# Patient Record
Sex: FEMALE | Race: WHITE | HISPANIC OR LATINO
[De-identification: names, ages, dates, MRNs, and addresses within clinical notes are randomized per-mention and may not be internally consistent; named-entity substitution may affect disease eponyms.]

---

## 2017-04-13 ENCOUNTER — APPOINTMENT (OUTPATIENT)
Dept: CARDIOLOGY | Facility: CLINIC | Age: 82
End: 2017-04-13

## 2017-04-13 VITALS — WEIGHT: 120 LBS | SYSTOLIC BLOOD PRESSURE: 110 MMHG | DIASTOLIC BLOOD PRESSURE: 60 MMHG | BODY MASS INDEX: 21.95 KG/M2

## 2017-09-25 ENCOUNTER — APPOINTMENT (OUTPATIENT)
Dept: CARDIOLOGY | Facility: CLINIC | Age: 82
End: 2017-09-25

## 2017-09-25 VITALS
BODY MASS INDEX: 20.8 KG/M2 | HEIGHT: 62 IN | WEIGHT: 113 LBS | HEART RATE: 82 BPM | DIASTOLIC BLOOD PRESSURE: 66 MMHG | SYSTOLIC BLOOD PRESSURE: 126 MMHG

## 2017-09-25 DIAGNOSIS — E11.9 TYPE 2 DIABETES MELLITUS W/OUT COMPLICATIONS: ICD-10-CM

## 2017-09-25 DIAGNOSIS — I10 ESSENTIAL (PRIMARY) HYPERTENSION: ICD-10-CM

## 2017-09-25 DIAGNOSIS — E03.9 HYPOTHYROIDISM, UNSPECIFIED: ICD-10-CM

## 2017-09-25 DIAGNOSIS — Z98.61 ATHEROSCLEROTIC HEART DISEASE OF NATIVE CORONARY ARTERY W/OUT ANGINA PECTORIS: ICD-10-CM

## 2017-09-25 DIAGNOSIS — I25.10 ATHEROSCLEROTIC HEART DISEASE OF NATIVE CORONARY ARTERY W/OUT ANGINA PECTORIS: ICD-10-CM

## 2017-10-13 ENCOUNTER — APPOINTMENT (OUTPATIENT)
Dept: CARDIOLOGY | Facility: CLINIC | Age: 82
End: 2017-10-13

## 2017-10-13 VITALS
DIASTOLIC BLOOD PRESSURE: 77 MMHG | WEIGHT: 113 LBS | HEIGHT: 62 IN | BODY MASS INDEX: 20.8 KG/M2 | HEART RATE: 62 BPM | SYSTOLIC BLOOD PRESSURE: 113 MMHG | OXYGEN SATURATION: 99 %

## 2017-12-14 ENCOUNTER — INPATIENT (INPATIENT)
Facility: HOSPITAL | Age: 82
LOS: 5 days | Discharge: HOME HEALTH | End: 2017-12-20
Attending: INTERNAL MEDICINE | Admitting: GENERAL PRACTICE

## 2017-12-14 DIAGNOSIS — I25.10 ATHEROSCLEROTIC HEART DISEASE OF NATIVE CORONARY ARTERY WITHOUT ANGINA PECTORIS: ICD-10-CM

## 2017-12-14 DIAGNOSIS — I50.1 LEFT VENTRICULAR FAILURE, UNSPECIFIED: ICD-10-CM

## 2017-12-14 DIAGNOSIS — M06.9 RHEUMATOID ARTHRITIS, UNSPECIFIED: ICD-10-CM

## 2017-12-14 DIAGNOSIS — R73.9 HYPERGLYCEMIA, UNSPECIFIED: ICD-10-CM

## 2017-12-14 DIAGNOSIS — E03.9 HYPOTHYROIDISM, UNSPECIFIED: ICD-10-CM

## 2017-12-14 DIAGNOSIS — I10 ESSENTIAL (PRIMARY) HYPERTENSION: ICD-10-CM

## 2017-12-14 DIAGNOSIS — E11.9 TYPE 2 DIABETES MELLITUS WITHOUT COMPLICATIONS: ICD-10-CM

## 2017-12-14 DIAGNOSIS — E78.5 HYPERLIPIDEMIA, UNSPECIFIED: ICD-10-CM

## 2017-12-22 DIAGNOSIS — Z79.84 LONG TERM (CURRENT) USE OF ORAL HYPOGLYCEMIC DRUGS: ICD-10-CM

## 2017-12-22 DIAGNOSIS — I42.8 OTHER CARDIOMYOPATHIES: ICD-10-CM

## 2017-12-22 DIAGNOSIS — Z79.4 LONG TERM (CURRENT) USE OF INSULIN: ICD-10-CM

## 2017-12-22 DIAGNOSIS — E87.1 HYPO-OSMOLALITY AND HYPONATREMIA: ICD-10-CM

## 2017-12-22 DIAGNOSIS — M06.9 RHEUMATOID ARTHRITIS, UNSPECIFIED: ICD-10-CM

## 2017-12-22 DIAGNOSIS — N17.9 ACUTE KIDNEY FAILURE, UNSPECIFIED: ICD-10-CM

## 2017-12-22 DIAGNOSIS — Z95.810 PRESENCE OF AUTOMATIC (IMPLANTABLE) CARDIAC DEFIBRILLATOR: ICD-10-CM

## 2017-12-22 DIAGNOSIS — E87.6 HYPOKALEMIA: ICD-10-CM

## 2017-12-22 DIAGNOSIS — I11.0 HYPERTENSIVE HEART DISEASE WITH HEART FAILURE: ICD-10-CM

## 2017-12-22 DIAGNOSIS — E03.9 HYPOTHYROIDISM, UNSPECIFIED: ICD-10-CM

## 2017-12-22 DIAGNOSIS — E83.39 OTHER DISORDERS OF PHOSPHORUS METABOLISM: ICD-10-CM

## 2017-12-22 DIAGNOSIS — I21.9 ACUTE MYOCARDIAL INFARCTION, UNSPECIFIED: ICD-10-CM

## 2017-12-22 DIAGNOSIS — I50.23 ACUTE ON CHRONIC SYSTOLIC (CONGESTIVE) HEART FAILURE: ICD-10-CM

## 2017-12-22 DIAGNOSIS — Z87.891 PERSONAL HISTORY OF NICOTINE DEPENDENCE: ICD-10-CM

## 2017-12-22 DIAGNOSIS — E86.0 DEHYDRATION: ICD-10-CM

## 2017-12-22 DIAGNOSIS — E11.10 TYPE 2 DIABETES MELLITUS WITH KETOACIDOSIS WITHOUT COMA: ICD-10-CM

## 2017-12-22 DIAGNOSIS — I25.10 ATHEROSCLEROTIC HEART DISEASE OF NATIVE CORONARY ARTERY WITHOUT ANGINA PECTORIS: ICD-10-CM

## 2017-12-22 DIAGNOSIS — J06.9 ACUTE UPPER RESPIRATORY INFECTION, UNSPECIFIED: ICD-10-CM

## 2017-12-22 DIAGNOSIS — E78.5 HYPERLIPIDEMIA, UNSPECIFIED: ICD-10-CM

## 2018-01-07 ENCOUNTER — INPATIENT (INPATIENT)
Facility: HOSPITAL | Age: 83
LOS: 4 days | Discharge: HOME | End: 2018-01-12
Attending: INTERNAL MEDICINE | Admitting: GENERAL PRACTICE

## 2018-01-07 DIAGNOSIS — M06.9 RHEUMATOID ARTHRITIS, UNSPECIFIED: ICD-10-CM

## 2018-01-07 DIAGNOSIS — E03.9 HYPOTHYROIDISM, UNSPECIFIED: ICD-10-CM

## 2018-01-07 DIAGNOSIS — R73.9 HYPERGLYCEMIA, UNSPECIFIED: ICD-10-CM

## 2018-01-07 DIAGNOSIS — I25.10 ATHEROSCLEROTIC HEART DISEASE OF NATIVE CORONARY ARTERY WITHOUT ANGINA PECTORIS: ICD-10-CM

## 2018-01-07 DIAGNOSIS — E11.9 TYPE 2 DIABETES MELLITUS WITHOUT COMPLICATIONS: ICD-10-CM

## 2018-01-07 DIAGNOSIS — E78.5 HYPERLIPIDEMIA, UNSPECIFIED: ICD-10-CM

## 2018-01-07 DIAGNOSIS — I10 ESSENTIAL (PRIMARY) HYPERTENSION: ICD-10-CM

## 2018-01-07 DIAGNOSIS — I50.1 LEFT VENTRICULAR FAILURE, UNSPECIFIED: ICD-10-CM

## 2018-01-18 ENCOUNTER — OUTPATIENT (OUTPATIENT)
Dept: OUTPATIENT SERVICES | Facility: HOSPITAL | Age: 83
LOS: 1 days | Discharge: HOME | End: 2018-01-18

## 2018-01-18 ENCOUNTER — APPOINTMENT (OUTPATIENT)
Dept: ENDOCRINOLOGY | Facility: CLINIC | Age: 83
End: 2018-01-18

## 2018-01-18 VITALS
TEMPERATURE: 97.2 F | HEIGHT: 62 IN | BODY MASS INDEX: 20.43 KG/M2 | HEART RATE: 88 BPM | WEIGHT: 111 LBS | SYSTOLIC BLOOD PRESSURE: 102 MMHG | DIASTOLIC BLOOD PRESSURE: 68 MMHG

## 2018-01-18 DIAGNOSIS — I25.10 ATHEROSCLEROTIC HEART DISEASE OF NATIVE CORONARY ARTERY WITHOUT ANGINA PECTORIS: ICD-10-CM

## 2018-01-18 DIAGNOSIS — I10 ESSENTIAL (PRIMARY) HYPERTENSION: ICD-10-CM

## 2018-01-18 DIAGNOSIS — E78.5 HYPERLIPIDEMIA, UNSPECIFIED: ICD-10-CM

## 2018-01-18 DIAGNOSIS — I50.1 LEFT VENTRICULAR FAILURE, UNSPECIFIED: ICD-10-CM

## 2018-01-18 DIAGNOSIS — E11.9 TYPE 2 DIABETES MELLITUS WITHOUT COMPLICATIONS: ICD-10-CM

## 2018-01-18 DIAGNOSIS — R73.9 HYPERGLYCEMIA, UNSPECIFIED: ICD-10-CM

## 2018-01-18 DIAGNOSIS — M06.9 RHEUMATOID ARTHRITIS, UNSPECIFIED: ICD-10-CM

## 2018-01-18 DIAGNOSIS — E03.9 HYPOTHYROIDISM, UNSPECIFIED: ICD-10-CM

## 2018-01-18 RX ORDER — ESCITALOPRAM OXALATE 10 MG/1
10 TABLET ORAL
Qty: 30 | Refills: 3 | Status: ACTIVE | COMMUNITY
Start: 2018-01-18

## 2018-01-19 LAB — GLUCOSE SERPL-MCNC: 175 MG/DL

## 2018-01-22 DIAGNOSIS — E03.9 HYPOTHYROIDISM, UNSPECIFIED: ICD-10-CM

## 2018-01-22 DIAGNOSIS — Z91.19 PATIENT'S NONCOMPLIANCE WITH OTHER MEDICAL TREATMENT AND REGIMEN: ICD-10-CM

## 2018-01-22 DIAGNOSIS — E11.59 TYPE 2 DIABETES MELLITUS WITH OTHER CIRCULATORY COMPLICATIONS: ICD-10-CM

## 2018-01-22 DIAGNOSIS — I13.0 HYPERTENSIVE HEART AND CHRONIC KIDNEY DISEASE WITH HEART FAILURE AND STAGE 1 THROUGH STAGE 4 CHRONIC KIDNEY DISEASE, OR UNSPECIFIED CHRONIC KIDNEY DISEASE: ICD-10-CM

## 2018-01-22 DIAGNOSIS — Z79.82 LONG TERM (CURRENT) USE OF ASPIRIN: ICD-10-CM

## 2018-01-22 DIAGNOSIS — Z90.710 ACQUIRED ABSENCE OF BOTH CERVIX AND UTERUS: ICD-10-CM

## 2018-01-22 DIAGNOSIS — I50.22 CHRONIC SYSTOLIC (CONGESTIVE) HEART FAILURE: ICD-10-CM

## 2018-01-22 DIAGNOSIS — R41.0 DISORIENTATION, UNSPECIFIED: ICD-10-CM

## 2018-01-22 DIAGNOSIS — Z79.4 LONG TERM (CURRENT) USE OF INSULIN: ICD-10-CM

## 2018-01-22 DIAGNOSIS — M06.9 RHEUMATOID ARTHRITIS, UNSPECIFIED: ICD-10-CM

## 2018-01-22 DIAGNOSIS — B96.20 UNSPECIFIED ESCHERICHIA COLI [E. COLI] AS THE CAUSE OF DISEASES CLASSIFIED ELSEWHERE: ICD-10-CM

## 2018-01-22 DIAGNOSIS — E11.65 TYPE 2 DIABETES MELLITUS WITH HYPERGLYCEMIA: ICD-10-CM

## 2018-01-22 DIAGNOSIS — E78.5 HYPERLIPIDEMIA, UNSPECIFIED: ICD-10-CM

## 2018-01-22 DIAGNOSIS — F03.90 UNSPECIFIED DEMENTIA WITHOUT BEHAVIORAL DISTURBANCE: ICD-10-CM

## 2018-01-22 DIAGNOSIS — Z87.891 PERSONAL HISTORY OF NICOTINE DEPENDENCE: ICD-10-CM

## 2018-01-22 DIAGNOSIS — H40.9 UNSPECIFIED GLAUCOMA: ICD-10-CM

## 2018-01-22 DIAGNOSIS — F32.9 MAJOR DEPRESSIVE DISORDER, SINGLE EPISODE, UNSPECIFIED: ICD-10-CM

## 2018-01-22 DIAGNOSIS — E11.22 TYPE 2 DIABETES MELLITUS WITH DIABETIC CHRONIC KIDNEY DISEASE: ICD-10-CM

## 2018-01-22 DIAGNOSIS — Z95.810 PRESENCE OF AUTOMATIC (IMPLANTABLE) CARDIAC DEFIBRILLATOR: ICD-10-CM

## 2018-01-22 DIAGNOSIS — E87.1 HYPO-OSMOLALITY AND HYPONATREMIA: ICD-10-CM

## 2018-01-22 DIAGNOSIS — N39.0 URINARY TRACT INFECTION, SITE NOT SPECIFIED: ICD-10-CM

## 2018-01-22 DIAGNOSIS — N18.4 CHRONIC KIDNEY DISEASE, STAGE 4 (SEVERE): ICD-10-CM

## 2018-01-22 DIAGNOSIS — Z95.0 PRESENCE OF CARDIAC PACEMAKER: ICD-10-CM

## 2018-01-22 LAB — C PEPTIDE SERPL-MCNC: 1.04 NG/ML

## 2018-01-22 RX ORDER — INSULIN LISPRO 100 [IU]/ML
100 INJECTION, SOLUTION INTRAVENOUS; SUBCUTANEOUS
Refills: 0 | Status: DISCONTINUED | COMMUNITY
Start: 2018-01-18 | End: 2018-01-22

## 2018-01-23 RX ORDER — PIOGLITAZONE HYDROCHLORIDE 30 MG/1
30 TABLET ORAL
Qty: 30 | Refills: 5 | Status: DISCONTINUED | COMMUNITY
Start: 2018-01-22 | End: 2018-01-23

## 2018-02-07 RX ORDER — INSULIN GLARGINE 100 [IU]/ML
100 INJECTION, SOLUTION SUBCUTANEOUS DAILY
Refills: 0 | Status: DISCONTINUED | COMMUNITY
Start: 2018-01-18 | End: 2018-02-07

## 2018-02-07 RX ORDER — GLIMEPIRIDE 2 MG/1
2 TABLET ORAL DAILY
Qty: 90 | Refills: 2 | Status: ACTIVE | COMMUNITY
Start: 1900-01-01 | End: 1900-01-01

## 2018-02-07 RX ORDER — BLOOD SUGAR DIAGNOSTIC
STRIP MISCELLANEOUS 3 TIMES DAILY
Qty: 300 | Refills: 0 | Status: ACTIVE | COMMUNITY
Start: 2018-02-07 | End: 1900-01-01

## 2018-02-07 RX ORDER — INSULIN GLARGINE 300 U/ML
300 INJECTION, SOLUTION SUBCUTANEOUS
Qty: 1 | Refills: 4 | Status: ACTIVE | COMMUNITY
Start: 2018-02-07 | End: 1900-01-01

## 2018-02-14 RX ORDER — BLOOD SUGAR DIAGNOSTIC
STRIP MISCELLANEOUS 3 TIMES DAILY
Qty: 1 | Refills: 5 | Status: ACTIVE | COMMUNITY
Start: 2018-02-14 | End: 1900-01-01

## 2018-02-14 RX ORDER — LANCETS 33 GAUGE
EACH MISCELLANEOUS
Qty: 100 | Refills: 5 | Status: ACTIVE | COMMUNITY
Start: 2018-02-14 | End: 1900-01-01

## 2018-04-13 ENCOUNTER — APPOINTMENT (OUTPATIENT)
Dept: CARDIOLOGY | Facility: CLINIC | Age: 83
End: 2018-04-13

## 2018-04-13 VITALS
HEART RATE: 57 BPM | WEIGHT: 112 LBS | DIASTOLIC BLOOD PRESSURE: 77 MMHG | SYSTOLIC BLOOD PRESSURE: 122 MMHG | HEIGHT: 62 IN | BODY MASS INDEX: 20.61 KG/M2 | OXYGEN SATURATION: 96 %

## 2018-04-30 ENCOUNTER — INPATIENT (INPATIENT)
Facility: HOSPITAL | Age: 83
LOS: 16 days | Discharge: NURSING, REHAB OR LONG-TERM CARE | End: 2018-05-17
Attending: INTERNAL MEDICINE | Admitting: INTERNAL MEDICINE
Payer: MEDICARE

## 2018-04-30 VITALS
RESPIRATION RATE: 18 BRPM | DIASTOLIC BLOOD PRESSURE: 83 MMHG | SYSTOLIC BLOOD PRESSURE: 156 MMHG | HEART RATE: 78 BPM | OXYGEN SATURATION: 96 % | TEMPERATURE: 96 F

## 2018-04-30 LAB
ACETONE SERPL-MCNC: (no result)
ALBUMIN SERPL ELPH-MCNC: 3 G/DL — LOW (ref 3.5–5.2)
ALP SERPL-CCNC: 294 U/L — HIGH (ref 30–115)
ALT FLD-CCNC: 7 U/L — SIGNIFICANT CHANGE UP (ref 0–41)
ANION GAP SERPL CALC-SCNC: 24 MMOL/L — HIGH (ref 7–14)
APPEARANCE UR: (no result)
AST SERPL-CCNC: 10 U/L — SIGNIFICANT CHANGE UP (ref 0–41)
BACTERIA # UR AUTO: (no result) /HPF
BASE EXCESS BLDV CALC-SCNC: -13 MMOL/L — LOW (ref -2–2)
BASE EXCESS BLDV CALC-SCNC: -13.3 MMOL/L — LOW (ref -2–2)
BILIRUB SERPL-MCNC: 0.3 MG/DL — SIGNIFICANT CHANGE UP (ref 0.2–1.2)
BILIRUB UR-MCNC: NEGATIVE — SIGNIFICANT CHANGE UP
BUN SERPL-MCNC: 125 MG/DL — CRITICAL HIGH (ref 10–20)
CA-I SERPL-SCNC: 1.09 MMOL/L — LOW (ref 1.12–1.3)
CA-I SERPL-SCNC: 1.13 MMOL/L — SIGNIFICANT CHANGE UP (ref 1.12–1.3)
CALCIUM SERPL-MCNC: 8.1 MG/DL — LOW (ref 8.5–10.1)
CHLORIDE SERPL-SCNC: 90 MMOL/L — LOW (ref 98–110)
CO2 SERPL-SCNC: 11 MMOL/L — LOW (ref 17–32)
COLOR SPEC: YELLOW — SIGNIFICANT CHANGE UP
CREAT SERPL-MCNC: 5.8 MG/DL — CRITICAL HIGH (ref 0.7–1.5)
DIFF PNL FLD: (no result)
GAS PNL BLDV: 122 MMOL/L — LOW (ref 136–145)
GAS PNL BLDV: 124 MMOL/L — LOW (ref 136–145)
GAS PNL BLDV: SIGNIFICANT CHANGE UP
GAS PNL BLDV: SIGNIFICANT CHANGE UP
GLUCOSE SERPL-MCNC: 637 MG/DL — CRITICAL HIGH (ref 70–99)
GLUCOSE UR QL: >=1000 MG/DL
HCO3 BLDV-SCNC: 12 MMOL/L — LOW (ref 22–29)
HCO3 BLDV-SCNC: 13 MMOL/L — LOW (ref 22–29)
HCT VFR BLD CALC: 26.1 % — LOW (ref 37–47)
HCT VFR BLDA CALC: 39.9 % — SIGNIFICANT CHANGE UP (ref 34–44)
HCT VFR BLDA CALC: 46.8 % — HIGH (ref 34–44)
HGB BLD CALC-MCNC: 13 G/DL — LOW (ref 14–18)
HGB BLD CALC-MCNC: 15.3 G/DL — SIGNIFICANT CHANGE UP (ref 14–18)
HGB BLD-MCNC: 8.8 G/DL — LOW (ref 12–16)
KETONES UR-MCNC: (no result)
LACTATE BLDV-MCNC: 1.2 MMOL/L — SIGNIFICANT CHANGE UP (ref 0.5–1.6)
LACTATE BLDV-MCNC: 1.3 MMOL/L — SIGNIFICANT CHANGE UP (ref 0.5–1.6)
LEUKOCYTE ESTERASE UR-ACNC: (no result)
LIDOCAIN IGE QN: 51 U/L — SIGNIFICANT CHANGE UP (ref 7–60)
MCHC RBC-ENTMCNC: 25 PG — LOW (ref 27–31)
MCHC RBC-ENTMCNC: 33.7 G/DL — SIGNIFICANT CHANGE UP (ref 32–37)
MCV RBC AUTO: 74.1 FL — LOW (ref 81–99)
NITRITE UR-MCNC: NEGATIVE — SIGNIFICANT CHANGE UP
NRBC # BLD: 0 /100 WBCS — SIGNIFICANT CHANGE UP (ref 0–0)
PCO2 BLDV: 28 MMHG — LOW (ref 41–51)
PCO2 BLDV: 32 MMHG — LOW (ref 41–51)
PH BLDV: 7.22 — LOW (ref 7.26–7.43)
PH BLDV: 7.26 — SIGNIFICANT CHANGE UP (ref 7.26–7.43)
PH UR: 6 — SIGNIFICANT CHANGE UP (ref 5–8)
PLATELET # BLD AUTO: 381 K/UL — SIGNIFICANT CHANGE UP (ref 130–400)
PO2 BLDV: 35 MMHG — SIGNIFICANT CHANGE UP (ref 20–40)
PO2 BLDV: 40 MMHG — SIGNIFICANT CHANGE UP (ref 20–40)
POTASSIUM BLDV-SCNC: 5.6 MMOL/L — SIGNIFICANT CHANGE UP (ref 3.3–5.6)
POTASSIUM BLDV-SCNC: 6.4 MMOL/L — HIGH (ref 3.3–5.6)
POTASSIUM SERPL-MCNC: 7.3 MMOL/L — CRITICAL HIGH (ref 3.5–5)
POTASSIUM SERPL-SCNC: 7.3 MMOL/L — CRITICAL HIGH (ref 3.5–5)
PROT SERPL-MCNC: 5.4 G/DL — LOW (ref 6–8)
PROT UR-MCNC: (no result) MG/DL
RBC # BLD: 3.52 M/UL — LOW (ref 4.2–5.4)
RBC # FLD: 17 % — HIGH (ref 11.5–14.5)
RBC CASTS # UR COMP ASSIST: (no result) /HPF
SAO2 % BLDV: 58 % — SIGNIFICANT CHANGE UP
SAO2 % BLDV: 68 % — SIGNIFICANT CHANGE UP
SODIUM SERPL-SCNC: 125 MMOL/L — LOW (ref 135–146)
SP GR SPEC: 1.01 — SIGNIFICANT CHANGE UP (ref 1.01–1.03)
UROBILINOGEN FLD QL: 0.2 MG/DL — SIGNIFICANT CHANGE UP (ref 0.2–0.2)
WBC # BLD: 26.54 K/UL — HIGH (ref 4.8–10.8)
WBC # FLD AUTO: 26.54 K/UL — HIGH (ref 4.8–10.8)
WBC UR QL: (no result) /HPF

## 2018-04-30 RX ORDER — CEFEPIME 1 G/1
1000 INJECTION, POWDER, FOR SOLUTION INTRAMUSCULAR; INTRAVENOUS EVERY 12 HOURS
Qty: 0 | Refills: 0 | Status: DISCONTINUED | OUTPATIENT
Start: 2018-05-01 | End: 2018-05-01

## 2018-04-30 RX ORDER — CEFEPIME 1 G/1
1000 INJECTION, POWDER, FOR SOLUTION INTRAMUSCULAR; INTRAVENOUS ONCE
Qty: 0 | Refills: 0 | Status: COMPLETED | OUTPATIENT
Start: 2018-04-30 | End: 2018-04-30

## 2018-04-30 RX ORDER — CALCIUM GLUCONATE 100 MG/ML
1 VIAL (ML) INTRAVENOUS ONCE
Qty: 0 | Refills: 0 | Status: COMPLETED | OUTPATIENT
Start: 2018-04-30 | End: 2018-04-30

## 2018-04-30 RX ORDER — IPRATROPIUM/ALBUTEROL SULFATE 18-103MCG
3 AEROSOL WITH ADAPTER (GRAM) INHALATION
Qty: 0 | Refills: 0 | Status: COMPLETED | OUTPATIENT
Start: 2018-04-30 | End: 2018-04-30

## 2018-04-30 RX ORDER — SODIUM CHLORIDE 9 MG/ML
1000 INJECTION INTRAMUSCULAR; INTRAVENOUS; SUBCUTANEOUS ONCE
Qty: 0 | Refills: 0 | Status: COMPLETED | OUTPATIENT
Start: 2018-04-30 | End: 2018-04-30

## 2018-04-30 RX ORDER — GABAPENTIN 400 MG/1
100 CAPSULE ORAL ONCE
Qty: 0 | Refills: 0 | Status: COMPLETED | OUTPATIENT
Start: 2018-04-30 | End: 2018-04-30

## 2018-04-30 RX ORDER — CEFEPIME 1 G/1
INJECTION, POWDER, FOR SOLUTION INTRAMUSCULAR; INTRAVENOUS
Qty: 0 | Refills: 0 | Status: DISCONTINUED | OUTPATIENT
Start: 2018-04-30 | End: 2018-05-01

## 2018-04-30 RX ORDER — INSULIN HUMAN 100 [IU]/ML
5 INJECTION, SOLUTION SUBCUTANEOUS
Qty: 100 | Refills: 0 | Status: DISCONTINUED | OUTPATIENT
Start: 2018-04-30 | End: 2018-05-02

## 2018-04-30 RX ADMIN — CEFEPIME 100 MILLIGRAM(S): 1 INJECTION, POWDER, FOR SOLUTION INTRAMUSCULAR; INTRAVENOUS at 23:00

## 2018-04-30 RX ADMIN — SODIUM CHLORIDE 1000 MILLILITER(S): 9 INJECTION INTRAMUSCULAR; INTRAVENOUS; SUBCUTANEOUS at 16:23

## 2018-04-30 RX ADMIN — GABAPENTIN 100 MILLIGRAM(S): 400 CAPSULE ORAL at 17:26

## 2018-04-30 RX ADMIN — Medication 200 GRAM(S): at 20:34

## 2018-04-30 RX ADMIN — INSULIN HUMAN 5 UNIT(S)/HR: 100 INJECTION, SOLUTION SUBCUTANEOUS at 19:55

## 2018-04-30 RX ADMIN — Medication 3 MILLILITER(S): at 20:34

## 2018-04-30 RX ADMIN — Medication 3 MILLILITER(S): at 19:56

## 2018-04-30 RX ADMIN — Medication 3 MILLILITER(S): at 21:05

## 2018-04-30 NOTE — ED PROVIDER NOTE - MEDICAL DECISION MAKING DETAILS
82 yo F with h/o DM, HTN, HL, RA, hypothyroidism, CHF (EF 55% in 12/17)< s/p PPM, s/p AICD, hemorrhoids, p/w weakness and diarrhea. As per family, pt has been getting increasingly weak and tired at home. Pt initially noted with elevated glucose, hyperkalemia. Pt treated with insuline drip and hyperkalemia protocol. Repeat fingerstick shows slight improvement of glucose. VBG shows improvement of potassium. Pt given abx for CT finding of gangrenous cysitits. Urology aware and will f/u. Spoke with ICU fellow, (?) who approved pt to ICU under .

## 2018-04-30 NOTE — ED PROVIDER NOTE - NS ED ROS FT
Cardiac:  No chest pain, SOB   Respiratory:  No cough or respiratory distress. No hemoptysis. No history of asthma or RAD.  GI:  + diarrhea No nausea, vomiting, abdominal pain.  :  No dysuria, frequency or burning.  MS:  No myalgia, muscle weakness, joint pain or back pain.  Neuro:  No headache or weakness.  No LOC.  Skin:  No skin rash.   Endocrine: +diabetes  Except as documented in the HPI,  all other systems are negative.

## 2018-04-30 NOTE — ED PROVIDER NOTE - ATTENDING CONTRIBUTION TO CARE
82 yo F with h/o DM, HTN, HL, RA, hypothyroidism, CHF (EF 55% in 12/17)< s/p PPM, s/p AICD, hemorrhoids, p/w weakness and diarrhea. As per family, pt has been getting increasingly weak and tired at home. She developed profuse diarrhea x 2 days, having multiple episodes of watery, dark stool. Pt has not been able to get out of bed and has not been eating so not taking her meds. Pt states sx started after eating out. Pt states that she had similar sx in the past when she was admitted to the ICU for DKA. Pt denies any fever/chills, ha, chest pain, sob, constipation, dysuria. a/p: pt tachy, pt appears in mild distress, weak, +dry mucus membranes, g, ncat, perrla, norm cardiac exam, lungs cta b/l, no w/r/r, abd is soft and nt, no pedal edema, will check labs, vbg, ekg, cxr, ct a/p, ivf and reassess

## 2018-04-30 NOTE — ED ADULT NURSE NOTE - OBJECTIVE STATEMENT
Pt c/o weakness, b/l lower extremity pain and diarrhea x 2 days accompanied by hyperglycemia. as per daughter she checked her mothers fingerstick at it was "too high to read". Pt denies any chest pain, abd pain, recent travel, or recent course of antibiotic

## 2018-04-30 NOTE — ED PROVIDER NOTE - CARE PLAN
Principal Discharge DX:	DKA (diabetic ketoacidoses)  Secondary Diagnosis:	Cystitis  Secondary Diagnosis:	Hyperkalemia

## 2018-04-30 NOTE — ED PROVIDER NOTE - PROGRESS NOTE DETAILS
PH 7.2 FS undetecable on machine, probable DKA, 2n dliter of NS ordered and 2nd 18 gauge line placed, pending ct of abd and pelvis, movin gpt in room now pt found to have gangrenous cystitis on non-con CT, subspeciality PA Zenaida aware and will see pt, will order cefepime Repeat fingerstick shows slight improvement of glucose. VBG shows improvement of potassium. Pt given abx for CT finding of gangrenous cysitits. Urology aware and will f/u. Spoke with ICU fellow, (?) who approved pt to ICU under . Discussed case with ICU Resident Dr. Zelaya

## 2018-04-30 NOTE — ED PROVIDER NOTE - CRITICAL CARE PROVIDED
interpretation of diagnostic studies/consult w/ pt's family directly relating to pts condition/direct patient care (not related to procedure)/additional history taking/consultation with other physicians

## 2018-04-30 NOTE — ED PROVIDER NOTE - PHYSICAL EXAMINATION
VITAL SIGNS: I have reviewed nursing notes and confirm.  CONSTITUTIONAL: cachectic, chronically ill-appearing  SKIN: skin exam is warm and dry, no acute rash.    HEAD: Normocephalic; atraumatic.  EYES: conjunctiva and sclera clear.  ENT: No nasal discharge; airway clear..  CARD: S1, S2 normal; no murmurs, gallops, or rubs. Regular rate and rhythm.   RESP: No wheezes, rales or rhonchi.  ABD: Normal bowel sounds; soft; non-distended; non-tender  EXT: Normal ROM.  No clubbing, cyanosis or edema.   NEURO: Alert, oriented, grossly unremarkable

## 2018-05-01 DIAGNOSIS — Z95.810 PRESENCE OF AUTOMATIC (IMPLANTABLE) CARDIAC DEFIBRILLATOR: Chronic | ICD-10-CM

## 2018-05-01 LAB
AMYLASE P1 CFR SERPL: 41 U/L — SIGNIFICANT CHANGE UP (ref 25–115)
ANION GAP SERPL CALC-SCNC: 18 MMOL/L — HIGH (ref 7–14)
ANION GAP SERPL CALC-SCNC: 19 MMOL/L — HIGH (ref 7–14)
ANION GAP SERPL CALC-SCNC: 24 MMOL/L — HIGH (ref 7–14)
APPEARANCE UR: (no result)
BASOPHILS # BLD AUTO: 0.02 K/UL — SIGNIFICANT CHANGE UP (ref 0–0.2)
BASOPHILS NFR BLD AUTO: 0.1 % — SIGNIFICANT CHANGE UP (ref 0–1)
BILIRUB UR-MCNC: NEGATIVE — SIGNIFICANT CHANGE UP
BUN SERPL-MCNC: 100 MG/DL — CRITICAL HIGH (ref 10–20)
BUN SERPL-MCNC: 120 MG/DL — CRITICAL HIGH (ref 10–20)
BUN SERPL-MCNC: 122 MG/DL — CRITICAL HIGH (ref 10–20)
BUN SERPL-MCNC: 125 MG/DL — CRITICAL HIGH (ref 10–20)
BUN SERPL-MCNC: 127 MG/DL — CRITICAL HIGH (ref 10–20)
C DIFF BY PCR RESULT: NEGATIVE — SIGNIFICANT CHANGE UP
C DIFF TOX GENS STL QL NAA+PROBE: SIGNIFICANT CHANGE UP
CALCIUM SERPL-MCNC: 6.1 MG/DL — LOW (ref 8.5–10.1)
CALCIUM SERPL-MCNC: 7.4 MG/DL — LOW (ref 8.5–10.1)
CALCIUM SERPL-MCNC: 7.5 MG/DL — LOW (ref 8.5–10.1)
CALCIUM SERPL-MCNC: 7.9 MG/DL — LOW (ref 8.5–10.1)
CALCIUM SERPL-MCNC: 8 MG/DL — LOW (ref 8.5–10.1)
CHLORIDE SERPL-SCNC: 100 MMOL/L — SIGNIFICANT CHANGE UP (ref 98–110)
CHLORIDE SERPL-SCNC: 100 MMOL/L — SIGNIFICANT CHANGE UP (ref 98–110)
CHLORIDE SERPL-SCNC: 108 MMOL/L — SIGNIFICANT CHANGE UP (ref 98–110)
CHLORIDE SERPL-SCNC: 96 MMOL/L — LOW (ref 98–110)
CHLORIDE SERPL-SCNC: 99 MMOL/L — SIGNIFICANT CHANGE UP (ref 98–110)
CK SERPL-CCNC: 12 U/L — SIGNIFICANT CHANGE UP (ref 0–225)
CO2 SERPL-SCNC: 10 MMOL/L — LOW (ref 17–32)
CO2 SERPL-SCNC: 12 MMOL/L — LOW (ref 17–32)
CO2 SERPL-SCNC: 13 MMOL/L — LOW (ref 17–32)
CO2 SERPL-SCNC: 14 MMOL/L — LOW (ref 17–32)
CO2 SERPL-SCNC: 15 MMOL/L — LOW (ref 17–32)
COLOR SPEC: YELLOW — SIGNIFICANT CHANGE UP
CREAT ?TM UR-MCNC: 56 MG/DL — SIGNIFICANT CHANGE UP
CREAT SERPL-MCNC: 4.3 MG/DL — CRITICAL HIGH (ref 0.7–1.5)
CREAT SERPL-MCNC: 5.9 MG/DL — CRITICAL HIGH (ref 0.7–1.5)
CREAT SERPL-MCNC: 5.9 MG/DL — CRITICAL HIGH (ref 0.7–1.5)
CREAT SERPL-MCNC: 6.1 MG/DL — CRITICAL HIGH (ref 0.7–1.5)
CREAT SERPL-MCNC: 6.1 MG/DL — CRITICAL HIGH (ref 0.7–1.5)
DIFF PNL FLD: (no result)
EOSINOPHIL # BLD AUTO: 0.11 K/UL — SIGNIFICANT CHANGE UP (ref 0–0.7)
EOSINOPHIL NFR BLD AUTO: 0.6 % — SIGNIFICANT CHANGE UP (ref 0–8)
GLUCOSE SERPL-MCNC: 116 MG/DL — HIGH (ref 70–99)
GLUCOSE SERPL-MCNC: 147 MG/DL — HIGH (ref 70–99)
GLUCOSE SERPL-MCNC: 227 MG/DL — HIGH (ref 70–99)
GLUCOSE SERPL-MCNC: 326 MG/DL — HIGH (ref 70–99)
GLUCOSE SERPL-MCNC: SIGNIFICANT CHANGE UP MG/DL (ref 70–99)
GLUCOSE UR QL: 250 MG/DL
HCT VFR BLD CALC: 23.5 % — LOW (ref 37–47)
HGB BLD-MCNC: 7.9 G/DL — LOW (ref 12–16)
IMM GRANULOCYTES NFR BLD AUTO: 0.9 % — HIGH (ref 0.1–0.3)
KETONES UR-MCNC: NEGATIVE — SIGNIFICANT CHANGE UP
LEUKOCYTE ESTERASE UR-ACNC: (no result)
LIDOCAIN IGE QN: 16 U/L — SIGNIFICANT CHANGE UP (ref 7–60)
LYMPHOCYTES # BLD AUTO: 0.76 K/UL — LOW (ref 1.2–3.4)
LYMPHOCYTES # BLD AUTO: 3.8 % — LOW (ref 20.5–51.1)
MAGNESIUM SERPL-MCNC: 2.2 MG/DL — SIGNIFICANT CHANGE UP (ref 1.8–2.4)
MCHC RBC-ENTMCNC: 24.5 PG — LOW (ref 27–31)
MCHC RBC-ENTMCNC: 33.6 G/DL — SIGNIFICANT CHANGE UP (ref 32–37)
MCV RBC AUTO: 73 FL — LOW (ref 81–99)
MONOCYTES # BLD AUTO: 0.66 K/UL — HIGH (ref 0.1–0.6)
MONOCYTES NFR BLD AUTO: 3.3 % — SIGNIFICANT CHANGE UP (ref 1.7–9.3)
NEUTROPHILS # BLD AUTO: 18.19 K/UL — HIGH (ref 1.4–6.5)
NEUTROPHILS NFR BLD AUTO: 91.3 % — HIGH (ref 42.2–75.2)
NITRITE UR-MCNC: NEGATIVE — SIGNIFICANT CHANGE UP
OSMOLALITY UR: 367 MOS/KG — SIGNIFICANT CHANGE UP (ref 50–1400)
PH UR: 5.5 — SIGNIFICANT CHANGE UP (ref 5–8)
PHOSPHATE SERPL-MCNC: 5.4 MG/DL — HIGH (ref 2.1–4.9)
PLATELET # BLD AUTO: 358 K/UL — SIGNIFICANT CHANGE UP (ref 130–400)
POTASSIUM SERPL-MCNC: 4.5 MMOL/L — SIGNIFICANT CHANGE UP (ref 3.5–5)
POTASSIUM SERPL-MCNC: 5.5 MMOL/L — HIGH (ref 3.5–5)
POTASSIUM SERPL-MCNC: 5.9 MMOL/L — HIGH (ref 3.5–5)
POTASSIUM SERPL-MCNC: 5.9 MMOL/L — HIGH (ref 3.5–5)
POTASSIUM SERPL-MCNC: 6.1 MMOL/L — CRITICAL HIGH (ref 3.5–5)
POTASSIUM SERPL-SCNC: 4.5 MMOL/L — SIGNIFICANT CHANGE UP (ref 3.5–5)
POTASSIUM SERPL-SCNC: 5.5 MMOL/L — HIGH (ref 3.5–5)
POTASSIUM SERPL-SCNC: 5.9 MMOL/L — HIGH (ref 3.5–5)
POTASSIUM SERPL-SCNC: 5.9 MMOL/L — HIGH (ref 3.5–5)
POTASSIUM SERPL-SCNC: 6.1 MMOL/L — CRITICAL HIGH (ref 3.5–5)
PROT UR-MCNC: 100 MG/DL
RBC # BLD: 3.22 M/UL — LOW (ref 4.2–5.4)
RBC # FLD: 16.6 % — HIGH (ref 11.5–14.5)
SODIUM SERPL-SCNC: 130 MMOL/L — LOW (ref 135–146)
SODIUM SERPL-SCNC: 133 MMOL/L — LOW (ref 135–146)
SODIUM SERPL-SCNC: 133 MMOL/L — LOW (ref 135–146)
SODIUM SERPL-SCNC: 134 MMOL/L — LOW (ref 135–146)
SODIUM SERPL-SCNC: 136 MMOL/L — SIGNIFICANT CHANGE UP (ref 135–146)
SODIUM UR-SCNC: 71 MMOL/L — SIGNIFICANT CHANGE UP
SP GR SPEC: 1.01 — SIGNIFICANT CHANGE UP (ref 1.01–1.03)
TROPONIN T SERPL-MCNC: <0.01 NG/ML — SIGNIFICANT CHANGE UP
UROBILINOGEN FLD QL: 0.2 MG/DL — SIGNIFICANT CHANGE UP (ref 0.2–0.2)
WBC # BLD: 19.91 K/UL — HIGH (ref 4.8–10.8)
WBC # FLD AUTO: 19.91 K/UL — HIGH (ref 4.8–10.8)

## 2018-05-01 RX ORDER — INSULIN HUMAN 100 [IU]/ML
6 INJECTION, SOLUTION SUBCUTANEOUS ONCE
Qty: 0 | Refills: 0 | Status: COMPLETED | OUTPATIENT
Start: 2018-05-01 | End: 2018-05-01

## 2018-05-01 RX ORDER — LEVOTHYROXINE SODIUM 125 MCG
100 TABLET ORAL DAILY
Qty: 0 | Refills: 0 | Status: DISCONTINUED | OUTPATIENT
Start: 2018-05-01 | End: 2018-05-17

## 2018-05-01 RX ORDER — DEXTROSE 50 % IN WATER 50 %
25 SYRINGE (ML) INTRAVENOUS ONCE
Qty: 0 | Refills: 0 | Status: COMPLETED | OUTPATIENT
Start: 2018-05-01 | End: 2018-05-01

## 2018-05-01 RX ORDER — SODIUM CHLORIDE 9 MG/ML
1000 INJECTION, SOLUTION INTRAVENOUS
Qty: 0 | Refills: 0 | Status: DISCONTINUED | OUTPATIENT
Start: 2018-05-01 | End: 2018-05-01

## 2018-05-01 RX ORDER — SODIUM CHLORIDE 9 MG/ML
500 INJECTION INTRAMUSCULAR; INTRAVENOUS; SUBCUTANEOUS ONCE
Qty: 0 | Refills: 0 | Status: DISCONTINUED | OUTPATIENT
Start: 2018-05-01 | End: 2018-05-01

## 2018-05-01 RX ORDER — CEFEPIME 1 G/1
1000 INJECTION, POWDER, FOR SOLUTION INTRAMUSCULAR; INTRAVENOUS DAILY
Qty: 0 | Refills: 0 | Status: DISCONTINUED | OUTPATIENT
Start: 2018-05-01 | End: 2018-05-01

## 2018-05-01 RX ORDER — SODIUM CHLORIDE 9 MG/ML
500 INJECTION, SOLUTION INTRAVENOUS ONCE
Qty: 0 | Refills: 0 | Status: COMPLETED | OUTPATIENT
Start: 2018-05-01 | End: 2018-05-01

## 2018-05-01 RX ORDER — SODIUM CHLORIDE 9 MG/ML
1000 INJECTION INTRAMUSCULAR; INTRAVENOUS; SUBCUTANEOUS
Qty: 0 | Refills: 0 | Status: DISCONTINUED | OUTPATIENT
Start: 2018-05-01 | End: 2018-05-01

## 2018-05-01 RX ORDER — ATORVASTATIN CALCIUM 80 MG/1
10 TABLET, FILM COATED ORAL AT BEDTIME
Qty: 0 | Refills: 0 | Status: DISCONTINUED | OUTPATIENT
Start: 2018-05-01 | End: 2018-05-17

## 2018-05-01 RX ORDER — METOPROLOL TARTRATE 50 MG
50 TABLET ORAL DAILY
Qty: 0 | Refills: 0 | Status: DISCONTINUED | OUTPATIENT
Start: 2018-05-01 | End: 2018-05-17

## 2018-05-01 RX ORDER — ESCITALOPRAM OXALATE 10 MG/1
10 TABLET, FILM COATED ORAL DAILY
Qty: 0 | Refills: 0 | Status: DISCONTINUED | OUTPATIENT
Start: 2018-05-01 | End: 2018-05-17

## 2018-05-01 RX ORDER — HEPARIN SODIUM 5000 [USP'U]/ML
5000 INJECTION INTRAVENOUS; SUBCUTANEOUS EVERY 12 HOURS
Qty: 0 | Refills: 0 | Status: DISCONTINUED | OUTPATIENT
Start: 2018-05-01 | End: 2018-05-17

## 2018-05-01 RX ORDER — MEROPENEM 1 G/30ML
500 INJECTION INTRAVENOUS EVERY 24 HOURS
Qty: 0 | Refills: 0 | Status: DISCONTINUED | OUTPATIENT
Start: 2018-05-01 | End: 2018-05-07

## 2018-05-01 RX ORDER — SODIUM POLYSTYRENE SULFONATE 4.1 MEQ/G
15 POWDER, FOR SUSPENSION ORAL ONCE
Qty: 0 | Refills: 0 | Status: COMPLETED | OUTPATIENT
Start: 2018-05-01 | End: 2018-05-01

## 2018-05-01 RX ORDER — SODIUM CHLORIDE 9 MG/ML
1000 INJECTION, SOLUTION INTRAVENOUS
Qty: 0 | Refills: 0 | Status: DISCONTINUED | OUTPATIENT
Start: 2018-05-01 | End: 2018-05-02

## 2018-05-01 RX ADMIN — SODIUM POLYSTYRENE SULFONATE 15 GRAM(S): 4.1 POWDER, FOR SUSPENSION ORAL at 07:47

## 2018-05-01 RX ADMIN — SODIUM CHLORIDE 666.67 MILLILITER(S): 9 INJECTION, SOLUTION INTRAVENOUS at 11:18

## 2018-05-01 RX ADMIN — HEPARIN SODIUM 5000 UNIT(S): 5000 INJECTION INTRAVENOUS; SUBCUTANEOUS at 17:11

## 2018-05-01 RX ADMIN — ESCITALOPRAM OXALATE 10 MILLIGRAM(S): 10 TABLET, FILM COATED ORAL at 14:44

## 2018-05-01 RX ADMIN — MEROPENEM 100 MILLIGRAM(S): 1 INJECTION INTRAVENOUS at 11:17

## 2018-05-01 RX ADMIN — Medication 25 MILLILITER(S): at 11:18

## 2018-05-01 RX ADMIN — INSULIN HUMAN 5 UNIT(S)/HR: 100 INJECTION, SOLUTION SUBCUTANEOUS at 00:47

## 2018-05-01 RX ADMIN — SODIUM CHLORIDE 100 MILLILITER(S): 9 INJECTION, SOLUTION INTRAVENOUS at 19:42

## 2018-05-01 RX ADMIN — ATORVASTATIN CALCIUM 10 MILLIGRAM(S): 80 TABLET, FILM COATED ORAL at 21:59

## 2018-05-01 RX ADMIN — Medication 100 MICROGRAM(S): at 07:48

## 2018-05-01 NOTE — H&P ADULT - NSHPPHYSICALEXAM_GEN_ALL_CORE
GENERAL: in mild distress, appears dry. pt is drowsy but able to wake up and answers questions appropriately  HEAD:  Atraumatic, Normocephalic  EYES: EOMI, PERRLA, conjunctiva and sclera clear  NECK: Supple, No JVD  CHEST/LUNG: Clear to auscultation bilaterally; No wheeze  HEART: Regular rate and rhythm; No murmurs, rubs, or gallops  ABDOMEN: Soft, Nontender, Nondistended; Bowel sounds present  EXTREMITIES: No clubbing, cyanosis, or edema  NEUROLOGY: non-focal

## 2018-05-01 NOTE — H&P ADULT - NSHPLABSRESULTS_GEN_ALL_CORE
T(C): 36.4 (18 @ 02:30), Max: 36.4 (18 @ 00:17)  T(F): 97.6 (18 @ 02:30), Max: 97.6 (18 @ 00:17)  HR: 64 (18 @ 03:30) (64 - 82)  BP: 99/47 (18 @ 03:30) (97/47 - 156/83)  RR: 15 (18 @ 03:30) (14 - 18)  SpO2: 97% (18 @ 03:30) (95% - 99%)  Labs:                         8.8<L>  26.54<H>   )-----------(   381      ( 2018 16:59 )              26.1<L>    Neutro%  x       Lympho%  x       Mono%    x       Bands    x            125<L>  |  90<L>  |  125<HH>  ----------------------------<  637<HH>  7.3<HH>   |  11<L>  |  5.8<HH>    Ca    8.1<L>      2018 16:59    TPro  5.4<L>  /  Alb  3.0<L>  /  TBili  0.3  /  DBili  x   /  AST  10  /  ALT  7   /  AlkPhos  294<H>      eGFR if Non African American: 6 mL/min/1.73M2 (18 @ 16:59)  eGFR if : 7 mL/min/1.73M2 (18 @ 16:59)      Urinalysis Basic - ( 2018 21:29 )    Color: Yellow / Appearance: Cloudy / S.015 / pH: x  Gluc: x / Ketone: Trace  / Bili: Negative / Urobili: 0.2 mg/dL   Blood: x / Protein: Trace mg/dL / Nitrite: Negative   Leuk Esterase: Small / RBC: 2-5 /HPF / WBC 6-10 /HPF   Sq Epi: x / Non Sq Epi: x / Bacteria: Many /HPF    Venous Blood Gas:   @ 21:51  7.26/28/40/12/68  VBG Lactate: 1.3  Venous Blood Gas:   @ 16:40  7.22/32/35//58  VBG Lactate: 1.2

## 2018-05-01 NOTE — SWALLOW BEDSIDE ASSESSMENT ADULT - COMMENTS
+toleration w/o any overt s/s penetration/aspiration Mild oral dysphagia w/o any overt s/s of penetration/aspiration

## 2018-05-01 NOTE — CONSULT NOTE ADULT - SUBJECTIVE AND OBJECTIVE BOX
Patient is a 83y old  Female who presents with a chief complaint of DKA (01 May 2018 04:15)      Ovenight events:    PAST MEDICAL & SURGICAL HISTORY:  Neuropathy  Diabetes  Hypothyroidism  HTN (hypertension)  CHF (congestive heart failure)  AICD (automatic cardioverter/defibrillator) present        FAMILY HISTORY:  No pertinent family history in first degree relatives        Allergies    Plavix (Other (Moderate))    Intolerances        atorvastatin 10 milliGRAM(s) Oral at bedtime  dextrose 5% + sodium chloride 0.9%. 1000 milliLiter(s) IV Continuous <Continuous>  dextrose 50% Injectable 25 milliLiter(s) IV Push once  escitalopram 10 milliGRAM(s) Oral daily  insulin Infusion 5 Unit(s)/Hr IV Continuous <Continuous>  lactated ringers Bolus 500 milliLiter(s) IV Bolus once  levothyroxine 100 MICROGram(s) Oral daily  meropenem  IVPB 500 milliGRAM(s) IV Intermittent every 24 hours  metoprolol succinate ER 50 milliGRAM(s) Oral daily  sodium chloride 0.9% Bolus 500 milliLiter(s) IV Bolus once  sodium chloride 0.9% Bolus 500 milliLiter(s) IV Bolus once  : Home Meds:      PHYSICAL EXAM    ICU Vital Signs Last 24 Hrs  T(C): 36.7 (01 May 2018 08:00), Max: 36.7 (01 May 2018 08:00)  T(F): 98 (01 May 2018 08:00), Max: 98 (01 May 2018 08:00)  HR: 74 (01 May 2018 08:00) (58 - 82)  BP: 118/54 (01 May 2018 08:00) (78/46 - 156/83)  BP(mean): 75 (01 May 2018 08:00) (58 - 80)  ABP: --  ABP(mean): --  RR: 15 (01 May 2018 08:00) (14 - 18)  SpO2: 92% (01 May 2018 08:00) (92% - 99%)      General:  HEENT:               Lymph Nodes: No cervical LN   Lungs: Bilateral BS  Cardiovascular: Regular  Abdomen: Soft, Positive BS  Extremities: No clubbing  Skin: Warm  Neurological: Non focal       18 @ 07:01  -  18 @ 07:00  --------------------------------------------------------  IN:    dextrose 5% + sodium chloride 0.9%.: 75 mL    insulin Infusion: 33 mL    IV PiggyBack: 20 mL    sodium chloride 0.9%: 150 mL    Sodium Chloride 0.9% IV Bolus: 1000 mL  Total IN: 1278 mL    OUT:    Voided: 730 mL  Total OUT: 730 mL    Total NET: 548 mL          LABS:                          7.9    19.91 )-----------( 358      ( 01 May 2018 04:17 )             23.5                                               05-01    133<L>  |  96<L>  |  127<HH>  ----------------------------<  227<H>  5.9<H>   |  13<L>  |  6.1<HH>    Ca    7.9<L>      01 May 2018 04:17    TPro  5.4<L>  /  Alb  3.0<L>  /  TBili  0.3  /  DBili  x   /  AST  10  /  ALT  7   /  AlkPhos  294<H>                                               Urinalysis Basic - ( 01 May 2018 08:59 )    Color: Yellow / Appearance: Turbid / S.015 / pH: x  Gluc: x / Ketone: Negative  / Bili: Negative / Urobili: 0.2 mg/dL   Blood: x / Protein: 100 mg/dL / Nitrite: Negative   Leuk Esterase: Large / RBC: >50 /HPF / WBC >50 /HPF   Sq Epi: x / Non Sq Epi: x / Bacteria: Many /HPF                                                  LIVER FUNCTIONS - ( 2018 16:59 )  Alb: 3.0 g/dL / Pro: 5.4 g/dL / ALK PHOS: 294 U/L / ALT: 7 U/L / AST: 10 U/L / GGT: x                                                                                                                                       X-Rays:             < from: CT Abdomen and Pelvis No Cont (18 @ 20:21) >  PELVIC ORGANS: There is intramural gas within the bladder wall. There is   also intraluminal bladder bowel gas.    < end of copied text >                                                                                 ECHO:    MEDICATIONS  (STANDING):  atorvastatin 10 milliGRAM(s) Oral at bedtime  dextrose 5% + sodium chloride 0.9%. 1000 milliLiter(s) (75 mL/Hr) IV Continuous <Continuous>  dextrose 50% Injectable 25 milliLiter(s) IV Push once  escitalopram 10 milliGRAM(s) Oral daily  insulin Infusion 5 Unit(s)/Hr (5 mL/Hr) IV Continuous <Continuous>  lactated ringers Bolus 500 milliLiter(s) IV Bolus once  levothyroxine 100 MICROGram(s) Oral daily  meropenem  IVPB 500 milliGRAM(s) IV Intermittent every 24 hours  metoprolol succinate ER 50 milliGRAM(s) Oral daily  sodium chloride 0.9% Bolus 500 milliLiter(s) IV Bolus once  sodium chloride 0.9% Bolus 500 milliLiter(s) IV Bolus once    MEDICATIONS  (PRN): 84 yo F with h/o DM, HTN, HL, RA, hypothyroidism, CHF (EF 55% in )< s/p PPM, s/p AICD, hemorrhoids, p/w weakness and diarrhea. As per family, pt has been getting increasingly weak and tired at home. Pt initially noted with elevated glucose, hyperkalemia. Pt treated with insuline drip and hyperkalemia protocol. Repeat fingerstick shows slight improvement of glucose. VBG shows improvement of potassium. Pt given abx for CT finding of gangrenous cysitits. Urology c/s recommended medical management for the current time.       Ovenight events:    PAST MEDICAL & SURGICAL HISTORY:  Neuropathy  Diabetes  Hypothyroidism  HTN (hypertension)  CHF (congestive heart failure)  AICD (automatic cardioverter/defibrillator) present        FAMILY HISTORY:  No pertinent family history in first degree relatives        Allergies    Plavix (Other (Moderate))    Intolerances        atorvastatin 10 milliGRAM(s) Oral at bedtime  dextrose 5% + sodium chloride 0.9%. 1000 milliLiter(s) IV Continuous <Continuous>  dextrose 50% Injectable 25 milliLiter(s) IV Push once  escitalopram 10 milliGRAM(s) Oral daily  insulin Infusion 5 Unit(s)/Hr IV Continuous <Continuous>  lactated ringers Bolus 500 milliLiter(s) IV Bolus once  levothyroxine 100 MICROGram(s) Oral daily  meropenem  IVPB 500 milliGRAM(s) IV Intermittent every 24 hours  metoprolol succinate ER 50 milliGRAM(s) Oral daily  sodium chloride 0.9% Bolus 500 milliLiter(s) IV Bolus once  sodium chloride 0.9% Bolus 500 milliLiter(s) IV Bolus once  : Home Meds:      PHYSICAL EXAM    ICU Vital Signs Last 24 Hrs  T(C): 36.7 (01 May 2018 08:00), Max: 36.7 (01 May 2018 08:00)  T(F): 98 (01 May 2018 08:00), Max: 98 (01 May 2018 08:00)  HR: 74 (01 May 2018 08:00) (58 - 82)  BP: 118/54 (01 May 2018 08:00) (78/46 - 156/83)  BP(mean): 75 (01 May 2018 08:00) (58 - 80)  ABP: --  ABP(mean): --  RR: 15 (01 May 2018 08:00) (14 - 18)  SpO2: 92% (01 May 2018 08:00) (92% - 99%)      General:  HEENT:               Lymph Nodes: No cervical LN   Lungs: Bilateral BS  Cardiovascular: Regular  Abdomen: Soft, Positive BS  Extremities: No clubbing  Skin: Warm  Neurological: Non focal       18 @ 07:01  -  - @ 07:00  --------------------------------------------------------  IN:    dextrose 5% + sodium chloride 0.9%.: 75 mL    insulin Infusion: 33 mL    IV PiggyBack: 20 mL    sodium chloride 0.9%: 150 mL    Sodium Chloride 0.9% IV Bolus: 1000 mL  Total IN: 1278 mL    OUT:    Voided: 730 mL  Total OUT: 730 mL    Total NET: 548 mL          LABS:                          7.9    19.91 )-----------( 358      ( 01 May 2018 04:17 )             23.5                                               05-01    133<L>  |  96<L>  |  127<HH>  ----------------------------<  227<H>  5.9<H>   |  13<L>  |  6.1<HH>    Ca    7.9<L>      01 May 2018 04:17    TPro  5.4<L>  /  Alb  3.0<L>  /  TBili  0.3  /  DBili  x   /  AST  10  /  ALT  7   /  AlkPhos  294<H>                                               Urinalysis Basic - ( 01 May 2018 08:59 )    Color: Yellow / Appearance: Turbid / S.015 / pH: x  Gluc: x / Ketone: Negative  / Bili: Negative / Urobili: 0.2 mg/dL   Blood: x / Protein: 100 mg/dL / Nitrite: Negative   Leuk Esterase: Large / RBC: >50 /HPF / WBC >50 /HPF   Sq Epi: x / Non Sq Epi: x / Bacteria: Many /HPF                                                  LIVER FUNCTIONS - ( 2018 16:59 )  Alb: 3.0 g/dL / Pro: 5.4 g/dL / ALK PHOS: 294 U/L / ALT: 7 U/L / AST: 10 U/L / GGT: x                                                                                                                                       X-Rays:             < from: CT Abdomen and Pelvis No Cont (18 @ 20:21) >  PELVIC ORGANS: There is intramural gas within the bladder wall. There is   also intraluminal bladder bowel gas.    < end of copied text >                                                                                 ECHO:    MEDICATIONS  (STANDING):  atorvastatin 10 milliGRAM(s) Oral at bedtime  dextrose 5% + sodium chloride 0.9%. 1000 milliLiter(s) (75 mL/Hr) IV Continuous <Continuous>  dextrose 50% Injectable 25 milliLiter(s) IV Push once  escitalopram 10 milliGRAM(s) Oral daily  insulin Infusion 5 Unit(s)/Hr (5 mL/Hr) IV Continuous <Continuous>  lactated ringers Bolus 500 milliLiter(s) IV Bolus once  levothyroxine 100 MICROGram(s) Oral daily  meropenem  IVPB 500 milliGRAM(s) IV Intermittent every 24 hours  metoprolol succinate ER 50 milliGRAM(s) Oral daily  sodium chloride 0.9% Bolus 500 milliLiter(s) IV Bolus once  sodium chloride 0.9% Bolus 500 milliLiter(s) IV Bolus once    MEDICATIONS  (PRN): 84 yo F with h/o DM, HTN, HL, RA, hypothyroidism, CHF (EF 55% in )< s/p PPM, s/p AICD, hemorrhoids, p/w weakness and diarrhea. As per family, pt has been getting increasingly weak and tired at home. Pt initially noted with elevated glucose, hyperkalemia. Pt treated with insuline drip and hyperkalemia protocol. Repeat fingerstick shows slight improvement of glucose. VBG shows improvement of potassium. Pt given abx for CT finding of gangrenous cysitits. Urology c/s recommended medical management for the current time.         PAST MEDICAL & SURGICAL HISTORY:  Neuropathy  Diabetes  Hypothyroidism  HTN (hypertension)  CHF (congestive heart failure)  AICD (automatic cardioverter/defibrillator) present        FAMILY HISTORY:  No pertinent family history in first degree relatives        Allergies    Plavix (Other (Moderate))    Intolerances        atorvastatin 10 milliGRAM(s) Oral at bedtime  dextrose 5% + sodium chloride 0.9%. 1000 milliLiter(s) IV Continuous <Continuous>  dextrose 50% Injectable 25 milliLiter(s) IV Push once  escitalopram 10 milliGRAM(s) Oral daily  insulin Infusion 5 Unit(s)/Hr IV Continuous <Continuous>  lactated ringers Bolus 500 milliLiter(s) IV Bolus once  levothyroxine 100 MICROGram(s) Oral daily  meropenem  IVPB 500 milliGRAM(s) IV Intermittent every 24 hours  metoprolol succinate ER 50 milliGRAM(s) Oral daily  sodium chloride 0.9% Bolus 500 milliLiter(s) IV Bolus once  sodium chloride 0.9% Bolus 500 milliLiter(s) IV Bolus once  : Home Meds:      PHYSICAL EXAM    ICU Vital Signs Last 24 Hrs  T(C): 36.7 (01 May 2018 08:00), Max: 36.7 (01 May 2018 08:00)  T(F): 98 (01 May 2018 08:00), Max: 98 (01 May 2018 08:00)  HR: 74 (01 May 2018 08:00) (58 - 82)  BP: 118/54 (01 May 2018 08:00) (78/46 - 156/83)  BP(mean): 75 (01 May 2018 08:00) (58 - 80)  ABP: --  ABP(mean): --  RR: 15 (01 May 2018 08:00) (14 - 18)  SpO2: 92% (01 May 2018 08:00) (92% - 99%)      General:  HEENT:               Lymph Nodes: No cervical LN   Lungs: Bilateral BS  Cardiovascular: Regular  Abdomen: Soft, Positive BS non tender not distended  Extremities: No clubbing  Skin: Warm  Neurological: Non focal, AAO1      18 @ 07:01  -  18 @ 07:00  --------------------------------------------------------  IN:    dextrose 5% + sodium chloride 0.9%.: 75 mL    insulin Infusion: 33 mL    IV PiggyBack: 20 mL    sodium chloride 0.9%: 150 mL    Sodium Chloride 0.9% IV Bolus: 1000 mL  Total IN: 1278 mL    OUT:    Voided: 730 mL  Total OUT: 730 mL    Total NET: 548 mL          LABS:                          7.9    19.91 )-----------( 358      ( 01 May 2018 04:17 )             23.5                                               05-01    133<L>  |  96<L>  |  127<HH>  ----------------------------<  227<H>  5.9<H>   |  13<L>  |  6.1<HH>    Ca    7.9<L>      01 May 2018 04:17    TPro  5.4<L>  /  Alb  3.0<L>  /  TBili  0.3  /  DBili  x   /  AST  10  /  ALT  7   /  AlkPhos  294<H>                                               Urinalysis Basic - ( 01 May 2018 08:59 )    Color: Yellow / Appearance: Turbid / S.015 / pH: x  Gluc: x / Ketone: Negative  / Bili: Negative / Urobili: 0.2 mg/dL   Blood: x / Protein: 100 mg/dL / Nitrite: Negative   Leuk Esterase: Large / RBC: >50 /HPF / WBC >50 /HPF   Sq Epi: x / Non Sq Epi: x / Bacteria: Many /HPF                                                  LIVER FUNCTIONS - ( 2018 16:59 )  Alb: 3.0 g/dL / Pro: 5.4 g/dL / ALK PHOS: 294 U/L / ALT: 7 U/L / AST: 10 U/L / GGT: x                                                                                                                                       X-Rays:             < from: CT Abdomen and Pelvis No Cont (18 @ 20:21) >  PELVIC ORGANS: There is intramural gas within the bladder wall. There is   also intraluminal bladder bowel gas.    < end of copied text >                                                                                 ECHO:    MEDICATIONS  (STANDING):  atorvastatin 10 milliGRAM(s) Oral at bedtime  dextrose 5% + sodium chloride 0.9%. 1000 milliLiter(s) (75 mL/Hr) IV Continuous <Continuous>  dextrose 50% Injectable 25 milliLiter(s) IV Push once  escitalopram 10 milliGRAM(s) Oral daily  insulin Infusion 5 Unit(s)/Hr (5 mL/Hr) IV Continuous <Continuous>  lactated ringers Bolus 500 milliLiter(s) IV Bolus once  levothyroxine 100 MICROGram(s) Oral daily  meropenem  IVPB 500 milliGRAM(s) IV Intermittent every 24 hours  metoprolol succinate ER 50 milliGRAM(s) Oral daily  sodium chloride 0.9% Bolus 500 milliLiter(s) IV Bolus once  sodium chloride 0.9% Bolus 500 milliLiter(s) IV Bolus once    MEDICATIONS  (PRN):

## 2018-05-01 NOTE — CONSULT NOTE ADULT - ASSESSMENT
a/p as above no acute urological intervention surgically    repeat potassium level,     broad spectrum iv antibiotics    strict aggressive glucose control    urine culture sensitivity; blood culture    id consult     nephrology consult    discussed with dr esquivel at [facility],     attn to see and sign a/p as above no acute urological intervention surgically    davis to remain at all times    repeat potassium level,     broad spectrum iv antibiotics    strict aggressive glucose control    urine culture sensitivity; blood culture    id consult     nephrology consult    discussed with dr esquivel at [facility],     attn to see and sign

## 2018-05-01 NOTE — CONSULT NOTE ADULT - ASSESSMENT
82 yo F with h/o DM, HTN, HL, RA, hypothyroidism, CHF (EF 55% in 12/17)< s/p PPM, s/p AICD, hemorrhoids, p/w weakness and diarrhea. FOund to have DREW , acidosis and hyperkalemia    ·	DREW rule out prerenal etiology due to diarrhea/ ATN (hypotension and emphysematous cystitis)/ DKA   ·	Non oliguric  ·	no hydro on CT   ·	would continue IVF and switch to d5w and 3 amps bicarbonate at 100 cc per hour / DC LR   ·	follow repeat K and BMP and IP   ·	on insulin for now   ·	check ABG and urine lytes   ·	Avoid hypotension and nephrotoxins  ·	No need for RRT yet     ·	Sepsis / cystitis/ Diarrhea   ·	on Merrem dose is OK   ·	check C diff

## 2018-05-01 NOTE — CONSULT NOTE ADULT - ASSESSMENT
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

## 2018-05-01 NOTE — PROGRESS NOTE ADULT - SUBJECTIVE AND OBJECTIVE BOX
Patient is a 83y old  Female who presents with a chief complaint of DKA (01 May 2018 04:15)      Overnight events:   No acute event overnight.         PAST MEDICAL & SURGICAL HISTORY:  Neuropathy  Diabetes  Hypothyroidism  HTN (hypertension)  CHF (congestive heart failure)  AICD (automatic cardioverter/defibrillator) present      Allergies  Plavix (Other (Moderate))          MEDICATIONS  (STANDING):  atorvastatin 10 milliGRAM(s) Oral at bedtime  dextrose 5% + sodium chloride 0.9%. 1000 milliLiter(s) (75 mL/Hr) IV Continuous <Continuous>  dextrose 50% Injectable 25 milliLiter(s) IV Push once  escitalopram 10 milliGRAM(s) Oral daily  insulin Infusion 5 Unit(s)/Hr (5 mL/Hr) IV Continuous <Continuous>  lactated ringers Bolus 500 milliLiter(s) IV Bolus once  levothyroxine 100 MICROGram(s) Oral daily  meropenem  IVPB 500 milliGRAM(s) IV Intermittent every 24 hours  metoprolol succinate ER 50 milliGRAM(s) Oral daily  sodium chloride 0.9% Bolus 500 milliLiter(s) IV Bolus once  sodium chloride 0.9% Bolus 500 milliLiter(s) IV Bolus once          Vital Signs Last 24 Hrs  T(C): 36.7 (01 May 2018 08:00), Max: 36.7 (01 May 2018 08:00)  T(F): 98 (01 May 2018 08:00), Max: 98 (01 May 2018 08:00)  HR: 74 (01 May 2018 08:00) (58 - 82)  BP: 118/54 (01 May 2018 08:00) (78/46 - 156/83)  BP(mean): 75 (01 May 2018 08:00) (58 - 80)  RR: 15 (01 May 2018 08:00) (14 - 18)  SpO2: 92% (01 May 2018 08:00) (92% - 99%)  CAPILLARY BLOOD GLUCOSE  57 (01 May 2018 08:00)  55 (01 May 2018 07:00)  108 (01 May 2018 06:00)  227 (01 May 2018 05:00)  279 (01 May 2018 04:00)  316 (01 May 2018 03:00)  380 (01 May 2018 02:15)  437 (01 May 2018 00:56)  498 (2018 23:30)  554 (2018 22:36)  542 (2018 21:24)        I&O's Summary    2018 07:01  -  01 May 2018 07:00  --------------------------------------------------------  IN: 1278 mL / OUT: 730 mL / NET: 548 mL        Physical Exam:    -     General : Not in acute distress.    -      Cardiac: S1 & S2. No murmur/gallop/rubs.     -      Pulm: clear to auscultate b/l lung field.     -      GI: positive BS. No tenderness/rigidity/rebound.     -      Musculoskeletal: no pitting edema.     -      Neuro: A & O3.         Labs:                        7.9    19.91 )-----------( 358      ( 01 May 2018 04:17 )             23.5                           05-01    133<L>  |  96<L>  |  127<HH>  ----------------------------<  227<H>  5.9<H>   |  13<L>  |  6.1<HH>    Ca    7.9<L>      01 May 2018 04:17    TPro  5.4<L>  /  Alb  3.0<L>  /  TBili  0.3  /  DBili  x   /  AST  10  /  ALT  7   /  AlkPhos  294<H>  04-30    LIVER FUNCTIONS - ( 2018 16:59 )  Alb: 3.0 g/dL / Pro: 5.4 g/dL / ALK PHOS: 294 U/L / ALT: 7 U/L / AST: 10 U/L / GGT: x                                                         Urinalysis Basic - ( 01 May 2018 08:59 )    Color: Yellow / Appearance: Turbid / S.015 / pH: x  Gluc: x / Ketone: Negative  / Bili: Negative / Urobili: 0.2 mg/dL   Blood: x / Protein: 100 mg/dL / Nitrite: Negative   Leuk Esterase: Large / RBC: >50 /HPF / WBC >50 /HPF   Sq Epi: x / Non Sq Epi: x / Bacteria: Many /HPF              Imaging:    CTabdo/pelvic: Findings compatible with emphysematous cystitis.    CXR: Pulmonary vascular congestion. Mild cardiomegaly.        ECG: Patient is a 83y old  Female who presents with a chief complaint of DKA (01 May 2018 04:15)      Overnight events:   No acute event overnight.         PAST MEDICAL & SURGICAL HISTORY:  Neuropathy  Diabetes  Hypothyroidism  HTN (hypertension)  CHF (congestive heart failure)  AICD (automatic cardioverter/defibrillator) present      Allergies  Plavix (Other (Moderate))          MEDICATIONS  (STANDING):  atorvastatin 10 milliGRAM(s) Oral at bedtime  dextrose 5% 1000 milliLiter(s) (100 mL/Hr) IV Continuous <Continuous>  escitalopram 10 milliGRAM(s) Oral daily  insulin Infusion 5 Unit(s)/Hr (5 mL/Hr) IV Continuous <Continuous>  levothyroxine 100 MICROGram(s) Oral daily  meropenem  IVPB 500 milliGRAM(s) IV Intermittent every 24 hours  metoprolol succinate ER 50 milliGRAM(s) Oral daily          Vital Signs Last 24 Hrs  T(C): 36.7 (01 May 2018 08:00), Max: 36.7 (01 May 2018 08:00)  T(F): 98 (01 May 2018 08:00), Max: 98 (01 May 2018 08:00)  HR: 74 (01 May 2018 08:00) (58 - 82)  BP: 118/54 (01 May 2018 08:00) (78/46 - 156/83)  BP(mean): 75 (01 May 2018 08:00) (58 - 80)  RR: 15 (01 May 2018 08:00) (14 - 18)  SpO2: 92% (01 May 2018 08:00) (92% - 99%)  CAPILLARY BLOOD GLUCOSE  57 (01 May 2018 08:00)  55 (01 May 2018 07:00)  108 (01 May 2018 06:00)  227 (01 May 2018 05:00)  279 (01 May 2018 04:00)  316 (01 May 2018 03:00)  380 (01 May 2018 02:15)  437 (01 May 2018 00:56)  498 (2018 23:30)  554 (2018 22:36)  542 (2018 21:24)        I&O's Summary    2018 07:01  -  01 May 2018 07:00  --------------------------------------------------------  IN: 1278 mL / OUT: 730 mL / NET: 548 mL        Physical Exam:    -     General : Not in acute distress.    -      Cardiac: S1 & S2. No murmur/gallop/rubs.     -      Pulm: clear to auscultate b/l lung field.     -      GI: positive BS. No tenderness/rigidity/rebound.     -      Musculoskeletal: no pitting edema.     -      Neuro: A&O x 3. Non focal.         Labs:                        7.9    19.91 )-----------( 358      ( 01 May 2018 04:17 )             23.5                           05-01    133<L>  |  96<L>  |  127<HH>  ----------------------------<  227<H>  5.9<H>   |  13<L>  |  6.1<HH>    Ca    7.9<L>      01 May 2018 04:17    TPro  5.4<L>  /  Alb  3.0<L>  /  TBili  0.3  /  DBili  x   /  AST  10  /  ALT  7   /  AlkPhos  294<H>  04-30    LIVER FUNCTIONS - ( 2018 16:59 )  Alb: 3.0 g/dL / Pro: 5.4 g/dL / ALK PHOS: 294 U/L / ALT: 7 U/L / AST: 10 U/L / GGT: x                                                         Urinalysis Basic - ( 01 May 2018 08:59 )    Color: Yellow / Appearance: Turbid / S.015 / pH: x  Gluc: x / Ketone: Negative  / Bili: Negative / Urobili: 0.2 mg/dL   Blood: x / Protein: 100 mg/dL / Nitrite: Negative   Leuk Esterase: Large / RBC: >50 /HPF / WBC >50 /HPF   Sq Epi: x / Non Sq Epi: x / Bacteria: Many /HPF              Imaging:    CTabdo/pelvic: Findings compatible with emphysematous cystitis.    CXR: Pulmonary vascular congestion. Mild cardiomegaly.        ECG:

## 2018-05-01 NOTE — H&P ADULT - ASSESSMENT
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    # DKA likely 2/2 dehydration 2/2 watery diarrhea.  - start insulin drip  - IV NS at 100cc/hr (lower rate 2/2 DREW)  - monitor FS  - monitor K  - monitor anion gap, bicarb   - BMP q2-3 hours  - switch to d5w+ 1/2 NS when FS less than 200  - clear liquid diet as tolerated    # DREW (last Cr on Jan 2018 was 0.97) likely 2/2 pre-renal 2/2 DKA and r/o post renal  - renal c/s  - urine   - US bladder and kidney  - urine studies    # Emphysematous bladder  - per Urology: no acute  intervention for now and emphysematous bladder likely 2/2 DM  - davis to remain at all times   - c/w cefepime   - urine culture and blood cx follow up  - f/u ID    # DM  - c/w insulin  and Hba1c    # CHF s/p AICD- stable    # DVT ppx: on heparin subq  - 84 yo F with PMHx as listed admitted for DKA and 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    # DKA likely 2/2 dehydration 2/2 watery diarrhea.  - r/o C Diff and send for pcr  - start insulin drip  - IV NS at 100cc/hr (lower rate 2/2 DREW)  - monitor FS  - monitor K  - monitor anion gap, bicarb   - BMP q2-3 hours  - switch to d5w+ 1/2 NS when FS less than 200  - clear liquid diet as tolerated    # DREW (last Cr on Jan 2018 was 0.97) likely 2/2 pre-renal 2/2 DKA and r/o post renal  - renal c/s  - urine   - US bladder and kidney  - urine studies  - hold entresto, spironolactone, lasix for now    # Emphysematous bladder  - per Urology: no acute  intervention for now and emphysematous bladder likely 2/2 DM  - davis to remain at all times   - c/w cefepime   - urine culture and blood cx follow up  - f/u ID    # DM  - c/w insulin  and Hba1c    # CHF s/p AICD- stable  - hold spironolactone, entresto and lasix for now for DREW    # DVT ppx: on heparin subq  - 82 yo F with PMHx as listed admitted for DKA and 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    # DKA likely 2/2 dehydration 2/2 watery diarrhea.  - r/o C Diff and send for pcr  - start insulin drip  - IV NS at 100cc/hr (lower rate 2/2 DREW)  - monitor FS  - monitor K  - monitor anion gap, bicarb   - BMP q2-3 hours  - switch to d5w+ 1/2 NS when FS less than 200     # DREW and hyperkalemia(last Cr on Jan 2018 was 0.97) likely 2/2 pre-renal 2/2 DKA and r/o post renal  - renal c/s  - urine   - US bladder and kidney  - urine studies  - hold entresto, spironolactone, lasix for now  -15 mg Kayexelate    # Emphysematous bladder  - per Urology: no acute  intervention for now and emphysematous bladder likely 2/2 DM  - davis to remain at all times   - c/w cefepime   - urine culture and blood cx follow up  - f/u ID    # DM  - c/w insulin  and Hba1c    # CHF s/p AICD- stable  - hold spironolactone, entresto and lasix for now for DREW    # DVT ppx: on heparin subq  -

## 2018-05-01 NOTE — CONSULT NOTE ADULT - SUBJECTIVE AND OBJECTIVE BOX
Patient is a 83y old  Female who presents with a chief complaint of weakness    HPI:    82 yo F with h/o DM, HTN, HL, RA, hypothyroidism, CHF (EF 55% in )< s/p PPM, s/p AICD, hemorrhoids, p/w weakness and diarrhea. As per family, pt has been getting increasingly weak and tired at home. She developed profuse diarrhea x 2 days, having multiple episodes of watery, dark stool. Pt has not been able to get out of bed and has not been eating so not taking her meds. Pt states sx started after eating out. Pt states that she had similar sx in the past when she was admitted to the ICU for DKA. Pt denies any fever/chills, ha, chest pain, sob, constipation, dysuria. a/p: pt tachy, pt appears in mild distress, weak, +dry mucus membranes, g, ncat, perrla, norm cardiac exam, lungs cta b/l, no w/r/r, abd is soft and nt, no pedal edema, will check labs, vbg, ekg, cxr, ct a/p, ivf and reassess.    family states her blood sugar is now under control with a reading of 600 plus.  ct abd show air within the wall of the bladder, likely 2/2 to uncontrolled diabetes       PAST MEDICAL & SURGICAL HISTORY:  Neuropathy  Diabetes  Hypothyroidism  HTN (hypertension)  CHF (congestive heart failure)      Home Medications:  aspirin 81 mg oral tablet: 1 tab(s) orally once a day (2018 16:36)  Entresto 97 mg-103 mg oral tablet: 1 tab(s) orally 2 times a day (2018 16:36)  escitalopram 10 mg oral tablet: 1 tab(s) orally once a day (2018 16:36)  glimepiride 2 mg oral tablet: 1 tab(s) orally once a day (2018 16:36)  Lasix 20 mg oral tablet: 1 tab(s) orally once a day (2018 16:36)  levothyroxine 100 mcg (0.1 mg) oral tablet: 1 tab(s) orally once a day (2018 16:36)  metoprolol succinate 50 mg oral tablet, extended release: 1 tab(s) orally once a day (2018 16:36)  pravastatin 10 mg oral tablet: 1 tab(s) orally once a day (2018 16:36)  spironolactone 25 mg oral tablet: 1 tab(s) orally 2 times a day (2018 16:36)  Tresiba FlexTouch 100 units/mL subcutaneous solution:  (2018 16:36)      Allergies    Plavix (Other (Moderate))    Vital Signs Last 24 Hrs  T(C): 36.4 (01 May 2018 00:17), Max: 36.4 (01 May 2018 00:17)  T(F): 97.6 (01 May 2018 00:17), Max: 97.6 (01 May 2018 00:17)  HR: 76 (01 May 2018 00:17) (76 - 82)  BP: 111/65 (01 May 2018 00:17) (111/65 - 156/83)    RR: 18 (01 May 2018 00:17) (16 - 18)  SpO2: 99% (01 May 2018 00:17) (96% - 99%)      PHYSICAL EXAM:      Constitutional:    Eyes:    ENMT:    Neck:    Breasts:    Back:    Respiratory:    Cardiovascular:    Gastrointestinal:    Genitourinary:    Rectal:    Extremities:    Vascular:    Neurological:    Skin:    Lymph Nodes:    Musculoskeletal:    Psychiatric:                          8.8    26.54 )-----------( 381      ( 2018 16:59 )             26.1         0430    125<L>  |  90<L>  |  125<HH>  ----------------------------<  637<HH>  7.3<HH>   |  11<L>  |  5.8<HH>    Ca    8.1<L>      2018 16:59    TPro  5.4<L>  /  Alb  3.0<L>  /  TBili  0.3  /  DBili  x   /  AST  10  /  ALT  7   /  AlkPhos  294<H>  30          Urinalysis Basic - ( 2018 21:29 )    Color: Yellow / Appearance: Cloudy / S.015 / pH: x  Gluc: x / Ketone: Trace  / Bili: Negative / Urobili: 0.2 mg/dL   Blood: x / Protein: Trace mg/dL / Nitrite: Negative   Leuk Esterase: Small / RBC: 2-5 /HPF / WBC 6-10 /HPF   Sq Epi: x / Non Sq Epi: x / Bacteria: Many /HPF        MEDICATIONS  (STANDING):  cefepime   IVPB 1000 milliGRAM(s) IV Intermittent every 12 hours  cefepime   IVPB      insulin Infusion 5 Unit(s)/Hr (5 mL/Hr) IV Continuous <Continuous>    MEDICATIONS  (PRN): Patient is a 83y old  Female who presents with a chief complaint of weakness    HPI:    82 yo F with h/o DM, HTN, HL, RA, hypothyroidism, CHF (EF 55% in )< s/p PPM, s/p AICD, hemorrhoids, p/w weakness and diarrhea. As per family, pt has been getting increasingly weak and tired at home. She developed profuse diarrhea x 2 days, having multiple episodes of watery, dark stool. Pt has not been able to get out of bed and has not been eating so not taking her meds. Pt states sx started after eating out. Pt states that she had similar sx in the past when she was admitted to the ICU for DKA. Pt denies any fever/chills, ha, chest pain, sob, constipation, dysuria. a/p: pt tachy, pt appears in mild distress, weak, +dry mucus membranes, g, ncat, perrla, norm cardiac exam, lungs cta b/l, no w/r/r, abd is soft and nt, no pedal edema, will check labs, vbg, ekg, cxr, ct a/p, ivf and reassess.    family states her blood sugar is now under control with a reading of 600 plus.  ct abd show air within the wall of the bladder, likely 2/2 to uncontrolled diabetes       PAST MEDICAL & SURGICAL HISTORY:  Neuropathy  Diabetes  Hypothyroidism  HTN (hypertension)  CHF (congestive heart failure)      Home Medications:  aspirin 81 mg oral tablet: 1 tab(s) orally once a day (2018 16:36)  Entresto 97 mg-103 mg oral tablet: 1 tab(s) orally 2 times a day (2018 16:36)  escitalopram 10 mg oral tablet: 1 tab(s) orally once a day (2018 16:36)  glimepiride 2 mg oral tablet: 1 tab(s) orally once a day (2018 16:36)  Lasix 20 mg oral tablet: 1 tab(s) orally once a day (2018 16:36)  levothyroxine 100 mcg (0.1 mg) oral tablet: 1 tab(s) orally once a day (2018 16:36)  metoprolol succinate 50 mg oral tablet, extended release: 1 tab(s) orally once a day (2018 16:36)  pravastatin 10 mg oral tablet: 1 tab(s) orally once a day (2018 16:36)  spironolactone 25 mg oral tablet: 1 tab(s) orally 2 times a day (2018 16:36)  Tresiba FlexTouch 100 units/mL subcutaneous solution:  (2018 16:36)      Allergies    Plavix (Other (Moderate))    Vital Signs Last 24 Hrs  T(C): 36.4 (01 May 2018 00:17), Max: 36.4 (01 May 2018 00:17)  T(F): 97.6 (01 May 2018 00:17), Max: 97.6 (01 May 2018 00:17)  HR: 76 (01 May 2018 00:17) (76 - 82)  BP: 111/65 (01 May 2018 00:17) (111/65 - 156/83)    RR: 18 (01 May 2018 00:17) (16 - 18)  SpO2: 99% (01 May 2018 00:17) (96% - 99%)      PHYSICAL EXAM:      Constitutional:    Eyes:    ENMT:    Neck:    Breasts:    Back:    Respiratory:    Cardiovascular:    Gastrointestinal:    Genitourinary:    Rectal:    Extremities:    Vascular:    Neurological:    Skin:    Lymph Nodes:    Musculoskeletal:    Psychiatric:                          8.8    26.54 )-----------( 381      ( 2018 16:59 )             26.1         0430    125<L>  |  90<L>  |  125<HH>  ----------------------------<  637<HH>  7.3<HH>   |  11<L>  |  5.8<HH>    Ca    8.1<L>      2018 16:59    TPro  5.4<L>  /  Alb  3.0<L>  /  TBili  0.3  /  DBili  x   /  AST  10  /  ALT  7   /  AlkPhos  294<H>  30          Urinalysis Basic - ( 2018 21:29 )    Color: Yellow / Appearance: Cloudy / S.015 / pH: x  Gluc: x / Ketone: Trace  / Bili: Negative / Urobili: 0.2 mg/dL   Blood: x / Protein: Trace mg/dL / Nitrite: Negative   Leuk Esterase: Small / RBC: 2-5 /HPF / WBC 6-10 /HPF   Sq Epi: x / Non Sq Epi: x / Bacteria: Many /HPF        MEDICATIONS  (STANDING):  cefepime   IVPB 1000 milliGRAM(s) IV Intermittent every 12 hours  cefepime   IVPB        emphysematous cystitis.    There is intramural gas within the bladder wall. There is   also intraluminal bladder bowel gas.      < end of copied text >  insulin Infusion 5 Unit(s)/Hr (5 mL/Hr) IV Continuous <Continuous>    MEDICATIONS  (PRN): Patient is a 83y old  Female who presents with a chief complaint of weakness    HPI:    82 yo F with h/o DM, HTN, HL, RA, hypothyroidism, CHF (EF 55% in )< s/p PPM, s/p AICD, hemorrhoids, p/w weakness and diarrhea. As per family, pt has been getting increasingly weak and tired at home. She developed profuse diarrhea x 2 days, having multiple episodes of watery, dark stool. Pt has not been able to get out of bed and has not been eating so not taking her meds. Pt states sx started after eating out. Pt states that she had similar sx in the past when she was admitted to the ICU for DKA. Pt denies any fever/chills, ha, chest pain, sob, constipation, dysuria. a/p: pt tachy, pt appears in mild distress, weak, +dry mucus membranes, g, ncat, perrla, norm cardiac exam, lungs cta b/l, no w/r/r, abd is soft and nt, no pedal edema, will check labs, vbg, ekg, cxr, ct a/p, ivf and reassess.    family states her blood sugar is now under control with a reading of 600 plus.  ct abd show air within the wall of the bladder, likely 2/2 to uncontrolled diabetes       PAST MEDICAL & SURGICAL HISTORY:  Neuropathy  Diabetes  Hypothyroidism  HTN (hypertension)  CHF (congestive heart failure)      Home Medications:  aspirin 81 mg oral tablet: 1 tab(s) orally once a day (2018 16:36)  Entresto 97 mg-103 mg oral tablet: 1 tab(s) orally 2 times a day (2018 16:36)  escitalopram 10 mg oral tablet: 1 tab(s) orally once a day (2018 16:36)  glimepiride 2 mg oral tablet: 1 tab(s) orally once a day (2018 16:36)  Lasix 20 mg oral tablet: 1 tab(s) orally once a day (2018 16:36)  levothyroxine 100 mcg (0.1 mg) oral tablet: 1 tab(s) orally once a day (2018 16:36)  metoprolol succinate 50 mg oral tablet, extended release: 1 tab(s) orally once a day (2018 16:36)  pravastatin 10 mg oral tablet: 1 tab(s) orally once a day (2018 16:36)  spironolactone 25 mg oral tablet: 1 tab(s) orally 2 times a day (2018 16:36)  Tresiba FlexTouch 100 units/mL subcutaneous solution:  (2018 16:36)      Allergies    Plavix (Other (Moderate))    Vital Signs Last 24 Hrs  T(C): 36.4 (01 May 2018 00:17), Max: 36.4 (01 May 2018 00:17)  T(F): 97.6 (01 May 2018 00:17), Max: 97.6 (01 May 2018 00:17)  HR: 76 (01 May 2018 00:17) (76 - 82)  BP: 111/65 (01 May 2018 00:17) (111/65 - 156/83)    RR: 18 (01 May 2018 00:17) (16 - 18)  SpO2: 99% (01 May 2018 00:17) (96% - 99%)      PHYSICAL EXAM:      Constitutional: weak , dry, confused    Eyes: perrl, eomi    ENMT: no facial droop, sensory intact    Neck: from    Respiratory: cta b/l    Cardiovascular: s1, s2    Gastrointestinal: abd soft nondistended    Genitourinary: davis in place urine cloudy with white sediment    Rectal: declined    Extremities: moving all extremities to verbal commands sensory intact                          8.8    26.54 )-----------( 381      ( 2018 16:59 )             26.1         125<L>  |  90<L>  |  125<HH>  ----------------------------<  637<HH>  7.3<HH>   |  11<L>  |  5.8<HH>    Ca    8.1<L>      2018 16:59    TPro  5.4<L>  /  Alb  3.0<L>  /  TBili  0.3  /  DBili  x   /  AST  10  /  ALT  7   /  AlkPhos  294<H>  04-30          Urinalysis Basic - ( 2018 21:29 )    Color: Yellow / Appearance: Cloudy / S.015 / pH: x  Gluc: x / Ketone: Trace  / Bili: Negative / Urobili: 0.2 mg/dL   Blood: x / Protein: Trace mg/dL / Nitrite: Negative   Leuk Esterase: Small / RBC: 2-5 /HPF / WBC 6-10 /HPF   Sq Epi: x / Non Sq Epi: x / Bacteria: Many /HPF        MEDICATIONS  (STANDING):  cefepime   IVPB 1000 milliGRAM(s) IV Intermittent every 12 hours  cefepime   IVPB        emphysematous cystitis.    There is intramural gas within the bladder wall. There is   also intraluminal bladder bowel gas.        insulin Infusion 5 Unit(s)/Hr (5 mL/Hr) IV Continuous <Continuous>    MEDICATIONS  (PRN):

## 2018-05-01 NOTE — CONSULT NOTE ADULT - SUBJECTIVE AND OBJECTIVE BOX
NEPHROLOGY CONSULTATION NOTE    82 yo F with h/o DM, HTN, HL, RA, hypothyroidism, CHF (EF 55% in )< s/p PPM, s/p AICD, hemorrhoids, p/w weakness and diarrhea. Pt reports profuse diarrhea x 3 days, having multiple episodes of watery, dark stool with associated increasing weakness. Pt has not been able to get out of bed and has not been eating so not taking her meds. Pt states sx started after eating out. Pt denies any fever/chills, ha, chest pain, sob, constipation, dysuria.  Patient seen today lethargic and poorly responsive to questions and commands   Pt is found to have DKA in ED. VBG pH 7.2, anion gap 24, small acetone, glucose >600. Pt is hemodynamically stable in ED.        PAST MEDICAL & SURGICAL HISTORY:  Neuropathy  Diabetes  Hypothyroidism  HTN (hypertension)  CHF (congestive heart failure)  AICD (automatic cardioverter/defibrillator) present    Allergies:  Plavix (Other (Moderate))    Home Medications Reviewed  Hospital Medications:   MEDICATIONS  (STANDING):  atorvastatin 10 milliGRAM(s) Oral at bedtime  dextrose 5% + sodium chloride 0.9%. 1000 milliLiter(s) (75 mL/Hr) IV Continuous <Continuous>  dextrose 50% Injectable 25 milliLiter(s) IV Push once  escitalopram 10 milliGRAM(s) Oral daily  insulin Infusion 5 Unit(s)/Hr (5 mL/Hr) IV Continuous <Continuous>  lactated ringers Bolus 500 milliLiter(s) IV Bolus once  levothyroxine 100 MICROGram(s) Oral daily  meropenem  IVPB 500 milliGRAM(s) IV Intermittent every 24 hours  metoprolol succinate ER 50 milliGRAM(s) Oral daily  sodium chloride 0.9% Bolus 500 milliLiter(s) IV Bolus once  sodium chloride 0.9% Bolus 500 milliLiter(s) IV Bolus once      SOCIAL HISTORY:  Denies ETOH,Smoking,   FAMILY HISTORY:  No pertinent family history in first degree relatives        REVIEW OF SYSTEMS:  lethargic confused poorly responsive to commands  Rest as above     VITALS:  T(F): 98 (18 @ 08:00), Max: 98 (18 @ 08:00)  HR: 74 (18 @ 08:00)  BP: 118/54 (18 @ 08:00)  RR: 15 (18 @ 08:00)  SpO2: 92% (18 @ 08:00)    30 @ 07:01  -   @ 07:00  --------------------------------------------------------  IN: 1278 mL / OUT: 730 mL / NET: 548 mL      Height (cm): 162.56 ( @ 02:15)  Weight (kg): 50.9 ( @ 02:15)  BMI (kg/m2): 19.3 ( 02:15)  BSA (m2): 1.53 ( @ 02:15)      I&O's Detail    2018 07:01  -  01 May 2018 07:00  --------------------------------------------------------  IN:    dextrose 5% + sodium chloride 0.9%.: 75 mL    insulin Infusion: 33 mL    IV PiggyBack: 20 mL    sodium chloride 0.9%: 150 mL    Sodium Chloride 0.9% IV Bolus: 1000 mL  Total IN: 1278 mL    OUT:    Voided: 730 mL  Total OUT: 730 mL    Total NET: 548 mL            PHYSICAL EXAM:  Constitutional: lethargic   HEENT: anicteric sclera, oropharynx clear, MMM  Neck: No JVD  Respiratory: CTAB, no wheezes, rales or rhonchi  Cardiovascular: irregular S1S2  Gastrointestinal: BS+, soft, NT/ND  Extremities: No cyanosis or clubbing. No peripheral edema  Neurological: lethargic moves all extremities   : No CVA tenderness. No davis.   Skin: No rashes  Vascular Access:    LABS:      133<L>  |  96<L>  |  127<HH>  ----------------------------<  227<H>  5.9<H>   |  13<L>  |  6.1<HH>  Creatinine Trend: 6.1<--, 6.1<--, 5.8<--  Ca    7.9<L>      01 May 2018 04:17    TPro  5.4<L>  /  Alb  3.0<L>  /  TBili  0.3  /  DBili      /  AST  10  /  ALT  7   /  AlkPhos  294<H>                              7.9    19.91 )-----------( 358      ( 01 May 2018 04:17 )             23.5     Urine Studies:  Urinalysis Basic - ( 01 May 2018 08:59 )    Color: Yellow / Appearance: Turbid / S.015 / pH:   Gluc:  / Ketone: Negative  / Bili: Negative / Urobili: 0.2 mg/dL   Blood:  / Protein: 100 mg/dL / Nitrite: Negative   Leuk Esterase: Large / RBC: >50 /HPF / WBC >50 /HPF   Sq Epi:  / Non Sq Epi:  / Bacteria: Many /HPF      Sodium, Random Urine: 71.0 mmoL/L ( @ 08:58)  Creatinine, Random Urine: 56 mg/dL ( @ 08:58)  Osmolality, Random Urine: 367 mos/kg ( @ 08:57)            RADIOLOGY & ADDITIONAL STUDIES:  < from: CT Abdomen and Pelvis No Cont (18 @ 20:21) >  IMPRESSION:     Findings compatible with emphysematous cystitis     < end of copied text >      < from: CT Abdomen and Pelvis No Cont (18 @ 20:21) >  KIDNEYS: No radiopaque urinary stones. No hydronephrosis    < end of copied text >

## 2018-05-01 NOTE — CONSULT NOTE ADULT - SUBJECTIVE AND OBJECTIVE BOX
Patient is a 83y old  Female who presents with a chief complaint of DKA (01 May 2018 04:15)      HPI:  84 yo F with h/o DM, HTN, HL, RA, hypothyroidism, CHF (EF 55% in )< s/p PPM, s/p AICD, hemorrhoids, p/w weakness and diarrhea. Pt reports profuse diarrhea x 3 days, having multiple episodes of watery, dark stool with associated increasing weakness. Pt has not been able to get out of bed and has not been eating so not taking her meds. Pt states sx started after eating out. Pt denies any fever/chills, ha, chest pain, sob, constipation, dysuria.  Pt is found to have DKA in ED. VBG pH 7.2, anion gap 24, small acetone, glucose >600. Pt is hemodynamically stable in ED.  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 Pt denies having dysuria or pain in bladder.   - s/p IV NS 1000cc bolus (01 May 2018 04:15)      PAST MEDICAL & SURGICAL HISTORY:  Neuropathy  Diabetes  Hypothyroidism  HTN (hypertension)  CHF (congestive heart failure)  AICD (automatic cardioverter/defibrillator) present      SOCIAL HX:   Smoking      X smoker                    ETOH   no ETOH                         Other    FAMILY HISTORY:  No pertinent family history in first degree relatives      ROS:  See HPI     Allergies    Plavix (Other (Moderate))    Intolerances          PHYSICAL EXAM    ICU Vital Signs Last 24 Hrs  T(C): 36.4 (01 May 2018 02:30), Max: 36.4 (01 May 2018 00:17)  T(F): 97.6 (01 May 2018 02:30), Max: 97.6 (01 May 2018 00:17)  HR: 68 (01 May 2018 07:00) (58 - 82)  BP: 115/56 (01 May 2018 07:00) (78/46 - 156/83)  BP(mean): 77 (01 May 2018 07:00) (58 - 80)  ABP: --  ABP(mean): --  RR: 14 (01 May 2018 07:00) (14 - 18)  SpO2: 96% (01 May 2018 07:00) (95% - 99%)      General: In NAD . AOX3  HEENT:  DANIEL              Lymph Nodes: No cervical LN   Lungs: Bilateral BS  Cardiovascular: Regular  Abdomen: Soft, Positive BS  Extremities: No clubbing  Skin: Warm  Neurological: Non focal       18 @ 07:01  -  18 @ 07:00  --------------------------------------------------------  IN:    dextrose 5% + sodium chloride 0.9%.: 75 mL    insulin Infusion: 33 mL    IV PiggyBack: 20 mL    sodium chloride 0.9%: 150 mL    Sodium Chloride 0.9% IV Bolus: 1000 mL  Total IN: 1278 mL    OUT:    Voided: 730 mL  Total OUT: 730 mL    Total NET: 548 mL          LABS:                          7.9    19.91 )-----------( 358      ( 01 May 2018 04:17 )             23.5                                               05-01    133<L>  |  96<L>  |  127<HH>  ----------------------------<  227<H>  5.9<H>   |  13<L>  |  6.1<HH>    Ca    7.9<L>      01 May 2018 04:17    TPro  5.4<L>  /  Alb  3.0<L>  /  TBili  0.3  /  DBili  x   /  AST  10  /  ALT  7   /  AlkPhos  294<H>                                               Urinalysis Basic - ( 2018 21:29 )    Color: Yellow / Appearance: Cloudy / S.015 / pH: x  Gluc: x / Ketone: Trace  / Bili: Negative / Urobili: 0.2 mg/dL   Blood: x / Protein: Trace mg/dL / Nitrite: Negative   Leuk Esterase: Small / RBC: 2-5 /HPF / WBC 6-10 /HPF   Sq Epi: x / Non Sq Epi: x / Bacteria: Many /HPF                                                  LIVER FUNCTIONS - ( 2018 16:59 )  Alb: 3.0 g/dL / Pro: 5.4 g/dL / ALK PHOS: 294 U/L / ALT: 7 U/L / AST: 10 U/L / GGT: x                                                                                                                                       X-Rays                                                                                     ECHO    MEDICATIONS  (STANDING):  atorvastatin 10 milliGRAM(s) Oral at bedtime  cefepime   IVPB 1000 milliGRAM(s) IV Intermittent daily  dextrose 5% + sodium chloride 0.9%. 1000 milliLiter(s) (75 mL/Hr) IV Continuous <Continuous>  dextrose 50% Injectable 25 milliLiter(s) IV Push once  escitalopram 10 milliGRAM(s) Oral daily  insulin Infusion 5 Unit(s)/Hr (5 mL/Hr) IV Continuous <Continuous>  levothyroxine 100 MICROGram(s) Oral daily  metoprolol succinate ER 50 milliGRAM(s) Oral daily  sodium chloride 0.9% Bolus 500 milliLiter(s) IV Bolus once  sodium chloride 0.9% Bolus 500 milliLiter(s) IV Bolus once    MEDICATIONS  (PRN):

## 2018-05-01 NOTE — CONSULT NOTE ADULT - ASSESSMENT
IMPRESSION:    DKA  DREW  Emphysematous Cystitis / UTI     PLAN:    CNS: no Depressants    HEENT: Oral care    PULMONARY:  HOB @ 45 degrees    CARDIOVASCULAR: Continue IVF.      GI: GI prophylaxis.  Feeding     RENAL:  Follow up lytes.  Correct as needed.  Repeat Lytes stat.    INFECTIOUS DISEASE: Follow up cultures.  Meropenem for now.  FU Cultures     HEMATOLOGICAL:  DVT prophylaxis.    ENDOCRINE:  Follow up FS.  Insulin protocol if needed.    MUSCULOSKELETAL:    OOB to chair.

## 2018-05-01 NOTE — H&P ADULT - HISTORY OF PRESENT ILLNESS
82 yo F with h/o DM, HTN, HL, RA, hypothyroidism, CHF (EF 55% in 12/17)< s/p PPM, s/p AICD, hemorrhoids, p/w weakness and diarrhea. Pt reports profuse diarrhea x 3 days, having multiple episodes of watery, dark stool with associated increasing weakness. Pt has not been able to get out of bed and has not been eating so not taking her meds. Pt states sx started after eating out. Pt denies any fever/chills, ha, chest pain, sob, constipation, dysuria.  Pt is found to have DKA in ED. VBG pH 7.2, anion gap 24, small acetone, glucose >600. Pt is hemodynamically stable in ED.  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 Pt denies having dysuria or pain in bladder.   - s/p IV NS 1000cc bolus

## 2018-05-01 NOTE — PROGRESS NOTE ADULT - ASSESSMENT
82 yo F with hx of DM presents with 3d hx of diarrhea and weakness, found to have DKA, DREW and Emphysematous cystitis.       PLAN:    CNS: Not on depressants.     PULMONARY:  HOB @ 45 degrees    CARDIOVASCULAR:   CHF  - c/w metoprolol succinate    GI: GI prophylaxis  - c/w  current diet    RENAL:   DREW likely pre-renal   - c/w IVF   - monitor BMP     INFECTIOUS DISEASE:   Emphysematous cystitis  - UA positive WBC  - f/u UCx, BCx   - c/w Petey for now  - f/u ID      ENDOCRINE:    DKA  - monitor lytes  - c/w insulin protocol.  - Monitor FS    HEMATOLOGICAL:  DVT prophylaxis.      OOB to chair. 82 yo F with hx of DM presents with 3d hx of diarrhea and weakness, found to have DKA, DREW and Emphysematous cystitis.       PLAN:    CNS: Not on depressants.     PULMONARY:  HOB @ 45 degrees    CARDIOVASCULAR:   CHF  - c/w metoprolol succinate    GI: GI prophylaxis  - c/w  current diet    RENAL:   DREW likely pre-renal   - c/w IVF D5W NS with HCO3 @ 75cc/h as per renal  - monitor BMP   - f/u nephro    INFECTIOUS DISEASE:   Emphysematous cystitis  - UA positive WBC  - f/u UCx, BCx   - c/w Petey for now  - f/u ID      ENDOCRINE:    DKA  - monitor lytes (replete as needed)  - c/w insulin protocol.  - Monitor FS    HEMATOLOGICAL:  DVT prophylaxis.      OOB to chair. 84 yo F with hx of DM presents with 3d hx of diarrhea and weakness, found to have DKA, DREW and Emphysematous cystitis.       PLAN:    CNS: Not on depressants.     PULMONARY:  HOB @ 45 degrees    CARDIOVASCULAR:   CHF  - c/w metoprolol succinate    GI: GI prophylaxis  - c/w  current diet    RENAL:   DREW likely pre-renal   - c/w IVF D5w with HCO3 @ 100cc/h as per renal  - monitor BMP   - f/u nephro    INFECTIOUS DISEASE:   Emphysematous cystitis  - UA positive WBC  - f/u UCx, BCx   - c/w Petey for now  - f/u ID      ENDOCRINE:    DKA  - monitor lytes (replete as needed)  - c/w insulin protocol.  - Monitor FS    HEMATOLOGICAL:  DVT prophylaxis.      OOB to chair.

## 2018-05-02 LAB
ALLERGY+IMMUNOLOGY DIAG STUDY NOTE: SIGNIFICANT CHANGE UP
ANION GAP SERPL CALC-SCNC: 15 MMOL/L — HIGH (ref 7–14)
ANION GAP SERPL CALC-SCNC: 20 MMOL/L — HIGH (ref 7–14)
BASOPHILS # BLD AUTO: 0.02 K/UL — SIGNIFICANT CHANGE UP (ref 0–0.2)
BASOPHILS # BLD AUTO: 0.03 K/UL — SIGNIFICANT CHANGE UP (ref 0–0.2)
BASOPHILS NFR BLD AUTO: 0.1 % — SIGNIFICANT CHANGE UP (ref 0–1)
BASOPHILS NFR BLD AUTO: 0.2 % — SIGNIFICANT CHANGE UP (ref 0–1)
BLD GP AB SCN SERPL QL: (no result)
BUN SERPL-MCNC: 120 MG/DL — CRITICAL HIGH (ref 10–20)
BUN SERPL-MCNC: 120 MG/DL — CRITICAL HIGH (ref 10–20)
CALCIUM SERPL-MCNC: 6.9 MG/DL — LOW (ref 8.5–10.1)
CALCIUM SERPL-MCNC: 7 MG/DL — LOW (ref 8.5–10.1)
CHLORIDE SERPL-SCNC: 103 MMOL/L — SIGNIFICANT CHANGE UP (ref 98–110)
CHLORIDE SERPL-SCNC: 96 MMOL/L — LOW (ref 98–110)
CO2 SERPL-SCNC: 19 MMOL/L — SIGNIFICANT CHANGE UP (ref 17–32)
CO2 SERPL-SCNC: 19 MMOL/L — SIGNIFICANT CHANGE UP (ref 17–32)
CREAT SERPL-MCNC: 5.6 MG/DL — CRITICAL HIGH (ref 0.7–1.5)
CREAT SERPL-MCNC: 6 MG/DL — CRITICAL HIGH (ref 0.7–1.5)
EOSINOPHIL # BLD AUTO: 0.29 K/UL — SIGNIFICANT CHANGE UP (ref 0–0.7)
EOSINOPHIL # BLD AUTO: 0.31 K/UL — SIGNIFICANT CHANGE UP (ref 0–0.7)
EOSINOPHIL NFR BLD AUTO: 1.6 % — SIGNIFICANT CHANGE UP (ref 0–8)
EOSINOPHIL NFR BLD AUTO: 1.7 % — SIGNIFICANT CHANGE UP (ref 0–8)
GAS PNL BLDA: SIGNIFICANT CHANGE UP
GLUCOSE SERPL-MCNC: 146 MG/DL — HIGH (ref 70–99)
GLUCOSE SERPL-MCNC: 51 MG/DL — LOW (ref 70–99)
HCT VFR BLD CALC: 21.2 % — LOW (ref 37–47)
HCT VFR BLD CALC: 22.6 % — LOW (ref 37–47)
HGB BLD-MCNC: 7.3 G/DL — CRITICAL LOW (ref 12–16)
HGB BLD-MCNC: 7.7 G/DL — LOW (ref 12–16)
IMM GRANULOCYTES NFR BLD AUTO: 0.9 % — HIGH (ref 0.1–0.3)
IMM GRANULOCYTES NFR BLD AUTO: 1 % — HIGH (ref 0.1–0.3)
LYMPHOCYTES # BLD AUTO: 0.67 K/UL — LOW (ref 1.2–3.4)
LYMPHOCYTES # BLD AUTO: 0.84 K/UL — LOW (ref 1.2–3.4)
LYMPHOCYTES # BLD AUTO: 3.7 % — LOW (ref 20.5–51.1)
LYMPHOCYTES # BLD AUTO: 4.7 % — LOW (ref 20.5–51.1)
MCHC RBC-ENTMCNC: 24.4 PG — LOW (ref 27–31)
MCHC RBC-ENTMCNC: 24.7 PG — LOW (ref 27–31)
MCHC RBC-ENTMCNC: 34.1 G/DL — SIGNIFICANT CHANGE UP (ref 32–37)
MCHC RBC-ENTMCNC: 34.4 G/DL — SIGNIFICANT CHANGE UP (ref 32–37)
MCV RBC AUTO: 70.9 FL — LOW (ref 81–99)
MCV RBC AUTO: 72.4 FL — LOW (ref 81–99)
MONOCYTES # BLD AUTO: 0.87 K/UL — HIGH (ref 0.1–0.6)
MONOCYTES # BLD AUTO: 1.06 K/UL — HIGH (ref 0.1–0.6)
MONOCYTES NFR BLD AUTO: 4.8 % — SIGNIFICANT CHANGE UP (ref 1.7–9.3)
MONOCYTES NFR BLD AUTO: 5.9 % — SIGNIFICANT CHANGE UP (ref 1.7–9.3)
NEUTROPHILS # BLD AUTO: 15.55 K/UL — HIGH (ref 1.4–6.5)
NEUTROPHILS # BLD AUTO: 16.26 K/UL — HIGH (ref 1.4–6.5)
NEUTROPHILS NFR BLD AUTO: 86.6 % — HIGH (ref 42.2–75.2)
NEUTROPHILS NFR BLD AUTO: 88.8 % — HIGH (ref 42.2–75.2)
NRBC # BLD: 0 /100 WBCS — SIGNIFICANT CHANGE UP (ref 0–0)
NRBC # BLD: 0 /100 WBCS — SIGNIFICANT CHANGE UP (ref 0–0)
PLATELET # BLD AUTO: 310 K/UL — SIGNIFICANT CHANGE UP (ref 130–400)
PLATELET # BLD AUTO: 337 K/UL — SIGNIFICANT CHANGE UP (ref 130–400)
POTASSIUM SERPL-MCNC: 5.5 MMOL/L — HIGH (ref 3.5–5)
POTASSIUM SERPL-MCNC: 5.7 MMOL/L — HIGH (ref 3.5–5)
POTASSIUM SERPL-SCNC: 5.5 MMOL/L — HIGH (ref 3.5–5)
POTASSIUM SERPL-SCNC: 5.7 MMOL/L — HIGH (ref 3.5–5)
RBC # BLD: 2.99 M/UL — LOW (ref 4.2–5.4)
RBC # BLD: 3.12 M/UL — LOW (ref 4.2–5.4)
RBC # FLD: 16.7 % — HIGH (ref 11.5–14.5)
RBC # FLD: 17.2 % — HIGH (ref 11.5–14.5)
SODIUM SERPL-SCNC: 135 MMOL/L — SIGNIFICANT CHANGE UP (ref 135–146)
SODIUM SERPL-SCNC: 137 MMOL/L — SIGNIFICANT CHANGE UP (ref 135–146)
TYPE + AB SCN PNL BLD: SIGNIFICANT CHANGE UP
WBC # BLD: 17.95 K/UL — HIGH (ref 4.8–10.8)
WBC # BLD: 18.29 K/UL — HIGH (ref 4.8–10.8)
WBC # FLD AUTO: 17.95 K/UL — HIGH (ref 4.8–10.8)
WBC # FLD AUTO: 18.29 K/UL — HIGH (ref 4.8–10.8)

## 2018-05-02 PROCEDURE — 99231 SBSQ HOSP IP/OBS SF/LOW 25: CPT

## 2018-05-02 RX ORDER — DEXTROSE 50 % IN WATER 50 %
12.5 SYRINGE (ML) INTRAVENOUS ONCE
Qty: 0 | Refills: 0 | Status: DISCONTINUED | OUTPATIENT
Start: 2018-05-02 | End: 2018-05-17

## 2018-05-02 RX ORDER — INSULIN GLARGINE 100 [IU]/ML
5 INJECTION, SOLUTION SUBCUTANEOUS EVERY MORNING
Qty: 0 | Refills: 0 | Status: DISCONTINUED | OUTPATIENT
Start: 2018-05-02 | End: 2018-05-03

## 2018-05-02 RX ORDER — INSULIN LISPRO 100/ML
2 VIAL (ML) SUBCUTANEOUS
Qty: 0 | Refills: 0 | Status: DISCONTINUED | OUTPATIENT
Start: 2018-05-02 | End: 2018-05-03

## 2018-05-02 RX ORDER — BENZOCAINE AND MENTHOL 5; 1 G/100ML; G/100ML
1 LIQUID ORAL
Qty: 0 | Refills: 0 | Status: DISCONTINUED | OUTPATIENT
Start: 2018-05-02 | End: 2018-05-17

## 2018-05-02 RX ORDER — DEXTROSE 50 % IN WATER 50 %
1 SYRINGE (ML) INTRAVENOUS ONCE
Qty: 0 | Refills: 0 | Status: DISCONTINUED | OUTPATIENT
Start: 2018-05-02 | End: 2018-05-17

## 2018-05-02 RX ORDER — DEXTROSE 50 % IN WATER 50 %
25 SYRINGE (ML) INTRAVENOUS ONCE
Qty: 0 | Refills: 0 | Status: DISCONTINUED | OUTPATIENT
Start: 2018-05-02 | End: 2018-05-17

## 2018-05-02 RX ORDER — INSULIN LISPRO 100/ML
VIAL (ML) SUBCUTANEOUS
Qty: 0 | Refills: 0 | Status: DISCONTINUED | OUTPATIENT
Start: 2018-05-02 | End: 2018-05-03

## 2018-05-02 RX ORDER — SODIUM CHLORIDE 9 MG/ML
1000 INJECTION, SOLUTION INTRAVENOUS
Qty: 0 | Refills: 0 | Status: DISCONTINUED | OUTPATIENT
Start: 2018-05-02 | End: 2018-05-17

## 2018-05-02 RX ORDER — DEXTROSE 50 % IN WATER 50 %
25 SYRINGE (ML) INTRAVENOUS ONCE
Qty: 0 | Refills: 0 | Status: COMPLETED | OUTPATIENT
Start: 2018-05-02 | End: 2018-05-02

## 2018-05-02 RX ORDER — GLUCAGON INJECTION, SOLUTION 0.5 MG/.1ML
1 INJECTION, SOLUTION SUBCUTANEOUS ONCE
Qty: 0 | Refills: 0 | Status: DISCONTINUED | OUTPATIENT
Start: 2018-05-02 | End: 2018-05-17

## 2018-05-02 RX ORDER — ACETAMINOPHEN 500 MG
650 TABLET ORAL EVERY 6 HOURS
Qty: 0 | Refills: 0 | Status: DISCONTINUED | OUTPATIENT
Start: 2018-05-02 | End: 2018-05-17

## 2018-05-02 RX ORDER — DEXTROSE 50 % IN WATER 50 %
50 SYRINGE (ML) INTRAVENOUS ONCE
Qty: 0 | Refills: 0 | Status: COMPLETED | OUTPATIENT
Start: 2018-05-02 | End: 2018-05-02

## 2018-05-02 RX ORDER — SODIUM CHLORIDE 9 MG/ML
1000 INJECTION, SOLUTION INTRAVENOUS
Qty: 0 | Refills: 0 | Status: DISCONTINUED | OUTPATIENT
Start: 2018-05-02 | End: 2018-05-02

## 2018-05-02 RX ORDER — SODIUM CHLORIDE 9 MG/ML
1000 INJECTION, SOLUTION INTRAVENOUS
Qty: 0 | Refills: 0 | Status: DISCONTINUED | OUTPATIENT
Start: 2018-05-02 | End: 2018-05-03

## 2018-05-02 RX ADMIN — Medication 650 MILLIGRAM(S): at 19:39

## 2018-05-02 RX ADMIN — INSULIN GLARGINE 5 UNIT(S): 100 INJECTION, SOLUTION SUBCUTANEOUS at 12:18

## 2018-05-02 RX ADMIN — HEPARIN SODIUM 5000 UNIT(S): 5000 INJECTION INTRAVENOUS; SUBCUTANEOUS at 06:25

## 2018-05-02 RX ADMIN — ATORVASTATIN CALCIUM 10 MILLIGRAM(S): 80 TABLET, FILM COATED ORAL at 22:33

## 2018-05-02 RX ADMIN — Medication 650 MILLIGRAM(S): at 17:37

## 2018-05-02 RX ADMIN — MEROPENEM 100 MILLIGRAM(S): 1 INJECTION INTRAVENOUS at 11:25

## 2018-05-02 RX ADMIN — Medication 100 MICROGRAM(S): at 05:20

## 2018-05-02 RX ADMIN — BENZOCAINE AND MENTHOL 1 LOZENGE: 5; 1 LIQUID ORAL at 22:34

## 2018-05-02 RX ADMIN — HEPARIN SODIUM 5000 UNIT(S): 5000 INJECTION INTRAVENOUS; SUBCUTANEOUS at 17:40

## 2018-05-02 RX ADMIN — Medication 25 MILLILITER(S): at 00:00

## 2018-05-02 RX ADMIN — Medication 2 UNIT(S): at 17:39

## 2018-05-02 RX ADMIN — Medication 1: at 17:40

## 2018-05-02 RX ADMIN — ESCITALOPRAM OXALATE 10 MILLIGRAM(S): 10 TABLET, FILM COATED ORAL at 13:46

## 2018-05-02 RX ADMIN — Medication 50 MILLILITER(S): at 05:19

## 2018-05-02 NOTE — PROGRESS NOTE ADULT - ASSESSMENT
84 yo F with hx of DM presents with 3d hx of diarrhea and weakness, found to have DKA, DREW and Emphysematous cystitis.       PLAN:    CNS: Not on depressants.     PULMONARY:  HOB @ 45 degrees    CARDIOVASCULAR:   CHF  - c/w metoprolol succinate    GI: GI prophylaxis  - c/w  current diet    RENAL:   DREW likely pre-renal   - c/w IVF 1/2 NS @ 75cc  - monitor BMP   - f/u nephro    INFECTIOUS DISEASE:   Emphysematous cystitis  - UA positive WBC  - f/u UCx, BCx   - c/w Petey for now  - f/u ID      ENDOCRINE:    DKA  - monitor lytes (replete as needed)  - c/w insulin protocol.  - Monitor FS  - bridge to insulin SC today     HEMATOLOGICAL:  DVT prophylaxis.      OOB to chair. 82 yo F with hx of DM presents with 3d hx of diarrhea and weakness, found to have DKA, DREW and Emphysematous cystitis.       PLAN:    CNS: Not on depressants.     PULMONARY:  HOB @ 45 degrees    CARDIOVASCULAR:   CHF  - c/w metoprolol succinate    GI: GI prophylaxis  - c/w  current diet    RENAL:   DERW likely pre-renal   - c/w IVF 1/2 NS @ 75cc  - monitor BMP   - f/u nephro    INFECTIOUS DISEASE:   Emphysematous cystitis  - UA positive WBC  - f/u UCx, BCx   - c/w Petey for now  - f/u ID      ENDOCRINE:    DKA  - monitor lytes (replete as needed)  - c/w insulin protocol.  - Monitor FS  - bridge to insulin SC today     HEMATOLOGICAL:  DVT prophylaxis.      OOB to chair.

## 2018-05-02 NOTE — CONSULT NOTE ADULT - SUBJECTIVE AND OBJECTIVE BOX
Request for consultation:  Requested by:    Patient is a 83y old  Female who presents with a chief complaint of DKA (01 May 2018 04:15)    HPI:  84 yo F with h/o DM, HTN, HL, RA, hypothyroidism, CHF (EF 55% in 12/17)< s/p PPM, s/p AICD, hemorrhoids, p/w weakness and diarrhea. Pt reports profuse diarrhea x 3 days, having multiple episodes of watery, dark stool with associated increasing weakness. Pt has not been able to get out of bed and has not been eating so not taking her meds. Pt states sx started after eating out. Pt denies any fever/chills, ha, chest pain, sob, constipation, dysuria.  Pt is found to have DKA in ED. VBG pH 7.2, anion gap 24, small acetone, glucose >600. Pt is hemodynamically stable in ED.  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 Pt denies having dysuria or pain in bladder.   - s/p IV NS 1000cc bolus (01 May 2018 04:15).    D3 of ICU stay, AG is closed, patient experiencing overnight hypoglycemia while on the insulin gtt. DREW present of admission but improving. She is lethargic, denies abdominal pain, N/V, still having diarrhea but improving (2 loose BM overnight)      FAMILY HISTORY:  No pertinent family history in first degree relatives    PAST MEDICAL & SURGICAL HISTORY:  Neuropathy  Diabetes  Hypothyroidism  HTN (hypertension)  CHF (congestive heart failure)  AICD (automatic cardioverter/defibrillator) present      Birth History:  Developmental History:    Review of Systems:  All review of systems negative, except for those marked:  General:		[x] Abnormal:  Pulmonary:		[] Abnormal:  Cardiac:		[x] Abnormal: dyspnea on exertion  Gastrointestinal:	[x] Abnormal: see HPI  ENT:			[] Abnormal:  Renal/Urologic:		[x] Abnormal: see HPI  Musculoskeletal:	[] Abnormal:  Endocrine:		[x] Abnormal: Poorly controlled diabetes  Hematologic:		[] Abnormal:  Neurologic:		[] Abnormal:  Skin:			[] Abnormal:  Allergy/Immune:	[] Abnormal:  Psychiatric:		[] Abnormal:    Allergies    Plavix (Other (Moderate))    Intolerances      Home Medications:  aspirin 81 mg oral tablet: 1 tab(s) orally once a day (30 Apr 2018 16:36)  Entresto 97 mg-103 mg oral tablet: 1 tab(s) orally 2 times a day (30 Apr 2018 16:36)  escitalopram 10 mg oral tablet: 1 tab(s) orally once a day (30 Apr 2018 16:36)  glimepiride 2 mg oral tablet: 1 tab(s) orally once a day (30 Apr 2018 16:36)  Lasix 20 mg oral tablet: 1 tab(s) orally once a day (30 Apr 2018 16:36)  levothyroxine 100 mcg (0.1 mg) oral tablet: 1 tab(s) orally once a day (30 Apr 2018 16:36)  metoprolol succinate 50 mg oral tablet, extended release: 1 tab(s) orally once a day (30 Apr 2018 16:36)  pravastatin 10 mg oral tablet: 1 tab(s) orally once a day (30 Apr 2018 16:36)  spironolactone 25 mg oral tablet: 1 tab(s) orally 2 times a day (30 Apr 2018 16:36)  Tresiba FlexTouch 100 units/mL subcutaneous solution:  (30 Apr 2018 16:36)        MEDICATIONS  (STANDING):  atorvastatin 10 milliGRAM(s) Oral at bedtime  dextrose 5% + sodium chloride 0.45%. 1000 milliLiter(s) (75 mL/Hr) IV Continuous <Continuous>  dextrose 5%. 1000 milliLiter(s) (50 mL/Hr) IV Continuous <Continuous>  dextrose 50% Injectable 12.5 Gram(s) IV Push once  dextrose 50% Injectable 25 Gram(s) IV Push once  dextrose 50% Injectable 25 Gram(s) IV Push once  escitalopram 10 milliGRAM(s) Oral daily  heparin  Injectable 5000 Unit(s) SubCutaneous every 12 hours  insulin glargine Injectable (LANTUS) 5 Unit(s) SubCutaneous every morning  insulin lispro (HumaLOG) corrective regimen sliding scale   SubCutaneous three times a day before meals  insulin lispro Injectable (HumaLOG) 2 Unit(s) SubCutaneous before breakfast  insulin lispro Injectable (HumaLOG) 2 Unit(s) SubCutaneous before lunch  insulin lispro Injectable (HumaLOG) 2 Unit(s) SubCutaneous before dinner  levothyroxine 100 MICROGram(s) Oral daily  meropenem  IVPB 500 milliGRAM(s) IV Intermittent every 24 hours  metoprolol succinate ER 50 milliGRAM(s) Oral daily    MEDICATIONS  (PRN):  dextrose Gel 1 Dose(s) Oral once PRN Blood Glucose LESS THAN 70 milliGRAM(s)/deciliter  glucagon  Injectable 1 milliGRAM(s) IntraMuscular once PRN Glucose LESS THAN 70 milligrams/deciliter      Vital Signs Last 24 Hrs  T(C): 37.1 (02 May 2018 04:00), Max: 37.1 (02 May 2018 04:00)  T(F): 98.7 (02 May 2018 04:00), Max: 98.7 (02 May 2018 04:00)  HR: 80 (02 May 2018 10:00) (62 - 84)  BP: 70/44 (02 May 2018 10:00) (70/44 - 124/63)  BP(mean): 54 (02 May 2018 10:00) (53 - 92)  RR: 18 (02 May 2018 10:00) (13 - 36)  SpO2: 96% (02 May 2018 10:00) (92% - 99%)  Height (cm): 162.56 (05-01 @ 02:15)  Weight (kg): 50.9 (05-01 @ 02:15)  BMI (kg/m2): 19.3 (05-01 @ 02:15)    PHYSICAL EXAM  All physical exam findings normal, except those marked:  General:	Confused, lethargic but cooperative, NAD  .		[] Abnormal:  Neck		Normal: supple, no cervical adenopathy, no palpable thyroid  .		[] Abnormal:  Cardiovascular	Normal: regular rate, normal S1, S2, no murmurs  .		[] Abnormal:  Respiratory	Normal: no chest wall deformity, normal respiratory pattern, CTA B/L  .		[] Abnormal:  Abdominal	Normal: soft, ND, NT, bowel sounds present, no masses, no organomegaly  .		[] Abnormal:  		No CVA tenderness		  	            [] Abnormal:  Extremities	Normal: FROM x4  .		[] Abnormal:  Skin		Normal: intact and not indurated, no rash, no acanthosis nigricans  .		[] Abnormal:  Neurologic	Normal: grossly intact  .		[x] Abnormal: AAOx1    LABS  VBG - ( 30 Apr 2018 21:51 )  pH: 7.26  /  pCO2: 28    /  pO2: 40    / HCO3: 12    / Base Excess: -13.0 /  SvO2: 68    / Lactate: 1.3                            7.3    18.29 )-----------( 337      ( 02 May 2018 04:27 )             21.2     05-02    137  |  103  |  120<HH>  ----------------------------<  51<L>  5.5<H>   |  19  |  5.6<HH>    Ca    7.0<L>      02 May 2018 04:27  Phos  5.4     05-01  Mg     2.2     05-01    TPro  5.4<L>  /  Alb  3.0<L>  /  TBili  0.3  /  DBili  x   /  AST  10  /  ALT  7   /  AlkPhos  294<H>  04-30      Ketone - Urine: Negative (05-01 @ 08:59)  Ketone - Urine: Trace (04-30 @ 21:29)    CAPILLARY BLOOD GLUCOSE  152 (02 May 2018 10:00)  143 (02 May 2018 06:00)  63 (02 May 2018 05:00)  70 (02 May 2018 04:00)  94 (02 May 2018 02:00)  124 (02 May 2018 01:00)  57 (02 May 2018 00:00)  101 (01 May 2018 22:00)  161 (01 May 2018 20:00)  160 (01 May 2018 18:00)  143 (01 May 2018 16:00)  144 (01 May 2018 14:00)  131 (01 May 2018 12:30)

## 2018-05-02 NOTE — CONSULT NOTE ADULT - ASSESSMENT
Patient is a 83y old  Female who presents with a chief complaint of DKA (01 May 2018 04:15)    PAST MEDICAL & SURGICAL HISTORY:  Neuropathy  Diabetes  Hypothyroidism  HTN (hypertension)  CHF (congestive heart failure)  AICD (automatic cardioverter/defibrillator) present      # DKA:   AG closed and bicarb 19  Patient is eating.   Still on Insulin gtt: Switch to basal/bolus regimen (0.5u/kg per day)    # Emphesymatous cystitis:   On IV meropenem  f/u blood and urine cultures     # Delirium:   Toxic/metabolic, manage underlying sepsis and DREW as per primary team

## 2018-05-02 NOTE — PROGRESS NOTE ADULT - ASSESSMENT
IMPRESSION:    DKA resolved  DREW  Emphysematous Cystitis / UTI     PLAN:    CNS: no Depressants    HEENT: Oral care    PULMONARY:  HOB @ 45 degrees    CARDIOVASCULAR: Continue IVF.  Check ABG and adjust accordingly    GI: GI prophylaxis.  Feeding     RENAL:  Follow up lytes.  Correct as needed.  FU with renal     INFECTIOUS DISEASE: Follow up cultures.  Meropenem for now.  FU Cultures     HEMATOLOGICAL:  DVT prophylaxis.    ENDOCRINE:  Follow up FS.  SQ insulin and DC IV insulin     MUSCULOSKELETAL:    OOB to chair.      Transfer to floor     DC Karina

## 2018-05-02 NOTE — PROGRESS NOTE ADULT - SUBJECTIVE AND OBJECTIVE BOX
Patient is a 83y old  Female who presents with a chief complaint of DKA (01 May 2018 04:15)      Overnight events:   No acute event overnight. Patient is awake but sleepy.   Will convert IV insulin to SC insulin and transfer to floor today.         PAST MEDICAL & SURGICAL HISTORY:  Neuropathy  Diabetes  Hypothyroidism  HTN (hypertension)  CHF (congestive heart failure)  AICD (automatic cardioverter/defibrillator) present      Allergies  Plavix (Other (Moderate))          MEDICATIONS  (STANDING):  atorvastatin 10 milliGRAM(s) Oral at bedtime  dextrose 5% + sodium chloride 0.45%. 1000 milliLiter(s) (75 mL/Hr) IV Continuous <Continuous>  dextrose 5%. 1000 milliLiter(s) (50 mL/Hr) IV Continuous <Continuous>  dextrose 50% Injectable 12.5 Gram(s) IV Push once  dextrose 50% Injectable 25 Gram(s) IV Push once  escitalopram 10 milliGRAM(s) Oral daily  heparin  Injectable 5000 Unit(s) SubCutaneous every 12 hours  insulin glargine Injectable (LANTUS) 5 Unit(s) SubCutaneous every morning  insulin lispro (HumaLOG) corrective regimen sliding scale   SubCutaneous three times a day before meals  insulin lispro Injectable (HumaLOG) 2 Unit(s) SubCutaneous before breakfast  insulin lispro Injectable (HumaLOG) 2 Unit(s) SubCutaneous before lunch  insulin lispro Injectable (HumaLOG) 2 Unit(s) SubCutaneous before dinner  levothyroxine 100 MICROGram(s) Oral daily  meropenem  IVPB 500 milliGRAM(s) IV Intermittent every 24 hours  metoprolol succinate ER 50 milliGRAM(s) Oral daily    MEDICATIONS  (PRN):  dextrose Gel 1 Dose(s) Oral once PRN Blood Glucose LESS THAN 70 milliGRAM(s)/deciliter  glucagon  Injectable 1 milliGRAM(s) IntraMuscular once PRN Glucose LESS THAN 70 milligrams/deciliter          Vital Signs Last 24 Hrs  T(C): 36.7 (01 May 2018 08:00), Max: 36.7 (01 May 2018 08:00)  T(F): 98 (01 May 2018 08:00), Max: 98 (01 May 2018 08:00)  HR: 74 (01 May 2018 08:00) (58 - 82)  BP: 118/54 (01 May 2018 08:00) (78/46 - 156/83)  BP(mean): 75 (01 May 2018 08:00) (58 - 80)  RR: 15 (01 May 2018 08:00) (14 - 18)  SpO2: 92% (01 May 2018 08:00) (92% - 99%)  CAPILLARY BLOOD GLUCOSE  57 (01 May 2018 08:00)  55 (01 May 2018 07:00)  108 (01 May 2018 06:00)  227 (01 May 2018 05:00)  279 (01 May 2018 04:00)  316 (01 May 2018 03:00)  380 (01 May 2018 02:15)  437 (01 May 2018 00:56)  498 (2018 23:30)  554 (2018 22:36)  542 (2018 21:24)        I&O's Summary    2018 07:01  -  01 May 2018 07:00  --------------------------------------------------------  IN: 1278 mL / OUT: 730 mL / NET: 548 mL          Physical Exam:    -     General : Not in acute distress.    -      Cardiac: S1 & S2. No murmur/gallop/rubs.     -      Pulm: clear to auscultate b/l lung field.     -      GI: positive BS. No tenderness/rigidity/rebound.     -      Musculoskeletal: no pitting edema.     -      Neuro: A&O x 3. Non focal.           Labs:                        7.9    19.91 )-----------( 358      ( 01 May 2018 04:17 )             23.5                           05-01    133<L>  |  96<L>  |  127<HH>  ----------------------------<  227<H>  5.9<H>   |  13<L>  |  6.1<HH>    Ca    7.9<L>      01 May 2018 04:17    TPro  5.4<L>  /  Alb  3.0<L>  /  TBili  0.3  /  DBili  x   /  AST  10  /  ALT  7   /  AlkPhos  294<H>  0430    LIVER FUNCTIONS - ( 2018 16:59 )  Alb: 3.0 g/dL / Pro: 5.4 g/dL / ALK PHOS: 294 U/L / ALT: 7 U/L / AST: 10 U/L / GGT: x                                                         Urinalysis Basic - ( 01 May 2018 08:59 )    Color: Yellow / Appearance: Turbid / S.015 / pH: x  Gluc: x / Ketone: Negative  / Bili: Negative / Urobili: 0.2 mg/dL   Blood: x / Protein: 100 mg/dL / Nitrite: Negative   Leuk Esterase: Large / RBC: >50 /HPF / WBC >50 /HPF   Sq Epi: x / Non Sq Epi: x / Bacteria: Many /HPF              Imaging:    CTabdo/pelvic: Findings compatible with emphysematous cystitis.    CXR: Pulmonary vascular congestion. Mild cardiomegaly.        ECG: Patient is a 83y old  Female who presents with a chief complaint of DKA (01 May 2018 04:15)      Overnight events:   No acute event overnight. Patient is awake but sleepy.   Will convert IV insulin to SC insulin and transfer to floor today.         PAST MEDICAL & SURGICAL HISTORY:  Neuropathy  Diabetes  Hypothyroidism  HTN (hypertension)  CHF (congestive heart failure)  AICD (automatic cardioverter/defibrillator) present      Allergies  Plavix (Other (Moderate))          MEDICATIONS  (STANDING):  atorvastatin 10 milliGRAM(s) Oral at bedtime  dextrose 5% + sodium chloride 0.45%. 1000 milliLiter(s) (75 mL/Hr) IV Continuous <Continuous>  dextrose 5%. 1000 milliLiter(s) (50 mL/Hr) IV Continuous <Continuous>  dextrose 50% Injectable 12.5 Gram(s) IV Push once  dextrose 50% Injectable 25 Gram(s) IV Push once  escitalopram 10 milliGRAM(s) Oral daily  heparin  Injectable 5000 Unit(s) SubCutaneous every 12 hours  insulin glargine Injectable (LANTUS) 5 Unit(s) SubCutaneous every morning  insulin lispro (HumaLOG) corrective regimen sliding scale   SubCutaneous three times a day before meals  insulin lispro Injectable (HumaLOG) 2 Unit(s) SubCutaneous before breakfast  insulin lispro Injectable (HumaLOG) 2 Unit(s) SubCutaneous before lunch  insulin lispro Injectable (HumaLOG) 2 Unit(s) SubCutaneous before dinner  levothyroxine 100 MICROGram(s) Oral daily  meropenem  IVPB 500 milliGRAM(s) IV Intermittent every 24 hours  metoprolol succinate ER 50 milliGRAM(s) Oral daily    MEDICATIONS  (PRN):  dextrose Gel 1 Dose(s) Oral once PRN Blood Glucose LESS THAN 70 milliGRAM(s)/deciliter  glucagon  Injectable 1 milliGRAM(s) IntraMuscular once PRN Glucose LESS THAN 70 milligrams/deciliter        Vital Signs Last 24 Hrs  T(C): 37.1 (02 May 2018 04:00), Max: 37.1 (02 May 2018 04:00)  T(F): 98.7 (02 May 2018 04:00), Max: 98.7 (02 May 2018 04:00)  HR: 80 (02 May 2018 10:00) (62 - 84)  BP: 70/44 (02 May 2018 10:00) (70/44 - 124/63)  BP(mean): 54 (02 May 2018 10:00) (53 - 92)  RR: 18 (02 May 2018 10:00) (13 - 36)  SpO2: 96% (02 May 2018 10:00) (92% - 99%)    I&O's Summary    01 May 2018 07:01  -  02 May 2018 07:00  --------------------------------------------------------  IN: 2567 mL / OUT: 722 mL / NET: 1845 mL            Physical Exam:    -     General : Not in acute distress.    -      Cardiac: S1 & S2. No murmur/gallop/rubs.     -      Pulm: clear to auscultate b/l lung field.     -      GI: positive BS. No tenderness/rigidity/rebound.     -      Musculoskeletal: no pitting edema.     -      Neuro: A&O x 3. Non focal.           Labs:                                   7.3    18.29 )-----------( 337      ( 02 May 2018 04:27 )             21.2             05-02    137  |  103  |  120<HH>  ----------------------------<  51<L>  5.5<H>   |  19  |  5.6<HH>    Ca    7.0<L>      02 May 2018 04:27  Phos  5.4     05-01  Mg     2.2     05-01    TPro  5.4<L>  /  Alb  3.0<L>  /  TBili  0.3  /  DBili  x   /  AST  10  /  ALT  7   /  AlkPhos  294<H>  30    LIVER FUNCTIONS - ( 2018 16:59 )  Alb: 3.0 g/dL / Pro: 5.4 g/dL / ALK PHOS: 294 U/L / ALT: 7 U/L / AST: 10 U/L / GGT: x                   CARDIAC MARKERS ( 01 May 2018 12:53 )  x     / <0.01 ng/mL / 12 U/L / x     / x            Urinalysis Basic - ( 01 May 2018 08:59 )    Color: Yellow / Appearance: Turbid / S.015 / pH: x  Gluc: x / Ketone: Negative  / Bili: Negative / Urobili: 0.2 mg/dL   Blood: x / Protein: 100 mg/dL / Nitrite: Negative   Leuk Esterase: Large / RBC: >50 /HPF / WBC >50 /HPF   Sq Epi: x / Non Sq Epi: x / Bacteria: Many /HPF            Imaging:    CTabdo/pelvic: Findings compatible with emphysematous cystitis.    CXR: Pulmonary vascular congestion. Mild cardiomegaly.        ECG:

## 2018-05-02 NOTE — SWALLOW BEDSIDE ASSESSMENT ADULT - SWALLOW EVAL: DIAGNOSIS
+mild oral dysphagia for soft, +toleration of thin, puree and soft w/o overt s/s of penetration/aspiration

## 2018-05-02 NOTE — CHART NOTE - NSCHARTNOTEFT_GEN_A_CORE
HPI  84 yo F with h/o DM, HTN, HL, RA, hypothyroidism, CHF (EF 55% in 12/17)< s/p PPM, s/p AICD, hemorrhoids, p/w weakness and diarrhea. Pt reports profuse diarrhea x 3 days, having multiple episodes of watery, dark stool with associated increasing weakness. Pt has not been able to get out of bed and has not been eating so not taking her meds. Pt states sx started after eating out. Pt denies any fever/chills, ha, chest pain, sob, constipation, dysuria.  Pt is found to have DKA in ED. VBG pH 7.2, anion gap 24, small acetone, glucose >600. Pt is hemodynamically stable in ED.  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 Pt denies having dysuria or pain in bladder.   - s/p IV NS 1000cc bolus.        ICU event:    Patient was admitted to ICU for close monitoring for DKA. Patient was placed on insulin and NaHCO3 drip with frequent FS check.  Overnight patient had multiple hypoglycemic episodes while on insulin drip, requiring D5W.   Bridged insulin IV to SC as AG improved and pH is wnl.   Patient was never intubated, never require pressors or MLC.   Patient's diarrhea also improved and BP stable.   Will transfer to floor today.     Impression:   DKA  DREW  Emphysematous cystitis      PLAN:  - continue with davis as per Urology.   - f/u BMP   - f/u UCx sensitivity  - f/u Urology  - f/u ID  - f/u Endo

## 2018-05-02 NOTE — CONSULT NOTE ADULT - ATTENDING COMMENTS
as above, commence basal and bolus therapy, continue general supportive care.
Pt interviewed and examined.  Labs and ct reviewed.  Impression:  empysematous cystitis, likely neurogenic bladder    Recommendation:    Follow up cultures  broad spectrum iv antibiotics  maintain davis  ID consult  control of blood sugar  nephrology consult

## 2018-05-02 NOTE — PROGRESS NOTE ADULT - SUBJECTIVE AND OBJECTIVE BOX
Over Night Events:  Did well overnight.  No SOB .  No CP.  UO 35cc / hr         ROS:  See HPI    PHYSICAL EXAM    ICU Vital Signs Last 24 Hrs  T(C): 37.1 (02 May 2018 04:00), Max: 37.1 (02 May 2018 04:00)  T(F): 98.7 (02 May 2018 04:00), Max: 98.7 (02 May 2018 04:00)  HR: 70 (02 May 2018 07:34) (62 - 84)  BP: 72/42 (02 May 2018 07:34) (72/42 - 124/63)  BP(mean): 53 (02 May 2018 07:34) (53 - 92)  ABP: --  ABP(mean): --  RR: 19 (02 May 2018 07:34) (13 - 36)  SpO2: 92% (02 May 2018 07:34) (92% - 99%)      General: in NAD   HEENT: DANIEL             Lymph Nodes: No cervical LN   Lungs: Bilateral BS  Cardiovascular: Regular   Abdomen: Soft, Positive BS  Extremities: No clubbing   Skin: Warm  Neurological: Non focal       18 @ 07:01  -  18 @ 07:00  --------------------------------------------------------  IN:    dextrose 5%: 1600 mL    dextrose 5% + sodium chloride 0.9%: 375 mL    insulin Infusion: 42 mL    IV PiggyBack: 50 mL    Lactated Ringers IV Bolus: 500 mL  Total IN: 2567 mL    OUT:    Indwelling Catheter - Urethral: 722 mL  Total OUT: 722 mL    Total NET: 1845 mL          LABS:                            7.3    18.29 )-----------( 337      ( 02 May 2018 04:27 )             21.2                                               05-02    137  |  103  |  120<HH>  ----------------------------<  51<L>  5.5<H>   |  19  |  5.6<HH>    Ca    7.0<L>      02 May 2018 04:27  Phos  5.4     05-  Mg     2.2     05-01    TPro  5.4<L>  /  Alb  3.0<L>  /  TBili  0.3  /  DBili  x   /  AST  10  /  ALT  7   /  AlkPhos  294<H>                                               Urinalysis Basic - ( 01 May 2018 08:59 )    Color: Yellow / Appearance: Turbid / S.015 / pH: x  Gluc: x / Ketone: Negative  / Bili: Negative / Urobili: 0.2 mg/dL   Blood: x / Protein: 100 mg/dL / Nitrite: Negative   Leuk Esterase: Large / RBC: >50 /HPF / WBC >50 /HPF   Sq Epi: x / Non Sq Epi: x / Bacteria: Many /HPF        CARDIAC MARKERS ( 01 May 2018 12:53 )  x     / <0.01 ng/mL / 12 U/L / x     / x                                                LIVER FUNCTIONS - ( 2018 16:59 )  Alb: 3.0 g/dL / Pro: 5.4 g/dL / ALK PHOS: 294 U/L / ALT: 7 U/L / AST: 10 U/L / GGT: x                                                                                                                                       MEDICATIONS  (STANDING):  atorvastatin 10 milliGRAM(s) Oral at bedtime  dextrose 5% + sodium chloride 0.45%. 1000 milliLiter(s) (75 mL/Hr) IV Continuous <Continuous>  escitalopram 10 milliGRAM(s) Oral daily  heparin  Injectable 5000 Unit(s) SubCutaneous every 12 hours  insulin Infusion 5 Unit(s)/Hr (5 mL/Hr) IV Continuous <Continuous>  levothyroxine 100 MICROGram(s) Oral daily  meropenem  IVPB 500 milliGRAM(s) IV Intermittent every 24 hours  metoprolol succinate ER 50 milliGRAM(s) Oral daily    MEDICATIONS  (PRN):      Xrays:            NO infiltrate                                                                       ECHO

## 2018-05-02 NOTE — PROGRESS NOTE ADULT - ASSESSMENT
a/p as above    caution with insulin drip    transfused as needed - h/h 7.3/ 21    nephrology note - no need for no indication for RRT Cr trending down    possible pressur for hypotension    keep davis at all times - do not d/c davis as per dr esquivel    f/u blood urine culture    discussed with medicine resident     attn to see

## 2018-05-02 NOTE — PROGRESS NOTE ADULT - ASSESSMENT
82 yo F with h/o DM, HTN, HL, RA, hypothyroidism, CHF (EF 55% in 12/17)< s/p PPM, s/p AICD, hemorrhoids, p/w weakness and diarrhea. Found to have DREW , acidosis and hyperkalemia/cystitis/neurogenic bladder:  # creatinine trending down  # non oliguric  # change iv fluid to 1/2 ns with 75 meq hco3 at 75 cc/h  # start sodium bicarb q 8  #followed by ID/  # remains on marry  # BP on the low side, d/c metoprolol, might need to start pressors  # ph noted, start phoslo 1/1/1  # no indication for RRT  # will follow

## 2018-05-03 LAB
-  AMIKACIN: SIGNIFICANT CHANGE UP
-  AMOXICILLIN/CLAVULANIC ACID: SIGNIFICANT CHANGE UP
-  AMPICILLIN/SULBACTAM: SIGNIFICANT CHANGE UP
-  AMPICILLIN: SIGNIFICANT CHANGE UP
-  AZTREONAM: SIGNIFICANT CHANGE UP
-  CEFAZOLIN: SIGNIFICANT CHANGE UP
-  CEFEPIME: SIGNIFICANT CHANGE UP
-  CEFOXITIN: SIGNIFICANT CHANGE UP
-  CEFTRIAXONE: SIGNIFICANT CHANGE UP
-  CIPROFLOXACIN: SIGNIFICANT CHANGE UP
-  ERTAPENEM: SIGNIFICANT CHANGE UP
-  GENTAMICIN: SIGNIFICANT CHANGE UP
-  IMIPENEM: SIGNIFICANT CHANGE UP
-  LEVOFLOXACIN: SIGNIFICANT CHANGE UP
-  MEROPENEM: SIGNIFICANT CHANGE UP
-  NITROFURANTOIN: SIGNIFICANT CHANGE UP
-  PIPERACILLIN/TAZOBACTAM: SIGNIFICANT CHANGE UP
-  TIGECYCLINE: SIGNIFICANT CHANGE UP
-  TOBRAMYCIN: SIGNIFICANT CHANGE UP
-  TRIMETHOPRIM/SULFAMETHOXAZOLE: SIGNIFICANT CHANGE UP
ANION GAP SERPL CALC-SCNC: 17 MMOL/L — HIGH (ref 7–14)
ANION GAP SERPL CALC-SCNC: 22 MMOL/L — HIGH (ref 7–14)
APPEARANCE UR: (no result)
BACTERIA # UR AUTO: (no result) /HPF
BASOPHILS # BLD AUTO: 0.04 K/UL — SIGNIFICANT CHANGE UP (ref 0–0.2)
BASOPHILS NFR BLD AUTO: 0.3 % — SIGNIFICANT CHANGE UP (ref 0–1)
BILIRUB UR-MCNC: NEGATIVE — SIGNIFICANT CHANGE UP
BUN SERPL-MCNC: 117 MG/DL — CRITICAL HIGH (ref 10–20)
BUN SERPL-MCNC: 120 MG/DL — CRITICAL HIGH (ref 10–20)
CALCIUM SERPL-MCNC: 6.8 MG/DL — LOW (ref 8.5–10.1)
CALCIUM SERPL-MCNC: 6.9 MG/DL — LOW (ref 8.5–10.1)
CHLORIDE SERPL-SCNC: 93 MMOL/L — LOW (ref 98–110)
CHLORIDE SERPL-SCNC: 96 MMOL/L — LOW (ref 98–110)
CO2 SERPL-SCNC: 16 MMOL/L — LOW (ref 17–32)
CO2 SERPL-SCNC: 16 MMOL/L — LOW (ref 17–32)
COLOR SPEC: YELLOW — SIGNIFICANT CHANGE UP
CREAT SERPL-MCNC: 6 MG/DL — CRITICAL HIGH (ref 0.7–1.5)
CREAT SERPL-MCNC: 6 MG/DL — CRITICAL HIGH (ref 0.7–1.5)
CULTURE RESULTS: SIGNIFICANT CHANGE UP
DIFF PNL FLD: (no result)
EOSINOPHIL # BLD AUTO: 0.3 K/UL — SIGNIFICANT CHANGE UP (ref 0–0.7)
EOSINOPHIL NFR BLD AUTO: 2.2 % — SIGNIFICANT CHANGE UP (ref 0–8)
EPI CELLS # UR: (no result) /HPF
ESTIMATED AVERAGE GLUCOSE: 309 MG/DL — HIGH (ref 68–114)
GAS PNL BLDA: SIGNIFICANT CHANGE UP
GLUCOSE SERPL-MCNC: 189 MG/DL — HIGH (ref 70–99)
GLUCOSE SERPL-MCNC: 197 MG/DL — HIGH (ref 70–99)
GLUCOSE UR QL: 100 MG/DL
HBA1C BLD-MCNC: 12.4 % — HIGH (ref 4–5.6)
HCT VFR BLD CALC: 20.9 % — LOW (ref 37–47)
HGB BLD-MCNC: 7 G/DL — CRITICAL LOW (ref 12–16)
IMM GRANULOCYTES NFR BLD AUTO: 1.2 % — HIGH (ref 0.1–0.3)
IRON SATN MFR SERPL: 28 % — SIGNIFICANT CHANGE UP (ref 15–50)
IRON SATN MFR SERPL: 32 UG/DL — LOW (ref 35–150)
KETONES UR-MCNC: NEGATIVE — SIGNIFICANT CHANGE UP
LEUKOCYTE ESTERASE UR-ACNC: (no result)
LYMPHOCYTES # BLD AUTO: 0.82 K/UL — LOW (ref 1.2–3.4)
LYMPHOCYTES # BLD AUTO: 6 % — LOW (ref 20.5–51.1)
MCHC RBC-ENTMCNC: 24.5 PG — LOW (ref 27–31)
MCHC RBC-ENTMCNC: 33.5 G/DL — SIGNIFICANT CHANGE UP (ref 32–37)
MCV RBC AUTO: 73.1 FL — LOW (ref 81–99)
METHOD TYPE: SIGNIFICANT CHANGE UP
MONOCYTES # BLD AUTO: 0.64 K/UL — HIGH (ref 0.1–0.6)
MONOCYTES NFR BLD AUTO: 4.7 % — SIGNIFICANT CHANGE UP (ref 1.7–9.3)
NEUTROPHILS # BLD AUTO: 11.61 K/UL — HIGH (ref 1.4–6.5)
NEUTROPHILS NFR BLD AUTO: 85.6 % — HIGH (ref 42.2–75.2)
NITRITE UR-MCNC: NEGATIVE — SIGNIFICANT CHANGE UP
NRBC # BLD: 0 /100 WBCS — SIGNIFICANT CHANGE UP (ref 0–0)
ORGANISM # SPEC MICROSCOPIC CNT: SIGNIFICANT CHANGE UP
ORGANISM # SPEC MICROSCOPIC CNT: SIGNIFICANT CHANGE UP
PH UR: 6 — SIGNIFICANT CHANGE UP (ref 5–8)
PLATELET # BLD AUTO: 254 K/UL — SIGNIFICANT CHANGE UP (ref 130–400)
POTASSIUM SERPL-MCNC: 5.5 MMOL/L — HIGH (ref 3.5–5)
POTASSIUM SERPL-MCNC: 5.5 MMOL/L — HIGH (ref 3.5–5)
POTASSIUM SERPL-SCNC: 5.5 MMOL/L — HIGH (ref 3.5–5)
POTASSIUM SERPL-SCNC: 5.5 MMOL/L — HIGH (ref 3.5–5)
PROT UR-MCNC: 30 MG/DL
RBC # BLD: 2.86 M/UL — LOW (ref 4.2–5.4)
RBC # FLD: 17.2 % — HIGH (ref 11.5–14.5)
RBC CASTS # UR COMP ASSIST: (no result) /HPF
SODIUM SERPL-SCNC: 129 MMOL/L — LOW (ref 135–146)
SODIUM SERPL-SCNC: 131 MMOL/L — LOW (ref 135–146)
SP GR SPEC: 1.01 — SIGNIFICANT CHANGE UP (ref 1.01–1.03)
SPECIMEN SOURCE: SIGNIFICANT CHANGE UP
TIBC SERPL-MCNC: 114 UG/DL — LOW (ref 220–430)
UIBC SERPL-MCNC: 82 UG/DL — LOW (ref 110–370)
UROBILINOGEN FLD QL: 0.2 MG/DL — SIGNIFICANT CHANGE UP (ref 0.2–0.2)
WBC # BLD: 13.57 K/UL — HIGH (ref 4.8–10.8)
WBC # FLD AUTO: 13.57 K/UL — HIGH (ref 4.8–10.8)
WBC UR QL: >50 /HPF

## 2018-05-03 RX ORDER — INSULIN LISPRO 100/ML
4 VIAL (ML) SUBCUTANEOUS
Qty: 0 | Refills: 0 | Status: DISCONTINUED | OUTPATIENT
Start: 2018-05-03 | End: 2018-05-09

## 2018-05-03 RX ORDER — INSULIN LISPRO 100/ML
6 VIAL (ML) SUBCUTANEOUS ONCE
Qty: 0 | Refills: 0 | Status: COMPLETED | OUTPATIENT
Start: 2018-05-03 | End: 2018-05-03

## 2018-05-03 RX ORDER — INSULIN HUMAN 100 [IU]/ML
5 INJECTION, SOLUTION SUBCUTANEOUS ONCE
Qty: 0 | Refills: 0 | Status: COMPLETED | OUTPATIENT
Start: 2018-05-03 | End: 2018-05-03

## 2018-05-03 RX ORDER — INSULIN GLARGINE 100 [IU]/ML
5 INJECTION, SOLUTION SUBCUTANEOUS EVERY MORNING
Qty: 0 | Refills: 0 | Status: DISCONTINUED | OUTPATIENT
Start: 2018-05-03 | End: 2018-05-10

## 2018-05-03 RX ORDER — INSULIN LISPRO 100/ML
8 VIAL (ML) SUBCUTANEOUS ONCE
Qty: 0 | Refills: 0 | Status: DISCONTINUED | OUTPATIENT
Start: 2018-05-03 | End: 2018-05-03

## 2018-05-03 RX ORDER — LANOLIN ALCOHOL/MO/W.PET/CERES
5 CREAM (GRAM) TOPICAL AT BEDTIME
Qty: 0 | Refills: 0 | Status: DISCONTINUED | OUTPATIENT
Start: 2018-05-03 | End: 2018-05-17

## 2018-05-03 RX ORDER — SODIUM CHLORIDE 9 MG/ML
1000 INJECTION INTRAMUSCULAR; INTRAVENOUS; SUBCUTANEOUS ONCE
Qty: 0 | Refills: 0 | Status: COMPLETED | OUTPATIENT
Start: 2018-05-03 | End: 2018-05-03

## 2018-05-03 RX ORDER — SODIUM CHLORIDE 9 MG/ML
1000 INJECTION, SOLUTION INTRAVENOUS
Qty: 0 | Refills: 0 | Status: DISCONTINUED | OUTPATIENT
Start: 2018-05-03 | End: 2018-05-04

## 2018-05-03 RX ORDER — INSULIN LISPRO 100/ML
VIAL (ML) SUBCUTANEOUS
Qty: 0 | Refills: 0 | Status: DISCONTINUED | OUTPATIENT
Start: 2018-05-03 | End: 2018-05-10

## 2018-05-03 RX ORDER — INSULIN LISPRO 100/ML
VIAL (ML) SUBCUTANEOUS
Qty: 0 | Refills: 0 | Status: DISCONTINUED | OUTPATIENT
Start: 2018-05-03 | End: 2018-05-12

## 2018-05-03 RX ORDER — INSULIN GLARGINE 100 [IU]/ML
5 INJECTION, SOLUTION SUBCUTANEOUS AT BEDTIME
Qty: 0 | Refills: 0 | Status: DISCONTINUED | OUTPATIENT
Start: 2018-05-03 | End: 2018-05-12

## 2018-05-03 RX ORDER — OXYCODONE HYDROCHLORIDE 5 MG/1
5 TABLET ORAL EVERY 6 HOURS
Qty: 0 | Refills: 0 | Status: DISCONTINUED | OUTPATIENT
Start: 2018-05-03 | End: 2018-05-03

## 2018-05-03 RX ADMIN — INSULIN GLARGINE 5 UNIT(S): 100 INJECTION, SOLUTION SUBCUTANEOUS at 10:31

## 2018-05-03 RX ADMIN — SODIUM CHLORIDE 1000 MILLILITER(S): 9 INJECTION INTRAMUSCULAR; INTRAVENOUS; SUBCUTANEOUS at 12:28

## 2018-05-03 RX ADMIN — OXYCODONE HYDROCHLORIDE 5 MILLIGRAM(S): 5 TABLET ORAL at 10:57

## 2018-05-03 RX ADMIN — HEPARIN SODIUM 5000 UNIT(S): 5000 INJECTION INTRAVENOUS; SUBCUTANEOUS at 05:58

## 2018-05-03 RX ADMIN — MEROPENEM 100 MILLIGRAM(S): 1 INJECTION INTRAVENOUS at 11:01

## 2018-05-03 RX ADMIN — SODIUM CHLORIDE 125 MILLILITER(S): 9 INJECTION, SOLUTION INTRAVENOUS at 15:29

## 2018-05-03 RX ADMIN — INSULIN HUMAN 5 UNIT(S): 100 INJECTION, SOLUTION SUBCUTANEOUS at 12:25

## 2018-05-03 RX ADMIN — Medication 650 MILLIGRAM(S): at 06:24

## 2018-05-03 RX ADMIN — Medication 650 MILLIGRAM(S): at 23:24

## 2018-05-03 RX ADMIN — Medication 2 UNIT(S): at 08:56

## 2018-05-03 RX ADMIN — ESCITALOPRAM OXALATE 10 MILLIGRAM(S): 10 TABLET, FILM COATED ORAL at 12:08

## 2018-05-03 RX ADMIN — HEPARIN SODIUM 5000 UNIT(S): 5000 INJECTION INTRAVENOUS; SUBCUTANEOUS at 17:30

## 2018-05-03 RX ADMIN — INSULIN GLARGINE 3 UNIT(S): 100 INJECTION, SOLUTION SUBCUTANEOUS at 20:37

## 2018-05-03 RX ADMIN — Medication 50 MILLIGRAM(S): at 05:58

## 2018-05-03 RX ADMIN — ATORVASTATIN CALCIUM 10 MILLIGRAM(S): 80 TABLET, FILM COATED ORAL at 22:56

## 2018-05-03 RX ADMIN — Medication 5 MILLIGRAM(S): at 22:56

## 2018-05-03 RX ADMIN — SODIUM CHLORIDE 75 MILLILITER(S): 9 INJECTION, SOLUTION INTRAVENOUS at 04:04

## 2018-05-03 RX ADMIN — Medication 100 MICROGRAM(S): at 05:58

## 2018-05-03 RX ADMIN — OXYCODONE HYDROCHLORIDE 5 MILLIGRAM(S): 5 TABLET ORAL at 11:30

## 2018-05-03 RX ADMIN — Medication 650 MILLIGRAM(S): at 22:54

## 2018-05-03 RX ADMIN — Medication 650 MILLIGRAM(S): at 05:58

## 2018-05-03 RX ADMIN — Medication 3: at 08:56

## 2018-05-03 NOTE — PROGRESS NOTE ADULT - ASSESSMENT
#DKA  was downgraded from ICU  anion gap re-opened on the floor AM labs  continuing subcutaneous insulin, sugars have ranged from 250 to 100 through the day, monitoing   endocrine recommending increasing lantus to 20 per day, currently have 12 units daily 6 am 6 hs  f/u 1600 labs, if anion gap worsening or not improving    #DREW  nephro: change fluids to 1/2NS w/ bicarb, which was done this morning, and started sodium bicarb 650 PO q8, need for dialysis?  HD?    #Emphysematous cystitis / UTI  f/u urology - continue davis, awaiting follow up from attng    DNR/DNI #DKA  was downgraded from ICU  anion gap re-opened on the floor AM labs (not sure if the gap is being caused by the acute kidney injury or secondary to DM.   continuing subcutaneous insulin, sugars have ranged from 250 to 100 through the day, monitoing   endocrine recommending increasing lantus to 20 per day, currently have 12 units daily 6 am 6 hs  f/u 1600 labs, if anion gap worsening or not improving    #anion gap metabolic acidosis  now with respiratory compensation seen on ABG  nephro: change fluids to 1/2NS w/ bicarb, which was done this morning, and started sodium bicarb 650 PO q8, need for dialysis?  f/u nephro for hemodialysis    #Lethargic / altered  patient knows name, where she is, and the year but is increasingly lethargic  Patient going back into DKA vs uremic encephalopathy    #Emphysematous cystitis / UTI  f/u urology - continue davis, awaiting follow up from attng    DNR/DNI

## 2018-05-03 NOTE — PROGRESS NOTE ADULT - ASSESSMENT
82 yo F with h/o DM, HTN, HL, RA, hypothyroidism, CHF (EF 55% in 12/17)< s/p PPM, s/p AICD, hemorrhoids, p/w weakness and diarrhea. Found to have DREW , acidosis and hyperkalemia/cystitis/neurogenic bladder:  # creatinine trending down  # non oliguric  # change iv fluid to 1/2 ns with 75 meq hco3 at 75 cc/h  # start sodium bicarb q 8  #followed by ID/  # remains on marry  # BP on the low side, d/c metoprolol, might need to start pressors  # ph noted, start phoslo 1/1/1  # no indication for RRT  # will follow 82 yo F with h/o DM, HTN, HL, RA, hypothyroidism, CHF (EF 55% in 12/17)< s/p PPM, s/p AICD, hemorrhoids, p/w weakness and diarrhea. Found to have DREW , acidosis and hyperkalemia/cystitis/neurogenic bladder:    # creatinine stable at 6   # non oliguric  # change iv fluid to 1/2 ns with 75 meq hco3 at 75 cc/h please   # start sodium bicarb 650 mg po q 8  #followed by ID/  # remains on marry  # BP noted better today   # phosphorus noted  no binders  # no urgent indication for RRT  # will follow

## 2018-05-03 NOTE — PROGRESS NOTE ADULT - SUBJECTIVE AND OBJECTIVE BOX
Nephrology progress note    Patient is seen and examined, events over the last 24 h noted .    Allergies:  Plavix (Other (Moderate))    Hospital Medications:   MEDICATIONS  (STANDING):  atorvastatin 10 milliGRAM(s) Oral at bedtime  dextrose 5%. 1000 milliLiter(s) (50 mL/Hr) IV Continuous <Continuous>  dextrose 50% Injectable 12.5 Gram(s) IV Push once  dextrose 50% Injectable 25 Gram(s) IV Push once  dextrose 50% Injectable 25 Gram(s) IV Push once  escitalopram 10 milliGRAM(s) Oral daily  heparin  Injectable 5000 Unit(s) SubCutaneous every 12 hours  insulin glargine Injectable (LANTUS) 5 Unit(s) SubCutaneous every morning  insulin lispro (HumaLOG) corrective regimen sliding scale   SubCutaneous three times a day before meals  insulin lispro Injectable (HumaLOG) 2 Unit(s) SubCutaneous before breakfast  insulin lispro Injectable (HumaLOG) 2 Unit(s) SubCutaneous before lunch  insulin lispro Injectable (HumaLOG) 2 Unit(s) SubCutaneous before dinner  levothyroxine 100 MICROGram(s) Oral daily  melatonin 5 milliGRAM(s) Oral at bedtime  meropenem  IVPB 500 milliGRAM(s) IV Intermittent every 24 hours  metoprolol succinate ER 50 milliGRAM(s) Oral daily  oxyCODONE    IR 5 milliGRAM(s) Oral every 6 hours  sodium chloride 0.45%. 1000 milliLiter(s) (75 mL/Hr) IV Continuous <Continuous>        VITALS:  T(F): 97.2 (18 @ 05:41), Max: 97.6 (18 @ 17:30)  HR: 80 (18 @ 05:41)  BP: 143/64 (18 @ 05:41)  RR: 18 (18 @ 05:41)  SpO2: 95% (18 @ 19:30)  Wt(kg): --     @ 07:01  -   @ 07:00  --------------------------------------------------------  IN: 2567 mL / OUT: 722 mL / NET: 1845 mL     @ 07:01  -   @ 07:00  --------------------------------------------------------  IN: 905 mL / OUT: 530 mL / NET: 375 mL      Height (cm): 160.02 ( @ 17:30)  Weight (kg): 51.3 ( @ 17:30)  BMI (kg/m2): 20 ( @ 17:30)  BSA (m2): 1.52 ( @ 17:30)    PHYSICAL EXAM:  Constitutional: NAD  HEENT: anicteric sclera, oropharynx clear, MMM  Neck: No JVD  Respiratory: CTAB, no wheezes, rales or rhonchi  Cardiovascular: S1, S2, RRR  Gastrointestinal: BS+, soft, NT/ND  Extremities: No cyanosis or clubbing. No peripheral edema  :  No davis.   Skin: No rashes    LABS:      131<L>  |  93<L>  |  117<HH>  ----------------------------<  197<H>  5.5<H>   |  16<L>  |  6.0<HH>    Ca    6.8<L>      03 May 2018 05:50  Phos  5.4       Mg     2.2                                 7.0    13.57 )-----------( 254      ( 03 May 2018 05:50 )             20.9       Urine Studies:  Urinalysis Basic - ( 01 May 2018 08:59 )    Color: Yellow / Appearance: Turbid / S.015 / pH:   Gluc:  / Ketone: Negative  / Bili: Negative / Urobili: 0.2 mg/dL   Blood:  / Protein: 100 mg/dL / Nitrite: Negative   Leuk Esterase: Large / RBC: >50 /HPF / WBC >50 /HPF   Sq Epi:  / Non Sq Epi:  / Bacteria: Many /HPF      Sodium, Random Urine: 71.0 mmoL/L ( @ 08:58)  Creatinine, Random Urine: 56 mg/dL (05-01 @ 08:58)  Osmolality, Random Urine: 367 mos/kg ( @ 08:57)    RADIOLOGY & ADDITIONAL STUDIES: Nephrology progress note    Patient is seen and examined, events over the last 24 h noted .  No chest pain no SOB  more responsive   Allergies:  Plavix (Other (Moderate))    Hospital Medications:   MEDICATIONS  (STANDING):  atorvastatin 10 milliGRAM(s) Oral at bedtime  dextrose 5%. 1000 milliLiter(s) (50 mL/Hr) IV Continuous <Continuous>  escitalopram 10 milliGRAM(s) Oral daily  heparin  Injectable 5000 Unit(s) SubCutaneous every 12 hours  insulin glargine Injectable (LANTUS) 5 Unit(s) SubCutaneous every morning  insulin lispro (HumaLOG) corrective regimen sliding scale   SubCutaneous three times a day before meals  levothyroxine 100 MICROGram(s) Oral daily  melatonin 5 milliGRAM(s) Oral at bedtime  meropenem  IVPB 500 milliGRAM(s) IV Intermittent every 24 hours  metoprolol succinate ER 50 milliGRAM(s) Oral daily  oxyCODONE    IR 5 milliGRAM(s) Oral every 6 hours  sodium chloride 0.45%. 1000 milliLiter(s) (75 mL/Hr) IV Continuous <Continuous>        VITALS:  T(F): 97.2 (18 @ 05:41), Max: 97.6 (18 @ 17:30)  HR: 80 (18 @ 05:41)  BP: 143/64 (18 @ 05:41)  RR: 18 (18 @ 05:41)  SpO2: 95% (18 @ 19:30)  Wt(kg): --     @ :  -   @ 07:00  --------------------------------------------------------  IN: 2567 mL / OUT: 722 mL / NET: 1845 mL     @ 07:01  -   @ 07:00  --------------------------------------------------------  IN: 905 mL / OUT: 530 mL / NET: 375 mL      Height (cm): 160.02 ( @ 17:30)  Weight (kg): 51.3 ( @ 17:30)  BMI (kg/m2): 20 ( @ 17:30)  BSA (m2): 1.52 ( @ 17:30)    PHYSICAL EXAM:  Constitutional: NAD  HEENT: anicteric sclera, oropharynx clear, MMM  Neck: No JVD  Respiratory: CTAB, no wheezes, rales or rhonchi  Cardiovascular: S1, S2, RRR  Gastrointestinal: BS+, soft, NT/ND  Extremities: No cyanosis or clubbing. No peripheral edema  :  positive davis   Skin: No rashes    LABS:      131<L>  |  93<L>  |  117<HH>  ----------------------------<  197<H>  5.5<H>   |  16<L>  |  6.0<HH>  Creatinine Trend: 6.0<--, 6.0<--, 6.0<--, 5.6<--, 5.9<--, 5.9<--  Ca    6.8<L>      03 May 2018 05:50  Phos  5.4       Mg     2.2                                 7.0    13.57 )-----------( 254      ( 03 May 2018 05:50 )             20.9       Urine Studies:  Urinalysis Basic - ( 01 May 2018 08:59 )    Color: Yellow / Appearance: Turbid / S.015 / pH:   Gluc:  / Ketone: Negative  / Bili: Negative / Urobili: 0.2 mg/dL   Blood:  / Protein: 100 mg/dL / Nitrite: Negative   Leuk Esterase: Large / RBC: >50 /HPF / WBC >50 /HPF   Sq Epi:  / Non Sq Epi:  / Bacteria: Many /HPF      Sodium, Random Urine: 71.0 mmoL/L ( @ 08:58)  Creatinine, Random Urine: 56 mg/dL ( @ 08:58)  Osmolality, Random Urine: 367 mos/kg ( @ 08:57)    RADIOLOGY & ADDITIONAL STUDIES:

## 2018-05-03 NOTE — PROGRESS NOTE ADULT - ASSESSMENT
Mrs Lloyd is an elderly Female with above PMH s/p ICU Care for DKA on the floor Day #1.     Problem List  1. DKA (resolved)  2. DREW on CKD (stable per Nephro)  3. High Anion Gap Metabolic Acidosis  4. Anemia (Acute on Chronic - no acute blood loss / +Chronic Anemia due to CKD)  5. Bilateral Pleural Effusions - component of fluid overload from IVF / TTE?  6. Emphesematous Cystitis - Keep davis per  - f/u recs / f/u Ucxs  7. Metabolic Encephalopathy - RF, UTI, Acidosis  8. CODE STATUS - DNR / DNI - Will speak to family to sign forms    Plan: ABG, BMP, CBC, Transfuse 1PRBC, 1/2NSwith bicarb Infusion, f/u Ucxs, Blood cxs, UA, c/w Meropenem, f/u , Nephrology ? HD Plans? Access? Adjust Lantus 12Un sq AM/PM, Lispro 4Un with meals. Plan Rounding Team.

## 2018-05-03 NOTE — PROGRESS NOTE ADULT - SUBJECTIVE AND OBJECTIVE BOX
Evgeny Perry; Edson Reza; Sarha Marquez; User ADM; Maurice Menendez; Maurice Menendez; Iveth Gibbons; Justino Zelaya; Stephany Fox; Oleg Hernandez; Team [facility]; Team [facility]; Ordering Physician; Daniella Alvarado; Faisal Nowak; Gina Hendricks; Anders Abbott; Marilou Portillo; Jinny Childs; Jinny Childs; Amy Joe; Roland Fay; Maurice De Oliveira; Katie Borges  Patient is a 83y old  Female who presents with a chief complaint of DKA (01 May 2018 04:15)      Vital Signs Last 24 Hrs  T(C): 35.7 (03 May 2018 14:26), Max: 36.4 (02 May 2018 17:30)  T(F): 96.3 (03 May 2018 14:26), Max: 97.6 (02 May 2018 17:30)  HR: 65 (03 May 2018 14:26) (65 - 88)  BP: 139/65 (03 May 2018 14:26) (121/57 - 143/64)  BP(mean): 93 (02 May 2018 16:00) (93 - 93)  RR: 16 (03 May 2018 14:26) (16 - 24)  SpO2: 95% (02 May 2018 19:30) (94% - 96%)  CAPILLARY BLOOD GLUCOSE  100 (03 May 2018 15:05)  273 (03 May 2018 11:17)  268 (03 May 2018 05:41)  224 (02 May 2018 20:47)  160 (02 May 2018 17:00)          PAST MEDICAL & SURGICAL HISTORY:  Neuropathy  Diabetes  Hypothyroidism  HTN (hypertension)  CHF (congestive heart failure)  AICD (automatic cardioverter/defibrillator) present    MEDICATIONS  (STANDING):  atorvastatin 10 milliGRAM(s) Oral at bedtime  dextrose 5%. 1000 milliLiter(s) (50 mL/Hr) IV Continuous <Continuous>  dextrose 50% Injectable 12.5 Gram(s) IV Push once  dextrose 50% Injectable 25 Gram(s) IV Push once  dextrose 50% Injectable 25 Gram(s) IV Push once  escitalopram 10 milliGRAM(s) Oral daily  heparin  Injectable 5000 Unit(s) SubCutaneous every 12 hours  insulin glargine Injectable (LANTUS) 5 Unit(s) SubCutaneous every morning  insulin glargine Injectable (LANTUS) 5 Unit(s) SubCutaneous at bedtime  insulin lispro (HumaLOG) corrective regimen sliding scale   SubCutaneous three times a day before meals  insulin lispro (HumaLOG) corrective regimen sliding scale   SubCutaneous three times a day before meals  insulin lispro Injectable (HumaLOG) 4 Unit(s) SubCutaneous before breakfast  insulin lispro Injectable (HumaLOG) 4 Unit(s) SubCutaneous before lunch  insulin lispro Injectable (HumaLOG) 4 Unit(s) SubCutaneous before dinner  levothyroxine 100 MICROGram(s) Oral daily  melatonin 5 milliGRAM(s) Oral at bedtime  meropenem  IVPB 500 milliGRAM(s) IV Intermittent every 24 hours  metoprolol succinate ER 50 milliGRAM(s) Oral daily  sodium chloride 0.45% 1000 milliLiter(s) (125 mL/Hr) IV Continuous <Continuous>    MEDICATIONS  (PRN):  acetaminophen   Tablet. 650 milliGRAM(s) Oral every 6 hours PRN Mild Pain (1 - 3)  benzocaine 15 mG/menthol 3.6 mG Lozenge 1 Lozenge Oral every 3 hours PRN Sore Throat  dextrose Gel 1 Dose(s) Oral once PRN Blood Glucose LESS THAN 70 milliGRAM(s)/deciliter  glucagon  Injectable 1 milliGRAM(s) IntraMuscular once PRN Glucose LESS THAN 70 milligrams/deciliter      Physical exam  Gen: lethargic  heent: no lymphadenopathy, neck supple  Cardio: RRR, s1 s2 noted  pulm: lungs clear to auscultation b/l, no rales, crackles noted  abd: soft, non tender  Extremities: edematous b/l arms and legs non pitting edema  neuro: A&Ox3, lethargic     Labs:                        7.0    13.57 )-----------( 254      ( 03 May 2018 05:50 )             20.9             05-03    131<L>  |  93<L>  |  117<HH>  ----------------------------<  197<H>  5.5<H>   |  16<L>  |  6.0<HH>    Ca    6.8<L>      03 May 2018 05:50  Phos  5.4     05-01  Mg     2.2     05-01                    ABG - ( 03 May 2018 14:40 )  pH, Arterial: 7.34  pH, Blood: x     /  pCO2: 31    /  pO2: 68    / HCO3: 17    / Base Excess: -8.2  /  SaO2: 94                    Culture - Blood (collected 02 May 2018 04:27)  Source: .Blood None  Preliminary Report (03 May 2018 13:02):    No growth to date.    Culture - Blood (collected 01 May 2018 11:00)  Source: .Blood Blood  Preliminary Report (03 May 2018 01:03):    No growth to date.    Culture - Urine (collected 01 May 2018 08:59)  Source: .Urine Catheterized  Final Report (03 May 2018 09:56):    10,000 - 49,000 CFU/mL Escherichia coli  Organism: Escherichia coli (03 May 2018 09:56)  Organism: Escherichia coli (03 May 2018 09:56)      I&O's Summary    02 May 2018 07:01  -  03 May 2018 07:00  --------------------------------------------------------  IN: 905 mL / OUT: 530 mL / NET: 375 mL        Imaging:    ECG:

## 2018-05-03 NOTE — PROGRESS NOTE ADULT - SUBJECTIVE AND OBJECTIVE BOX
VI MOBLEY  83y  Female    Patient is a 83y old  Female who presents with a chief complaint of DKA (01 May 2018 04:15)    INTERVAL HPI/OVERNIGHT EVENTS: Lethargic today. No overnight events.     PAST MEDICAL & SURGICAL HISTORY:  Neuropathy  Diabetes  Hypothyroidism  HTN (hypertension)  CHF (congestive heart failure)  AICD (automatic cardioverter/defibrillator) present    VITALS:  T(F): 96.3 (05-03-18 @ 14:26), Max: 97.6 (05-02-18 @ 17:30)  HR: 65 (05-03-18 @ 14:26) (65 - 88)  BP: 139/65 (05-03-18 @ 14:26) (121/57 - 143/64)  RR: 16 (05-03-18 @ 14:26) (16 - 24)  SpO2: 95% (05-02-18 @ 19:30) (94% - 96%)    LABS:                        7.0    13.57 )-----------( 254      ( 03 May 2018 05:50 )             20.9     05-03    131<L>  |  93<L>  |  117<HH>  ----------------------------<  197<H>  5.5<H>   |  16<L>  |  6.0    Ca    6.8<L>      03 May 2018 05:50  Phos  5.4     05-01  Mg     2.2     05-01    MICROBIOLOGY: pending urine cxs and Blood cxs    RADIOLOGY & ADDITIONAL TESTS:  CXR: Bilateral Interstitial Opacities    PROBLEM LIST:  HEALTH ISSUES - PROBLEM Dx: DLPAULA VI  83y  Female    Patient is a 83y old  Female who presents with a chief complaint of DKA (01 May 2018 04:15)    INTERVAL HPI/OVERNIGHT EVENTS: Lethargic today. No overnight events.     PAST MEDICAL & SURGICAL HISTORY:  Neuropathy  Diabetes  Hypothyroidism  HTN (hypertension)  CHF (congestive heart failure)  AICD (automatic cardioverter/defibrillator) present    VITALS:  T(F): 96.3 (05-03-18 @ 14:26), Max: 97.6 (05-02-18 @ 17:30)  HR: 65 (05-03-18 @ 14:26) (65 - 88)  BP: 139/65 (05-03-18 @ 14:26) (121/57 - 143/64)  RR: 16 (05-03-18 @ 14:26) (16 - 24)  SpO2: 95% (05-02-18 @ 19:30) (94% - 96%)    LABS:                        7.0    13.57 )-----------( 254      ( 03 May 2018 05:50 )             20.9     05-03    131<L>  |  93<L>  |  117<HH>  ----------------------------<  197<H>  5.5<H>   |  16<L>  |  6.0    Ca    6.8<L>      03 May 2018 05:50  Phos  5.4     05-01  Mg     2.2     05-01    MEDICATIONS  (STANDING):  atorvastatin 10 milliGRAM(s) Oral at bedtime  dextrose 5%. 1000 milliLiter(s) (50 mL/Hr) IV Continuous <Continuous>  dextrose 50% Injectable 12.5 Gram(s) IV Push once  dextrose 50% Injectable 25 Gram(s) IV Push once  dextrose 50% Injectable 25 Gram(s) IV Push once  escitalopram 10 milliGRAM(s) Oral daily  heparin  Injectable 5000 Unit(s) SubCutaneous every 12 hours  insulin glargine Injectable (LANTUS) 5 Unit(s) SubCutaneous every morning  insulin glargine Injectable (LANTUS) 5 Unit(s) SubCutaneous at bedtime  insulin lispro (HumaLOG) corrective regimen sliding scale   SubCutaneous three times a day before meals  insulin lispro (HumaLOG) corrective regimen sliding scale   SubCutaneous three times a day before meals  insulin lispro Injectable (HumaLOG) 4 Unit(s) SubCutaneous before breakfast  insulin lispro Injectable (HumaLOG) 4 Unit(s) SubCutaneous before lunch  insulin lispro Injectable (HumaLOG) 4 Unit(s) SubCutaneous before dinner  levothyroxine 100 MICROGram(s) Oral daily  melatonin 5 milliGRAM(s) Oral at bedtime  meropenem  IVPB 500 milliGRAM(s) IV Intermittent every 24 hours  metoprolol succinate ER 50 milliGRAM(s) Oral daily  sodium chloride 0.45% 1000 milliLiter(s) (125 mL/Hr) IV Continuous <Continuous>    MEDICATIONS  (PRN):  acetaminophen   Tablet. 650 milliGRAM(s) Oral every 6 hours PRN Mild Pain (1 - 3)  benzocaine 15 mG/menthol 3.6 mG Lozenge 1 Lozenge Oral every 3 hours PRN Sore Throat  dextrose Gel 1 Dose(s) Oral once PRN Blood Glucose LESS THAN 70 milliGRAM(s)/deciliter  glucagon  Injectable 1 milliGRAM(s) IntraMuscular once PRN Glucose LESS THAN 70 milligrams/deciliter      MICROBIOLOGY: pending urine cxs and Blood cxs    RADIOLOGY & ADDITIONAL TESTS:  CXR: Bilateral Interstitial Opacities

## 2018-05-04 LAB
ANION GAP SERPL CALC-SCNC: 17 MMOL/L — HIGH (ref 7–14)
ANION GAP SERPL CALC-SCNC: 17 MMOL/L — HIGH (ref 7–14)
ANION GAP SERPL CALC-SCNC: 20 MMOL/L — HIGH (ref 7–14)
BUN SERPL-MCNC: 111 MG/DL — CRITICAL HIGH (ref 10–20)
BUN SERPL-MCNC: 116 MG/DL — CRITICAL HIGH (ref 10–20)
BUN SERPL-MCNC: 117 MG/DL — CRITICAL HIGH (ref 10–20)
CALCIUM SERPL-MCNC: 6.8 MG/DL — LOW (ref 8.5–10.1)
CALCIUM SERPL-MCNC: 6.9 MG/DL — LOW (ref 8.5–10.1)
CALCIUM SERPL-MCNC: 6.9 MG/DL — LOW (ref 8.5–10.1)
CHLORIDE SERPL-SCNC: 90 MMOL/L — LOW (ref 98–110)
CHLORIDE SERPL-SCNC: 95 MMOL/L — LOW (ref 98–110)
CHLORIDE SERPL-SCNC: 97 MMOL/L — LOW (ref 98–110)
CK MB CFR SERPL CALC: 1.8 NG/ML — SIGNIFICANT CHANGE UP (ref 0.6–6.3)
CK SERPL-CCNC: 25 U/L — SIGNIFICANT CHANGE UP (ref 0–225)
CO2 SERPL-SCNC: 18 MMOL/L — SIGNIFICANT CHANGE UP (ref 17–32)
CO2 SERPL-SCNC: 19 MMOL/L — SIGNIFICANT CHANGE UP (ref 17–32)
CO2 SERPL-SCNC: 21 MMOL/L — SIGNIFICANT CHANGE UP (ref 17–32)
CREAT SERPL-MCNC: 5.4 MG/DL — CRITICAL HIGH (ref 0.7–1.5)
CREAT SERPL-MCNC: 5.6 MG/DL — CRITICAL HIGH (ref 0.7–1.5)
CREAT SERPL-MCNC: 5.7 MG/DL — CRITICAL HIGH (ref 0.7–1.5)
FERRITIN SERPL-MCNC: 618 NG/ML — HIGH (ref 15–150)
FOLATE SERPL-MCNC: 4.8 NG/ML — SIGNIFICANT CHANGE UP
GLUCOSE SERPL-MCNC: 106 MG/DL — HIGH (ref 70–99)
GLUCOSE SERPL-MCNC: 165 MG/DL — HIGH (ref 70–99)
GLUCOSE SERPL-MCNC: 171 MG/DL — HIGH (ref 70–99)
HCT VFR BLD CALC: 22.7 % — LOW (ref 37–47)
HGB BLD-MCNC: 7.6 G/DL — LOW (ref 12–16)
MCHC RBC-ENTMCNC: 24.6 PG — LOW (ref 27–31)
MCHC RBC-ENTMCNC: 33.5 G/DL — SIGNIFICANT CHANGE UP (ref 32–37)
MCV RBC AUTO: 73.5 FL — LOW (ref 81–99)
NRBC # BLD: 0 /100 WBCS — SIGNIFICANT CHANGE UP (ref 0–0)
PLATELET # BLD AUTO: 257 K/UL — SIGNIFICANT CHANGE UP (ref 130–400)
POTASSIUM SERPL-MCNC: 5.2 MMOL/L — HIGH (ref 3.5–5)
POTASSIUM SERPL-MCNC: 5.6 MMOL/L — HIGH (ref 3.5–5)
POTASSIUM SERPL-MCNC: 6.2 MMOL/L — CRITICAL HIGH (ref 3.5–5)
POTASSIUM SERPL-SCNC: 5.2 MMOL/L — HIGH (ref 3.5–5)
POTASSIUM SERPL-SCNC: 5.6 MMOL/L — HIGH (ref 3.5–5)
POTASSIUM SERPL-SCNC: 6.2 MMOL/L — CRITICAL HIGH (ref 3.5–5)
RBC # BLD: 3.09 M/UL — LOW (ref 4.2–5.4)
RBC # FLD: 17.2 % — HIGH (ref 11.5–14.5)
SODIUM SERPL-SCNC: 131 MMOL/L — LOW (ref 135–146)
SODIUM SERPL-SCNC: 131 MMOL/L — LOW (ref 135–146)
SODIUM SERPL-SCNC: 132 MMOL/L — LOW (ref 135–146)
TROPONIN T SERPL-MCNC: <0.01 NG/ML — SIGNIFICANT CHANGE UP
VIT B12 SERPL-MCNC: 1866 PG/ML — HIGH (ref 232–1245)
WBC # BLD: 9.9 K/UL — SIGNIFICANT CHANGE UP (ref 4.8–10.8)
WBC # FLD AUTO: 9.9 K/UL — SIGNIFICANT CHANGE UP (ref 4.8–10.8)

## 2018-05-04 RX ORDER — SODIUM POLYSTYRENE SULFONATE 4.1 MEQ/G
30 POWDER, FOR SUSPENSION ORAL ONCE
Qty: 0 | Refills: 0 | Status: COMPLETED | OUTPATIENT
Start: 2018-05-04 | End: 2018-05-04

## 2018-05-04 RX ORDER — SODIUM BICARBONATE 1 MEQ/ML
650 SYRINGE (ML) INTRAVENOUS EVERY 8 HOURS
Qty: 0 | Refills: 0 | Status: DISCONTINUED | OUTPATIENT
Start: 2018-05-04 | End: 2018-05-08

## 2018-05-04 RX ORDER — DEXTROSE 50 % IN WATER 50 %
50 SYRINGE (ML) INTRAVENOUS ONCE
Qty: 0 | Refills: 0 | Status: COMPLETED | OUTPATIENT
Start: 2018-05-04 | End: 2018-05-04

## 2018-05-04 RX ORDER — INSULIN HUMAN 100 [IU]/ML
10 INJECTION, SOLUTION SUBCUTANEOUS ONCE
Qty: 0 | Refills: 0 | Status: COMPLETED | OUTPATIENT
Start: 2018-05-04 | End: 2018-05-04

## 2018-05-04 RX ORDER — INSULIN LISPRO 100/ML
2 VIAL (ML) SUBCUTANEOUS ONCE
Qty: 0 | Refills: 0 | Status: COMPLETED | OUTPATIENT
Start: 2018-05-04 | End: 2018-05-04

## 2018-05-04 RX ORDER — INSULIN GLARGINE 100 [IU]/ML
3 INJECTION, SOLUTION SUBCUTANEOUS ONCE
Qty: 0 | Refills: 0 | Status: COMPLETED | OUTPATIENT
Start: 2018-05-04 | End: 2018-05-03

## 2018-05-04 RX ADMIN — Medication 100 MICROGRAM(S): at 05:11

## 2018-05-04 RX ADMIN — Medication 1: at 08:38

## 2018-05-04 RX ADMIN — Medication 1: at 12:33

## 2018-05-04 RX ADMIN — Medication 650 MILLIGRAM(S): at 13:56

## 2018-05-04 RX ADMIN — SODIUM CHLORIDE 125 MILLILITER(S): 9 INJECTION, SOLUTION INTRAVENOUS at 00:07

## 2018-05-04 RX ADMIN — MEROPENEM 100 MILLIGRAM(S): 1 INJECTION INTRAVENOUS at 11:21

## 2018-05-04 RX ADMIN — Medication 4 UNIT(S): at 12:33

## 2018-05-04 RX ADMIN — INSULIN GLARGINE 5 UNIT(S): 100 INJECTION, SOLUTION SUBCUTANEOUS at 08:39

## 2018-05-04 RX ADMIN — Medication 4 UNIT(S): at 17:54

## 2018-05-04 RX ADMIN — Medication 650 MILLIGRAM(S): at 16:56

## 2018-05-04 RX ADMIN — Medication 4 UNIT(S): at 08:38

## 2018-05-04 RX ADMIN — Medication 2 UNIT(S): at 22:05

## 2018-05-04 RX ADMIN — ESCITALOPRAM OXALATE 10 MILLIGRAM(S): 10 TABLET, FILM COATED ORAL at 11:21

## 2018-05-04 RX ADMIN — Medication 650 MILLIGRAM(S): at 16:26

## 2018-05-04 RX ADMIN — ATORVASTATIN CALCIUM 10 MILLIGRAM(S): 80 TABLET, FILM COATED ORAL at 21:41

## 2018-05-04 RX ADMIN — HEPARIN SODIUM 5000 UNIT(S): 5000 INJECTION INTRAVENOUS; SUBCUTANEOUS at 05:10

## 2018-05-04 RX ADMIN — Medication 50 MILLIGRAM(S): at 05:11

## 2018-05-04 RX ADMIN — INSULIN HUMAN 10 UNIT(S): 100 INJECTION, SOLUTION SUBCUTANEOUS at 04:38

## 2018-05-04 RX ADMIN — Medication 50 MILLILITER(S): at 04:38

## 2018-05-04 RX ADMIN — Medication 5 MILLIGRAM(S): at 21:42

## 2018-05-04 RX ADMIN — HEPARIN SODIUM 5000 UNIT(S): 5000 INJECTION INTRAVENOUS; SUBCUTANEOUS at 17:55

## 2018-05-04 RX ADMIN — Medication 650 MILLIGRAM(S): at 21:42

## 2018-05-04 RX ADMIN — SODIUM POLYSTYRENE SULFONATE 30 GRAM(S): 4.1 POWDER, FOR SUSPENSION ORAL at 08:55

## 2018-05-04 NOTE — PROGRESS NOTE ADULT - ASSESSMENT
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

## 2018-05-04 NOTE — PROGRESS NOTE ADULT - ASSESSMENT
Patient is a 83y old  Female who presents with a chief complaint of DKA (01 May 2018 04:15)    PAST MEDICAL & SURGICAL HISTORY:  Neuropathy  Diabetes  Hypothyroidism  HTN (hypertension)  CHF (congestive heart failure)  AICD (automatic cardioverter/defibrillator) present    #DKA: Resolved  -Patient mental status is waxing and waning probably due to uremic encephalopathy: Decreased appetite and inconsistent meals.   -Increase Lantus to 12 units qHS with Lispro 3u qAC.    #Refractory hyperkalemia: DREW on CKD. HD? Patient is a 83y old  Female who presents with a chief complaint of DKA (01 May 2018 04:15)    PAST MEDICAL & SURGICAL HISTORY:  Neuropathy  Diabetes  Hypothyroidism  HTN (hypertension)  CHF (congestive heart failure)  AICD (automatic cardioverter/defibrillator) present    #DKA: Resolved  -Patient mental status is waxing and waning probably due to uremic encephalopathy: Decreased appetite and inconsistent meals.   -Continue current regimen    #Refractory hyperkalemia: DREW on CKD. HD?

## 2018-05-04 NOTE — PROGRESS NOTE ADULT - ASSESSMENT
Mrs Lloyd is an elderly Female with above PMH s/p ICU Care for DKA on the floor Day #1.     Problem List  1. DKA (resolved)  2. DREW on CKD (stable per Nephro)  3. High Anion Gap Metabolic Acidosis  4. Anemia (Acute on Chronic - no acute blood loss / +Chronic Anemia due to CKD)  5. Bilateral Pleural Effusions - component of fluid overload from IVF / TTE?  6. Emphesematous Cystitis - Keep davis per  - f/u recs / f/u Ucxs  7. Metabolic Encephalopathy - RF, UTI, Acidosis  8. CODE STATUS - DNR / DNI - Will speak to family to sign forms  9. HyperKalemia s/p Bicarb/Insulin    Plan:   Continue Meropenem  Continue Davis   Patient is AOx3 she consents to HD if needed   Follow Kidney Function / K / f/u BMP   recs - continue abx and davis  F/U U cxs / Blood cxs  Continue meds as above  Stop IVF  Will follow Mrs Lloyd is an elderly Female with above PMH s/p ICU Care for DKA on the floor Day #1.     Problem List  1. DKA (resolved)  2. DREW on CKD (stable per Nephro)  3. High Anion Gap Metabolic Acidosis  4. Anemia (Acute on Chronic - no acute blood loss / +Chronic Anemia due to CKD)  5. Bilateral Pleural Effusions - component of fluid overload from IVF / TTE?  6. Emphesematous Cystitis - Keep davis per  - f/u recs / f/u Ucxs  7. Metabolic Encephalopathy - RF, UTI, Acidosis  8. CODE STATUS - DNR / DNI - Will speak to family to sign forms  9. HyperKalemia s/p Bicarb/Insulin  10. Sepsis POA     Plan:   Continue Meropenem  Continue Davis   Patient is AOx3 she consents to HD if needed   Follow Kidney Function / K / f/u BMP   recs - continue abx and davis  F/U U cxs / Blood cxs  Continue meds as above  Stop IVF  Will follow Mrs Lloyd is an elderly Female with above PMH s/p ICU Care for DKA on the floor Day #1.     Problem List  1. DKA (resolved)  2. DREW on CKD (stable per Nephro) / Fluid Overload from RF  3. High Anion Gap Metabolic Acidosis  4. Anemia (Acute on Chronic - no acute blood loss / +Chronic Anemia due to CKD)  5. Bilateral Pleural Effusions - component of fluid overload from IVF / TTE?  6. Emphesematous Cystitis - Keep davis per  - f/u recs / f/u Ucxs  7. Metabolic Encephalopathy - RF, UTI, Acidosis  8. CODE STATUS - DNR / DNI - Will speak to family to sign forms  9. HyperKalemia s/p Bicarb/Insulin  10. Sepsis POA   11. Chronic Systolic CHF s/p AICD / with improvement with Current EF 55%    Plan:   ID Consult  Continue Meropenem  Continue Davis   Patient is AOx3 she consents to HD if needed   Follow Kidney Function / K / f/u BMP   recs - continue abx and davis  F/U U cxs / Blood cxs  Continue meds as above  Stop IVF  Will follow

## 2018-05-04 NOTE — PROGRESS NOTE ADULT - SUBJECTIVE AND OBJECTIVE BOX
Interval Events:    [] All review of systems performed and negative, unlisted commented here:    Allergies    Plavix (Other (Moderate))    Intolerances      Endocrine/Metabolic Medications:  atorvastatin 10 milliGRAM(s) Oral at bedtime  dextrose 50% Injectable 12.5 Gram(s) IV Push once  dextrose 50% Injectable 25 Gram(s) IV Push once  dextrose 50% Injectable 25 Gram(s) IV Push once  dextrose Gel 1 Dose(s) Oral once PRN  glucagon  Injectable 1 milliGRAM(s) IntraMuscular once PRN  insulin glargine Injectable (LANTUS) 5 Unit(s) SubCutaneous every morning  insulin glargine Injectable (LANTUS) 5 Unit(s) SubCutaneous at bedtime  insulin lispro (HumaLOG) corrective regimen sliding scale   SubCutaneous three times a day before meals  insulin lispro (HumaLOG) corrective regimen sliding scale   SubCutaneous three times a day before meals  insulin lispro Injectable (HumaLOG) 4 Unit(s) SubCutaneous before breakfast  insulin lispro Injectable (HumaLOG) 4 Unit(s) SubCutaneous before lunch  insulin lispro Injectable (HumaLOG) 4 Unit(s) SubCutaneous before dinner  levothyroxine 100 MICROGram(s) Oral daily      Vital Signs Last 24 Hrs  T(C): 37 (04 May 2018 05:47), Max: 37 (04 May 2018 05:47)  T(F): 98.6 (04 May 2018 05:47), Max: 98.6 (04 May 2018 05:47)  HR: 59 (04 May 2018 05:47) (59 - 75)  BP: 142/64 (04 May 2018 05:47) (121/62 - 142/64)  BP(mean): --  RR: 17 (04 May 2018 05:47) (16 - 20)  SpO2: --      PHYSICAL EXAM  All physical exam findings normal, except those marked:  General:	Lethargic, well-hydrated  .  Neck		Normal: supple, no cervical adenopathy, no palpable thyroid  .		[] Abnormal:  Cardiovascular	Normal: regular rate, normal S1, S2, no murmurs  .		[] Abnormal:  Respiratory	Normal: no chest wall deformity, normal respiratory pattern, CTA B/L  .		[] Abnormal: tachypnea  Abdominal	Normal: soft, ND, NT, bowel sounds present, no masses, no organomegaly  .		[] Abnormal:  		deferred  Extremities	  .		[] Abnormal: edematous bilateral upper and lower extremities  Skin		Normal: intact and not indurated, no rash, no acanthosis nigricans  .		[] Abnormal:  Neurologic	Normal: grossly intact, AAOx1  .		[] Abnormal:    LABS                        7.6    9.90  )-----------( 257      ( 04 May 2018 08:38 )             22.7                               131    |  90     |  117                 Calcium: 6.9   / iCa: x      (05-04 @ 08:38)    ----------------------------<  165       Magnesium: x                                5.6     |  21     |  5.6              Phosphorous: x          CAPILLARY BLOOD GLUCOSE  197 (04 May 2018 11:10)  153 (04 May 2018 07:12)  74 (03 May 2018 21:33)  79 (03 May 2018 16:26)  100 (03 May 2018 15:05) Interval Events:    [] All review of systems performed and negative, unlisted commented here: Reports feeling well, looked lethargic though.     Allergies    Plavix (Other (Moderate))    Intolerances      Endocrine/Metabolic Medications:  atorvastatin 10 milliGRAM(s) Oral at bedtime  dextrose 50% Injectable 12.5 Gram(s) IV Push once  dextrose 50% Injectable 25 Gram(s) IV Push once  dextrose 50% Injectable 25 Gram(s) IV Push once  dextrose Gel 1 Dose(s) Oral once PRN  glucagon  Injectable 1 milliGRAM(s) IntraMuscular once PRN  insulin glargine Injectable (LANTUS) 5 Unit(s) SubCutaneous every morning  insulin glargine Injectable (LANTUS) 5 Unit(s) SubCutaneous at bedtime  insulin lispro (HumaLOG) corrective regimen sliding scale   SubCutaneous three times a day before meals  insulin lispro (HumaLOG) corrective regimen sliding scale   SubCutaneous three times a day before meals  insulin lispro Injectable (HumaLOG) 4 Unit(s) SubCutaneous before breakfast  insulin lispro Injectable (HumaLOG) 4 Unit(s) SubCutaneous before lunch  insulin lispro Injectable (HumaLOG) 4 Unit(s) SubCutaneous before dinner  levothyroxine 100 MICROGram(s) Oral daily      Vital Signs Last 24 Hrs  T(C): 37 (04 May 2018 05:47), Max: 37 (04 May 2018 05:47)  T(F): 98.6 (04 May 2018 05:47), Max: 98.6 (04 May 2018 05:47)  HR: 59 (04 May 2018 05:47) (59 - 75)  BP: 142/64 (04 May 2018 05:47) (121/62 - 142/64)  BP(mean): --  RR: 17 (04 May 2018 05:47) (16 - 20)  SpO2: --      PHYSICAL EXAM  All physical exam findings normal, except those marked:  General:	Lethargic, well-hydrated  .  Neck		Normal: supple, no cervical adenopathy, no palpable thyroid  .		[] Abnormal:  Cardiovascular	Normal: regular rate, normal S1, S2, no murmurs  .		[] Abnormal:  Respiratory	Normal: no chest wall deformity, normal respiratory pattern, CTA B/L  .		[] Abnormal: tachypnea  Abdominal	Normal: soft, ND, NT, bowel sounds present, no masses, no organomegaly  .		[] Abnormal:  		deferred  Extremities	  .		[] Abnormal: edematous bilateral upper and lower extremities  Skin		Normal: intact and not indurated, no rash, no acanthosis nigricans  .		[] Abnormal:  Neurologic	Normal: grossly intact, AAOx1  .		[] Abnormal:    LABS                        7.6    9.90  )-----------( 257      ( 04 May 2018 08:38 )             22.7                               131    |  90     |  117                 Calcium: 6.9   / iCa: x      (05-04 @ 08:38)    ----------------------------<  165       Magnesium: x                                5.6     |  21     |  5.6              Phosphorous: x          CAPILLARY BLOOD GLUCOSE  197 (04 May 2018 11:10)  153 (04 May 2018 07:12)  74 (03 May 2018 21:33)  79 (03 May 2018 16:26)  100 (03 May 2018 15:05)

## 2018-05-04 NOTE — PROGRESS NOTE ADULT - SUBJECTIVE AND OBJECTIVE BOX
VI MOBLEY  83y  Female    Patient is a 83y old  Female who presents with a chief complaint of DKA (01 May 2018 04:15)    INTERVAL HPI/OVERNIGHT EVENTS: Lethargic today. No overnight events.     PAST MEDICAL & SURGICAL HISTORY:  Neuropathy  Diabetes  Hypothyroidism  HTN (hypertension)  CHF (congestive heart failure)  AICD (automatic cardioverter/defibrillator) present    Vital Signs Last 24 Hrs  T(C): 36.1 (04 May 2018 14:35), Max: 37 (04 May 2018 05:47)  T(F): 96.9 (04 May 2018 14:35), Max: 98.6 (04 May 2018 05:47)  HR: 67 (04 May 2018 14:35) (59 - 75)  BP: 142/68 (04 May 2018 14:35) (121/62 - 142/68)  RR: 16 (04 May 2018 14:35) (16 - 20)    LABS:                        7.6    9.90  )-----------( 257      ( 04 May 2018 08:38 )             22.7       05-04    131<L>  |  95<L>  |  111<HH>  ----------------------------<  171<H>  5.2<H>   |  19  |  5.4<HH>    Ca    6.8<L>      04 May 2018 12:12                          7.0    13.57 )-----------( 254      ( 03 May 2018 05:50 )             20.9     05-03    131<L>  |  93<L>  |  117<HH>  ----------------------------<  197<H>  5.5<H>   |  16<L>  |  6.0    Ca    6.8<L>      03 May 2018 05:50  Phos  5.4     05-01  Mg     2.2     05-01    MEDICATIONS  (STANDING):  atorvastatin 10 milliGRAM(s) Oral at bedtime  dextrose 5%. 1000 milliLiter(s) (50 mL/Hr) IV Continuous <Continuous>  dextrose 50% Injectable 12.5 Gram(s) IV Push once  dextrose 50% Injectable 25 Gram(s) IV Push once  dextrose 50% Injectable 25 Gram(s) IV Push once  escitalopram 10 milliGRAM(s) Oral daily  heparin  Injectable 5000 Unit(s) SubCutaneous every 12 hours  insulin glargine Injectable (LANTUS) 5 Unit(s) SubCutaneous every morning  insulin glargine Injectable (LANTUS) 5 Unit(s) SubCutaneous at bedtime  insulin lispro (HumaLOG) corrective regimen sliding scale   SubCutaneous three times a day before meals  insulin lispro (HumaLOG) corrective regimen sliding scale   SubCutaneous three times a day before meals  insulin lispro Injectable (HumaLOG) 4 Unit(s) SubCutaneous before breakfast  insulin lispro Injectable (HumaLOG) 4 Unit(s) SubCutaneous before lunch  insulin lispro Injectable (HumaLOG) 4 Unit(s) SubCutaneous before dinner  levothyroxine 100 MICROGram(s) Oral daily  melatonin 5 milliGRAM(s) Oral at bedtime  meropenem  IVPB 500 milliGRAM(s) IV Intermittent every 24 hours  metoprolol succinate ER 50 milliGRAM(s) Oral daily  sodium chloride 0.45% 1000 milliLiter(s) (125 mL/Hr) IV Continuous <Continuous>    MEDICATIONS  (PRN):  acetaminophen   Tablet. 650 milliGRAM(s) Oral every 6 hours PRN Mild Pain (1 - 3)  benzocaine 15 mG/menthol 3.6 mG Lozenge 1 Lozenge Oral every 3 hours PRN Sore Throat  dextrose Gel 1 Dose(s) Oral once PRN Blood Glucose LESS THAN 70 milliGRAM(s)/deciliter  glucagon  Injectable 1 milliGRAM(s) IntraMuscular once PRN Glucose LESS THAN 70 milligrams/deciliter      MICROBIOLOGY: pending urine cxs and Blood cxs    RADIOLOGY & ADDITIONAL TESTS:  CXR: Bilateral Interstitial Opacities

## 2018-05-04 NOTE — PROGRESS NOTE ADULT - SUBJECTIVE AND OBJECTIVE BOX
seen and examine  no distress  c/o CP       Standing Inpatient Medications  atorvastatin 10 milliGRAM(s) Oral at bedtime  dextrose 5%. 1000 milliLiter(s) IV Continuous <Continuous>  dextrose 50% Injectable 12.5 Gram(s) IV Push once  dextrose 50% Injectable 25 Gram(s) IV Push once  dextrose 50% Injectable 25 Gram(s) IV Push once  escitalopram 10 milliGRAM(s) Oral daily  heparin  Injectable 5000 Unit(s) SubCutaneous every 12 hours  insulin glargine Injectable (LANTUS) 5 Unit(s) SubCutaneous every morning  insulin glargine Injectable (LANTUS) 5 Unit(s) SubCutaneous at bedtime  insulin lispro (HumaLOG) corrective regimen sliding scale   SubCutaneous three times a day before meals  insulin lispro (HumaLOG) corrective regimen sliding scale   SubCutaneous three times a day before meals  insulin lispro Injectable (HumaLOG) 4 Unit(s) SubCutaneous before breakfast  insulin lispro Injectable (HumaLOG) 4 Unit(s) SubCutaneous before lunch  insulin lispro Injectable (HumaLOG) 4 Unit(s) SubCutaneous before dinner  levothyroxine 100 MICROGram(s) Oral daily  melatonin 5 milliGRAM(s) Oral at bedtime  meropenem  IVPB 500 milliGRAM(s) IV Intermittent every 24 hours  metoprolol succinate ER 50 milliGRAM(s) Oral daily  sodium bicarbonate 650 milliGRAM(s) Oral every 8 hours              VITALS/PHYSICAL EXAM  --------------------------------------------------------------------------------  T(C): 37 (05-04-18 @ 05:47), Max: 37 (05-04-18 @ 05:47)  HR: 59 (05-04-18 @ 05:47) (59 - 75)  BP: 142/64 (05-04-18 @ 05:47) (121/62 - 142/64)  RR: 17 (05-04-18 @ 05:47) (16 - 20)  SpO2: --  Wt(kg): --  Height (cm): 160.02 (05-02-18 @ 17:30)  Weight (kg): 51.3 (05-02-18 @ 17:30)  BMI (kg/m2): 20 (05-02-18 @ 17:30)  BSA (m2): 1.52 (05-02-18 @ 17:30)      05-03-18 @ 07:01  -  05-04-18 @ 07:00  --------------------------------------------------------  IN: 276 mL / OUT: 850 mL / NET: -574 mL      Physical Exam:  	Gen: NAD  	Pulm: CTA B/L  	CV:  S1S2; no rub  	Abd: soft, nontender/nondistended  	: davis   	  	      LABS/STUDIES  --------------------------------------------------------------------------------              7.6    9.90  >-----------<  257      [05-04-18 @ 08:38]              22.7     131  |  x   |  117  ----------------------------<  x       [05-04-18 @ 08:38]  5.6   |  x   |  x         Ca     6.9     [05-04-18 @ 02:08]            Creatinine Trend:  SCr 5.7 [05-04 @ 02:08]  SCr 6.0 [05-03 @ 05:50]  SCr 6.0 [05-03 @ 02:08]  SCr 6.0 [05-02 @ 16:37]  SCr 5.6 [05-02 @ 04:27]    Urinalysis - [05-03-18 @ 17:57]      Color Yellow / Appearance Cloudy / SG 1.010 / pH 6.0      Gluc 100 / Ketone Negative  / Bili Negative / Urobili 0.2       Blood Moderate / Protein 30 / Leuk Est Large / Nitrite Negative      RBC 5-10 / WBC >50 / Hyaline  / Gran  / Sq Epi  / Non Sq Epi Occasional / Bacteria Few    Urine Creatinine 56      [05-01-18 @ 08:58]  Urine Sodium 71.0      [05-01-18 @ 08:58]  Urine Osmolality 367      [05-01-18 @ 08:57]    Iron 32, TIBC 114, %sat 28      [05-03-18 @ 05:50]  Ferritin 618      [05-03-18 @ 05:50]  HbA1c 12.4      [05-03-18 @ 05:50]

## 2018-05-04 NOTE — PROGRESS NOTE ADULT - ASSESSMENT
82 yo F with h/o DM, HTN, HL, RA, hypothyroidism, CHF (EF 55% in 12/17)< s/p PPM, s/p AICD, hemorrhoids, p/w weakness and diarrhea. Found to have DREW , acidosis and hyperkalemia/cystitis/neurogenic bladder:    # creatinine stable at 6   # non oliguric  # off iv fluids   # on  sodium bicarb 650 mg po q 8  # remains on marry  # BP noted better today   # will discuss RRT with the family , and further plan of care   # overall prognosis poor

## 2018-05-05 LAB
ANION GAP SERPL CALC-SCNC: 20 MMOL/L — HIGH (ref 7–14)
BUN SERPL-MCNC: 110 MG/DL — CRITICAL HIGH (ref 10–20)
CALCIUM SERPL-MCNC: 7 MG/DL — LOW (ref 8.5–10.1)
CHLORIDE SERPL-SCNC: 94 MMOL/L — LOW (ref 98–110)
CO2 SERPL-SCNC: 18 MMOL/L — SIGNIFICANT CHANGE UP (ref 17–32)
CREAT SERPL-MCNC: 5.6 MG/DL — CRITICAL HIGH (ref 0.7–1.5)
GLUCOSE SERPL-MCNC: 135 MG/DL — HIGH (ref 70–99)
HCT VFR BLD CALC: 25 % — LOW (ref 37–47)
HGB BLD-MCNC: 8.2 G/DL — LOW (ref 12–16)
MCHC RBC-ENTMCNC: 24.5 PG — LOW (ref 27–31)
MCHC RBC-ENTMCNC: 32.8 G/DL — SIGNIFICANT CHANGE UP (ref 32–37)
MCV RBC AUTO: 74.6 FL — LOW (ref 81–99)
NRBC # BLD: 0 /100 WBCS — SIGNIFICANT CHANGE UP (ref 0–0)
PLATELET # BLD AUTO: 244 K/UL — SIGNIFICANT CHANGE UP (ref 130–400)
POTASSIUM SERPL-MCNC: 6 MMOL/L — CRITICAL HIGH (ref 3.5–5)
POTASSIUM SERPL-SCNC: 6 MMOL/L — CRITICAL HIGH (ref 3.5–5)
RBC # BLD: 3.35 M/UL — LOW (ref 4.2–5.4)
RBC # FLD: 17.3 % — HIGH (ref 11.5–14.5)
SODIUM SERPL-SCNC: 132 MMOL/L — LOW (ref 135–146)
WBC # BLD: 9.08 K/UL — SIGNIFICANT CHANGE UP (ref 4.8–10.8)
WBC # FLD AUTO: 9.08 K/UL — SIGNIFICANT CHANGE UP (ref 4.8–10.8)

## 2018-05-05 RX ORDER — LIDOCAINE 4 G/100G
1 CREAM TOPICAL DAILY
Qty: 0 | Refills: 0 | Status: DISCONTINUED | OUTPATIENT
Start: 2018-05-06 | End: 2018-05-17

## 2018-05-05 RX ORDER — LIDOCAINE 4 G/100G
1 CREAM TOPICAL ONCE
Qty: 0 | Refills: 0 | Status: COMPLETED | OUTPATIENT
Start: 2018-05-05 | End: 2018-05-05

## 2018-05-05 RX ORDER — LIDOCAINE 4 G/100G
CREAM TOPICAL
Qty: 0 | Refills: 0 | Status: DISCONTINUED | OUTPATIENT
Start: 2018-05-05 | End: 2018-05-17

## 2018-05-05 RX ADMIN — Medication 650 MILLIGRAM(S): at 13:47

## 2018-05-05 RX ADMIN — Medication 650 MILLIGRAM(S): at 00:45

## 2018-05-05 RX ADMIN — HEPARIN SODIUM 5000 UNIT(S): 5000 INJECTION INTRAVENOUS; SUBCUTANEOUS at 05:31

## 2018-05-05 RX ADMIN — Medication 2: at 12:16

## 2018-05-05 RX ADMIN — Medication 650 MILLIGRAM(S): at 21:18

## 2018-05-05 RX ADMIN — Medication 650 MILLIGRAM(S): at 16:15

## 2018-05-05 RX ADMIN — ESCITALOPRAM OXALATE 10 MILLIGRAM(S): 10 TABLET, FILM COATED ORAL at 11:48

## 2018-05-05 RX ADMIN — Medication 650 MILLIGRAM(S): at 22:18

## 2018-05-05 RX ADMIN — Medication 650 MILLIGRAM(S): at 05:32

## 2018-05-05 RX ADMIN — MEROPENEM 100 MILLIGRAM(S): 1 INJECTION INTRAVENOUS at 11:50

## 2018-05-05 RX ADMIN — Medication 650 MILLIGRAM(S): at 22:48

## 2018-05-05 RX ADMIN — ATORVASTATIN CALCIUM 10 MILLIGRAM(S): 80 TABLET, FILM COATED ORAL at 21:19

## 2018-05-05 RX ADMIN — Medication 100 MICROGRAM(S): at 05:32

## 2018-05-05 RX ADMIN — HEPARIN SODIUM 5000 UNIT(S): 5000 INJECTION INTRAVENOUS; SUBCUTANEOUS at 17:41

## 2018-05-05 RX ADMIN — INSULIN GLARGINE 5 UNIT(S): 100 INJECTION, SOLUTION SUBCUTANEOUS at 21:19

## 2018-05-05 RX ADMIN — Medication 4 UNIT(S): at 12:16

## 2018-05-05 RX ADMIN — Medication 5 MILLIGRAM(S): at 21:19

## 2018-05-05 RX ADMIN — Medication 50 MILLIGRAM(S): at 05:32

## 2018-05-05 RX ADMIN — Medication 650 MILLIGRAM(S): at 09:54

## 2018-05-05 NOTE — PROGRESS NOTE ADULT - SUBJECTIVE AND OBJECTIVE BOX
seen and examined  no distress  lying comfortable       Standing Inpatient Medications  atorvastatin 10 milliGRAM(s) Oral at bedtime  dextrose 5%. 1000 milliLiter(s) IV Continuous <Continuous>  dextrose 50% Injectable 12.5 Gram(s) IV Push once  dextrose 50% Injectable 25 Gram(s) IV Push once  dextrose 50% Injectable 25 Gram(s) IV Push once  escitalopram 10 milliGRAM(s) Oral daily  heparin  Injectable 5000 Unit(s) SubCutaneous every 12 hours  insulin glargine Injectable (LANTUS) 5 Unit(s) SubCutaneous every morning  insulin glargine Injectable (LANTUS) 5 Unit(s) SubCutaneous at bedtime  insulin lispro (HumaLOG) corrective regimen sliding scale   SubCutaneous three times a day before meals  insulin lispro (HumaLOG) corrective regimen sliding scale   SubCutaneous three times a day before meals  insulin lispro Injectable (HumaLOG) 4 Unit(s) SubCutaneous before breakfast  insulin lispro Injectable (HumaLOG) 4 Unit(s) SubCutaneous before lunch  insulin lispro Injectable (HumaLOG) 4 Unit(s) SubCutaneous before dinner  levothyroxine 100 MICROGram(s) Oral daily  melatonin 5 milliGRAM(s) Oral at bedtime  meropenem  IVPB 500 milliGRAM(s) IV Intermittent every 24 hours  metoprolol succinate ER 50 milliGRAM(s) Oral daily  sodium bicarbonate 650 milliGRAM(s) Oral every 8 hours            VITALS/PHYSICAL EXAM  --------------------------------------------------------------------------------  T(C): 35.8 (05-05-18 @ 05:00), Max: 36.4 (05-04-18 @ 20:15)  HR: 70 (05-05-18 @ 05:00) (67 - 72)  BP: 146/68 (05-05-18 @ 05:00) (142/68 - 150/75)  RR: 18 (05-05-18 @ 05:00) (16 - 19)  SpO2: 98% (05-04-18 @ 21:30) (98% - 98%)  Wt(kg): --        05-04-18 @ 07:01  -  05-05-18 @ 07:00  --------------------------------------------------------  IN: 0 mL / OUT: 900 mL / NET: -900 mL      Physical Exam:  	Gen: NAD  	Pulm:  decrease BS B/L  	CV: S1S2; no rub  	Abd: distended  	: davis   	LE:  edema      LABS/STUDIES  --------------------------------------------------------------------------------              7.6    9.90  >-----------<  257      [05-04-18 @ 08:38]              22.7     131  |  95  |  111  ----------------------------<  171      [05-04-18 @ 12:12]  5.2   |  19  |  5.4        Ca     6.8     [05-04-18 @ 12:12]          Troponin <0.01      [05-04-18 @ 12:12]  CK 25      [05-04-18 @ 12:12]    Creatinine Trend:  SCr 5.4 [05-04 @ 12:12]  SCr 5.6 [05-04 @ 08:38]  SCr 5.7 [05-04 @ 02:08]  SCr 6.0 [05-03 @ 05:50]  SCr 6.0 [05-03 @ 02:08]    Urinalysis - [05-03-18 @ 17:57]      Color Yellow / Appearance Cloudy / SG 1.010 / pH 6.0      Gluc 100 / Ketone Negative  / Bili Negative / Urobili 0.2       Blood Moderate / Protein 30 / Leuk Est Large / Nitrite Negative      RBC 5-10 / WBC >50 / Hyaline  / Gran  / Sq Epi  / Non Sq Epi Occasional / Bacteria Few    Urine Creatinine 56      [05-01-18 @ 08:58]  Urine Sodium 71.0      [05-01-18 @ 08:58]  Urine Osmolality 367      [05-01-18 @ 08:57]    Iron 32, TIBC 114, %sat 28      [05-03-18 @ 05:50]  Ferritin 618      [05-03-18 @ 05:50]  HbA1c 12.4      [05-03-18 @ 05:50]

## 2018-05-05 NOTE — PROGRESS NOTE ADULT - ASSESSMENT
#DKA: resolved  was DG from ICU  Gap closed and was re-opened periodically on the floor likely 2/2 to CKD  c/w to monitor FS, keep blood sugar <180  c/w insulin regimen as per endo     #anion gap metabolic acidosis w .respiratory compensation  likely 2/2 kidney failure  Nephro: willing to do HD if patient and family agree. Today patient states she wants HD to be done    #Emphysematous cystitis / UTI  f/u urology - continue davis as per urology, continue antibiotics  ID eval: f/u recs, resident saw patient, awaiting Dr. Daugherty f/u    DNR/DNI #DKA: resolved  was DG from ICU  Gap closed and was re-opened periodically on the floor likely 2/2 to CKD  c/w to monitor FS, keep blood sugar <180  c/w insulin regimen as per endo     #anion gap metabolic acidosis w .respiratory compensation  likely 2/2 kidney failure  Nephro: willing to do HD if patient and family agree. Today patient states she wants HD to be done    #Emphysematous cystitis / UTI  f/u urology - continue davis as per urology, continue antibiotics  ID eval: f/u recs, resident saw patient, awaiting Dr. Daugherty f/u    DNR/DNI    Attending Attestation:  Pt seen and examined she is still "tired" but in no distress, lying in bed. Agree with above documentation as corrected. Continue current care. /Nephro/ID follow ups. f/u Urine cxs and blood cxs. ?Role for davis at this time. Pt is DNR/DNI but interested in HD.

## 2018-05-05 NOTE — PROGRESS NOTE ADULT - ASSESSMENT
82 yo F with h/o DM, HTN, HL, RA, hypothyroidism, CHF (EF 55% in 12/17)< s/p PPM, s/p AICD, hemorrhoids, p/w weakness and diarrhea. Found to have DREW , acidosis and hyperkalemia/cystitis/neurogenic bladder:    # creatinine trending down   # non oliguric  # on  sodium bicarb 650 mg po q 8  # remains on marry  # i had a long discussion with patient daughter regarding HD, at this point no indication, creatinine improving   # check albumin to cottect calcium  # check ph/pth level  # start feso4 po q 8, will needs donavan   # overall prognosis poor

## 2018-05-05 NOTE — PROGRESS NOTE ADULT - SUBJECTIVE AND OBJECTIVE BOX
Evgeny Perry; Edson Reza; Sarah Marquez; User ADM; Maurice Menendez; Iveth Gibbons; Justino Zelaya; Stephany Fox; Oleg Hernandez; Team [facility] Nephrology; Team [facility] Adult Infectious Disease; Ordering Physician; Daniella Alvarado; Faisal Nowak; Gina Hendricks; Anders Abbott; Marilou Portillo; Jinny Childs; Amy Joe; Roland Fay; Maurice De Oliveira; Katie Borges; Ziggy Garcia; Ermelinda Nair; Светлана Barnhart; Светлана Kasgilberto; Adry Schroeder  Patient is a 83y old  Female who presents with a chief complaint of DKA (01 May 2018 04:15)      Vital Signs Last 24 Hrs  T(C): 35.8 (05 May 2018 05:00), Max: 36.4 (04 May 2018 20:15)  T(F): 96.5 (05 May 2018 05:00), Max: 97.5 (04 May 2018 20:15)  HR: 70 (05 May 2018 05:00) (67 - 72)  BP: 146/68 (05 May 2018 05:00) (142/68 - 150/75)  BP(mean): --  RR: 18 (05 May 2018 05:00) (16 - 19)  SpO2: 98% (04 May 2018 21:30) (98% - 98%)  CAPILLARY BLOOD GLUCOSE  116 (05 May 2018 08:58)  63 (05 May 2018 07:51)  87 (05 May 2018 02:30)  61 (05 May 2018 02:00)  86 (04 May 2018 21:30)  75 (04 May 2018 20:36)  101 (04 May 2018 16:01)          PAST MEDICAL & SURGICAL HISTORY:  Neuropathy  Diabetes  Hypothyroidism  HTN (hypertension)  CHF (congestive heart failure)  AICD (automatic cardioverter/defibrillator) present    MEDICATIONS  (STANDING):  atorvastatin 10 milliGRAM(s) Oral at bedtime  dextrose 5%. 1000 milliLiter(s) (50 mL/Hr) IV Continuous <Continuous>  dextrose 50% Injectable 12.5 Gram(s) IV Push once  dextrose 50% Injectable 25 Gram(s) IV Push once  dextrose 50% Injectable 25 Gram(s) IV Push once  escitalopram 10 milliGRAM(s) Oral daily  heparin  Injectable 5000 Unit(s) SubCutaneous every 12 hours  insulin glargine Injectable (LANTUS) 5 Unit(s) SubCutaneous every morning  insulin glargine Injectable (LANTUS) 5 Unit(s) SubCutaneous at bedtime  insulin lispro (HumaLOG) corrective regimen sliding scale   SubCutaneous three times a day before meals  insulin lispro (HumaLOG) corrective regimen sliding scale   SubCutaneous three times a day before meals  insulin lispro Injectable (HumaLOG) 4 Unit(s) SubCutaneous before breakfast  insulin lispro Injectable (HumaLOG) 4 Unit(s) SubCutaneous before lunch  insulin lispro Injectable (HumaLOG) 4 Unit(s) SubCutaneous before dinner  levothyroxine 100 MICROGram(s) Oral daily  melatonin 5 milliGRAM(s) Oral at bedtime  meropenem  IVPB 500 milliGRAM(s) IV Intermittent every 24 hours  metoprolol succinate ER 50 milliGRAM(s) Oral daily  sodium bicarbonate 650 milliGRAM(s) Oral every 8 hours    MEDICATIONS  (PRN):  acetaminophen   Tablet. 650 milliGRAM(s) Oral every 6 hours PRN Mild Pain (1 - 3)  benzocaine 15 mG/menthol 3.6 mG Lozenge 1 Lozenge Oral every 3 hours PRN Sore Throat  dextrose Gel 1 Dose(s) Oral once PRN Blood Glucose LESS THAN 70 milliGRAM(s)/deciliter  glucagon  Injectable 1 milliGRAM(s) IntraMuscular once PRN Glucose LESS THAN 70 milligrams/deciliter    Physical Exam:  General: NAD, lethargic  HEENT: Neck supple  Heart: RRR, S1, S2 noted, no murmurs  Lungs: CTA b/l, no wheezes, rales, rhonchi   Abdomen: soft, non tender, non distended, + bowel sounds  Extremities: 1+ edema b/l LE  Neuro: A&O x 3, no focal deficits  Skin: No rashes    Labs:                        7.6    9.90  )-----------( 257      ( 04 May 2018 08:38 )             22.7             05-04    131<L>  |  95<L>  |  111<HH>  ----------------------------<  171<H>  5.2<H>   |  19  |  5.4<HH>    Ca    6.8<L>      04 May 2018 12:12                CARDIAC MARKERS ( 04 May 2018 12:12 )  x     / <0.01 ng/mL / 25 U/L / x     / 1.8 ng/mL      ABG - ( 03 May 2018 14:40 )  pH, Arterial: 7.34  pH, Blood: x     /  pCO2: 31    /  pO2: 68    / HCO3: 17    / Base Excess: -8.2  /  SaO2: 94                Urinalysis Basic - ( 03 May 2018 17:57 )    Color: Yellow / Appearance: Cloudy / S.010 / pH: x  Gluc: x / Ketone: Negative  / Bili: Negative / Urobili: 0.2 mg/dL   Blood: x / Protein: 30 mg/dL / Nitrite: Negative   Leuk Esterase: Large / RBC: 5-10 /HPF / WBC >50 /HPF   Sq Epi: x / Non Sq Epi: Occasional /HPF / Bacteria: Few /HPF        I&O's Summary    04 May 2018 07:01  -  05 May 2018 07:00  --------------------------------------------------------  IN: 0 mL / OUT: 900 mL / NET: -900 mL        Imaging:    ECG: Evgeny Perry; Edson Reza; Sarah Marquez; User ADM; Maurice Menendez; Iveth Gibbons; Justino Zelaya; Stephany Fox; Oleg Hernandez; Team [facility] Nephrology; Team [facility] Adult Infectious Disease; Ordering Physician; Daniella Alvarado; Faisal Nowak; Gina Hendricks; Anders Abbott; Marilou Portillo; Jinny Childs; Amy Joe; Roland Fay; Maurice De Oliveira; Katie Borges; Ziggy Garcia; Ermelinda Nair; Светлана Barnhart; Светлана Kasgilberto; Adry Schroeder  Patient is a 83y old  Female who presents with a chief complaint of DKA (01 May 2018 04:15)      Vital Signs Last 24 Hrs  T(C): 35.8 (05 May 2018 05:00), Max: 36.4 (04 May 2018 20:15)  T(F): 96.5 (05 May 2018 05:00), Max: 97.5 (04 May 2018 20:15)  HR: 70 (05 May 2018 05:00) (67 - 72)  BP: 146/68 (05 May 2018 05:00) (142/68 - 150/75)  RR: 18 (05 May 2018 05:00) (16 - 19)  SpO2: 98% (04 May 2018 21:30) (98% - 98%)    CAPILLARY BLOOD GLUCOSE  116 (05 May 2018 08:58)  63 (05 May 2018 07:51)  87 (05 May 2018 02:30)  61 (05 May 2018 02:00)  86 (04 May 2018 21:30)  75 (04 May 2018 20:36)  101 (04 May 2018 16:01)    PAST MEDICAL & SURGICAL HISTORY:  Neuropathy  Diabetes  Hypothyroidism  HTN (hypertension)  CHF (congestive heart failure)  AICD (automatic cardioverter/defibrillator) present    MEDICATIONS  (STANDING):  atorvastatin 10 milliGRAM(s) Oral at bedtime  dextrose 5%. 1000 milliLiter(s) (50 mL/Hr) IV Continuous <Continuous>  dextrose 50% Injectable 12.5 Gram(s) IV Push once  dextrose 50% Injectable 25 Gram(s) IV Push once  dextrose 50% Injectable 25 Gram(s) IV Push once  escitalopram 10 milliGRAM(s) Oral daily  heparin  Injectable 5000 Unit(s) SubCutaneous every 12 hours  insulin glargine Injectable (LANTUS) 5 Unit(s) SubCutaneous every morning  insulin glargine Injectable (LANTUS) 5 Unit(s) SubCutaneous at bedtime  insulin lispro (HumaLOG) corrective regimen sliding scale   SubCutaneous three times a day before meals  insulin lispro (HumaLOG) corrective regimen sliding scale   SubCutaneous three times a day before meals  insulin lispro Injectable (HumaLOG) 4 Unit(s) SubCutaneous before breakfast  insulin lispro Injectable (HumaLOG) 4 Unit(s) SubCutaneous before lunch  insulin lispro Injectable (HumaLOG) 4 Unit(s) SubCutaneous before dinner  levothyroxine 100 MICROGram(s) Oral daily  melatonin 5 milliGRAM(s) Oral at bedtime  meropenem  IVPB 500 milliGRAM(s) IV Intermittent every 24 hours  metoprolol succinate ER 50 milliGRAM(s) Oral daily  sodium bicarbonate 650 milliGRAM(s) Oral every 8 hours    MEDICATIONS  (PRN):  acetaminophen   Tablet. 650 milliGRAM(s) Oral every 6 hours PRN Mild Pain (1 - 3)  benzocaine 15 mG/menthol 3.6 mG Lozenge 1 Lozenge Oral every 3 hours PRN Sore Throat  dextrose Gel 1 Dose(s) Oral once PRN Blood Glucose LESS THAN 70 milliGRAM(s)/deciliter  glucagon  Injectable 1 milliGRAM(s) IntraMuscular once PRN Glucose LESS THAN 70 milligrams/deciliter    Physical Exam:  General: NAD, lethargic  HEENT: Neck supple  Heart: RRR, S1, S2 noted, no murmurs  Lungs: CTA b/l, no wheezes, rales, rhonchi   Abdomen: soft, non tender, non distended, + bowel sounds  Extremities: 1+ edema b/l LE  Neuro: A&O x 3, no focal deficits  Skin: No rashes    Labs:                        7.6    9.90  )-----------( 257      ( 04 May 2018 08:38 )             22.7             05-04    131<L>  |  95<L>  |  111<HH>  ----------------------------<  171<H>  5.2<H>   |  19  |  5.4<HH>    Ca    6.8<L>      04 May 2018 12:12        CARDIAC MARKERS ( 04 May 2018 12:12 )  x     / <0.01 ng/mL / 25 U/L / x     / 1.8 ng/mL      ABG - ( 03 May 2018 14:40 )  pH, Arterial: 7.34  pH, Blood: x     /  pCO2: 31    /  pO2: 68    / HCO3: 17    / Base Excess: -8.2  /  SaO2: 94        Urinalysis Basic - ( 03 May 2018 17:57 )    Color: Yellow / Appearance: Cloudy / S.010 / pH: x  Gluc: x / Ketone: Negative  / Bili: Negative / Urobili: 0.2 mg/dL   Blood: x / Protein: 30 mg/dL / Nitrite: Negative   Leuk Esterase: Large / RBC: 5-10 /HPF / WBC >50 /HPF   Sq Epi: x / Non Sq Epi: Occasional /HPF / Bacteria: Few /HPF    I&O's Summary    04 May 2018 07:01  -  05 May 2018 07:00  --------------------------------------------------------  IN: 0 mL / OUT: 900 mL / NET: -900 mL

## 2018-05-06 LAB
ANION GAP SERPL CALC-SCNC: 15 MMOL/L — HIGH (ref 7–14)
ANION GAP SERPL CALC-SCNC: 18 MMOL/L — HIGH (ref 7–14)
BUN SERPL-MCNC: 108 MG/DL — CRITICAL HIGH (ref 10–20)
BUN SERPL-MCNC: 110 MG/DL — CRITICAL HIGH (ref 10–20)
CALCIUM SERPL-MCNC: 6.8 MG/DL — LOW (ref 8.5–10.1)
CALCIUM SERPL-MCNC: 6.8 MG/DL — LOW (ref 8.5–10.1)
CHLORIDE SERPL-SCNC: 94 MMOL/L — LOW (ref 98–110)
CHLORIDE SERPL-SCNC: 96 MMOL/L — LOW (ref 98–110)
CO2 SERPL-SCNC: 18 MMOL/L — SIGNIFICANT CHANGE UP (ref 17–32)
CO2 SERPL-SCNC: 21 MMOL/L — SIGNIFICANT CHANGE UP (ref 17–32)
CREAT SERPL-MCNC: 5.2 MG/DL — CRITICAL HIGH (ref 0.7–1.5)
CREAT SERPL-MCNC: 5.3 MG/DL — CRITICAL HIGH (ref 0.7–1.5)
GLUCOSE SERPL-MCNC: 103 MG/DL — HIGH (ref 70–99)
GLUCOSE SERPL-MCNC: 152 MG/DL — HIGH (ref 70–99)
HCT VFR BLD CALC: 22.4 % — LOW (ref 37–47)
HGB BLD-MCNC: 7.4 G/DL — CRITICAL LOW (ref 12–16)
MCHC RBC-ENTMCNC: 24.4 PG — LOW (ref 27–31)
MCHC RBC-ENTMCNC: 33 G/DL — SIGNIFICANT CHANGE UP (ref 32–37)
MCV RBC AUTO: 73.9 FL — LOW (ref 81–99)
NRBC # BLD: 0 /100 WBCS — SIGNIFICANT CHANGE UP (ref 0–0)
PLATELET # BLD AUTO: 298 K/UL — SIGNIFICANT CHANGE UP (ref 130–400)
POTASSIUM SERPL-MCNC: 5.7 MMOL/L — HIGH (ref 3.5–5)
POTASSIUM SERPL-MCNC: 5.7 MMOL/L — HIGH (ref 3.5–5)
POTASSIUM SERPL-SCNC: 5.7 MMOL/L — HIGH (ref 3.5–5)
POTASSIUM SERPL-SCNC: 5.7 MMOL/L — HIGH (ref 3.5–5)
RBC # BLD: 3.03 M/UL — LOW (ref 4.2–5.4)
RBC # FLD: 17.2 % — HIGH (ref 11.5–14.5)
SODIUM SERPL-SCNC: 130 MMOL/L — LOW (ref 135–146)
SODIUM SERPL-SCNC: 132 MMOL/L — LOW (ref 135–146)
WBC # BLD: 10.22 K/UL — SIGNIFICANT CHANGE UP (ref 4.8–10.8)
WBC # FLD AUTO: 10.22 K/UL — SIGNIFICANT CHANGE UP (ref 4.8–10.8)

## 2018-05-06 RX ORDER — OXYCODONE AND ACETAMINOPHEN 5; 325 MG/1; MG/1
1 TABLET ORAL ONCE
Qty: 0 | Refills: 0 | Status: DISCONTINUED | OUTPATIENT
Start: 2018-05-06 | End: 2018-05-06

## 2018-05-06 RX ORDER — FUROSEMIDE 40 MG
40 TABLET ORAL EVERY 12 HOURS
Qty: 0 | Refills: 0 | Status: DISCONTINUED | OUTPATIENT
Start: 2018-05-06 | End: 2018-05-08

## 2018-05-06 RX ADMIN — Medication 4 UNIT(S): at 17:06

## 2018-05-06 RX ADMIN — Medication 50 MILLIGRAM(S): at 05:44

## 2018-05-06 RX ADMIN — Medication 40 MILLIGRAM(S): at 13:05

## 2018-05-06 RX ADMIN — MEROPENEM 100 MILLIGRAM(S): 1 INJECTION INTRAVENOUS at 11:08

## 2018-05-06 RX ADMIN — Medication 650 MILLIGRAM(S): at 05:44

## 2018-05-06 RX ADMIN — Medication 4 UNIT(S): at 12:39

## 2018-05-06 RX ADMIN — OXYCODONE AND ACETAMINOPHEN 1 TABLET(S): 5; 325 TABLET ORAL at 02:43

## 2018-05-06 RX ADMIN — Medication 100 MICROGRAM(S): at 05:44

## 2018-05-06 RX ADMIN — HEPARIN SODIUM 5000 UNIT(S): 5000 INJECTION INTRAVENOUS; SUBCUTANEOUS at 17:09

## 2018-05-06 RX ADMIN — Medication 5 MILLIGRAM(S): at 21:36

## 2018-05-06 RX ADMIN — ATORVASTATIN CALCIUM 10 MILLIGRAM(S): 80 TABLET, FILM COATED ORAL at 21:36

## 2018-05-06 RX ADMIN — Medication 650 MILLIGRAM(S): at 21:36

## 2018-05-06 RX ADMIN — Medication 2: at 17:06

## 2018-05-06 RX ADMIN — Medication 650 MILLIGRAM(S): at 15:06

## 2018-05-06 RX ADMIN — HEPARIN SODIUM 5000 UNIT(S): 5000 INJECTION INTRAVENOUS; SUBCUTANEOUS at 05:43

## 2018-05-06 NOTE — PROGRESS NOTE ADULT - ASSESSMENT
84 yo F with h/o DM, HTN, HL, RA, hypothyroidism, CHF (EF 55% in 12/17)< s/p PPM, s/p AICD, hemorrhoids, p/w weakness and diarrhea. Found to have DREW , acidosis and hyperkalemia/cystitis/neurogenic bladder:    # creatinine trending down   # non oliguric  # on  sodium bicarb 650 mg po q 8  # remains on marry  # no need for RRT for now  # check albumin to cottect calcium  # check ph/pth level  # start feso4 po q 8, will needs donavan   # overall prognosis poor

## 2018-05-06 NOTE — PROGRESS NOTE ADULT - ASSESSMENT
82 y/o with emphysematous cystitis and DREW  - Repeat UA, UCx  - Maintain davis catheter - will consider TOV when UCx is NG  - Cont IV abx  - Cont IV hydration  - will d/w attending

## 2018-05-06 NOTE — PROGRESS NOTE ADULT - SUBJECTIVE AND OBJECTIVE BOX
83y Female with emphysematous cystitis on CT. seen and examined a bedside, has been afebrile, denies chills,N/V, abdominal pain. Davis cath has remained in place draining well coudy white yellow urine.    VITAL SIGNS:  T(C): 35.8 (05-06-18 @ 06:09), Max: 36.4 (05-05-18 @ 20:38)  T(F): 96.4 (05-06-18 @ 06:09), Max: 97.5 (05-05-18 @ 20:38)  HR: 62 (05-06-18 @ 06:09) (62 - 73)  BP: 121/72 (05-06-18 @ 06:09) (121/72 - 171/83)  BP(mean): --  RR: 17 (05-06-18 @ 06:09) (17 - 18)  SpO2: 95% (05-05-18 @ 17:50) (95% - 95%)  Wt(kg): --      05-05-18 @ 07:01  -  05-06-18 @ 07:00  --------------------------------------------------------  IN: 0 mL / OUT: 1100 mL / NET: -1100 mL        Creatinine, Serum: 5.3 mg/dL (05-06-18 @ 06:33)  Creatinine, Serum: 5.2 mg/dL (05-06-18 @ 02:01)  Creatinine, Serum: 5.6 mg/dL (05-05-18 @ 18:00)  Creatinine, Serum: 5.4 mg/dL (05-04-18 @ 12:12)  Creatinine, Serum: 5.6 mg/dL (05-04-18 @ 08:38)  Creatinine, Serum: 5.7 mg/dL (05-04-18 @ 02:08)  Creatinine, Serum: 6.0 mg/dL (05-03-18 @ 05:50)  Creatinine, Serum: 6.0 mg/dL (05-03-18 @ 02:08)  Creatinine, Serum: 6.0 mg/dL (05-02-18 @ 16:37)  Creatinine, Serum: 5.6 mg/dL (05-02-18 @ 04:27)  Creatinine, Serum: 5.9 mg/dL (05-01-18 @ 21:01)  Creatinine, Serum: 5.9 mg/dL (05-01-18 @ 17:29)      Hemoglobin: 7.4 g/dL (05-06-18 @ 06:33)  Hemoglobin: 8.2 g/dL (05-05-18 @ 18:00)  Hemoglobin: 7.6 g/dL (05-04-18 @ 08:38)  Hemoglobin: 7.0 g/dL (05-03-18 @ 05:50)  Hemoglobin: 7.7 g/dL (05-02-18 @ 16:37)  Hemoglobin: 7.3 g/dL (05-02-18 @ 04:27)      WBC Count: 10.22 K/uL (05-06-18 @ 06:33)  WBC Count: 9.08 K/uL (05-05-18 @ 18:00)  WBC Count: 9.90 K/uL (05-04-18 @ 08:38)  WBC Count: 13.57 K/uL (05-03-18 @ 05:50)  WBC Count: 17.95 K/uL (05-02-18 @ 16:37)  WBC Count: 18.29 K/uL (05-02-18 @ 04:27)                Culture - Blood (collected 05-02-18 @ 11:09)  Source: .Blood None  Preliminary Report (05-03-18 @ 17:01):    No growth to date.    Culture - Blood (collected 05-02-18 @ 04:27)  Source: .Blood None  Preliminary Report (05-03-18 @ 13:02):    No growth to date.            05-06    132<L>  |  96<L>  |  108<HH>  ----------------------------<  103<H>  5.7<H>   |  21  |  5.3<HH>    Ca    6.8<L>      06 May 2018 06:33        acetaminophen   Tablet. 650 milliGRAM(s) Oral every 6 hours PRN  atorvastatin 10 milliGRAM(s) Oral at bedtime  benzocaine 15 mG/menthol 3.6 mG Lozenge 1 Lozenge Oral every 3 hours PRN  dextrose 5%. 1000 milliLiter(s) IV Continuous <Continuous>  dextrose 50% Injectable 12.5 Gram(s) IV Push once  dextrose 50% Injectable 25 Gram(s) IV Push once  dextrose 50% Injectable 25 Gram(s) IV Push once  dextrose Gel 1 Dose(s) Oral once PRN  escitalopram 10 milliGRAM(s) Oral daily  furosemide   Injectable 40 milliGRAM(s) IV Push every 12 hours  glucagon  Injectable 1 milliGRAM(s) IntraMuscular once PRN  heparin  Injectable 5000 Unit(s) SubCutaneous every 12 hours  insulin glargine Injectable (LANTUS) 5 Unit(s) SubCutaneous every morning  insulin glargine Injectable (LANTUS) 5 Unit(s) SubCutaneous at bedtime  insulin lispro (HumaLOG) corrective regimen sliding scale   SubCutaneous three times a day before meals  insulin lispro (HumaLOG) corrective regimen sliding scale   SubCutaneous three times a day before meals  insulin lispro Injectable (HumaLOG) 4 Unit(s) SubCutaneous before breakfast  insulin lispro Injectable (HumaLOG) 4 Unit(s) SubCutaneous before lunch  insulin lispro Injectable (HumaLOG) 4 Unit(s) SubCutaneous before dinner  levothyroxine 100 MICROGram(s) Oral daily  lidocaine   Patch 1 Patch Transdermal daily  lidocaine   Patch      melatonin 5 milliGRAM(s) Oral at bedtime  meropenem  IVPB 500 milliGRAM(s) IV Intermittent every 24 hours  metoprolol succinate ER 50 milliGRAM(s) Oral daily  sodium bicarbonate 650 milliGRAM(s) Oral every 8 hours          PHYSICAL EXAM:    Constitutional: WDWN resting comfortably in bed; NAD, awake/alert  Gastrointestinal: soft, NT/ND; no rebound or guarding; +BS, no hepatosplenomegaly, No cvat b/l, No suprapubic tenderness, Bladder nonpalpable   + davis cath draining cloudy yellow urine

## 2018-05-06 NOTE — PROGRESS NOTE ADULT - SUBJECTIVE AND OBJECTIVE BOX
VI MOBLEY  83y  Female    Patient is a 83y old  Female who presents with a chief complaint of DKA (01 May 2018 04:15)    INTERVAL HPI/OVERNIGHT EVENTS: Lethargic today. No overnight events.     PAST MEDICAL & SURGICAL HISTORY:  Neuropathy  Diabetes  Hypothyroidism  HTN (hypertension)  CHF (congestive heart failure)  AICD (automatic cardioverter/defibrillator) present    Vital Signs Last 24 Hrs  T(C): 36.1 (04 May 2018 14:35), Max: 37 (04 May 2018 05:47)  T(F): 96.9 (04 May 2018 14:35), Max: 98.6 (04 May 2018 05:47)  HR: 67 (04 May 2018 14:35) (59 - 75)  BP: 142/68 (04 May 2018 14:35) (121/62 - 142/68)  RR: 16 (04 May 2018 14:35) (16 - 20)    LABS:                                    7.4    10.22 )-----------( 298      ( 06 May 2018 06:33 )             22.4     05-06    132<L>  |  96<L>  |  108<HH>  ----------------------------<  103<H>  5.7<H>   |  21  |  5.3<HH>    Ca    6.8<L>      06 May 2018 06:33    MEDICATIONS  (STANDING):  atorvastatin 10 milliGRAM(s) Oral at bedtime  dextrose 5%. 1000 milliLiter(s) (50 mL/Hr) IV Continuous <Continuous>  dextrose 50% Injectable 12.5 Gram(s) IV Push once  dextrose 50% Injectable 25 Gram(s) IV Push once  dextrose 50% Injectable 25 Gram(s) IV Push once  escitalopram 10 milliGRAM(s) Oral daily  heparin  Injectable 5000 Unit(s) SubCutaneous every 12 hours  insulin glargine Injectable (LANTUS) 5 Unit(s) SubCutaneous every morning  insulin glargine Injectable (LANTUS) 5 Unit(s) SubCutaneous at bedtime  insulin lispro (HumaLOG) corrective regimen sliding scale   SubCutaneous three times a day before meals  insulin lispro (HumaLOG) corrective regimen sliding scale   SubCutaneous three times a day before meals  insulin lispro Injectable (HumaLOG) 4 Unit(s) SubCutaneous before breakfast  insulin lispro Injectable (HumaLOG) 4 Unit(s) SubCutaneous before lunch  insulin lispro Injectable (HumaLOG) 4 Unit(s) SubCutaneous before dinner  levothyroxine 100 MICROGram(s) Oral daily  melatonin 5 milliGRAM(s) Oral at bedtime  meropenem  IVPB 500 milliGRAM(s) IV Intermittent every 24 hours  metoprolol succinate ER 50 milliGRAM(s) Oral daily  sodium chloride 0.45% 1000 milliLiter(s) (125 mL/Hr) IV Continuous <Continuous>    MEDICATIONS  (PRN):  acetaminophen   Tablet. 650 milliGRAM(s) Oral every 6 hours PRN Mild Pain (1 - 3)  benzocaine 15 mG/menthol 3.6 mG Lozenge 1 Lozenge Oral every 3 hours PRN Sore Throat  dextrose Gel 1 Dose(s) Oral once PRN Blood Glucose LESS THAN 70 milliGRAM(s)/deciliter  glucagon  Injectable 1 milliGRAM(s) IntraMuscular once PRN Glucose LESS THAN 70 milligrams/deciliter    MICROBIOLOGY: pending urine cxs and Blood cxs    RADIOLOGY & ADDITIONAL TESTS:  CXR: Bilateral Interstitial Opacities

## 2018-05-06 NOTE — PROGRESS NOTE ADULT - SUBJECTIVE AND OBJECTIVE BOX
Nephrology progress note    Patient is seen and examined, events over the last 24 h noted .  no complaints, no chest pain  making good amount of urine    Allergies:  Plavix (Other (Moderate))    Hospital Medications:   MEDICATIONS  (STANDING):  atorvastatin 10 milliGRAM(s) Oral at bedtime  dextrose 5%. 1000 milliLiter(s) (50 mL/Hr) IV Continuous <Continuous>  escitalopram 10 milliGRAM(s) Oral daily  furosemide   Injectable 40 milliGRAM(s) IV Push every 12 hours  heparin  Injectable 5000 Unit(s) SubCutaneous every 12 hours  levothyroxine 100 MICROGram(s) Oral daily  lidocaine   Patch 1 Patch Transdermal daily  lidocaine   Patch      melatonin 5 milliGRAM(s) Oral at bedtime  meropenem  IVPB 500 milliGRAM(s) IV Intermittent every 24 hours  metoprolol succinate ER 50 milliGRAM(s) Oral daily  sodium bicarbonate 650 milliGRAM(s) Oral every 8 hours        VITALS:  T(F): 96.4 (18 @ 06:09), Max: 97.5 (18 @ 20:38)  HR: 62 (18 @ 06:09)  BP: 121/72 (18 @ 06:09)  RR: 17 (18 @ 06:09)  SpO2: 95% (18 @ 17:50)  Wt(kg): --     @ 07:01  -   @ 07:00  --------------------------------------------------------  IN: 0 mL / OUT: 900 mL / NET: -900 mL     @ 07:01  -   @ 07:00  --------------------------------------------------------  IN: 0 mL / OUT: 1100 mL / NET: -1100 mL          PHYSICAL EXAM:  Constitutional: NAD  HEENT: anicteric sclera, oropharynx clear, MMM  Neck: No JVD  Respiratory: bilateral basal crackles  Cardiovascular: S1, S2, RRR  Gastrointestinal: BS+, soft, NT/ND  Extremities: minimal pedal edema  :  No davis.   Skin: No rashes    LABS:      132<L>  |  96<L>  |  108<HH>  ----------------------------<  103<H>  5.7<H>   |  21  |  5.3<HH>    Ca    6.8<L>      06 May 2018 06:33                            7.4    10.22 )-----------( 298      ( 06 May 2018 06:33 )             22.4   5.3 <--, 5.2 <--, 5.6 <--, 5.4 <--, 5.6 <--, 5.7 <--, 6.0 <--trend in creatinine    Urine Studies:  Urinalysis Basic - ( 03 May 2018 17:57 )    Color: Yellow / Appearance: Cloudy / S.010 / pH:   Gluc:  / Ketone: Negative  / Bili: Negative / Urobili: 0.2 mg/dL   Blood:  / Protein: 30 mg/dL / Nitrite: Negative   Leuk Esterase: Large / RBC: 5-10 /HPF / WBC >50 /HPF   Sq Epi:  / Non Sq Epi: Occasional /HPF / Bacteria: Few /HPF      Sodium, Random Urine: 71.0 mmoL/L ( @ 08:58)  Creatinine, Random Urine: 56 mg/dL ( @ 08:58)  Osmolality, Random Urine: 367 mos/kg ( @ 08:57)    RADIOLOGY & ADDITIONAL STUDIES:  US Retroperitoneal Complete (18 @ 14:06) >  IMPRESSION:  No stones or hydronephrosis bilaterally.

## 2018-05-07 LAB
ALBUMIN SERPL ELPH-MCNC: 2.5 G/DL — LOW (ref 3.5–5.2)
ALBUMIN SERPL ELPH-MCNC: 2.5 G/DL — LOW (ref 3.5–5.2)
ALP SERPL-CCNC: 220 U/L — HIGH (ref 30–115)
ALP SERPL-CCNC: 228 U/L — HIGH (ref 30–115)
ALT FLD-CCNC: 13 U/L — SIGNIFICANT CHANGE UP (ref 0–41)
ALT FLD-CCNC: 13 U/L — SIGNIFICANT CHANGE UP (ref 0–41)
ANION GAP SERPL CALC-SCNC: 16 MMOL/L — HIGH (ref 7–14)
ANION GAP SERPL CALC-SCNC: 18 MMOL/L — HIGH (ref 7–14)
AST SERPL-CCNC: 24 U/L — SIGNIFICANT CHANGE UP (ref 0–41)
AST SERPL-CCNC: 24 U/L — SIGNIFICANT CHANGE UP (ref 0–41)
BILIRUB DIRECT SERPL-MCNC: <0.2 MG/DL — SIGNIFICANT CHANGE UP (ref 0–0.2)
BILIRUB INDIRECT FLD-MCNC: >0 MG/DL — LOW (ref 0.2–1.2)
BILIRUB SERPL-MCNC: 0.2 MG/DL — SIGNIFICANT CHANGE UP (ref 0.2–1.2)
BILIRUB SERPL-MCNC: <0.2 MG/DL — SIGNIFICANT CHANGE UP (ref 0.2–1.2)
BLD GP AB SCN SERPL QL: (no result)
BUN SERPL-MCNC: 111 MG/DL — CRITICAL HIGH (ref 10–20)
BUN SERPL-MCNC: 113 MG/DL — CRITICAL HIGH (ref 10–20)
CALCIUM SERPL-MCNC: 7 MG/DL — LOW (ref 8.5–10.1)
CALCIUM SERPL-MCNC: 7 MG/DL — LOW (ref 8.5–10.1)
CHLORIDE SERPL-SCNC: 96 MMOL/L — LOW (ref 98–110)
CHLORIDE SERPL-SCNC: 96 MMOL/L — LOW (ref 98–110)
CO2 SERPL-SCNC: 20 MMOL/L — SIGNIFICANT CHANGE UP (ref 17–32)
CO2 SERPL-SCNC: 24 MMOL/L — SIGNIFICANT CHANGE UP (ref 17–32)
CREAT SERPL-MCNC: 5.7 MG/DL — CRITICAL HIGH (ref 0.7–1.5)
CREAT SERPL-MCNC: 6.2 MG/DL — CRITICAL HIGH (ref 0.7–1.5)
CULTURE RESULTS: SIGNIFICANT CHANGE UP
GLUCOSE SERPL-MCNC: 122 MG/DL — HIGH (ref 70–99)
GLUCOSE SERPL-MCNC: 42 MG/DL — CRITICAL LOW (ref 70–99)
HCT VFR BLD CALC: 22 % — LOW (ref 37–47)
HCT VFR BLD CALC: 22.3 % — LOW (ref 37–47)
HGB BLD-MCNC: 7.2 G/DL — CRITICAL LOW (ref 12–16)
HGB BLD-MCNC: 7.3 G/DL — CRITICAL LOW (ref 12–16)
INR BLD: 1.06 RATIO — SIGNIFICANT CHANGE UP (ref 0.65–1.3)
MCHC RBC-ENTMCNC: 24.2 PG — LOW (ref 27–31)
MCHC RBC-ENTMCNC: 24.8 PG — LOW (ref 27–31)
MCHC RBC-ENTMCNC: 32.3 G/DL — SIGNIFICANT CHANGE UP (ref 32–37)
MCHC RBC-ENTMCNC: 33.2 G/DL — SIGNIFICANT CHANGE UP (ref 32–37)
MCV RBC AUTO: 74.8 FL — LOW (ref 81–99)
MCV RBC AUTO: 74.8 FL — LOW (ref 81–99)
NRBC # BLD: 0 /100 WBCS — SIGNIFICANT CHANGE UP (ref 0–0)
NRBC # BLD: 0 /100 WBCS — SIGNIFICANT CHANGE UP (ref 0–0)
PLATELET # BLD AUTO: 314 K/UL — SIGNIFICANT CHANGE UP (ref 130–400)
PLATELET # BLD AUTO: 349 K/UL — SIGNIFICANT CHANGE UP (ref 130–400)
POTASSIUM SERPL-MCNC: 5.3 MMOL/L — HIGH (ref 3.5–5)
POTASSIUM SERPL-MCNC: 6.1 MMOL/L — CRITICAL HIGH (ref 3.5–5)
POTASSIUM SERPL-SCNC: 5.3 MMOL/L — HIGH (ref 3.5–5)
POTASSIUM SERPL-SCNC: 6.1 MMOL/L — CRITICAL HIGH (ref 3.5–5)
PROT SERPL-MCNC: 4.6 G/DL — LOW (ref 6–8)
PROT SERPL-MCNC: 4.6 G/DL — LOW (ref 6–8)
PROTHROM AB SERPL-ACNC: 11.5 SEC — SIGNIFICANT CHANGE UP (ref 9.95–12.87)
RBC # BLD: 2.94 M/UL — LOW (ref 4.2–5.4)
RBC # BLD: 2.98 M/UL — LOW (ref 4.2–5.4)
RBC # FLD: 17.1 % — HIGH (ref 11.5–14.5)
RBC # FLD: 17.1 % — HIGH (ref 11.5–14.5)
SODIUM SERPL-SCNC: 134 MMOL/L — LOW (ref 135–146)
SODIUM SERPL-SCNC: 136 MMOL/L — SIGNIFICANT CHANGE UP (ref 135–146)
SPECIMEN SOURCE: SIGNIFICANT CHANGE UP
TYPE + AB SCN PNL BLD: SIGNIFICANT CHANGE UP
WBC # BLD: 9.05 K/UL — SIGNIFICANT CHANGE UP (ref 4.8–10.8)
WBC # BLD: 9.1 K/UL — SIGNIFICANT CHANGE UP (ref 4.8–10.8)
WBC # FLD AUTO: 9.05 K/UL — SIGNIFICANT CHANGE UP (ref 4.8–10.8)
WBC # FLD AUTO: 9.1 K/UL — SIGNIFICANT CHANGE UP (ref 4.8–10.8)

## 2018-05-07 PROCEDURE — 99232 SBSQ HOSP IP/OBS MODERATE 35: CPT

## 2018-05-07 RX ORDER — DEXTROSE 50 % IN WATER 50 %
50 SYRINGE (ML) INTRAVENOUS ONCE
Qty: 0 | Refills: 0 | Status: COMPLETED | OUTPATIENT
Start: 2018-05-07 | End: 2018-05-07

## 2018-05-07 RX ORDER — CEFTRIAXONE 500 MG/1
2 INJECTION, POWDER, FOR SOLUTION INTRAMUSCULAR; INTRAVENOUS EVERY 24 HOURS
Qty: 0 | Refills: 0 | Status: DISCONTINUED | OUTPATIENT
Start: 2018-05-07 | End: 2018-05-08

## 2018-05-07 RX ORDER — INSULIN HUMAN 100 [IU]/ML
10 INJECTION, SOLUTION SUBCUTANEOUS ONCE
Qty: 0 | Refills: 0 | Status: COMPLETED | OUTPATIENT
Start: 2018-05-07 | End: 2018-05-07

## 2018-05-07 RX ORDER — FUROSEMIDE 40 MG
40 TABLET ORAL ONCE
Qty: 0 | Refills: 0 | Status: COMPLETED | OUTPATIENT
Start: 2018-05-07 | End: 2018-05-08

## 2018-05-07 RX ORDER — SODIUM POLYSTYRENE SULFONATE 4.1 MEQ/G
15 POWDER, FOR SUSPENSION ORAL
Qty: 0 | Refills: 0 | Status: DISCONTINUED | OUTPATIENT
Start: 2018-05-07 | End: 2018-05-07

## 2018-05-07 RX ORDER — SODIUM POLYSTYRENE SULFONATE 4.1 MEQ/G
15 POWDER, FOR SUSPENSION ORAL
Qty: 0 | Refills: 0 | Status: COMPLETED | OUTPATIENT
Start: 2018-05-07 | End: 2018-05-08

## 2018-05-07 RX ORDER — SENNA PLUS 8.6 MG/1
1 TABLET ORAL AT BEDTIME
Qty: 0 | Refills: 0 | Status: DISCONTINUED | OUTPATIENT
Start: 2018-05-07 | End: 2018-05-17

## 2018-05-07 RX ORDER — DOCUSATE SODIUM 100 MG
100 CAPSULE ORAL THREE TIMES A DAY
Qty: 0 | Refills: 0 | Status: DISCONTINUED | OUTPATIENT
Start: 2018-05-07 | End: 2018-05-17

## 2018-05-07 RX ORDER — FERROUS SULFATE 325(65) MG
325 TABLET ORAL DAILY
Qty: 0 | Refills: 0 | Status: DISCONTINUED | OUTPATIENT
Start: 2018-05-07 | End: 2018-05-15

## 2018-05-07 RX ADMIN — Medication 40 MILLIGRAM(S): at 18:53

## 2018-05-07 RX ADMIN — ESCITALOPRAM OXALATE 10 MILLIGRAM(S): 10 TABLET, FILM COATED ORAL at 13:47

## 2018-05-07 RX ADMIN — Medication 8: at 13:42

## 2018-05-07 RX ADMIN — Medication 650 MILLIGRAM(S): at 09:51

## 2018-05-07 RX ADMIN — SENNA PLUS 1 TABLET(S): 8.6 TABLET ORAL at 22:29

## 2018-05-07 RX ADMIN — CEFTRIAXONE 100 GRAM(S): 500 INJECTION, POWDER, FOR SOLUTION INTRAMUSCULAR; INTRAVENOUS at 10:22

## 2018-05-07 RX ADMIN — SODIUM POLYSTYRENE SULFONATE 15 GRAM(S): 4.1 POWDER, FOR SUSPENSION ORAL at 19:14

## 2018-05-07 RX ADMIN — ATORVASTATIN CALCIUM 10 MILLIGRAM(S): 80 TABLET, FILM COATED ORAL at 22:28

## 2018-05-07 RX ADMIN — Medication 650 MILLIGRAM(S): at 13:47

## 2018-05-07 RX ADMIN — Medication 650 MILLIGRAM(S): at 22:28

## 2018-05-07 RX ADMIN — Medication 650 MILLIGRAM(S): at 00:10

## 2018-05-07 RX ADMIN — Medication 100 MILLIGRAM(S): at 22:28

## 2018-05-07 RX ADMIN — Medication 100 MICROGRAM(S): at 05:25

## 2018-05-07 RX ADMIN — HEPARIN SODIUM 5000 UNIT(S): 5000 INJECTION INTRAVENOUS; SUBCUTANEOUS at 05:25

## 2018-05-07 RX ADMIN — Medication 100 MILLIGRAM(S): at 10:23

## 2018-05-07 RX ADMIN — HEPARIN SODIUM 5000 UNIT(S): 5000 INJECTION INTRAVENOUS; SUBCUTANEOUS at 18:53

## 2018-05-07 RX ADMIN — Medication 50 MILLILITER(S): at 22:43

## 2018-05-07 RX ADMIN — INSULIN HUMAN 10 UNIT(S): 100 INJECTION, SOLUTION SUBCUTANEOUS at 10:42

## 2018-05-07 RX ADMIN — Medication 50 MILLILITER(S): at 10:54

## 2018-05-07 RX ADMIN — Medication 4 UNIT(S): at 13:41

## 2018-05-07 RX ADMIN — INSULIN GLARGINE 5 UNIT(S): 100 INJECTION, SOLUTION SUBCUTANEOUS at 08:26

## 2018-05-07 RX ADMIN — Medication 650 MILLIGRAM(S): at 05:25

## 2018-05-07 RX ADMIN — Medication 50 MILLILITER(S): at 15:46

## 2018-05-07 RX ADMIN — Medication 50 MILLIGRAM(S): at 05:25

## 2018-05-07 RX ADMIN — Medication 40 MILLIGRAM(S): at 08:21

## 2018-05-07 RX ADMIN — Medication 650 MILLIGRAM(S): at 10:19

## 2018-05-07 RX ADMIN — Medication 325 MILLIGRAM(S): at 13:46

## 2018-05-07 RX ADMIN — Medication 5 MILLIGRAM(S): at 22:29

## 2018-05-07 RX ADMIN — Medication 100 MILLIGRAM(S): at 13:46

## 2018-05-07 NOTE — PROGRESS NOTE ADULT - ASSESSMENT
#) DKA: resolved  - Gap closed and was re-opened periodically on the floor likely 2/2 to CKD  - c/w to monitor FS, keep blood sugar <180  - c/w insulin regimen as per endo     #) Anion gap metabolic acidosis w .respiratory compensation  - likely 2/2 kidney failure  - Nephro: willing to do HD if patient and family agree; patient states she wants HD to be done    #Emphysematous cystitis / UTI  - f/u urology: continue Collins and antibiotics  - ID eval pending    #) DNR/DNI 84 yo F with h/o DM, HTN, HL, RA, hypothyroidism, CHF (EF 55% in 12/17)< s/p PPM, s/p AICD, hemorrhoids, p/w weakness and diarrhea. Found to have DREW , acidosis and hyperkalemia/cystitis/neurogenic bladder:    #Emphysematous cystitis / UTI  - continue meropenum  - f/u urology: continue Collins and antibiotics  - ID eval pending    #) Anion gap metabolic acidosis w .respiratory compensation  - likely 2/2 kidney failure  - continue sodium bicarb 650 mg po q8  - creatinine trending down   - Nephology following: no need for RRT; check ph/pth level and start feso4 po q8, will needs donavan     #) DKA: resolved  - Gap closed and was re-opened periodically on the floor likely 2/2 to CKD  - c/w to monitor FS, keep blood sugar <180  - c/w insulin regimen as per endo     #) Hypothroidism  - continue levothyroxine     #) DNR/DNI 84 yo F with h/o DM, HTN, HL, RA, hypothyroidism, CHF (EF 55% in 12/17)< s/p PPM, s/p AICD, hemorrhoids, p/w weakness and diarrhea. Found to have DREW , acidosis and hyperkalemia/cystitis/neurogenic bladder:    #Emphysematous cystitis / UTI  - Urine culture: 10,000 - 49,000 CFU/mL Escherichia coli (05.01.18 @ 08:59); sensitivity available  - continue meropenum  - f/u urology: continue Collins and antibiotics  - ID eval pending    #) Anion gap metabolic acidosis w .respiratory compensation  - likely 2/2 kidney failure  - continue sodium bicarb 650 mg po q8  - creatinine trending down   - Nephology following: no need for RRT; check ph/pth level and start feso4 po q8, will needs donavan     #) DKA: resolved  - Gap closed and was re-opened periodically on the floor likely 2/2 to CKD  - c/w to monitor FS, keep blood sugar <180  - c/w insulin regimen as per endo     #) Hypothroidism  - continue levothyroxine     #) DNR/DNI 82 yo F with h/o DM, HTN, HL, RA, hypothyroidism, CHF (EF 55% in 12/17)< s/p PPM, s/p AICD, hemorrhoids, p/w weakness and diarrhea. Found to have DREW , acidosis and hyperkalemia/cystitis/neurogenic bladder:    #Emphysematous cystitis / UTI  - Urine culture: 10,000 - 49,000 CFU/mL Escherichia coli (05.01.18 @ 08:59); sensitivity available  - ID consult appreciated: start ceftriaxone 2gm q24; D/C Meropenum  - f/u urology: continue Collins and antibiotics    #) Anion gap metabolic acidosis w .respiratory compensation  - likely 2/2 kidney failure  - continue sodium bicarb 650 mg po q8  - creatinine trending down   - Nephology following: no need for RRT; check ph/pth level and start feso4 po q8    #) DKA: resolved  - Gap closed and was re-opened periodically on the floor likely 2/2 to CKD  - c/w to monitor FS, keep blood sugar <180  - c/w insulin regimen as per endo     #) Hypothroidism  - continue levothyroxine     #) DNR/DNI 84 yo F with h/o DM, HTN, HL, RA, hypothyroidism, CHF (EF 55% in 12/17)< s/p PPM, s/p AICD, hemorrhoids, p/w weakness and diarrhea. Found to have DREW , acidosis and hyperkalemia/cystitis/neurogenic bladder:    #) Emphysematous cystitis  - Urine culture: 10,000 - 49,000 CFU/mL Escherichia coli (05.01.18 @ 08:59); sensitivity available  - ID consult appreciated: start ceftriaxone 2gm q24; D/C Meropenum  - f/u urology: continue Collins and antibiotics    #) Chronic Systolic heart failure  - continue lasix 40mg I/V q12  - continue metoprolol ER 50mg daily     #) Anion gap metabolic acidosis w .respiratory compensation  - likely 2/2 kidney failure  - continue sodium bicarb 650 mg po q8  - creatinine trending down   - Nephology following: no need for RRT; check ph/pth level and start feso4 po q8    #) DKA: resolved  - Gap closed and was re-opened periodically on the floor likely 2/2 to CKD  - c/w to monitor FS, keep blood sugar <180  - c/w insulin regimen as per endo     #) Hypothroidism  - continue levothyroxine     #) DNR/DNI 82 yo F with h/o DM, HTN, HL, RA, hypothyroidism, CHF (EF 55% in 12/17)< s/p PPM, s/p AICD, hemorrhoids, p/w weakness and diarrhea. Found to have DREW , acidosis and hyperkalemia/cystitis/neurogenic bladder:    #) Emphysematous cystitis  - Urine culture: 10,000 - 49,000 CFU/mL Escherichia coli (05.01.18 @ 08:59); sensitivity available  - ID consult appreciated: start ceftriaxone 2gm q24; D/C Meropenum  - f/u urology: continue Collins and antibiotics    #) Hyperkalemia (6.1)  - stat D50 and Insulin 10U  - repeat bmp at 4pm    #) Chronic Systolic heart failure  - continue lasix 40mg I/V q12  - continue metoprolol ER 50mg daily     #) Anion gap metabolic acidosis w .respiratory compensation  - likely 2/2 kidney failure  - continue sodium bicarb 650 mg po q8  - creatinine trending down   - Nephology following: no need for RRT; check ph/pth level and start feso4 po q8    #) DKA: resolved  - Gap closed and was re-opened periodically on the floor likely 2/2 to CKD  - c/w to monitor FS, keep blood sugar <180  - c/w insulin regimen as per endo     #) Hypothroidism  - continue levothyroxine     #) DNR/DNI 84 yo F with h/o DM, HTN, HL, RA, hypothyroidism, CHF (EF 55% in 12/17)< s/p PPM, s/p AICD, hemorrhoids, p/w weakness and diarrhea. Found to have DREW , acidosis and hyperkalemia/cystitis/neurogenic bladder:    #) Emphysematous cystitis  - Urine culture: 10,000 - 49,000 CFU/mL Escherichia coli (05.01.18 @ 08:59); sensitivity available  - ID consult appreciated: start ceftriaxone 2gm q24; D/C Meropenum  - f/u urology: continue Davis and antibiotics    #) Hyperkalemia (6.1)  - stat D50 and Insulin 10U  - repeat bmp at 4pm    #) Chronic Systolic heart failure  - continue lasix 40mg I/V q12  - continue metoprolol ER 50mg daily     #) Anion gap metabolic acidosis w .respiratory compensation  - likely 2/2 kidney failure  - continue sodium bicarb 650 mg po q8  - creatinine trending down   - Nephology following: no need for RRT; check ph/pth level and start feso4 po q8    #) DKA: resolved  - Gap closed and was re-opened periodically on the floor likely 2/2 to CKD  - c/w to monitor FS, keep blood sugar <180  - c/w insulin regimen as per endo     #) Hypothroidism  - continue levothyroxine     #) DNR/DNI    Attending Attestation:  Pt seen and examined. Case at rounds, daughter at bedside and with ID and Nephrology. Agree with above documentation as corrected. Overall pt with improvement except as above active issues and ongoing lethargy 2/2 Uremia. Per Nephrology hold HD for now. Pt and Daughter on board in HD is needed. Today with Hyperkalemia and non oliguric RF responding to Lasix in addition for Fluid Overload from Valvular CHF and RF. Pt is also Anemic with low Iron - started Iron today. Question to Nephrology for the role of Epogen at this time. Will transfusde 1PRBC over 3hrs and follow K and CBC. Hyperkalemia treated this morning. Repeat K afternoon labs. Agree with continuing davis catheter per  for a few wks and starting Rocephin for Ecoli Emphesematous Cystitis. Will follow. Overall poor prognosis.   Time: 35min for all care.

## 2018-05-07 NOTE — PROGRESS NOTE ADULT - SUBJECTIVE AND OBJECTIVE BOX
LEANDRA VI  83y, Female      OVERNIGHT EVENTS:    no fevers, has a davis catheter.    VITALS:  T(F): 97.6, Max: 98.6 (05-06-18 @ 20:37)  HR: 72  BP: 145/71  RR: 18Vital Signs Last 24 Hrs  T(C): 36.4 (07 May 2018 04:26), Max: 37 (06 May 2018 20:37)  T(F): 97.6 (07 May 2018 04:26), Max: 98.6 (06 May 2018 20:37)  HR: 72 (07 May 2018 04:26) (60 - 72)  BP: 145/71 (07 May 2018 04:26) (132/67 - 145/71)  BP(mean): --  RR: 18 (07 May 2018 04:26) (18 - 18)  SpO2: 96% (06 May 2018 19:00) (96% - 96%)    TESTS & MEASUREMENTS:                        7.4    10.22 )-----------( 298      ( 06 May 2018 06:33 )             22.4     05-06    132<L>  |  96<L>  |  108<HH>  ----------------------------<  103<H>  5.7<H>   |  21  |  5.3<HH>    Ca    6.8<L>      06 May 2018 06:33          Culture - Blood (collected 05-02-18 @ 11:09)  Source: .Blood None  Preliminary Report (05-03-18 @ 17:01):    No growth to date.    Culture - Blood (collected 05-02-18 @ 04:27)  Source: .Blood None  Preliminary Report (05-03-18 @ 13:02):    No growth to date.    Culture - Blood (collected 05-01-18 @ 11:00)  Source: .Blood Blood  Final Report (05-07-18 @ 01:00):    No growth at 5 days.    Culture - Urine (collected 05-01-18 @ 08:59)  Source: .Urine Catheterized  Final Report (05-03-18 @ 09:56):    10,000 - 49,000 CFU/mL Escherichia coli  Organism: Escherichia coli (05-03-18 @ 09:56)  Organism: Escherichia coli (05-03-18 @ 09:56)      -  Amikacin: S <=8      -  Amoxicillin/Clavulanic Acid: S <=8/4      -  Ampicillin: R >16      -  Ampicillin/Sulbactam: R >16/8      -  Aztreonam: S <=4      -  Cefazolin: S <=2      -  Cefepime: S <=2      -  Cefoxitin: S 8      -  Ceftriaxone: S <=1      -  Ciprofloxacin: S <=0.5      -  Ertapenem: S <=0.5      -  Gentamicin: S <=1      -  Imipenem: S <=1      -  Levofloxacin: S <=1      -  Meropenem: S <=1      -  Nitrofurantoin: S <=32      -  Piperacillin/Tazobactam: S <=8      -  Tigecycline: S <=1      -  Tobramycin: S <=2      -  Trimethoprim/Sulfamethoxazole: S <=0.5/9.5      Method Type: NOEL            RADIOLOGY & ADDITIONAL TESTS:    ANTIBIOTICS:  meropenem  IVPB 500 milliGRAM(s) IV Intermittent every 24 hours

## 2018-05-07 NOTE — PROGRESS NOTE ADULT - ASSESSMENT
84 yo F with h/o DM, HTN, HL, RA, hypothyroidism, CHF (EF 55% in 12/17)< s/p PPM, s/p AICD, hemorrhoids, p/w weakness and diarrhea. Found to have DREW , acidosis and hyperkalemia/cystitis/neurogenic bladder:    # creatinine trending down   # non oliguric  # on  sodium bicarb 650 mg po q 8  # remains on marry  # no need for RRT for now  # check albumin to cottect calcium  # check ph/pth level  # start feso4 po q 8, will needs donavan   # overall prognosis poor 82 yo F with h/o DM, HTN, HL, RA, hypothyroidism, CHF (EF 55% in 12/17)< s/p PPM, s/p AICD, hemorrhoids, p/w weakness and diarrhea. Found to have DREW , acidosis and hyperkalemia/cystitis/neurogenic bladder:    # creatinine stable non oliguric   # non oliguric  # on  sodium bicarb 650 mg po q 8 can stop   # sp treatment of hyperkalemia repeat BMP ion the afternoon   # remains on marry  # no need for RRT for now but will follow up closely, discussed with family at bedside who agreed to HD if needed   # Ca corrected will be 8.2 check ionized calcium if needed   # check phosphorus  /pth level    # overall prognosis poor 84 yo F with h/o DM, HTN, HL, RA, hypothyroidism, CHF (EF 55% in 12/17)< s/p PPM, s/p AICD, hemorrhoids, p/w weakness and diarrhea. Found to have DREW , acidosis and hyperkalemia/cystitis/neurogenic bladder:    # creatinine stable non oliguric   # non oliguric continue lasix for now   # on  sodium bicarb 650 mg po q 8 can stop   # sp treatment of hyperkalemia repeat BMP ion the afternoon   # remains on marry  # no need for RRT for now but will follow up closely, discussed with family at bedside who agreed to HD if needed   # Ca corrected will be 8.2 check ionized calcium if needed   # check phosphorus  /pth level    # overall prognosis poor

## 2018-05-07 NOTE — DIETITIAN INITIAL EVALUATION ADULT. - DIET TYPE
dysphagia 2, mechanical soft, thin liquids/per pt. daughter ~25% PO at meal times/DASH/TLC (sodium and cholesterol restricted diet)/consistent carbohydrate (no snacks)

## 2018-05-07 NOTE — PROGRESS NOTE ADULT - SUBJECTIVE AND OBJECTIVE BOX
Nephrology progress note    Patient is seen and examined, events over the last 24 h noted .    Allergies:  Plavix (Other (Moderate))    Hospital Medications:   MEDICATIONS  (STANDING):  atorvastatin 10 milliGRAM(s) Oral at bedtime  cefTRIAXone   IVPB 2 Gram(s) IV Intermittent every 24 hours  dextrose 5%. 1000 milliLiter(s) (50 mL/Hr) IV Continuous <Continuous>  docusate sodium 100 milliGRAM(s) Oral three times a day  escitalopram 10 milliGRAM(s) Oral daily  ferrous    sulfate 325 milliGRAM(s) Oral daily  furosemide   Injectable 40 milliGRAM(s) IV Push every 12 hours  heparin  Injectable 5000 Unit(s) SubCutaneous every 12 hours  insulin glargine Injectable (LANTUS) 5 Unit(s) SubCutaneous every morning  levothyroxine 100 MICROGram(s) Oral daily  lidocaine   Patch 1 Patch Transdermal daily    melatonin 5 milliGRAM(s) Oral at bedtime  metoprolol succinate ER 50 milliGRAM(s) Oral daily  senna 1 Tablet(s) Oral at bedtime  sodium bicarbonate 650 milliGRAM(s) Oral every 8 hours        VITALS:  T(F): 97.6 (18 @ 04:26), Max: 98.6 (18 @ 20:37)  HR: 72 (18 @ 04:26)  BP: 145/71 (18 @ 04:26)  RR: 18 (18 @ 04:26)  SpO2: 96% (18 @ 19:00)  Wt(kg): --    :01  -   @ 07:00  --------------------------------------------------------  IN: 0 mL / OUT: 1100 mL / NET: -1100 mL     07:01  -   @ 07:00  --------------------------------------------------------  IN: 0 mL / OUT: 1800 mL / NET: -1800 mL          PHYSICAL EXAM:  Constitutional: NAD  HEENT: anicteric sclera, oropharynx clear, MMM  Neck: No JVD  Respiratory: CTAB, no wheezes, rales or rhonchi  Cardiovascular: S1, S2, RRR  Gastrointestinal: BS+, soft, NT/ND  Extremities: No cyanosis or clubbing. No peripheral edema  :  No davis.   Skin: No rashes    LABS:      134<L>  |  96<L>  |  113<HH>  ----------------------------<  122<H>  6.1<HH>   |  20  |  5.7<HH>  Potassium Trend: 6.1<--, 5.7<--, 5.7<--, 6.0<--, 5.2<--  Creatinine Trend: 5.7<--, 5.3<--, 5.2<--, 5.6<--, 5.4<--, 5.6<--  Ca    7.0<L>      07 May 2018 07:51    TPro  4.6<L>  /  Alb  2.5<L>  /  TBili  0.2  /  DBili  <0.2  /  AST  24  /  ALT  13  /  AlkPhos  220<H>                            7.3    9.05  )-----------( 314      ( 07 May 2018 07:51 )             22.0       Urine Studies:  Urinalysis Basic - ( 03 May 2018 17:57 )    Color: Yellow / Appearance: Cloudy / S.010 / pH:   Gluc:  / Ketone: Negative  / Bili: Negative / Urobili: 0.2 mg/dL   Blood:  / Protein: 30 mg/dL / Nitrite: Negative   Leuk Esterase: Large / RBC: 5-10 /HPF / WBC >50 /HPF   Sq Epi:  / Non Sq Epi: Occasional /HPF / Bacteria: Few /HPF      Sodium, Random Urine: 71.0 mmoL/L ( @ 08:58)  Creatinine, Random Urine: 56 mg/dL ( @ 08:58)  Osmolality, Random Urine: 367 mos/kg ( @ 08:57)    RADIOLOGY & ADDITIONAL STUDIES: Nephrology progress note    Patient is seen and examined, events over the last 24 h noted .  started on lasix     Allergies:  Plavix (Other (Moderate))    Hospital Medications:   MEDICATIONS  (STANDING):  atorvastatin 10 milliGRAM(s) Oral at bedtime  cefTRIAXone   IVPB 2 Gram(s) IV Intermittent every 24 hours  dextrose 5%. 1000 milliLiter(s) (50 mL/Hr) IV Continuous <Continuous>  docusate sodium 100 milliGRAM(s) Oral three times a day  escitalopram 10 milliGRAM(s) Oral daily  ferrous    sulfate 325 milliGRAM(s) Oral daily  furosemide   Injectable 40 milliGRAM(s) IV Push every 12 hours  heparin  Injectable 5000 Unit(s) SubCutaneous every 12 hours  insulin glargine Injectable (LANTUS) 5 Unit(s) SubCutaneous every morning  levothyroxine 100 MICROGram(s) Oral daily  lidocaine   Patch 1 Patch Transdermal daily    melatonin 5 milliGRAM(s) Oral at bedtime  metoprolol succinate ER 50 milliGRAM(s) Oral daily  senna 1 Tablet(s) Oral at bedtime  sodium bicarbonate 650 milliGRAM(s) Oral every 8 hours        VITALS:  T(F): 97.6 (18 @ 04:26), Max: 98.6 (18 @ 20:37)  HR: 72 (18 @ 04:26)  BP: 145/71 (18 @ 04:26)  RR: 18 (18 @ 04:26)  SpO2: 96% (18 @ 19:00)      :01  -   @ 07:00  --------------------------------------------------------  IN: 0 mL / OUT: 1100 mL / NET: -1100 mL     07:01  -   @ 07:00  --------------------------------------------------------  IN: 0 mL / OUT: 1800 mL / NET: -1800 mL          PHYSICAL EXAM:  Constitutional: lethargic   HEENT: anicteric sclera, oropharynx clear, MMM  Neck: No JVD  Respiratory: Crackles ta base   Cardiovascular: S1, S2, RRR  Gastrointestinal: BS+, soft, NT/ND  Extremities: No cyanosis or clubbing. plus one peripheral edema   :  positive davis    Skin: No rashes    LABS:      134<L>  |  96<L>  |  113<HH>  ----------------------------<  122<H>  6.1<HH>   |  20  |  5.7<HH>    Potassium Trend: 6.1<--, 5.7<--, 5.7<--, 6.0<--, 5.2<--  Creatinine Trend: 5.7<--, 5.3<--, 5.2<--, 5.6<--, 5.4<--, 5.6<--    Ca    7.0<L>      07 May 2018 07:51    TPro  4.6<L>  /  Alb  2.5<L>  /  TBili  0.2  /  DBili  <0.2  /  AST  24  /  ALT  13  /  AlkPhos  220<H>                            7.3    9.05  )-----------( 314      ( 07 May 2018 07:51 )             22.0       Urine Studies:  Urinalysis Basic - ( 03 May 2018 17:57 )    Color: Yellow / Appearance: Cloudy / S.010 / pH:   Gluc:  / Ketone: Negative  / Bili: Negative / Urobili: 0.2 mg/dL   Blood:  / Protein: 30 mg/dL / Nitrite: Negative   Leuk Esterase: Large / RBC: 5-10 /HPF / WBC >50 /HPF   Sq Epi:  / Non Sq Epi: Occasional /HPF / Bacteria: Few /HPF      Sodium, Random Urine: 71.0 mmoL/L ( @ 08:58)  Creatinine, Random Urine: 56 mg/dL ( @ 08:58)  Osmolality, Random Urine: 367 mos/kg ( @ 08:57)    RADIOLOGY & ADDITIONAL STUDIES:

## 2018-05-07 NOTE — PROGRESS NOTE ADULT - SUBJECTIVE AND OBJECTIVE BOX
SUBJECTIVE:    Patient is a 83y old Female who presents with a chief complaint of DKA (01 May 2018 04:15)    Currently admitted to medicine with the primary diagnosis of DKA (diabetic ketoacidoses)     Today is hospital day 7d. This morning she is resting comfortably in bed and reports no new issues or overnight events.     PAST MEDICAL & SURGICAL HISTORY  Neuropathy  Diabetes  Hypothyroidism  HTN (hypertension)  CHF (congestive heart failure)  AICD (automatic cardioverter/defibrillator) present    SOCIAL HISTORY:  Negative for smoking/alcohol/drug use.     ALLERGIES:  Plavix (Other (Moderate))    MEDICATIONS:  STANDING MEDICATIONS  atorvastatin 10 milliGRAM(s) Oral at bedtime  dextrose 5%. 1000 milliLiter(s) IV Continuous <Continuous>  dextrose 50% Injectable 12.5 Gram(s) IV Push once  dextrose 50% Injectable 25 Gram(s) IV Push once  dextrose 50% Injectable 25 Gram(s) IV Push once  escitalopram 10 milliGRAM(s) Oral daily  furosemide   Injectable 40 milliGRAM(s) IV Push every 12 hours  heparin  Injectable 5000 Unit(s) SubCutaneous every 12 hours  insulin glargine Injectable (LANTUS) 5 Unit(s) SubCutaneous every morning  insulin glargine Injectable (LANTUS) 5 Unit(s) SubCutaneous at bedtime  insulin lispro (HumaLOG) corrective regimen sliding scale   SubCutaneous three times a day before meals  insulin lispro (HumaLOG) corrective regimen sliding scale   SubCutaneous three times a day before meals  insulin lispro Injectable (HumaLOG) 4 Unit(s) SubCutaneous before breakfast  insulin lispro Injectable (HumaLOG) 4 Unit(s) SubCutaneous before lunch  insulin lispro Injectable (HumaLOG) 4 Unit(s) SubCutaneous before dinner  levothyroxine 100 MICROGram(s) Oral daily  lidocaine   Patch 1 Patch Transdermal daily  lidocaine   Patch      melatonin 5 milliGRAM(s) Oral at bedtime  meropenem  IVPB 500 milliGRAM(s) IV Intermittent every 24 hours  metoprolol succinate ER 50 milliGRAM(s) Oral daily  sodium bicarbonate 650 milliGRAM(s) Oral every 8 hours    PRN MEDICATIONS  acetaminophen   Tablet. 650 milliGRAM(s) Oral every 6 hours PRN  benzocaine 15 mG/menthol 3.6 mG Lozenge 1 Lozenge Oral every 3 hours PRN  dextrose Gel 1 Dose(s) Oral once PRN  glucagon  Injectable 1 milliGRAM(s) IntraMuscular once PRN    VITALS:   T(F): 97.6  HR: 72  BP: 145/71  RR: 18  SpO2: 96%    LABS:                        7.4    10.22 )-----------( 298      ( 06 May 2018 06:33 )             22.4     05-06    132<L>  |  96<L>  |  108<HH>  ----------------------------<  103<H>  5.7<H>   |  21  |  5.3<HH>    Ca    6.8<L>      06 May 2018 06:33        RADIOLOGY:  Xray Chest 1 View- PORTABLE-Routine (05.02.18)  Increased bilateral interstitial opacities    US Retroperitoneal Complete (05.01.18)  No stones or hydronephrosis bilaterally.    CT Abdomen and Pelvis No Cont (04.30.18)  Findings compatible with emphysematous cystitis.      PHYSICAL EXAM:  GEN: No acute distress  LUNGS: Clear to auscultation bilaterally   HEART: S1/S2 present. RRR.   ABD: Soft, non-tender, non-distended. Bowel sounds present  EXT: 1+ edema b/l LE  NEURO: AAOX3  Skin: No rashes SUBJECTIVE:    Patient is a 83y old Female who presents with a chief complaint of DKA (01 May 2018 04:15)    Currently admitted to medicine with the primary diagnosis of DKA (diabetic ketoacidoses)     Today is hospital day 7d. This morning she is resting comfortably in bed and reports no new issues or overnight events.     PAST MEDICAL & SURGICAL HISTORY  Neuropathy  Diabetes  Hypothyroidism  HTN (hypertension)  CHF (congestive heart failure)  AICD (automatic cardioverter/defibrillator) present    SOCIAL HISTORY:  Negative for smoking/alcohol/drug use.     ALLERGIES:  Plavix (Other (Moderate)    MEDICATIONS:  STANDING MEDICATIONS  atorvastatin 10 milliGRAM(s) Oral at bedtime  dextrose 5%. 1000 milliLiter(s) IV Continuous <Continuous>  dextrose 50% Injectable 12.5 Gram(s) IV Push once  dextrose 50% Injectable 25 Gram(s) IV Push once  dextrose 50% Injectable 25 Gram(s) IV Push once  escitalopram 10 milliGRAM(s) Oral daily  furosemide   Injectable 40 milliGRAM(s) IV Push every 12 hours  heparin  Injectable 5000 Unit(s) SubCutaneous every 12 hours  insulin glargine Injectable (LANTUS) 5 Unit(s) SubCutaneous every morning  insulin glargine Injectable (LANTUS) 5 Unit(s) SubCutaneous at bedtime  insulin lispro (HumaLOG) corrective regimen sliding scale   SubCutaneous three times a day before meals  insulin lispro (HumaLOG) corrective regimen sliding scale   SubCutaneous three times a day before meals  insulin lispro Injectable (HumaLOG) 4 Unit(s) SubCutaneous before breakfast  insulin lispro Injectable (HumaLOG) 4 Unit(s) SubCutaneous before lunch  insulin lispro Injectable (HumaLOG) 4 Unit(s) SubCutaneous before dinner  levothyroxine 100 MICROGram(s) Oral daily  lidocaine   Patch 1 Patch Transdermal daily  lidocaine   Patch      melatonin 5 milliGRAM(s) Oral at bedtime  meropenem  IVPB 500 milliGRAM(s) IV Intermittent every 24 hours  metoprolol succinate ER 50 milliGRAM(s) Oral daily  sodium bicarbonate 650 milliGRAM(s) Oral every 8 hours    PRN MEDICATIONS  acetaminophen   Tablet. 650 milliGRAM(s) Oral every 6 hours PRN  benzocaine 15 mG/menthol 3.6 mG Lozenge 1 Lozenge Oral every 3 hours PRN  dextrose Gel 1 Dose(s) Oral once PRN  glucagon  Injectable 1 milliGRAM(s) IntraMuscular once PRN    VITALS:   T(F): 97.6  HR: 72  BP: 145/71  RR: 18  SpO2: 96%    LABS:                        7.4    10.22 )-----------( 298      ( 06 May 2018 06:33 )             22.4     05-06    132<L>  |  96<L>  |  108<HH>  ----------------------------<  103<H>  5.7<H>   |  21  |  5.3<HH>    Ca    6.8<L>      06 May 2018 06:33        RADIOLOGY:  Xray Chest 1 View- PORTABLE-Routine (05.02.18)  Increased bilateral interstitial opacities    US Retroperitoneal Complete (05.01.18)  No stones or hydronephrosis bilaterally.    CT Abdomen and Pelvis No Cont (04.30.18)  Findings compatible with emphysematous cystitis.      PHYSICAL EXAM:  GEN: No acute distress  LUNGS: Clear to auscultation bilaterally   HEART: S1/S2 present. RRR.   ABD: Soft, non-tender, non-distended. Bowel sounds present  EXT: 1+ edema b/l LE  NEURO: AAOX3  Skin: No rashes

## 2018-05-07 NOTE — DIETITIAN INITIAL EVALUATION ADULT. - FACTORS AFF FOOD INTAKE
persistent cough, decreased appetite, no GI distress or chew/swallow difficulty per pt. daughter, last BM  5/4

## 2018-05-07 NOTE — DIETITIAN INITIAL EVALUATION ADULT. - ORAL INTAKE PTA
per pt. daughter pt. with fairly good appetite, decreases at times- especially when on Lasix/multiple medications, Ensure occasionally/fair

## 2018-05-07 NOTE — DIETITIAN INITIAL EVALUATION ADULT. - OTHER INFO
Initial assessment for LOS p/w DREW, weakness, diarrhea. Found to have  acidosis and hyperkalemia/cystitis/neurogenic bladder:  creatinine stable non oliguric, sp treatment of hyperkalemia, no need for RRT for now but will follow up closely, HD if needed, poor prognosis.

## 2018-05-07 NOTE — PROGRESS NOTE ADULT - ASSESSMENT
IMPRESSION:  Acute emphysematous cystitis secondary to E coli.    RECOMMENDATIONS:  Rocephin 2 gm q24h  D/C meropenem.

## 2018-05-08 LAB
ALLERGY+IMMUNOLOGY DIAG STUDY NOTE: SIGNIFICANT CHANGE UP
ALLERGY+IMMUNOLOGY DIAG STUDY NOTE: SIGNIFICANT CHANGE UP
ANION GAP SERPL CALC-SCNC: 16 MMOL/L — HIGH (ref 7–14)
ANION GAP SERPL CALC-SCNC: 19 MMOL/L — HIGH (ref 7–14)
ANTIBODY INTERPRETATION 2: SIGNIFICANT CHANGE UP
BASOPHILS # BLD AUTO: 0.02 K/UL — SIGNIFICANT CHANGE UP (ref 0–0.2)
BASOPHILS NFR BLD AUTO: 0.2 % — SIGNIFICANT CHANGE UP (ref 0–1)
BUN SERPL-MCNC: 108 MG/DL — CRITICAL HIGH (ref 10–20)
BUN SERPL-MCNC: 108 MG/DL — CRITICAL HIGH (ref 10–20)
CALCIUM SERPL-MCNC: 7.2 MG/DL — LOW (ref 8.5–10.1)
CALCIUM SERPL-MCNC: 7.4 MG/DL — LOW (ref 8.5–10.1)
CHLORIDE SERPL-SCNC: 94 MMOL/L — LOW (ref 98–110)
CHLORIDE SERPL-SCNC: 96 MMOL/L — LOW (ref 98–110)
CO2 SERPL-SCNC: 20 MMOL/L — SIGNIFICANT CHANGE UP (ref 17–32)
CO2 SERPL-SCNC: 24 MMOL/L — SIGNIFICANT CHANGE UP (ref 17–32)
CREAT SERPL-MCNC: 5.6 MG/DL — CRITICAL HIGH (ref 0.7–1.5)
CREAT SERPL-MCNC: 5.6 MG/DL — CRITICAL HIGH (ref 0.7–1.5)
EOSINOPHIL # BLD AUTO: 0.17 K/UL — SIGNIFICANT CHANGE UP (ref 0–0.7)
EOSINOPHIL NFR BLD AUTO: 1.9 % — SIGNIFICANT CHANGE UP (ref 0–8)
GLUCOSE SERPL-MCNC: 175 MG/DL — HIGH (ref 70–99)
GLUCOSE SERPL-MCNC: 42 MG/DL — CRITICAL LOW (ref 70–99)
HCT VFR BLD CALC: 26.5 % — LOW (ref 37–47)
HGB BLD-MCNC: 8.9 G/DL — LOW (ref 12–16)
IMM GRANULOCYTES NFR BLD AUTO: 0.9 % — HIGH (ref 0.1–0.3)
LYMPHOCYTES # BLD AUTO: 0.79 K/UL — LOW (ref 1.2–3.4)
LYMPHOCYTES # BLD AUTO: 9 % — LOW (ref 20.5–51.1)
MAGNESIUM SERPL-MCNC: 1.8 MG/DL — SIGNIFICANT CHANGE UP (ref 1.8–2.4)
MAGNESIUM SERPL-MCNC: 1.9 MG/DL — SIGNIFICANT CHANGE UP (ref 1.8–2.4)
MCHC RBC-ENTMCNC: 25 PG — LOW (ref 27–31)
MCHC RBC-ENTMCNC: 33.6 G/DL — SIGNIFICANT CHANGE UP (ref 32–37)
MCV RBC AUTO: 74.4 FL — LOW (ref 81–99)
MONOCYTES # BLD AUTO: 0.43 K/UL — SIGNIFICANT CHANGE UP (ref 0.1–0.6)
MONOCYTES NFR BLD AUTO: 4.9 % — SIGNIFICANT CHANGE UP (ref 1.7–9.3)
NEUTROPHILS # BLD AUTO: 7.26 K/UL — HIGH (ref 1.4–6.5)
NEUTROPHILS NFR BLD AUTO: 83.1 % — HIGH (ref 42.2–75.2)
PHOSPHATE SERPL-MCNC: 7 MG/DL — HIGH (ref 2.1–4.9)
PHOSPHATE SERPL-MCNC: 7.1 MG/DL — HIGH (ref 2.1–4.9)
PLATELET # BLD AUTO: 343 K/UL — SIGNIFICANT CHANGE UP (ref 130–400)
POTASSIUM SERPL-MCNC: 5 MMOL/L — SIGNIFICANT CHANGE UP (ref 3.5–5)
POTASSIUM SERPL-MCNC: 5.3 MMOL/L — HIGH (ref 3.5–5)
POTASSIUM SERPL-SCNC: 5 MMOL/L — SIGNIFICANT CHANGE UP (ref 3.5–5)
POTASSIUM SERPL-SCNC: 5.3 MMOL/L — HIGH (ref 3.5–5)
RBC # BLD: 3.56 M/UL — LOW (ref 4.2–5.4)
RBC # FLD: 16.5 % — HIGH (ref 11.5–14.5)
SODIUM SERPL-SCNC: 133 MMOL/L — LOW (ref 135–146)
SODIUM SERPL-SCNC: 136 MMOL/L — SIGNIFICANT CHANGE UP (ref 135–146)
WBC # BLD: 8.75 K/UL — SIGNIFICANT CHANGE UP (ref 4.8–10.8)
WBC # FLD AUTO: 8.75 K/UL — SIGNIFICANT CHANGE UP (ref 4.8–10.8)

## 2018-05-08 RX ORDER — VANCOMYCIN HCL 1 G
1000 VIAL (EA) INTRAVENOUS ONCE
Qty: 0 | Refills: 0 | Status: COMPLETED | OUTPATIENT
Start: 2018-05-08 | End: 2018-05-08

## 2018-05-08 RX ORDER — CEFTRIAXONE 500 MG/1
2 INJECTION, POWDER, FOR SOLUTION INTRAMUSCULAR; INTRAVENOUS EVERY 24 HOURS
Qty: 0 | Refills: 0 | Status: DISCONTINUED | OUTPATIENT
Start: 2018-05-08 | End: 2018-05-11

## 2018-05-08 RX ORDER — MEROPENEM 1 G/30ML
500 INJECTION INTRAVENOUS ONCE
Qty: 0 | Refills: 0 | Status: DISCONTINUED | OUTPATIENT
Start: 2018-05-08 | End: 2018-05-08

## 2018-05-08 RX ORDER — HYDROCORTISONE 1 %
1 OINTMENT (GRAM) TOPICAL
Qty: 0 | Refills: 0 | Status: DISCONTINUED | OUTPATIENT
Start: 2018-05-08 | End: 2018-05-10

## 2018-05-08 RX ORDER — MEROPENEM 1 G/30ML
500 INJECTION INTRAVENOUS
Qty: 0 | Refills: 0 | Status: DISCONTINUED | OUTPATIENT
Start: 2018-05-08 | End: 2018-05-08

## 2018-05-08 RX ORDER — DIPHENHYDRAMINE HCL 50 MG
25 CAPSULE ORAL EVERY 4 HOURS
Qty: 0 | Refills: 0 | Status: DISCONTINUED | OUTPATIENT
Start: 2018-05-08 | End: 2018-05-17

## 2018-05-08 RX ORDER — MEROPENEM 1 G/30ML
INJECTION INTRAVENOUS
Qty: 0 | Refills: 0 | Status: DISCONTINUED | OUTPATIENT
Start: 2018-05-08 | End: 2018-05-08

## 2018-05-08 RX ADMIN — SODIUM POLYSTYRENE SULFONATE 15 GRAM(S): 4.1 POWDER, FOR SUSPENSION ORAL at 18:35

## 2018-05-08 RX ADMIN — Medication 100 MICROGRAM(S): at 06:00

## 2018-05-08 RX ADMIN — Medication 4 UNIT(S): at 18:26

## 2018-05-08 RX ADMIN — Medication 650 MILLIGRAM(S): at 05:59

## 2018-05-08 RX ADMIN — Medication 40 MILLIGRAM(S): at 08:11

## 2018-05-08 RX ADMIN — ESCITALOPRAM OXALATE 10 MILLIGRAM(S): 10 TABLET, FILM COATED ORAL at 11:07

## 2018-05-08 RX ADMIN — Medication 40 MILLIGRAM(S): at 06:00

## 2018-05-08 RX ADMIN — Medication 650 MILLIGRAM(S): at 13:37

## 2018-05-08 RX ADMIN — Medication 250 MILLIGRAM(S): at 11:06

## 2018-05-08 RX ADMIN — Medication 5 MILLIGRAM(S): at 21:57

## 2018-05-08 RX ADMIN — Medication 325 MILLIGRAM(S): at 11:07

## 2018-05-08 RX ADMIN — Medication 4 UNIT(S): at 12:17

## 2018-05-08 RX ADMIN — HEPARIN SODIUM 5000 UNIT(S): 5000 INJECTION INTRAVENOUS; SUBCUTANEOUS at 06:00

## 2018-05-08 RX ADMIN — Medication 4 UNIT(S): at 08:12

## 2018-05-08 RX ADMIN — Medication 100 MILLIGRAM(S): at 21:57

## 2018-05-08 RX ADMIN — Medication 4: at 12:17

## 2018-05-08 RX ADMIN — Medication 25 MILLIGRAM(S): at 14:09

## 2018-05-08 RX ADMIN — ATORVASTATIN CALCIUM 10 MILLIGRAM(S): 80 TABLET, FILM COATED ORAL at 21:57

## 2018-05-08 RX ADMIN — Medication 100 MILLIGRAM(S): at 05:59

## 2018-05-08 RX ADMIN — SENNA PLUS 1 TABLET(S): 8.6 TABLET ORAL at 21:57

## 2018-05-08 RX ADMIN — HEPARIN SODIUM 5000 UNIT(S): 5000 INJECTION INTRAVENOUS; SUBCUTANEOUS at 18:27

## 2018-05-08 RX ADMIN — Medication 50 MILLIGRAM(S): at 05:59

## 2018-05-08 RX ADMIN — SODIUM POLYSTYRENE SULFONATE 15 GRAM(S): 4.1 POWDER, FOR SUSPENSION ORAL at 05:59

## 2018-05-08 RX ADMIN — Medication 1 APPLICATION(S): at 21:56

## 2018-05-08 RX ADMIN — INSULIN GLARGINE 5 UNIT(S): 100 INJECTION, SOLUTION SUBCUTANEOUS at 11:07

## 2018-05-08 RX ADMIN — CEFTRIAXONE 100 GRAM(S): 500 INJECTION, POWDER, FOR SOLUTION INTRAMUSCULAR; INTRAVENOUS at 11:05

## 2018-05-08 NOTE — PROGRESS NOTE ADULT - ASSESSMENT
84 yo F with h/o DM, HTN, HL, RA, hypothyroidism, CHF (EF 55% in 12/17)< s/p PPM, s/p AICD, hemorrhoids, p/w weakness and diarrhea. Found to have DREW , acidosis and hyperkalemia/cystitis/neurogenic bladder:    #) Metabolic encephalopathy  - 2/2 to uremia  - will f/u with nephrology for possible HD  - Lasix on hold for now    #) Hypothermia  - one episode overnight  - f/u septic workup  - spoke to ID: continue current Abx management    #) Emphysematous cystitis  - Urine culture: 10,000 - 49,000 CFU/mL Escherichia coli (05.01.18 @ 08:59); sensitivity available  - ID consult appreciated: continue with ceftriaxone 2gm q24  - f/u urology: continue Collins and antibiotics (for 2 weeks)     #) Hyperkalemia   - improved  - repeat bmp in evening    #) Chronic Systolic heart failure  - hold Lasix 40mg I/V q12  - continue metoprolol ER 50mg daily     #) Anion gap metabolic acidosis w .respiratory compensation  - likely 2/2 kidney failure  - continue sodium bicarb 650 mg po q8  - creatinine trending down (5.6 <-- 5.6 <-- 6.2 <-- 5.7)  - Nephology following: no need for RRT; will consult again for possible HD  - continue feso4 po q8    #) DKA: resolved  - Gap closed and was re-opened periodically on the floor likely 2/2 to CKD  - c/w to monitor FS, keep blood sugar <180  - c/w insulin regimen as per endo     #) Hypothroidism  - continue levothyroxine     #) DNR/DNI 82 yo F with h/o DM, HTN, HL, RA, hypothyroidism, CHF (EF 55% in 12/17)< s/p PPM, s/p AICD, hemorrhoids, p/w weakness and diarrhea. Found to have DREW , acidosis and hyperkalemia/cystitis/neurogenic bladder:    #) Metabolic encephalopathy  - 2/2 to uremia  - will f/u with nephrology for possible HD  - Lasix on hold for now    #) Hypothermia  - one episode overnight  - f/u septic workup  - spoke to ID: continue current Abx management    #) Emphysematous cystitis  - Urine culture: 10,000 - 49,000 CFU/mL Escherichia coli (05.01.18 @ 08:59); sensitivity available  - ID consult appreciated: continue with ceftriaxone 2gm q24  - f/u urology: continue Davis and antibiotics (for 2 weeks)     #) Hyperkalemia   - improved  - repeat bmp in evening    #) Chronic Systolic heart failure  - hold Lasix 40mg I/V q12  - continue metoprolol ER 50mg daily     #) Anion gap metabolic acidosis w .respiratory compensation  - likely 2/2 kidney failure  - continue sodium bicarb 650 mg po q8  - creatinine trending down (5.6 <-- 5.6 <-- 6.2 <-- 5.7)  - Nephology following: no need for RRT; will consult again for possible HD  - continue feso4 po q8    #) DKA: resolved  - Gap closed and was re-opened periodically on the floor likely 2/2 to CKD  - c/w to monitor FS, keep blood sugar <180  - c/w insulin regimen as per endo     #) Hypothroidism  - continue levothyroxine     #) DNR/DNI    Attending Attestation:  Pt has been seen and examined. Case and Plan discussed at rounds and with family and pt at bedside this morning. Agree with intern documentation as corrected above.     Acute Issues:  Rule Out Sepsis (hypothermia 93F, worsening lethargy, hypoglycemia)  Emphesematous Cystitis   DREW with improving hyperkalemia (Renal f/u appreciated)  Uremia 2/2 Renal Failure  Fluid Overload 2/2 Acute Systolic CHF / Valvular CHF (lasix stopped as CR starts to trend up)  Anemia - Iron Def vs due to medical phlebotomy vs RF    Plan:  Stop Lasix  PAN cxs / CXR   Change Abx back to meropenem add vancomycin  Will call ID to f/u today  Change davis     Case d/w family and pt at bedside. DNR/DNI discussed and forms signed. Family and pt still interested in HD must she need it.     Time for all care and discussions 35 minutes  Continue davis catheter even upon discharge per  and will need  f/u Outpt

## 2018-05-08 NOTE — PROGRESS NOTE ADULT - SUBJECTIVE AND OBJECTIVE BOX
Nephrology progress note    Patient is seen and examined, events over the last 24 h noted .    Allergies:  Plavix (Other (Moderate))    Hospital Medications:   MEDICATIONS  (STANDING):  atorvastatin 10 milliGRAM(s) Oral at bedtime  cefTRIAXone   IVPB 2 Gram(s) IV Intermittent every 24 hours  docusate sodium 100 milliGRAM(s) Oral three times a day  escitalopram 10 milliGRAM(s) Oral daily  ferrous    sulfate 325 milliGRAM(s) Oral daily  heparin  Injectable 5000 Unit(s) SubCutaneous every 12 hours  insulin glargine Injectable (LANTUS) 5 Unit(s) SubCutaneous every morning  insulin glargine Injectable (LANTUS) 5 Unit(s) SubCutaneous at bedtime  insulin lispro (HumaLOG) corrective regimen sliding scale   SubCutaneous three times a day before meals  insulin lispro (HumaLOG) corrective regimen sliding scale   SubCutaneous three times a day before meals  levothyroxine 100 MICROGram(s) Oral daily  lidocaine   Patch 1 Patch Transdermal daily   melatonin 5 milliGRAM(s) Oral at bedtime  metoprolol succinate ER 50 milliGRAM(s) Oral daily  senna 1 Tablet(s) Oral at bedtime  sodium bicarbonate 650 milliGRAM(s) Oral every 8 hours  sodium polystyrene sulfonate Suspension 15 Gram(s) Oral two times a day        VITALS:  T(F): 96.4 (18 @ 06:50), Max: 96.4 (18 @ 06:50)  HR: 55 (18 @ 06:50)  BP: 161/72 (18 @ 06:50)  RR: 18 (18 @ 06:50)  SpO2: 99% (18 @ 09:06)  Wt(kg): --     07:01  -   @ 07:00  --------------------------------------------------------  IN: 0 mL / OUT: 1800 mL / NET: -1800 mL     @ 07:01  -   @ 07:00  --------------------------------------------------------  IN: 240 mL / OUT: 1900 mL / NET: -1660 mL     @ 07:01  -   @ 11:45  --------------------------------------------------------  IN: 0 mL / OUT: 600 mL / NET: -600 mL      Height (cm): 160.02 ( @ 12:21)    PHYSICAL EXAM:  Constitutional: NAD  HEENT: anicteric sclera, oropharynx clear, MMM  Neck: No JVD  Respiratory: CTAB, no wheezes, rales or rhonchi  Cardiovascular: S1, S2, RRR  Gastrointestinal: BS+, soft, NT/ND  Extremities: No cyanosis or clubbing. No peripheral edema  :  No davis.   Skin: No rashes    LABS:      136  |  96<L>  |  108<HH>  ----------------------------<  175<H>  5.0   |  24  |  5.6<HH>    Creatinine Trend: 5.6<--, 5.6<--, 6.2<--, 5.7<--, 5.3<--, 5.2<--    Ca    7.2<L>      08 May 2018 09:28  Phos  7.0       Mg     1.8         TPro  4.6<L>  /  Alb  2.5<L>  /  TBili  <0.2  /  DBili      /  AST  24  /  ALT  13  /  AlkPhos  228<H>                            8.9    8.75  )-----------( 343      ( 08 May 2018 09:28 )             26.5       Urine Studies:  Urinalysis Basic - ( 03 May 2018 17:57 )    Color: Yellow / Appearance: Cloudy / S.010 / pH:   Gluc:  / Ketone: Negative  / Bili: Negative / Urobili: 0.2 mg/dL   Blood:  / Protein: 30 mg/dL / Nitrite: Negative   Leuk Esterase: Large / RBC: 5-10 /HPF / WBC >50 /HPF   Sq Epi:  / Non Sq Epi: Occasional /HPF / Bacteria: Few /HPF        RADIOLOGY & ADDITIONAL STUDIES: Nephrology progress note    Patient is seen and examined, events over the last 24 h noted .  more lethargic today but responds to questions  Davis still in plac    Allergies:  Plavix (Other (Moderate))    Hospital Medications:   MEDICATIONS  (STANDING):  atorvastatin 10 milliGRAM(s) Oral at bedtime  cefTRIAXone   IVPB 2 Gram(s) IV Intermittent every 24 hours  docusate sodium 100 milliGRAM(s) Oral three times a day  escitalopram 10 milliGRAM(s) Oral daily  ferrous    sulfate 325 milliGRAM(s) Oral daily  heparin  Injectable 5000 Unit(s) SubCutaneous every 12 hours  insulin glargine Injectable (LANTUS) 5 Unit(s) SubCutaneous every morning  insulin glargine Injectable (LANTUS) 5 Unit(s) SubCutaneous at bedtime  levothyroxine 100 MICROGram(s) Oral daily  lidocaine   Patch 1 Patch Transdermal daily   melatonin 5 milliGRAM(s) Oral at bedtime  metoprolol succinate ER 50 milliGRAM(s) Oral daily  senna 1 Tablet(s) Oral at bedtime  sodium bicarbonate 650 milliGRAM(s) Oral every 8 hours  sodium polystyrene sulfonate Suspension 15 Gram(s) Oral two times a day        VITALS:  T(F): 96.4 (18 @ 06:50), Max: 96.4 (18 @ 06:50)  HR: 55 (18 @ 06:50)  BP: 161/72 (18 @ 06:50)  RR: 18 (18 @ 06:50)  SpO2: 99% (18 @ 09:06)       07:01  -   @ 07:00  --------------------------------------------------------  IN: 0 mL / OUT: 1800 mL / NET: -1800 mL     @ 07:01  -   @ 07:00  --------------------------------------------------------  IN: 240 mL / OUT: 1900 mL / NET: -1660 mL     @ 07:01  -   @ 11:45  --------------------------------------------------------  IN: 0 mL / OUT: 600 mL / NET: -600 mL      Height (cm): 160.02 ( @ 12:21)    PHYSICAL EXAM:  Constitutional: lethargic   HEENT: anicteric sclera, oropharynx clear, MMM  Neck: No JVD  Respiratory: CTAB, no wheezes, rales or rhonchi  Cardiovascular: S1, S2, RRR  Gastrointestinal: BS+, soft, NT/ND  Extremities: No cyanosis or clubbing. No peripheral edema  :  positive davis   Skin: No rashes    LABS:      136  |  96<L>  |  108<HH>  ----------------------------<  175<H>  5.0   |  24  |  5.6<HH>    Creatinine Trend: 5.6<--, 5.6<--, 6.2<--, 5.7<--, 5.3<--, 5.2<--    Ca    7.2<L>      08 May 2018 09:28  Phos  7.0       Mg     1.8         TPro  4.6<L>  /  Alb  2.5<L>  /  TBili  <0.2  /  DBili      /  AST  24  /  ALT  13  /  AlkPhos  228<H>                              8.9    8.75  )-----------( 343      ( 08 May 2018 09:28 )             26.5       Urine Studies:  Urinalysis Basic - ( 03 May 2018 17:57 )    Color: Yellow / Appearance: Cloudy / S.010 / pH:   Gluc:  / Ketone: Negative  / Bili: Negative / Urobili: 0.2 mg/dL   Blood:  / Protein: 30 mg/dL / Nitrite: Negative   Leuk Esterase: Large / RBC: 5-10 /HPF / WBC >50 /HPF   Sq Epi:  / Non Sq Epi: Occasional /HPF / Bacteria: Few /HPF        RADIOLOGY & ADDITIONAL STUDIES:

## 2018-05-08 NOTE — PROGRESS NOTE ADULT - SUBJECTIVE AND OBJECTIVE BOX
SUBJECTIVE:    Patient is a 83y old Female who presents with a chief complaint of DKA (01 May 2018 04:15)    Currently admitted to medicine with the primary diagnosis of DKA (diabetic ketoacidoses)     Today is hospital day 7d. Last night patient was reported to be hypothermic. This morning she is resting comfortably in bed and feels tired.     PAST MEDICAL & SURGICAL HISTORY  Neuropathy  Diabetes  Hypothyroidism  HTN (hypertension)  CHF (congestive heart failure)  AICD (automatic cardioverter/defibrillator) present    SOCIAL HISTORY:  Negative for smoking/alcohol/drug use.     ALLERGIES:  Plavix (Other (Moderate)    MEDICATIONS:  STANDING MEDICATIONS  atorvastatin 10 milliGRAM(s) Oral at bedtime  dextrose 5%. 1000 milliLiter(s) IV Continuous <Continuous>  dextrose 50% Injectable 12.5 Gram(s) IV Push once  dextrose 50% Injectable 25 Gram(s) IV Push once  dextrose 50% Injectable 25 Gram(s) IV Push once  escitalopram 10 milliGRAM(s) Oral daily  furosemide   Injectable 40 milliGRAM(s) IV Push every 12 hours  heparin  Injectable 5000 Unit(s) SubCutaneous every 12 hours  insulin glargine Injectable (LANTUS) 5 Unit(s) SubCutaneous every morning  insulin glargine Injectable (LANTUS) 5 Unit(s) SubCutaneous at bedtime  insulin lispro (HumaLOG) corrective regimen sliding scale   SubCutaneous three times a day before meals  insulin lispro (HumaLOG) corrective regimen sliding scale   SubCutaneous three times a day before meals  insulin lispro Injectable (HumaLOG) 4 Unit(s) SubCutaneous before breakfast  insulin lispro Injectable (HumaLOG) 4 Unit(s) SubCutaneous before lunch  insulin lispro Injectable (HumaLOG) 4 Unit(s) SubCutaneous before dinner  levothyroxine 100 MICROGram(s) Oral daily  lidocaine   Patch 1 Patch Transdermal daily  lidocaine   Patch      melatonin 5 milliGRAM(s) Oral at bedtime  meropenem  IVPB 500 milliGRAM(s) IV Intermittent every 24 hours  metoprolol succinate ER 50 milliGRAM(s) Oral daily  sodium bicarbonate 650 milliGRAM(s) Oral every 8 hours    PRN MEDICATIONS  acetaminophen   Tablet. 650 milliGRAM(s) Oral every 6 hours PRN  benzocaine 15 mG/menthol 3.6 mG Lozenge 1 Lozenge Oral every 3 hours PRN  dextrose Gel 1 Dose(s) Oral once PRN  glucagon  Injectable 1 milliGRAM(s) IntraMuscular once PRN    VITALS:   T(F): 97.6  HR: 72  BP: 145/71  RR: 18  SpO2: 96%    LABS:                        7.4    10.22 )-----------( 298      ( 06 May 2018 06:33 )             22.4     05-06    132<L>  |  96<L>  |  108<HH>  ----------------------------<  103<H>  5.7<H>   |  21  |  5.3<HH>    Ca    6.8<L>      06 May 2018 06:33        RADIOLOGY:  Xray Chest 1 View- PORTABLE-Routine (05.02.18)  Increased bilateral interstitial opacities    US Retroperitoneal Complete (05.01.18)  No stones or hydronephrosis bilaterally.    CT Abdomen and Pelvis No Cont (04.30.18)  Findings compatible with emphysematous cystitis.      PHYSICAL EXAM:  GEN: No acute distress  LUNGS: Clear to auscultation bilaterally   HEART: S1/S2 present. RRR.   ABD: Soft, non-tender, non-distended. Bowel sounds present  EXT: 1+ edema b/l LE  NEURO: AAOX3  Skin: No rashes SUBJECTIVE:    Patient is a 83y old Female who presents with a chief complaint of DKA (01 May 2018 04:15)    Currently admitted to medicine with the primary diagnosis of DKA (diabetic ketoacidoses)     Today is hospital day 7d. Last night patient was reported to be hypothermic. This morning she is resting comfortably in bed and feels tired.     PAST MEDICAL & SURGICAL HISTORY  Neuropathy  Diabetes  Hypothyroidism  HTN (hypertension)  CHF (congestive heart failure)  AICD (automatic cardioverter/defibrillator) present    SOCIAL HISTORY:  Negative for smoking/alcohol/drug use.     ALLERGIES:  Plavix (Other (Moderate)    MEDICATIONS:  STANDING MEDICATIONS  atorvastatin 10 milliGRAM(s) Oral at bedtime  dextrose 5%. 1000 milliLiter(s) IV Continuous <Continuous>  dextrose 50% Injectable 12.5 Gram(s) IV Push once  dextrose 50% Injectable 25 Gram(s) IV Push once  dextrose 50% Injectable 25 Gram(s) IV Push once  escitalopram 10 milliGRAM(s) Oral daily  furosemide   Injectable 40 milliGRAM(s) IV Push every 12 hours  heparin  Injectable 5000 Unit(s) SubCutaneous every 12 hours  insulin glargine Injectable (LANTUS) 5 Unit(s) SubCutaneous every morning  insulin glargine Injectable (LANTUS) 5 Unit(s) SubCutaneous at bedtime  insulin lispro (HumaLOG) corrective regimen sliding scale   SubCutaneous three times a day before meals  insulin lispro (HumaLOG) corrective regimen sliding scale   SubCutaneous three times a day before meals  insulin lispro Injectable (HumaLOG) 4 Unit(s) SubCutaneous before breakfast  insulin lispro Injectable (HumaLOG) 4 Unit(s) SubCutaneous before lunch  insulin lispro Injectable (HumaLOG) 4 Unit(s) SubCutaneous before dinner  levothyroxine 100 MICROGram(s) Oral daily  lidocaine   Patch 1 Patch Transdermal daily  lidocaine   Patch      melatonin 5 milliGRAM(s) Oral at bedtime  meropenem  IVPB 500 milliGRAM(s) IV Intermittent every 24 hours  metoprolol succinate ER 50 milliGRAM(s) Oral daily  sodium bicarbonate 650 milliGRAM(s) Oral every 8 hours    PRN MEDICATIONS  acetaminophen   Tablet. 650 milliGRAM(s) Oral every 6 hours PRN  benzocaine 15 mG/menthol 3.6 mG Lozenge 1 Lozenge Oral every 3 hours PRN  dextrose Gel 1 Dose(s) Oral once PRN  glucagon  Injectable 1 milliGRAM(s) IntraMuscular once PRN    VITALS:   T(F): 97.6  HR: 72  BP: 145/71  RR: 18  SpO2: 96%    LABS:                        8.9    8.75  )-----------( 343      ( 08 May 2018 09:28 )             26.5                  7.4    10.22 )-----------( 298      ( 06 May 2018 06:33 )             22.4     05-08    136  |  96<L>  |  108<HH>  ----------------------------<  175<H>  5.0   |  24  |  5.6<HH>    Ca    7.2<L>      08 May 2018 09:28  Phos  7.0     05-08  Mg     1.8     05-08    TPro  4.6<L>  /  Alb  2.5<L>  /  TBili  <0.2  /  DBili  x   /  AST  24  /  ALT  13  /  AlkPhos  228<H>  05-07 05-06    132<L>  |  96<L>  |  108<HH>  ----------------------------<  103<H>  5.7<H>   |  21  |  5.3<HH>    Ca    6.8<L>      06 May 2018 06:33    RADIOLOGY:  Xray Chest 1 View- PORTABLE-Routine (05.02.18)  Increased bilateral interstitial opacities    US Retroperitoneal Complete (05.01.18)  No stones or hydronephrosis bilaterally.    CT Abdomen and Pelvis No Cont (04.30.18)  Findings compatible with emphysematous cystitis.    PHYSICAL EXAM:  GEN: No acute distress  LUNGS: Clear to auscultation bilaterally   HEART: S1/S2 present. RRR.   ABD: Soft, non-tender, non-distended. Bowel sounds present  EXT: 1+ edema b/l LE  NEURO: AAOX3  Skin: No rashes

## 2018-05-08 NOTE — PROGRESS NOTE ADULT - SUBJECTIVE AND OBJECTIVE BOX
VI MOBLEY  83y, Female      OVERNIGHT EVENTS:    Hypothermic.    VITALS:  T(F): 96.4, Max: 96.4 (05-08-18 @ 06:50)  HR: 55  BP: 161/72  RR: 18Vital Signs Last 24 Hrs  T(C): 35.8 (08 May 2018 06:50), Max: 35.8 (08 May 2018 06:50)  T(F): 96.4 (08 May 2018 06:50), Max: 96.4 (08 May 2018 06:50)  HR: 55 (08 May 2018 06:50) (50 - 76)  BP: 161/72 (08 May 2018 06:50) (135/66 - 161/72)  BP(mean): --  RR: 18 (08 May 2018 06:50) (16 - 19)  SpO2: 99% (08 May 2018 09:06) (96% - 99%)    TESTS & MEASUREMENTS:                        8.9    8.75  )-----------( 343      ( 08 May 2018 09:28 )             26.5     05-07    133<L>  |  94<L>  |  108<HH>  ----------------------------<  42<LL>  5.3<H>   |  20  |  5.6<HH>    Ca    7.4<L>      07 May 2018 22:16  Phos  7.1     05-07  Mg     1.9     05-07    TPro  4.6<L>  /  Alb  2.5<L>  /  TBili  <0.2  /  DBili  x   /  AST  24  /  ALT  13  /  AlkPhos  228<H>  05-07    LIVER FUNCTIONS - ( 07 May 2018 19:10 )  Alb: 2.5 g/dL / Pro: 4.6 g/dL / ALK PHOS: 228 U/L / ALT: 13 U/L / AST: 24 U/L / GGT: x             Culture - Blood (collected 05-02-18 @ 11:09)  Source: .Blood None  Final Report (05-07-18 @ 17:00):    No growth at 5 days.    Culture - Blood (collected 05-02-18 @ 04:27)  Source: .Blood None  Final Report (05-07-18 @ 13:00):    No growth at 5 days.    Culture - Blood (collected 05-01-18 @ 11:00)  Source: .Blood Blood  Final Report (05-07-18 @ 01:00):    No growth at 5 days.            RADIOLOGY & ADDITIONAL TESTS:    ANTIBIOTICS:  cefTRIAXone   IVPB 2 Gram(s) IV Intermittent every 24 hours  vancomycin  IVPB 1000 milliGRAM(s) IV Intermittent once

## 2018-05-08 NOTE — PROGRESS NOTE ADULT - ASSESSMENT
IMPRESSION:  Acute emphysematous cystitis secondary to E coli.  Hypothermic last night.    RECOMMENDATIONS:  Rocephin 2 gm q24h.  F/U repeat blood cultures.

## 2018-05-08 NOTE — PROGRESS NOTE ADULT - ASSESSMENT
82 yo F with h/o DM, HTN, HL, RA, hypothyroidism, CHF (EF 55% in 12/17)< s/p PPM, s/p AICD, hemorrhoids, p/w weakness and diarrhea. Found to have DREW , acidosis and hyperkalemia/cystitis/neurogenic bladder:    # creatinine stable non oliguric   # non oliguric continue lasix for now   # on  sodium bicarb 650 mg po q 8 can stop   # sp treatment of hyperkalemia repeat BMP ion the afternoon   # remains on marry  # no need for RRT for now but will follow up closely, discussed with family at bedside who agreed to HD if needed   # Ca corrected will be 8.2 check ionized calcium if needed   # check phosphorus  /pth level    # overall prognosis poor 84 yo F with h/o DM, HTN, HL, RA, hypothyroidism, CHF (EF 55% in 12/17)< s/p PPM, s/p AICD, hemorrhoids, p/w weakness and diarrhea. Found to have DREW , acidosis and hyperkalemia/cystitis/neurogenic bladder:    # creatinine stable around 6  non oliguric   #hold lasix no IVF today   # on  sodium bicarb 650 mg po q 8 can stop   # sp treatment of hyperkalemia repeat BMP showed an acceptable K level   # remains on rocephin  # no need for RRT today   # Ca corrected will be 8.2 check ionized calcium if needed   # check phosphorus  /pth level please     # overall prognosis poor

## 2018-05-09 LAB
ANION GAP SERPL CALC-SCNC: 15 MMOL/L — HIGH (ref 7–14)
ANION GAP SERPL CALC-SCNC: 19 MMOL/L — HIGH (ref 7–14)
BASOPHILS # BLD AUTO: 0.03 K/UL — SIGNIFICANT CHANGE UP (ref 0–0.2)
BASOPHILS # BLD AUTO: 0.04 K/UL — SIGNIFICANT CHANGE UP (ref 0–0.2)
BASOPHILS NFR BLD AUTO: 0.3 % — SIGNIFICANT CHANGE UP (ref 0–1)
BASOPHILS NFR BLD AUTO: 0.4 % — SIGNIFICANT CHANGE UP (ref 0–1)
BUN SERPL-MCNC: 101 MG/DL — CRITICAL HIGH (ref 10–20)
BUN SERPL-MCNC: 102 MG/DL — CRITICAL HIGH (ref 10–20)
CALCIUM SERPL-MCNC: 6.6 MG/DL — LOW (ref 8.5–10.1)
CALCIUM SERPL-MCNC: 6.7 MG/DL — LOW (ref 8.5–10.1)
CALCIUM SERPL-MCNC: 7.9 MG/DL — LOW (ref 8.4–10.5)
CHLORIDE SERPL-SCNC: 93 MMOL/L — LOW (ref 98–110)
CHLORIDE SERPL-SCNC: 94 MMOL/L — LOW (ref 98–110)
CO2 SERPL-SCNC: 23 MMOL/L — SIGNIFICANT CHANGE UP (ref 17–32)
CO2 SERPL-SCNC: 24 MMOL/L — SIGNIFICANT CHANGE UP (ref 17–32)
CREAT SERPL-MCNC: 5.6 MG/DL — CRITICAL HIGH (ref 0.7–1.5)
CREAT SERPL-MCNC: 5.6 MG/DL — CRITICAL HIGH (ref 0.7–1.5)
EOSINOPHIL # BLD AUTO: 0.34 K/UL — SIGNIFICANT CHANGE UP (ref 0–0.7)
EOSINOPHIL # BLD AUTO: 0.36 K/UL — SIGNIFICANT CHANGE UP (ref 0–0.7)
EOSINOPHIL NFR BLD AUTO: 3.3 % — SIGNIFICANT CHANGE UP (ref 0–8)
EOSINOPHIL NFR BLD AUTO: 4 % — SIGNIFICANT CHANGE UP (ref 0–8)
GLUCOSE SERPL-MCNC: 100 MG/DL — HIGH (ref 70–99)
GLUCOSE SERPL-MCNC: 56 MG/DL — LOW (ref 70–99)
HCT VFR BLD CALC: 25 % — LOW (ref 37–47)
HCT VFR BLD CALC: 26.5 % — LOW (ref 37–47)
HGB BLD-MCNC: 8.4 G/DL — LOW (ref 12–16)
HGB BLD-MCNC: 8.8 G/DL — LOW (ref 12–16)
IMM GRANULOCYTES NFR BLD AUTO: 0.6 % — HIGH (ref 0.1–0.3)
IMM GRANULOCYTES NFR BLD AUTO: 0.7 % — HIGH (ref 0.1–0.3)
LYMPHOCYTES # BLD AUTO: 0.87 K/UL — LOW (ref 1.2–3.4)
LYMPHOCYTES # BLD AUTO: 0.99 K/UL — LOW (ref 1.2–3.4)
LYMPHOCYTES # BLD AUTO: 9.5 % — LOW (ref 20.5–51.1)
LYMPHOCYTES # BLD AUTO: 9.6 % — LOW (ref 20.5–51.1)
MAGNESIUM SERPL-MCNC: 1.7 MG/DL — LOW (ref 1.8–2.4)
MAGNESIUM SERPL-MCNC: 1.7 MG/DL — LOW (ref 1.8–2.4)
MCHC RBC-ENTMCNC: 24.6 PG — LOW (ref 27–31)
MCHC RBC-ENTMCNC: 25.1 PG — LOW (ref 27–31)
MCHC RBC-ENTMCNC: 33.2 G/DL — SIGNIFICANT CHANGE UP (ref 32–37)
MCHC RBC-ENTMCNC: 33.6 G/DL — SIGNIFICANT CHANGE UP (ref 32–37)
MCV RBC AUTO: 74.2 FL — LOW (ref 81–99)
MCV RBC AUTO: 74.6 FL — LOW (ref 81–99)
MONOCYTES # BLD AUTO: 0.51 K/UL — SIGNIFICANT CHANGE UP (ref 0.1–0.6)
MONOCYTES # BLD AUTO: 0.56 K/UL — SIGNIFICANT CHANGE UP (ref 0.1–0.6)
MONOCYTES NFR BLD AUTO: 4.9 % — SIGNIFICANT CHANGE UP (ref 1.7–9.3)
MONOCYTES NFR BLD AUTO: 6.2 % — SIGNIFICANT CHANGE UP (ref 1.7–9.3)
NEUTROPHILS # BLD AUTO: 7.16 K/UL — HIGH (ref 1.4–6.5)
NEUTROPHILS # BLD AUTO: 8.47 K/UL — HIGH (ref 1.4–6.5)
NEUTROPHILS NFR BLD AUTO: 79.2 % — HIGH (ref 42.2–75.2)
NEUTROPHILS NFR BLD AUTO: 81.3 % — HIGH (ref 42.2–75.2)
NRBC # BLD: 0 /100 WBCS — SIGNIFICANT CHANGE UP (ref 0–0)
NRBC # BLD: 0 /100 WBCS — SIGNIFICANT CHANGE UP (ref 0–0)
PHOSPHATE SERPL-MCNC: 6.9 MG/DL — HIGH (ref 2.1–4.9)
PLATELET # BLD AUTO: 350 K/UL — SIGNIFICANT CHANGE UP (ref 130–400)
PLATELET # BLD AUTO: 364 K/UL — SIGNIFICANT CHANGE UP (ref 130–400)
POTASSIUM SERPL-MCNC: 4.8 MMOL/L — SIGNIFICANT CHANGE UP (ref 3.5–5)
POTASSIUM SERPL-MCNC: 4.8 MMOL/L — SIGNIFICANT CHANGE UP (ref 3.5–5)
POTASSIUM SERPL-SCNC: 4.8 MMOL/L — SIGNIFICANT CHANGE UP (ref 3.5–5)
POTASSIUM SERPL-SCNC: 4.8 MMOL/L — SIGNIFICANT CHANGE UP (ref 3.5–5)
PTH-INTACT FLD-MCNC: 42 PG/ML — SIGNIFICANT CHANGE UP (ref 15–65)
RBC # BLD: 3.35 M/UL — LOW (ref 4.2–5.4)
RBC # BLD: 3.57 M/UL — LOW (ref 4.2–5.4)
RBC # FLD: 16.5 % — HIGH (ref 11.5–14.5)
RBC # FLD: 16.8 % — HIGH (ref 11.5–14.5)
SODIUM SERPL-SCNC: 132 MMOL/L — LOW (ref 135–146)
SODIUM SERPL-SCNC: 136 MMOL/L — SIGNIFICANT CHANGE UP (ref 135–146)
WBC # BLD: 10.41 K/UL — SIGNIFICANT CHANGE UP (ref 4.8–10.8)
WBC # BLD: 9.04 K/UL — SIGNIFICANT CHANGE UP (ref 4.8–10.8)
WBC # FLD AUTO: 10.41 K/UL — SIGNIFICANT CHANGE UP (ref 4.8–10.8)
WBC # FLD AUTO: 9.04 K/UL — SIGNIFICANT CHANGE UP (ref 4.8–10.8)

## 2018-05-09 RX ORDER — MECLIZINE HCL 12.5 MG
12.5 TABLET ORAL ONCE
Qty: 0 | Refills: 0 | Status: COMPLETED | OUTPATIENT
Start: 2018-05-09 | End: 2018-05-09

## 2018-05-09 RX ORDER — SEVELAMER CARBONATE 2400 MG/1
400 POWDER, FOR SUSPENSION ORAL THREE TIMES A DAY
Qty: 0 | Refills: 0 | Status: DISCONTINUED | OUTPATIENT
Start: 2018-05-09 | End: 2018-05-10

## 2018-05-09 RX ORDER — INSULIN LISPRO 100/ML
3 VIAL (ML) SUBCUTANEOUS
Qty: 0 | Refills: 0 | Status: DISCONTINUED | OUTPATIENT
Start: 2018-05-09 | End: 2018-05-12

## 2018-05-09 RX ORDER — ONDANSETRON 8 MG/1
4 TABLET, FILM COATED ORAL ONCE
Qty: 0 | Refills: 0 | Status: DISCONTINUED | OUTPATIENT
Start: 2018-05-09 | End: 2018-05-09

## 2018-05-09 RX ORDER — MAGNESIUM SULFATE 500 MG/ML
1 VIAL (ML) INJECTION ONCE
Qty: 0 | Refills: 0 | Status: COMPLETED | OUTPATIENT
Start: 2018-05-09 | End: 2018-05-09

## 2018-05-09 RX ADMIN — Medication 100 MILLIGRAM(S): at 21:54

## 2018-05-09 RX ADMIN — Medication 650 MILLIGRAM(S): at 10:00

## 2018-05-09 RX ADMIN — HEPARIN SODIUM 5000 UNIT(S): 5000 INJECTION INTRAVENOUS; SUBCUTANEOUS at 05:55

## 2018-05-09 RX ADMIN — Medication 650 MILLIGRAM(S): at 16:00

## 2018-05-09 RX ADMIN — Medication 650 MILLIGRAM(S): at 15:10

## 2018-05-09 RX ADMIN — Medication 100 MILLIGRAM(S): at 05:58

## 2018-05-09 RX ADMIN — INSULIN GLARGINE 5 UNIT(S): 100 INJECTION, SOLUTION SUBCUTANEOUS at 08:17

## 2018-05-09 RX ADMIN — Medication 650 MILLIGRAM(S): at 09:07

## 2018-05-09 RX ADMIN — CEFTRIAXONE 100 GRAM(S): 500 INJECTION, POWDER, FOR SOLUTION INTRAMUSCULAR; INTRAVENOUS at 11:42

## 2018-05-09 RX ADMIN — Medication 4 UNIT(S): at 08:16

## 2018-05-09 RX ADMIN — SEVELAMER CARBONATE 400 MILLIGRAM(S): 2400 POWDER, FOR SUSPENSION ORAL at 21:53

## 2018-05-09 RX ADMIN — SENNA PLUS 1 TABLET(S): 8.6 TABLET ORAL at 21:54

## 2018-05-09 RX ADMIN — Medication 1 APPLICATION(S): at 17:43

## 2018-05-09 RX ADMIN — Medication 1 APPLICATION(S): at 05:59

## 2018-05-09 RX ADMIN — Medication 1: at 12:22

## 2018-05-09 RX ADMIN — Medication 5 MILLIGRAM(S): at 21:53

## 2018-05-09 RX ADMIN — Medication 325 MILLIGRAM(S): at 11:43

## 2018-05-09 RX ADMIN — ATORVASTATIN CALCIUM 10 MILLIGRAM(S): 80 TABLET, FILM COATED ORAL at 21:54

## 2018-05-09 RX ADMIN — Medication 12.5 MILLIGRAM(S): at 18:48

## 2018-05-09 RX ADMIN — HEPARIN SODIUM 5000 UNIT(S): 5000 INJECTION INTRAVENOUS; SUBCUTANEOUS at 17:42

## 2018-05-09 RX ADMIN — Medication 100 MICROGRAM(S): at 05:55

## 2018-05-09 RX ADMIN — Medication 100 GRAM(S): at 12:21

## 2018-05-09 RX ADMIN — SEVELAMER CARBONATE 400 MILLIGRAM(S): 2400 POWDER, FOR SUSPENSION ORAL at 17:42

## 2018-05-09 RX ADMIN — Medication 3 UNIT(S): at 12:22

## 2018-05-09 RX ADMIN — Medication 50 MILLIGRAM(S): at 05:55

## 2018-05-09 RX ADMIN — ESCITALOPRAM OXALATE 10 MILLIGRAM(S): 10 TABLET, FILM COATED ORAL at 11:43

## 2018-05-09 NOTE — PROGRESS NOTE ADULT - ASSESSMENT
82 yo F with h/o DM, HTN, HL, RA, hypothyroidism, CHF (EF 55% in 12/17)< s/p PPM, s/p AICD, hemorrhoids, p/w weakness and diarrhea. Found to have DREW , acidosis and hyperkalemia/cystitis/neurogenic bladder:    #) Emphysematous cystitis  - Urine culture: 10,000 - 49,000 CFU/mL Escherichia coli (05.01.18)  - ID consult appreciated: continue with ceftriaxone 2gm q24  - f/u urology: continue Collins and antibiotics (for 2 weeks)     #) Metabolic encephalopathy 2/2 to uremia  - much improved  - lasix on hold for now, will restart as per nephrology recommendations    #) Hypothermia  - isolated event  - f/u septic workup: negative  - spoke to ID: continue current Abx management    #) Hyperkalemia   - resolved  - repeat bmp    #) Chronic Systolic heart failure  - hold Lasix 40mg I/V q12  - continue metoprolol ER 50mg daily     #) Anion gap metabolic acidosis w .respiratory compensation  - likely 2/2 kidney failure  - creatinine holding (5.6 <-- 5.6 <-- 5.6 <-- 6.2 <-- 5.7)  - Nephology following: no need for RRT  - continue feso4 po q8    #) DKA: resolved  - Gap closed and was re-opened periodically on the floor likely 2/2 to CKD  - c/w to monitor FS, keep blood sugar <180  - c/w insulin regimen as per endo     #) Hypothyroidism  - continue levothyroxine     #) DNR/DNI 84 yo F with h/o DM, HTN, HL, RA, hypothyroidism, CHF (EF 55% in 12/17)< s/p PPM, s/p AICD, hemorrhoids, p/w weakness and diarrhea. Found to have DREW , acidosis and hyperkalemia/cystitis/neurogenic bladder:    #) Emphysematous cystitis  - Urine culture: 10,000 - 49,000 CFU/mL Escherichia coli (05.01.18)  - ID consult appreciated: continue with ceftriaxone 2gm q24  - f/u urology: continue Collins and antibiotics (for 2 weeks)     #) Metabolic encephalopathy 2/2 to uremia  - much improved    #) Hypothermia  - isolated event  - f/u septic workup: negative  - spoke to ID: continue current Abx management    #) Hyperkalemia   - resolved  - repeat bmp    #) DM type 1  - continue with insulin   - monitor finger sticks    #) Chronic Systolic heart failure  - hold Lasix 40mg I/V q12  - continue metoprolol ER 50mg daily     #) DREW on CKD  - creatinine holding (5.6 <-- 5.6 <-- 5.6 <-- 6.2 <-- 5.7)  - Nephology following: no need for RRT  - continue feso4 po q8  - start sevelamer    #) DKA: resolved  - Gap closed and was re-opened periodically on the floor likely 2/2 to CKD  - c/w to monitor FS, keep blood sugar <180    #) Hypothyroidism  - continue levothyroxine     #) DNR/DNI

## 2018-05-09 NOTE — PROGRESS NOTE ADULT - SUBJECTIVE AND OBJECTIVE BOX
SUBJECTIVE:    Patient is a 83y old Female who presents with a chief complaint of DKA (01 May 2018 04:15)    Currently admitted to medicine with the primary diagnosis of DKA (diabetic ketoacidoses)     Today is hospital day 9d. This morning she is resting comfortably in bed and reports no new issues or overnight events.     PAST MEDICAL & SURGICAL HISTORY  Neuropathy  Diabetes  Hypothyroidism  HTN (hypertension)  CHF (congestive heart failure)  AICD (automatic cardioverter/defibrillator) present    SOCIAL HISTORY:  Negative for smoking/alcohol/drug use.     ALLERGIES:  Plavix (Other (Moderate))    MEDICATIONS:  STANDING MEDICATIONS  atorvastatin 10 milliGRAM(s) Oral at bedtime  cefTRIAXone   IVPB 2 Gram(s) IV Intermittent every 24 hours  dextrose 5%. 1000 milliLiter(s) IV Continuous <Continuous>  dextrose 50% Injectable 12.5 Gram(s) IV Push once  dextrose 50% Injectable 25 Gram(s) IV Push once  dextrose 50% Injectable 25 Gram(s) IV Push once  docusate sodium 100 milliGRAM(s) Oral three times a day  escitalopram 10 milliGRAM(s) Oral daily  ferrous    sulfate 325 milliGRAM(s) Oral daily  heparin  Injectable 5000 Unit(s) SubCutaneous every 12 hours  hydrocortisone 0.5% Ointment 1 Application(s) Topical two times a day  insulin glargine Injectable (LANTUS) 5 Unit(s) SubCutaneous every morning  insulin glargine Injectable (LANTUS) 5 Unit(s) SubCutaneous at bedtime  insulin lispro (HumaLOG) corrective regimen sliding scale   SubCutaneous three times a day before meals  insulin lispro (HumaLOG) corrective regimen sliding scale   SubCutaneous three times a day before meals  insulin lispro Injectable (HumaLOG) 4 Unit(s) SubCutaneous before breakfast  insulin lispro Injectable (HumaLOG) 4 Unit(s) SubCutaneous before lunch  insulin lispro Injectable (HumaLOG) 4 Unit(s) SubCutaneous before dinner  insulin lispro Injectable (HumaLOG) 3 Unit(s) SubCutaneous three times a day before meals  levothyroxine 100 MICROGram(s) Oral daily  lidocaine   Patch 1 Patch Transdermal daily  lidocaine   Patch      magnesium sulfate  IVPB 1 Gram(s) IV Intermittent once  melatonin 5 milliGRAM(s) Oral at bedtime  metoprolol succinate ER 50 milliGRAM(s) Oral daily  senna 1 Tablet(s) Oral at bedtime    PRN MEDICATIONS  acetaminophen   Tablet. 650 milliGRAM(s) Oral every 6 hours PRN  benzocaine 15 mG/menthol 3.6 mG Lozenge 1 Lozenge Oral every 3 hours PRN  dextrose Gel 1 Dose(s) Oral once PRN  diphenhydrAMINE   Capsule 25 milliGRAM(s) Oral every 4 hours PRN  glucagon  Injectable 1 milliGRAM(s) IntraMuscular once PRN    VITALS:   T(F): 96.8  HR: 61  BP: 150/70  RR: 18  SpO2: 97%    LABS:                        8.4    9.04  )-----------( 350      ( 09 May 2018 07:03 )             25.0     05-09    136  |  94<L>  |  102<HH>  ----------------------------<  100<H>  4.8   |  23  |  5.6<HH>    Ca    6.6<L>      09 May 2018 07:03  Phos  6.9     05-09  Mg     1.7     05-09    TPro  4.6<L>  /  Alb  2.5<L>  /  TBili  <0.2  /  DBili  x   /  AST  24  /  ALT  13  /  AlkPhos  228<H>  05-07    PT/INR - ( 07 May 2018 21:38 )   PT: 11.50 sec;   INR: 1.06 ratio         Culture - Blood (collected 07 May 2018 20:00)  Source: .Blood Blood  Preliminary Report (09 May 2018 03:01):    No growth to date.      RADIOLOGY:  Xray Chest 1 View- PORTABLE-Routine (05.02.18)  Increased bilateral interstitial opacities    US Retroperitoneal Complete (05.01.18)  No stones or hydronephrosis bilaterally.    CT Abdomen and Pelvis No Cont (04.30.18)  Findings compatible with emphysematous cystitis.    PHYSICAL EXAM:  GEN: No acute distress  LUNGS: Clear to auscultation bilaterally   HEART: S1/S2 present. RRR.   ABD: Soft, non-tender, non-distended. Bowel sounds present  EXT: 1+ edema b/l LE  NEURO: AAOX3  Skin: No rashes

## 2018-05-09 NOTE — PROGRESS NOTE ADULT - ASSESSMENT
82 yo F with h/o DM, HTN, HL, RA, hypothyroidism, CHF (EF 55% in 12/17)< s/p PPM, s/p AICD, hemorrhoids, p/w weakness and diarrhea. Found to have DREW , acidosis and hyperkalemia/cystitis/neurogenic bladder:    # creatinine stable around 5.6   # no  lasix no IVF today   # sp treatment of hyperkalemia repeat BMP showed an acceptable K level   # remains on rocephin  # no need for RRT   # Ca corrected will be 8.2 check ionized calcium if needed   # phosphorus  high start renvela 1/1/1    # overall prognosis poor

## 2018-05-09 NOTE — PROGRESS NOTE ADULT - SUBJECTIVE AND OBJECTIVE BOX
VI MOBLEY  83y, Female      OVERNIGHT EVENTS:    No fevers, her usual self, NAD at rest, vitals stable.    VITALS:  T(F): 96.8, Max: 97.9 (05-08-18 @ 20:30)  HR: 61  BP: 150/70  RR: 18Vital Signs Last 24 Hrs  T(C): 36 (09 May 2018 04:45), Max: 36.6 (08 May 2018 20:30)  T(F): 96.8 (09 May 2018 04:45), Max: 97.9 (08 May 2018 20:30)  HR: 61 (09 May 2018 04:45) (55 - 69)  BP: 150/70 (09 May 2018 04:45) (143/67 - 161/72)  BP(mean): --  RR: 18 (09 May 2018 04:45) (18 - 18)  SpO2: 99% (08 May 2018 09:06) (99% - 99%)    TESTS & MEASUREMENTS:                        8.8    10.41 )-----------( 364      ( 09 May 2018 01:48 )             26.5     05-09    132<L>  |  93<L>  |  101<HH>  ----------------------------<  56<L>  4.8   |  24  |  5.6<HH>    Ca    6.7<L>      09 May 2018 01:48  Phos  7.0     05-08  Mg     1.7     05-09    TPro  4.6<L>  /  Alb  2.5<L>  /  TBili  <0.2  /  DBili  x   /  AST  24  /  ALT  13  /  AlkPhos  228<H>  05-07    LIVER FUNCTIONS - ( 07 May 2018 19:10 )  Alb: 2.5 g/dL / Pro: 4.6 g/dL / ALK PHOS: 228 U/L / ALT: 13 U/L / AST: 24 U/L / GGT: x             Culture - Blood (collected 05-07-18 @ 20:00)  Source: .Blood Blood  Preliminary Report (05-09-18 @ 03:01):    No growth to date.    Culture - Blood (collected 05-02-18 @ 11:09)  Source: .Blood None  Final Report (05-07-18 @ 17:00):    No growth at 5 days.            RADIOLOGY & ADDITIONAL TESTS:    ANTIBIOTICS:  cefTRIAXone   IVPB 2 Gram(s) IV Intermittent every 24 hours

## 2018-05-09 NOTE — PROGRESS NOTE ADULT - ASSESSMENT
IMPRESSION:  Acute emphysematous cystitis secondary to E coli.  Vitals stabilized.   Repeat blood cultures are negative.    RECOMMENDATIONS:  Continue with Rocephin 2 gm q24h.

## 2018-05-09 NOTE — PROGRESS NOTE ADULT - ATTENDING COMMENTS
82 yo F with h/o DM, HTN, HL, RA, hypothyroidism, CHF (EF 55% in 12/17)< s/p PPM, s/p AICD, hemorrhoids, p/w weakness and diarrhea. Found to have DREW , acidosis and hyperkalemia/emphysematous cystitis/neurogenic bladder:    #) Emphysematous cystitis  - Urine culture: 10,000 - 49,000 CFU/mL Escherichia coli (05.01.18)  - ID consult appreciated: continue with ceftriaxone 2gm q24  - f/u urology: continue Collins and antibiotics (for 2 weeks)     #) Metabolic encephalopathy 2/2 to uremia  - much improved    #) Hypothermia  - isolated event  - f/u septic workup: negative  - spoke to ID: continue current Abx management    #) Hyperkalemia   - resolved with Kayexalate PRN  - Monitor bmp    #) DM type 1  - continue with insulin   - monitor finger sticks    #) Chronic Systolic heart failure  - hold Lasix 40mg I/V q12  - continue metoprolol ER 50mg daily     #) DREW on CKD-5  - creatinine holding (5.6 <-- 5.6 <-- 5.6 <-- 6.2 <-- 5.7)  - Nephology following: no need for RRT  - continue feso4 po q8  - start sevelamer    #) DKA: resolved      #) Hypothyroidism  - continue levothyroxine     #) DNR/DNI

## 2018-05-09 NOTE — PROGRESS NOTE ADULT - SUBJECTIVE AND OBJECTIVE BOX
Nephrology progress note    Patient is seen and examined, events over the last 24 h noted .    Allergies:  Plavix (Other (Moderate))    Hospital Medications:   MEDICATIONS  (STANDING):  atorvastatin 10 milliGRAM(s) Oral at bedtime  cefTRIAXone   IVPB 2 Gram(s) IV Intermittent every 24 hours  dextrose 5%. 1000 milliLiter(s) (50 mL/Hr) IV Continuous <Continuous>  dextrose 50% Injectable 12.5 Gram(s) IV Push once  dextrose 50% Injectable 25 Gram(s) IV Push once  dextrose 50% Injectable 25 Gram(s) IV Push once  docusate sodium 100 milliGRAM(s) Oral three times a day  escitalopram 10 milliGRAM(s) Oral daily  ferrous    sulfate 325 milliGRAM(s) Oral daily  heparin  Injectable 5000 Unit(s) SubCutaneous every 12 hours  hydrocortisone 0.5% Ointment 1 Application(s) Topical two times a day  insulin glargine Injectable (LANTUS) 5 Unit(s) SubCutaneous every morning  insulin glargine Injectable (LANTUS) 5 Unit(s) SubCutaneous at bedtime  insulin lispro (HumaLOG) corrective regimen sliding scale   SubCutaneous three times a day before meals  insulin lispro (HumaLOG) corrective regimen sliding scale   SubCutaneous three times a day before meals  insulin lispro Injectable (HumaLOG) 3 Unit(s) SubCutaneous three times a day before meals  levothyroxine 100 MICROGram(s) Oral daily  lidocaine   Patch 1 Patch Transdermal daily  lidocaine   Patch      melatonin 5 milliGRAM(s) Oral at bedtime  metoprolol succinate ER 50 milliGRAM(s) Oral daily  senna 1 Tablet(s) Oral at bedtime        VITALS:  T(F): 96.8 (18 @ 04:45), Max: 97.9 (18 @ 20:30)  HR: 61 (18 @ 04:45)  BP: 150/70 (18 @ 04:45)  RR: 18 (18 @ 04:45)  SpO2: 97% (18 @ 09:23)  Wt(kg): --     @ 07:01  -   @ 07:00  --------------------------------------------------------  IN: 240 mL / OUT: 1900 mL / NET: -1660 mL     @ 07:01  -   @ 07:00  --------------------------------------------------------  IN: 0 mL / OUT: 2650 mL / NET: -2650 mL          PHYSICAL EXAM:  Constitutional: NAD  HEENT: anicteric sclera, oropharynx clear, MMM  Neck: No JVD  Respiratory: CTAB, no wheezes, rales or rhonchi  Cardiovascular: S1, S2, RRR  Gastrointestinal: BS+, soft, NT/ND  Extremities: No cyanosis or clubbing. No peripheral edema  :  No davis.   Skin: No rashes    LABS:      136  |  94<L>  |  102<HH>  ----------------------------<  100<H>  4.8   |  23  |  5.6<HH>    Ca    6.6<L>      09 May 2018 07:03  Phos  6.9       Mg     1.7         TPro  4.6<L>  /  Alb  2.5<L>  /  TBili  <0.2  /  DBili      /  AST  24  /  ALT  13  /  AlkPhos  228<H>                            8.4    9.04  )-----------( 350      ( 09 May 2018 07:03 )             25.0       Urine Studies:  Urinalysis Basic - ( 03 May 2018 17:57 )    Color: Yellow / Appearance: Cloudy / S.010 / pH:   Gluc:  / Ketone: Negative  / Bili: Negative / Urobili: 0.2 mg/dL   Blood:  / Protein: 30 mg/dL / Nitrite: Negative   Leuk Esterase: Large / RBC: 5-10 /HPF / WBC >50 /HPF   Sq Epi:  / Non Sq Epi: Occasional /HPF / Bacteria: Few /HPF        RADIOLOGY & ADDITIONAL STUDIES: Nephrology progress note    Patient is seen and examined, events over the last 24 h noted .  No new events no new complaints     Allergies:  Plavix (Other (Moderate))    Hospital Medications:   MEDICATIONS  (STANDING):  atorvastatin 10 milliGRAM(s) Oral at bedtime  cefTRIAXone   IVPB 2 Gram(s) IV Intermittent every 24 hours  docusate sodium 100 milliGRAM(s) Oral three times a day  escitalopram 10 milliGRAM(s) Oral daily  ferrous    sulfate 325 milliGRAM(s) Oral daily  heparin  Injectable 5000 Unit(s) SubCutaneous every 12 hours  hydrocortisone 0.5% Ointment 1 Application(s) Topical two times a day  insulin glargine Injectable (LANTUS) 5 Unit(s) SubCutaneous every morning  insulin glargine Injectable (LANTUS) 5 Unit(s) SubCutaneous at bedtime  insulin lispro Injectable (HumaLOG) 3 Unit(s) SubCutaneous three times a day before meals  levothyroxine 100 MICROGram(s) Oral daily  lidocaine   Patch 1 Patch Transdermal daily   melatonin 5 milliGRAM(s) Oral at bedtime  metoprolol succinate ER 50 milliGRAM(s) Oral daily  senna 1 Tablet(s) Oral at bedtime        VITALS:  T(F): 96.8 (18 @ 04:45), Max: 97.9 (18 @ 20:30)  HR: 61 (18 @ 04:45)  BP: 150/70 (18 @ 04:45)  RR: 18 (18 @ 04:45)  SpO2: 97% (18 @ 09:23)       @ 07:01  -   @ 07:00  --------------------------------------------------------  IN: 240 mL / OUT: 1900 mL / NET: -1660 mL     @ 07:01  -   @ 07:00  --------------------------------------------------------  IN: 0 mL / OUT: 2650 mL / NET: -2650 mL          PHYSICAL EXAM:  Constitutional: NAD  HEENT: anicteric sclera, oropharynx clear, MMM  Neck: No JVD  Respiratory: CTAB, no wheezes, rales or rhonchi  Cardiovascular: S1, S2, RRR  Gastrointestinal: BS+, soft, NT/ND  Extremities: No cyanosis or clubbing. No peripheral edema   Skin: No rashes    LABS:      136  |  94<L>  |  102<HH>  ----------------------------<  100<H>  4.8   |  23  |  5.6<HH>    Creatinine Trend: 5.6<--, 5.6<--, 5.6<--, 5.6<--, 6.2<--, 5.7<--    Ca    6.6<L>      09 May 2018 07:03  Phos  6.9       Mg     1.7         TPro  4.6<L>  /  Alb  2.5<L>  /  TBili  <0.2  /  DBili      /  AST  24  /  ALT  13  /  AlkPhos  228<H>                            8.4    9.04  )-----------( 350      ( 09 May 2018 07:03 )             25.0       Urine Studies:  Urinalysis Basic - ( 03 May 2018 17:57 )    Color: Yellow / Appearance: Cloudy / S.010 / pH:   Gluc:  / Ketone: Negative  / Bili: Negative / Urobili: 0.2 mg/dL   Blood:  / Protein: 30 mg/dL / Nitrite: Negative   Leuk Esterase: Large / RBC: 5-10 /HPF / WBC >50 /HPF   Sq Epi:  / Non Sq Epi: Occasional /HPF / Bacteria: Few /HPF        RADIOLOGY & ADDITIONAL STUDIES:

## 2018-05-10 LAB
ANION GAP SERPL CALC-SCNC: 22 MMOL/L — HIGH (ref 7–14)
APPEARANCE UR: CLEAR — SIGNIFICANT CHANGE UP
BACTERIA # UR AUTO: SIGNIFICANT CHANGE UP
BASOPHILS # BLD AUTO: 0.04 K/UL — SIGNIFICANT CHANGE UP (ref 0–0.2)
BASOPHILS NFR BLD AUTO: 0.4 % — SIGNIFICANT CHANGE UP (ref 0–1)
BILIRUB UR-MCNC: NEGATIVE — SIGNIFICANT CHANGE UP
BUN SERPL-MCNC: 93 MG/DL — CRITICAL HIGH (ref 10–20)
CALCIUM SERPL-MCNC: 6.9 MG/DL — LOW (ref 8.4–10.5)
CALCIUM SERPL-MCNC: 7 MG/DL — LOW (ref 8.5–10.1)
CHLORIDE SERPL-SCNC: 93 MMOL/L — LOW (ref 98–110)
CO2 SERPL-SCNC: 21 MMOL/L — SIGNIFICANT CHANGE UP (ref 17–32)
COLOR SPEC: YELLOW — SIGNIFICANT CHANGE UP
COMMENT - URINE: SIGNIFICANT CHANGE UP
CREAT SERPL-MCNC: 5.2 MG/DL — CRITICAL HIGH (ref 0.7–1.5)
DIFF PNL FLD: (no result)
EOSINOPHIL # BLD AUTO: 0.48 K/UL — SIGNIFICANT CHANGE UP (ref 0–0.7)
EOSINOPHIL NFR BLD AUTO: 5.1 % — SIGNIFICANT CHANGE UP (ref 0–8)
EPI CELLS # UR: (no result) /HPF
GLUCOSE SERPL-MCNC: 107 MG/DL — HIGH (ref 70–99)
GLUCOSE UR QL: 100 MG/DL
HCT VFR BLD CALC: 27.5 % — LOW (ref 37–47)
HGB BLD-MCNC: 9.2 G/DL — LOW (ref 12–16)
IMM GRANULOCYTES NFR BLD AUTO: 0.7 % — HIGH (ref 0.1–0.3)
KETONES UR-MCNC: NEGATIVE — SIGNIFICANT CHANGE UP
LEUKOCYTE ESTERASE UR-ACNC: SIGNIFICANT CHANGE UP
LYMPHOCYTES # BLD AUTO: 1.05 K/UL — LOW (ref 1.2–3.4)
LYMPHOCYTES # BLD AUTO: 11.1 % — LOW (ref 20.5–51.1)
MAGNESIUM SERPL-MCNC: 1.9 MG/DL — SIGNIFICANT CHANGE UP (ref 1.8–2.4)
MCHC RBC-ENTMCNC: 25.4 PG — LOW (ref 27–31)
MCHC RBC-ENTMCNC: 33.5 G/DL — SIGNIFICANT CHANGE UP (ref 32–37)
MCV RBC AUTO: 76 FL — LOW (ref 81–99)
MONOCYTES # BLD AUTO: 0.56 K/UL — SIGNIFICANT CHANGE UP (ref 0.1–0.6)
MONOCYTES NFR BLD AUTO: 5.9 % — SIGNIFICANT CHANGE UP (ref 1.7–9.3)
NEUTROPHILS # BLD AUTO: 7.24 K/UL — HIGH (ref 1.4–6.5)
NEUTROPHILS NFR BLD AUTO: 76.8 % — HIGH (ref 42.2–75.2)
NITRITE UR-MCNC: NEGATIVE — SIGNIFICANT CHANGE UP
NRBC # BLD: 0 /100 WBCS — SIGNIFICANT CHANGE UP (ref 0–0)
PH UR: 7 — SIGNIFICANT CHANGE UP (ref 5–8)
PHOSPHATE SERPL-MCNC: 6.8 MG/DL — HIGH (ref 2.1–4.9)
PLATELET # BLD AUTO: 411 K/UL — HIGH (ref 130–400)
POTASSIUM SERPL-MCNC: 4.5 MMOL/L — SIGNIFICANT CHANGE UP (ref 3.5–5)
POTASSIUM SERPL-SCNC: 4.5 MMOL/L — SIGNIFICANT CHANGE UP (ref 3.5–5)
PROT UR-MCNC: 30
PTH-INTACT FLD-MCNC: 47 PG/ML — SIGNIFICANT CHANGE UP (ref 15–65)
RBC # BLD: 3.62 M/UL — LOW (ref 4.2–5.4)
RBC # FLD: 16.9 % — HIGH (ref 11.5–14.5)
RBC CASTS # UR COMP ASSIST: (no result) /HPF
SODIUM SERPL-SCNC: 136 MMOL/L — SIGNIFICANT CHANGE UP (ref 135–146)
SP GR SPEC: 1.01 — SIGNIFICANT CHANGE UP (ref 1.01–1.03)
UROBILINOGEN FLD QL: 0.2 — SIGNIFICANT CHANGE UP (ref 0.2–0.2)
WBC # BLD: 9.44 K/UL — SIGNIFICANT CHANGE UP (ref 4.8–10.8)
WBC # FLD AUTO: 9.44 K/UL — SIGNIFICANT CHANGE UP (ref 4.8–10.8)
WBC UR QL: >50 /HPF

## 2018-05-10 RX ORDER — FUROSEMIDE 40 MG
40 TABLET ORAL DAILY
Qty: 0 | Refills: 0 | Status: DISCONTINUED | OUTPATIENT
Start: 2018-05-10 | End: 2018-05-12

## 2018-05-10 RX ORDER — CALCIUM ACETATE 667 MG
667 TABLET ORAL
Qty: 0 | Refills: 0 | Status: DISCONTINUED | OUTPATIENT
Start: 2018-05-10 | End: 2018-05-17

## 2018-05-10 RX ADMIN — SEVELAMER CARBONATE 400 MILLIGRAM(S): 2400 POWDER, FOR SUSPENSION ORAL at 06:01

## 2018-05-10 RX ADMIN — Medication 100 MILLIGRAM(S): at 06:01

## 2018-05-10 RX ADMIN — Medication 50 MILLIGRAM(S): at 06:01

## 2018-05-10 RX ADMIN — HEPARIN SODIUM 5000 UNIT(S): 5000 INJECTION INTRAVENOUS; SUBCUTANEOUS at 06:01

## 2018-05-10 RX ADMIN — ATORVASTATIN CALCIUM 10 MILLIGRAM(S): 80 TABLET, FILM COATED ORAL at 21:29

## 2018-05-10 RX ADMIN — Medication 100 MICROGRAM(S): at 06:01

## 2018-05-10 RX ADMIN — Medication 1 APPLICATION(S): at 06:01

## 2018-05-10 RX ADMIN — Medication 5 MILLIGRAM(S): at 21:29

## 2018-05-10 RX ADMIN — CEFTRIAXONE 100 GRAM(S): 500 INJECTION, POWDER, FOR SOLUTION INTRAMUSCULAR; INTRAVENOUS at 11:38

## 2018-05-10 RX ADMIN — HEPARIN SODIUM 5000 UNIT(S): 5000 INJECTION INTRAVENOUS; SUBCUTANEOUS at 17:27

## 2018-05-10 RX ADMIN — Medication 667 MILLIGRAM(S): at 17:27

## 2018-05-10 RX ADMIN — Medication 40 MILLIGRAM(S): at 14:23

## 2018-05-10 RX ADMIN — INSULIN GLARGINE 5 UNIT(S): 100 INJECTION, SOLUTION SUBCUTANEOUS at 10:39

## 2018-05-10 RX ADMIN — Medication 325 MILLIGRAM(S): at 11:38

## 2018-05-10 RX ADMIN — ESCITALOPRAM OXALATE 10 MILLIGRAM(S): 10 TABLET, FILM COATED ORAL at 11:38

## 2018-05-10 RX ADMIN — Medication 100 MILLIGRAM(S): at 14:23

## 2018-05-10 RX ADMIN — Medication 100 MILLIGRAM(S): at 21:29

## 2018-05-10 RX ADMIN — SENNA PLUS 1 TABLET(S): 8.6 TABLET ORAL at 21:29

## 2018-05-10 NOTE — CHART NOTE - NSCHARTNOTEFT_GEN_A_CORE
Registered Dietitian Follow-Up     Patient Profile Reviewed                           Yes [x]   No []     Nutrition History Previously Obtained        Yes [x]  No []       Pertinent Subjective Information: Pt. OOB to chair during visit, lethargic. Pt. reports slightly better appetite, states she had ~50% of her breakfast tray today for the first time.      Pertinent Medical Interventions: Emphysematous cystitis: - f/u urology: davis and antibiotics,  Metabolic encephalopathy 2/2 to uremia: improved, hyperkalemia: resolved, DKA resolved. Per nephro:  DREW: creatinine slightly better non oliguric no need for RRT, resume Lasix, phosphorus: renvela       Diet order: dysphagia 2 mech soft thin liquids, consistent carb, DASH/TLC     Anthropometrics:  - Ht. 160.02cm  - Wt. 59.6kg on 5/10 vs 51.3kg on 5/2     Pertinent Lab Data: (5/10) RBC 3.62, Hg 9.2, Hct 27.5, Vl 93, BUN 93, creat 5.2, glu 107, Ca 7.0, eGFR 7     Pertinent Meds: Heparin, Lispro, Glargine, Atorvastatin, Levothyroxine, Metoprolol, Colace, Renagel, Ferrous sulfate, Senna      Physical Findings:  - Appearance: lethargic, 2+ edema to L wrist, L hand noted   - GI function: denies symptoms, last BM 5/9  - Tubes:  - Oral/Mouth cavity: denies symptoms   - Skin: intact (BS 17)     Nutrition Requirements (from RD note on 5/7)   Weight Used:     Estimated Energy Needs    Continue [x]  Adjust [] 1138-1232kcal  Adjusted Energy Recommendations:   kcal/day        Estimated Protein Needs    Continue []  Adjust [x] 46-51g (0.9-1g/kg CBW- dosing weight) for DREW  Adjusted Protein Recommendations:   gm/day        Estimated Fluid Needs        Continue []  Adjust [x] 1500ml or per LIP  Adjusted Fluid Recommendations:   mL/day     Nutrient Intake: pt. reports 25-50% with ~50% today at breakfast         [] Previous Nutrition Diagnosis: Inadequate oral intake- improving             [x] Ongoing          [] Resolved      Nutrition Intervention: meals and snacks, medical food supplement     Rec: continue current diet order: dysphagia 2 mech soft thin liquids, consistent carb, DASH/TLC, order Ensure compact daily      Goal/Expected Outcome: In 4 days pt. to consume to at least 50-75% PO and supplement      Indicator/Monitoring: energy intake, body composition, NFPF, electrolyte/renal labs, glucose profile Registered Dietitian Follow-Up     Patient Profile Reviewed                           Yes [x]   No []     Nutrition History Previously Obtained        Yes [x]  No []       Pertinent Subjective Information: Pt. OOB to chair during visit, lethargic. Pt. reports slightly better appetite, states she had ~50% of her breakfast tray today for the first time.      Pertinent Medical Interventions: Emphysematous cystitis: - f/u urology: davis and antibiotics,  Metabolic encephalopathy 2/2 to uremia: improved, hyperkalemia: resolved, DKA resolved. Per nephro:  DREW: creatinine slightly better non oliguric no need for RRT, resume Lasix, phosphorus: Renvela.        Diet order: dysphagia 2 mech soft thin liquids, consistent carb, DASH/TLC     Anthropometrics:  - Ht. 160.02cm  - Wt. 59.6kg on 5/10 vs 51.3kg on 5/2     Pertinent Lab Data: (5/10) RBC 3.62, Hg 9.2, Hct 27.5, Vl 93, BUN 93, creat 5.2, glu 107, Ca 7.0, eGFR 7     Pertinent Meds: Heparin, Lispro, Glargine, Atorvastatin, Levothyroxine, Metoprolol, Colace, Renagel, Ferrous sulfate, Senna      Physical Findings:  - Appearance: lethargic, 2+ edema to L wrist, L hand noted   - GI function: denies symptoms, last BM 5/9  - Tubes:  - Oral/Mouth cavity: denies symptoms   - Skin: intact (BS 17)     Nutrition Requirements (from RD note on 5/7)   Weight Used:     Estimated Energy Needs    Continue [x]  Adjust [] 1138-1232kcal  Adjusted Energy Recommendations:   kcal/day        Estimated Protein Needs    Continue []  Adjust [x] 46-51g (0.9-1g/kg CBW- dosing weight) for DREW  Adjusted Protein Recommendations:   gm/day        Estimated Fluid Needs        Continue []  Adjust [x] 1500ml or per LIP- for CHF   Adjusted Fluid Recommendations:   mL/day     Nutrient Intake: pt. reports 25-50% with ~50% today at breakfast         [] Previous Nutrition Diagnosis: Inadequate oral intake- improving             [x] Ongoing          [] Resolved      Nutrition Intervention: meals and snacks, medical food supplement     Rec: continue current diet order: dysphagia 2 mech soft thin liquids, consistent carb, DASH/TLC, order Nepro daily      Goal/Expected Outcome: In 4 days pt. to consume to at least 50-75% PO and supplement      Indicator/Monitoring: energy intake, body composition, NFPF, electrolyte/renal labs, glucose profile Registered Dietitian Follow-Up     Patient Profile Reviewed                           Yes [x]   No []     Nutrition History Previously Obtained        Yes [x]  No []       Pertinent Subjective Information: Pt. OOB to chair during visit, lethargic. Pt. reports slightly better appetite, states she had ~50% of her breakfast tray today for the first time.      Pertinent Medical Interventions: Emphysematous cystitis: - f/u urology: davis and antibiotics,  Metabolic encephalopathy 2/2 to uremia: improved, hyperkalemia: resolved, DKA resolved. Per nephro:  DREW: creatinine slightly better non oliguric no need for RRT, resume Lasix, phosphorus: Renvela.        Diet order: dysphagia 2 mech soft thin liquids, consistent carb, DASH/TLC     Anthropometrics:  - Ht. 160.02cm  - Wt. 59.6kg on 5/10 vs 51.3kg on 5/2     Pertinent Lab Data: (5/10) RBC 3.62, Hg 9.2, Hct 27.5, Vl 93, BUN 93, creat 5.2, glu 107, Ca 7.0, eGFR 7     Pertinent Meds: Heparin, Lispro, Glargine, Atorvastatin, Levothyroxine, Metoprolol, Colace, Renagel, Ferrous sulfate, Senna      Physical Findings:  - Appearance: lethargic, 2+ edema to L wrist, L hand noted   - GI function: denies symptoms, last BM 5/9  - Tubes:  - Oral/Mouth cavity: denies symptoms   - Skin: intact (BS 17)     Nutrition Requirements (from RD note on 5/7)   Weight Used:     Estimated Energy Needs    Continue [x]  Adjust [] 1138-1232kcal  Adjusted Energy Recommendations:   kcal/day        Estimated Protein Needs    Continue []  Adjust [x] 46-51g (0.9-1g/kg CBW- dosing weight) for DREW  Adjusted Protein Recommendations:   gm/day        Estimated Fluid Needs        Continue []  Adjust [] per LIP d/t CHF/DREW/edema   Adjusted Fluid Recommendations:   mL/day     Nutrient Intake: pt. reports 25-50% with ~50% today at breakfast         [] Previous Nutrition Diagnosis: Inadequate oral intake- improving             [x] Ongoing          [] Resolved      Nutrition Intervention: meals and snacks, medical food supplement     Rec: continue current diet order: dysphagia 2 mech soft thin liquids, consistent carb, DASH/TLC, order Nepro daily      Goal/Expected Outcome: In 4 days pt. to consume to at least 50-75% PO and supplement      Indicator/Monitoring: energy intake, body composition, NFPF, electrolyte/renal labs, glucose profile

## 2018-05-10 NOTE — PROGRESS NOTE ADULT - ASSESSMENT
84 yo F with h/o DM, HTN, HL, RA, hypothyroidism, CHF (EF 55% in 12/17)< s/p PPM, s/p AICD, hemorrhoids, p/w weakness and diarrhea. Found to have DREW , acidosis and hyperkalemia/cystitis/neurogenic bladder:    # creatinine slightly better non oliguric no need for RRT   # resume lasix 40 IV q24h today   # remains on rocephin  # no need for RRT   # Ca corrected will be 8.2 check ionized calcium if needed   # phosphorus  switch to  renvela 1/1/1    # overall prognosis poor

## 2018-05-10 NOTE — PROGRESS NOTE ADULT - SUBJECTIVE AND OBJECTIVE BOX
DLVI MITCHELL  83y, Female      OVERNIGHT EVENTS:    no fevers, up in chair with no complaints.    VITALS:  T(F): 97.7, Max: 97.7 (05-10-18 @ 05:05)  HR: 59  BP: 137/68  RR: 18Vital Signs Last 24 Hrs  T(C): 36.5 (10 May 2018 05:05), Max: 36.5 (10 May 2018 05:05)  T(F): 97.7 (10 May 2018 05:05), Max: 97.7 (10 May 2018 05:05)  HR: 59 (10 May 2018 05:05) (55 - 70)  BP: 137/68 (10 May 2018 05:05) (131/72 - 139/64)  BP(mean): --  RR: 18 (10 May 2018 05:05) (18 - 19)  SpO2: --    TESTS & MEASUREMENTS:                        9.2    9.44  )-----------( 411      ( 10 May 2018 08:16 )             27.5     05-09    136  |  94<L>  |  102<HH>  ----------------------------<  100<H>  4.8   |  23  |  5.6<HH>    Ca    6.6<L>      09 May 2018 07:03  Phos  6.8     05-10  Mg     1.9     05-10          Culture - Blood (collected 05-08-18 @ 11:00)  Source: .Blood Blood-Peripheral  Preliminary Report (05-09-18 @ 17:01):    No growth to date.    Culture - Blood (collected 05-07-18 @ 20:00)  Source: .Blood Blood  Preliminary Report (05-09-18 @ 03:01):    No growth to date.            RADIOLOGY & ADDITIONAL TESTS:    ANTIBIOTICS:  cefTRIAXone   IVPB 2 Gram(s) IV Intermittent every 24 hours

## 2018-05-10 NOTE — PROGRESS NOTE ADULT - EXTREMITIES
No cyanosis, clubbing or edema
No cyanosis, clubbing or edema
detailed exam
No cyanosis, clubbing or edema

## 2018-05-10 NOTE — CONSULT NOTE ADULT - ASSESSMENT
IMPRESSION: Rehab of gait dysfunction      PRECAUTIONS: [  ] Cardiac  [  ] Respiratory  [  ] Seizures [  ] Contact Isolation  [  ] Droplet Isolation  [  ] Other    Weight Bearing Status:     RECOMMENDATION:    Out of Bed to Chair     DVT/Decubiti Prophylaxis    REHAB PLAN:     [ x  ] Bedside P/T 3-5 times a week   [   ]   Bedside O/T  2-3 times a week             [   ] No Rehab Therapy Indicated                   [   ]  Speech Therapy   Conditioning/ROM                                    ADL  Bed Mobility                                               Conditioning/ROM  Transfers                                                     Bed Mobility  Sitting /Standing Balance                         Transfers                                        Gait Training                                               Sitting/Standing Balance  Stair Training [   ]Applicable                    Home equipment Eval                                                                        Splinting  [   ] Only      GOALS:   ADL   [ x  ]   Independent                    Transfers  [ x  ] Independent                          Ambulation  [ x  ] Independent     [ x   ] With device                            [   ]  CG                                                         [   ]  CG                                                                  [   ] CG                            [    ] Min A                                                   [   ] Min A                                                              [   ] Min  A          DISCHARGE PLAN:   [   ]  Good candidate for Intensive Rehabilitation/Hospital based-[facility]A [facility]                                             Will tolerate 3hrs Intensive Rehab Daily                                       [   x ]  Short Term Rehab in Skilled Nursing Facility                              vs         [ x   ]  Home with Outpatient or VN services                                         [    ]  Possible Candidate for Intensive Hospital based Rehab

## 2018-05-10 NOTE — CONSULT NOTE ADULT - SUBJECTIVE AND OBJECTIVE BOX
HPI:  82 yo F with h/o DM, HTN, HL, RA, hypothyroidism, CHF (EF 55% in 12/17)< s/p PPM, s/p AICD, hemorrhoids, p/w weakness and diarrhea. Pt reports profuse diarrhea x 3 days, having multiple episodes of watery, dark stool with associated increasing weakness. Pt has not been able to get out of bed and has not been eating so not taking her meds. Pt states sx started after eating out. Pt denies any fever/chills, ha, chest pain, sob, constipation, dysuria.  Pt is found to have DKA in ED. VBG pH 7.2, anion gap 24, small acetone, glucose >600. Pt is hemodynamically stable in ED.  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 Pt denies having dysuria or pain in bladder.   - s/p IV NS 1000cc bolus (01 May 2018 04:15)      PAST MEDICAL & SURGICAL HISTORY:  Neuropathy  Diabetes  Hypothyroidism  HTN (hypertension)  CHF (congestive heart failure)  AICD (automatic cardioverter/defibrillator) present      Hospital Course:    TODAY'S SUBJECTIVE & REVIEW OF SYMPTOMS:     Constitutional WNL   Cardio WNL   Resp WNL   GI WNL  Heme WNL  Endo WNL  Skin WNL  MSK WNL  Neuro Weakness  Cognitive WNL  Psych WNL      MEDICATIONS  (STANDING):  atorvastatin 10 milliGRAM(s) Oral at bedtime  calcium acetate 667 milliGRAM(s) Oral three times a day with meals  cefTRIAXone   IVPB 2 Gram(s) IV Intermittent every 24 hours  dextrose 5%. 1000 milliLiter(s) (50 mL/Hr) IV Continuous <Continuous>  dextrose 50% Injectable 12.5 Gram(s) IV Push once  dextrose 50% Injectable 25 Gram(s) IV Push once  dextrose 50% Injectable 25 Gram(s) IV Push once  docusate sodium 100 milliGRAM(s) Oral three times a day  escitalopram 10 milliGRAM(s) Oral daily  ferrous    sulfate 325 milliGRAM(s) Oral daily  furosemide    Tablet 40 milliGRAM(s) Oral daily  heparin  Injectable 5000 Unit(s) SubCutaneous every 12 hours  insulin glargine Injectable (LANTUS) 5 Unit(s) SubCutaneous at bedtime  insulin lispro (HumaLOG) corrective regimen sliding scale   SubCutaneous three times a day before meals  insulin lispro Injectable (HumaLOG) 3 Unit(s) SubCutaneous three times a day before meals  levothyroxine 100 MICROGram(s) Oral daily  lidocaine   Patch 1 Patch Transdermal daily  lidocaine   Patch      melatonin 5 milliGRAM(s) Oral at bedtime  metoprolol succinate ER 50 milliGRAM(s) Oral daily  senna 1 Tablet(s) Oral at bedtime    MEDICATIONS  (PRN):  acetaminophen   Tablet. 650 milliGRAM(s) Oral every 6 hours PRN Mild Pain (1 - 3)  benzocaine 15 mG/menthol 3.6 mG Lozenge 1 Lozenge Oral every 3 hours PRN Sore Throat  dextrose Gel 1 Dose(s) Oral once PRN Blood Glucose LESS THAN 70 milliGRAM(s)/deciliter  diphenhydrAMINE   Capsule 25 milliGRAM(s) Oral every 4 hours PRN Rash and/or Itching  glucagon  Injectable 1 milliGRAM(s) IntraMuscular once PRN Glucose LESS THAN 70 milligrams/deciliter      FAMILY HISTORY:  No pertinent family history in first degree relatives      Allergies    Plavix (Other (Moderate))    Intolerances        SOCIAL HISTORY:    [  ] Etoh  [  ] Smoking  [  ] Substance abuse     Home Environment:  [x  ] Home Alone  [  ] Lives with Family  [  ] Home Health Aid    Dwelling:  [x  ] Apartment  [  ] Private House  [  ] Adult Home  [  ] Skilled Nursing Facility      [  ] Short Term  [  ] Long Term  [  ] Stairs       Elevator [x  ]    FUNCTIONAL STATUS PTA: (Check all that apply)  Ambulation: [ x  ]Independent    [  ] Dependent     [  ] Non-Ambulatory  Assistive Device: [  ] SA Cane  [  ]  Q Cane  [  ] Walker  [  ]  Wheelchair  ADL : [x  ] Independent  [  ]  Dependent       Vital Signs Last 24 Hrs  T(C): 36.6 (10 May 2018 13:46), Max: 36.6 (10 May 2018 13:46)  T(F): 97.8 (10 May 2018 13:46), Max: 97.8 (10 May 2018 13:46)  HR: 58 (10 May 2018 13:46) (58 - 70)  BP: 141/67 (10 May 2018 13:46) (131/72 - 141/67)  BP(mean): --  RR: 19 (10 May 2018 13:46) (18 - 19)  SpO2: --      PHYSICAL EXAM: Alert & Oriented X3  GENERAL: NAD, well-groomed, well-developed  HEAD:  Atraumatic, Normocephalic  CHEST/LUNG: Clear   HEART: Regular   ABDOMEN: Soft, Nontender  EXTREMITIES:  no calf tenderness    NERVOUS SYSTEM:  Cranial Nerves 2-12 intact [  ] Abnormal  [  ]  ROM: WFL all extremities [ x ]  Abnormal [  ]  Motor Strength: WFL all extremities  [  ]  Abnormal [ x ]4/5  all ext  Sensation: intact to light touch [  ] Abnormal [  ]  Reflexes: Symmetric [  ]  Abnormal [  ]    FUNCTIONAL STATUS:  Bed Mobility: Independent [  ]  Supervision [  ]  Needs Assistance [x  ]  N/A [  ]  Transfers: Independent [  ]  Supervision [  ]  Needs Assistance [x  ]  N/A [  ]   Ambulation: Independent [  ]  Supervision [  ]  Needs Assistance [  ]  N/A [  ]  ADL: Independent [  ] Requires Assistance [  ] N/A [  ]      LABS:                        9.2    9.44  )-----------( 411      ( 10 May 2018 08:16 )             27.5     05-10    136  |  93<L>  |  93<HH>  ----------------------------<  107<H>  4.5   |  21  |  5.2<HH>    Ca    7.0<L>      10 May 2018 08:16  Phos  6.8     05-10  Mg     1.9     05-10            RADIOLOGY & ADDITIONAL STUDIES:    Assesment:

## 2018-05-10 NOTE — PROGRESS NOTE ADULT - ATTENDING COMMENTS
84 yo F with h/o DM, HTN, HL, RA, hypothyroidism, CHF (EF 55% in 12/17)< s/p PPM, s/p AICD, hemorrhoids, p/w weakness and diarrhea. Found to have DREW , acidosis and hyperkalemia/emphysematous cystitis/neurogenic bladder:    #) Emphysematous cystitis  - Urine culture: 10,000 - 49,000 CFU/mL Escherichia coli (05.01.18)  - ID consult appreciated: continue with ceftriaxone 2gm q24. Cipro for 3 weeks starting tomorrow.  - f/u urology: continue Collins until antibiotics end    #) Metabolic encephalopathy 2/2 to uremia  - much improved    #) Hypothermia  - isolated event  - f/u septic workup: negative  - spoke to ID: continue current Abx management    #) Hyperkalemia   Kayexalate PRN  - Monitor bmp    #) DM type 1  - continue with insulin   - monitor finger sticks    #) Chronic Systolic heart failure  Per Nephro, resume Lasix. Will do PO 40 for today.   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   - continue feso4 po q8  - start sevelamer  Family says they knew nothing about pre-existing CKD prior to this admission Even though they have been following with Dr. Gonzalez Herrera as outpatient.   Request Dr Gonzalez Herrera to answer family's questions about her CKD and potential for HD ? (Can HD improve her ongoing fatigue ?)    #) DKA: resolved      #) Hypothyroidism  - continue levothyroxine     #) DNR/DNI .

## 2018-05-10 NOTE — PROGRESS NOTE ADULT - ASSESSMENT
82 yo F with h/o DM, HTN, HL, RA, hypothyroidism, CHF (EF 55% in 12/17)< s/p PPM, s/p AICD, hemorrhoids, p/w weakness and diarrhea. Found to have DREW , acidosis and hyperkalemia/cystitis/neurogenic bladder:    #) Emphysematous cystitis  - Urine culture: 10,000 - 49,000 CFU/mL Escherichia coli (05.01.18)  - ID consult appreciated: continue with ceftriaxone 2gm q24, will change to oral on discharge  - f/u urology: continue Collins and antibiotics (for 2 weeks)     #) Metabolic encephalopathy 2/2 to uremia  - much improved    #) Hypothermia  - isolated event  - f/u septic workup: negative  - spoke to ID: continue current Abx management    #) Hyperkalemia   - resolved  - repeat bmp    #) DM type 1  - continue with insulin   - monitor finger sticks    #) Chronic Systolic heart failure  - hold Lasix 40mg I/V q12  - continue metoprolol ER 50mg daily     #) DREW on CKD  - creatinine holding (5.6 <-- 5.6 <-- 5.6 <-- 6.2 <-- 5.7)  - Nephology following: no need for RRT  - continue feso4 po q8  - continue sevelamer    #) DKA: resolved  - Gap closed and was re-opened periodically on the floor likely 2/2 to CKD  - c/w to monitor FS, keep blood sugar <180    #) Hypothyroidism  - continue levothyroxine     #) Disposition  - anticipated for discharge tomorrow    #) DNR/DNI 84 yo F with h/o DM, HTN, HL, RA, hypothyroidism, CHF (EF 55% in 12/17)< s/p PPM, s/p AICD, hemorrhoids, p/w weakness and diarrhea. Found to have DREW , acidosis and hyperkalemia/cystitis/neurogenic bladder:    #) Emphysematous cystitis  - Urine culture: 10,000 - 49,000 CFU/mL Escherichia coli (05.01.18)  - ID consult appreciated: continue with ceftriaxone 2gm q24, will change to oral on discharge  - f/u urology: continue Collins and antibiotics (for 2 weeks)     #) Metabolic encephalopathy 2/2 to uremia  - much improved    #) Hypothermia  - isolated event  - f/u septic workup: negative  - spoke to ID: continue current Abx management    #) Hyperkalemia   - resolved  - repeat bmp    #) DM type 1  - continue with insulin   - monitor finger sticks    #) Chronic Systolic heart failure  - hold Lasix 40mg I/V q12  - continue metoprolol ER 50mg daily     #) ESRD  - creatinine holding (5.6 <-- 5.6 <-- 5.6 <-- 6.2 <-- 5.7)  - Nephology following: no need for RRT  - continue feso4 po q8  - continue sevelamer    #) DKA: resolved  - Gap closed and was re-opened periodically on the floor likely 2/2 to CKD  - c/w to monitor FS, keep blood sugar <180    #) Hypothyroidism  - continue levothyroxine     #) Disposition  - anticipated for discharge tomorrow    #) DNR/DNI 82 yo F with h/o DM, HTN, HL, RA, hypothyroidism, CHF (EF 55% in 12/17)< s/p PPM, s/p AICD, hemorrhoids, p/w weakness and diarrhea. Found to have DREW , acidosis and hyperkalemia/cystitis/neurogenic bladder:    #) Emphysematous cystitis  - Urine culture: 10,000 - 49,000 CFU/mL Escherichia coli (05.01.18)  - ID consult appreciated: continue with ceftriaxone 2gm q24; from 5/11 Ciprofloxacin 250 mg q12h for 3 more weeks.  - f/u urology: continue Collins and antibiotics (for 2 weeks)     #) Metabolic encephalopathy 2/2 to uremia  - much improved    #) Hypothermia  - isolated event  - f/u septic workup: negative  - spoke to ID: continue current Abx management    #) Hyperkalemia   - resolved  - repeat bmp    #) DM type 1  - continue with insulin   - monitor finger sticks    #) Chronic Systolic heart failure  - hold Lasix 40mg I/V q12  - continue metoprolol ER 50mg daily     #) ESRD  - creatinine holding (5.6 <-- 5.6 <-- 5.6 <-- 6.2 <-- 5.7)  - Nephology following: no need for RRT  - continue feso4 po q8  - continue sevelamer    #) DKA: resolved  - Gap closed and was re-opened periodically on the floor likely 2/2 to CKD  - c/w to monitor FS, keep blood sugar <180    #) Hypothyroidism  - continue levothyroxine     #) Disposition  - anticipated for discharge tomorrow    #) DNR/DNI 82 yo F with h/o DM, HTN, HL, RA, hypothyroidism, CHF (EF 55% in 12/17)< s/p PPM, s/p AICD, hemorrhoids, p/w weakness and diarrhea. Found to have DREW , acidosis and hyperkalemia/cystitis/neurogenic bladder:    #) Emphysematous cystitis  - Urine culture: 10,000 - 49,000 CFU/mL Escherichia coli (05.01.18)  - ID consult appreciated: continue with ceftriaxone 2gm q24; from 5/11 Ciprofloxacin 250 mg q12h for 3 more weeks.  - f/u urology: continue Collins and antibiotics (for 2 weeks)     #) Metabolic encephalopathy 2/2 to uremia  - much improved    #) Hypothermia  - isolated event  - f/u septic workup: negative  - spoke to ID: continue current Abx management    #) Hyperkalemia   - resolved  - repeat bmp    #) DM type 1  - continue with insulin   - monitor finger sticks    #) Chronic Systolic heart failure  - start Lasix 40mg PO  - continue metoprolol ER 50mg daily     #) ESRD  - creatinine holding (5.6 <-- 5.6 <-- 5.6 <-- 6.2 <-- 5.7)  - Nephology following: no need for RRT  - continue feso4 po q8  - continue sevelamer    #) DKA: resolved  - Gap closed and was re-opened periodically on the floor likely 2/2 to CKD  - c/w to monitor FS, keep blood sugar <180    #) Hypothyroidism  - continue levothyroxine     #) Disposition  - anticipated for discharge tomorrow    #) DNR/DNI 82 yo F with h/o DM, HTN, HL, RA, hypothyroidism, CHF (EF 55% in 12/17)< s/p PPM, s/p AICD, hemorrhoids, p/w weakness and diarrhea. Found to have DREW , acidosis and hyperkalemia/cystitis/neurogenic bladder:    #) Emphysematous cystitis  - Urine culture: 10,000 - 49,000 CFU/mL Escherichia coli (05.01.18)  - ID consult appreciated: continue with ceftriaxone 2gm q24; from 5/11 Ciprofloxacin 250 mg q12h for 3 more weeks.  - f/u urology: continue Collins and antibiotics (for 2 weeks)     #) Metabolic encephalopathy 2/2 to uremia  - much improved    #) Hypothermia  - isolated event  - f/u septic workup: negative  - spoke to ID: continue current Abx management    #) Hyperkalemia   - resolved  - repeat bmp    #) DM type 1  - continue with insulin   - monitor finger sticks    #) Chronic Systolic heart failure  - start Lasix 40mg PO  - continue metoprolol ER 50mg daily     #) ESRD  - creatinine holding (5.6 <-- 5.6 <-- 5.6 <-- 6.2 <-- 5.7)  - Nephology following: no need for RRT  - continue feso4 po q8  - continue phoslo    #) DKA: resolved  - Gap closed and was re-opened periodically on the floor likely 2/2 to CKD  - c/w to monitor FS, keep blood sugar <180    #) Hypothyroidism  - continue levothyroxine     #) Disposition  - anticipated for discharge tomorrow    #) DNR/DNI

## 2018-05-10 NOTE — PROGRESS NOTE ADULT - SUBJECTIVE AND OBJECTIVE BOX
SUBJECTIVE:    Patient is a 83y old Female who presents with a chief complaint of DKA (01 May 2018 04:15)    Currently admitted to medicine with the primary diagnosis of DKA (diabetic ketoacidoses)     Today is hospital day 10d. This morning she is resting comfortably in bed and reports no new issues or overnight events.     PAST MEDICAL & SURGICAL HISTORY  Neuropathy  Diabetes  Hypothyroidism  HTN (hypertension)  CHF (congestive heart failure)  AICD (automatic cardioverter/defibrillator) present    SOCIAL HISTORY:  Negative for smoking/alcohol/drug use.     ALLERGIES:  Plavix (Other (Moderate))    MEDICATIONS:  STANDING MEDICATIONS  atorvastatin 10 milliGRAM(s) Oral at bedtime  cefTRIAXone   IVPB 2 Gram(s) IV Intermittent every 24 hours  dextrose 5%. 1000 milliLiter(s) IV Continuous <Continuous>  dextrose 50% Injectable 12.5 Gram(s) IV Push once  dextrose 50% Injectable 25 Gram(s) IV Push once  dextrose 50% Injectable 25 Gram(s) IV Push once  docusate sodium 100 milliGRAM(s) Oral three times a day  escitalopram 10 milliGRAM(s) Oral daily  ferrous    sulfate 325 milliGRAM(s) Oral daily  heparin  Injectable 5000 Unit(s) SubCutaneous every 12 hours  hydrocortisone 0.5% Ointment 1 Application(s) Topical two times a day  insulin glargine Injectable (LANTUS) 5 Unit(s) SubCutaneous every morning  insulin glargine Injectable (LANTUS) 5 Unit(s) SubCutaneous at bedtime  insulin lispro (HumaLOG) corrective regimen sliding scale   SubCutaneous three times a day before meals  insulin lispro (HumaLOG) corrective regimen sliding scale   SubCutaneous three times a day before meals  insulin lispro Injectable (HumaLOG) 3 Unit(s) SubCutaneous three times a day before meals  levothyroxine 100 MICROGram(s) Oral daily  lidocaine   Patch 1 Patch Transdermal daily  lidocaine   Patch      melatonin 5 milliGRAM(s) Oral at bedtime  metoprolol succinate ER 50 milliGRAM(s) Oral daily  senna 1 Tablet(s) Oral at bedtime  sevelamer hydrochloride 400 milliGRAM(s) Oral three times a day    PRN MEDICATIONS  acetaminophen   Tablet. 650 milliGRAM(s) Oral every 6 hours PRN  benzocaine 15 mG/menthol 3.6 mG Lozenge 1 Lozenge Oral every 3 hours PRN  dextrose Gel 1 Dose(s) Oral once PRN  diphenhydrAMINE   Capsule 25 milliGRAM(s) Oral every 4 hours PRN  glucagon  Injectable 1 milliGRAM(s) IntraMuscular once PRN    VITALS:   T(F): 97.7  HR: 59  BP: 137/68  RR: 18  SpO2: --    LABS:                        9.2    9.44  )-----------( 411      ( 10 May 2018 08:16 )             27.5     05-09    136  |  94<L>  |  102<HH>  ----------------------------<  100<H>  4.8   |  23  |  5.6<HH>    Ca    6.6<L>      09 May 2018 07:03  Phos  6.9     05-09  Mg     1.7     05-09        Culture - Blood (collected 08 May 2018 11:00)  Source: .Blood Blood-Peripheral  Preliminary Report (09 May 2018 17:01):    No growth to date.    Culture - Blood (collected 07 May 2018 20:00)  Source: .Blood Blood  Preliminary Report (09 May 2018 03:01):    No growth to date.      RADIOLOGY:  Xray Chest 1 View- PORTABLE-Routine (05.02.18)  Increased bilateral interstitial opacities    US Retroperitoneal Complete (05.01.18)  No stones or hydronephrosis bilaterally.    CT Abdomen and Pelvis No Cont (04.30.18)  Findings compatible with emphysematous cystitis.    PHYSICAL EXAM:  GEN: No acute distress  LUNGS: Clear to auscultation bilaterally   HEART: S1/S2 present. RRR.   ABD: Soft, non-tender, non-distended. Bowel sounds present  EXT: 1+ edema b/l LE  NEURO: AAOX3  Skin: No rashes  Collins's present

## 2018-05-10 NOTE — PROGRESS NOTE ADULT - ASSESSMENT
IMPRESSION:  Acute emphysematous cystitis secondary to E coli.  Vitals stabilized.   Repeat blood cultures are negative.    RECOMMENDATIONS:  Continue with Rocephin 2 gm today and from 5/11 Ciprofloxacin 250 mg q12h for 3 more weeks.    Recall prn please

## 2018-05-10 NOTE — PROGRESS NOTE ADULT - GASTROINTESTINAL
Soft, non-tender, no hepatosplenomegaly, normal bowel sounds
detailed exam
Soft, non-tender, no hepatosplenomegaly, normal bowel sounds
Soft, non-tender, no hepatosplenomegaly, normal bowel sounds

## 2018-05-10 NOTE — PROGRESS NOTE ADULT - SUBJECTIVE AND OBJECTIVE BOX
Nephrology progress note    Patient is seen and examined, events over the last 24 h noted .  sitting in a chair comfortable  No events     Allergies:  Plavix (Other (Moderate))    Hospital Medications:   MEDICATIONS  (STANDING):  atorvastatin 10 milliGRAM(s) Oral at bedtime  cefTRIAXone   IVPB 2 Gram(s) IV Intermittent every 24 hours  dextrose 5%. 1000 milliLiter(s) (50 mL/Hr) IV Continuous <Continuous>  docusate sodium 100 milliGRAM(s) Oral three times a day  escitalopram 10 milliGRAM(s) Oral daily  ferrous    sulfate 325 milliGRAM(s) Oral daily  heparin  Injectable 5000 Unit(s) SubCutaneous every 12 hours  hydrocortisone 0.5% Ointment 1 Application(s) Topical two times a day  insulin glargine Injectable (LANTUS) 5 Unit(s) SubCutaneous every morning  insulin glargine Injectable (LANTUS) 5 Unit(s) SubCutaneous at bedtime  insulin lispro Injectable (HumaLOG) 3 Unit(s) SubCutaneous three times a day before meals  levothyroxine 100 MICROGram(s) Oral daily  lidocaine   Patch 1 Patch Transdermal daily  melatonin 5 milliGRAM(s) Oral at bedtime  metoprolol succinate ER 50 milliGRAM(s) Oral daily  senna 1 Tablet(s) Oral at bedtime  sevelamer hydrochloride 400 milliGRAM(s) Oral three times a day        VITALS:  T(F): 97.7 (05-10-18 @ 05:05), Max: 97.7 (05-10-18 @ 05:05)  HR: 59 (05-10-18 @ 05:05)  BP: 137/68 (05-10-18 @ 05:05)  RR: 18 (05-10-18 @ 05:05)       @ :  -   @ 07:00  --------------------------------------------------------  IN: 0 mL / OUT: 2650 mL / NET: -2650 mL     @ 07:01  -  05-10 @ 07:00  --------------------------------------------------------  IN: 0 mL / OUT: 825 mL / NET: -825 mL    05-10 @ 07:01  -  05-10 @ 11:20  --------------------------------------------------------  IN: 0 mL / OUT: 300 mL / NET: -300 mL          PHYSICAL EXAM:  Constitutional: NAD  HEENT: anicteric sclera, oropharynx clear, MMM  Neck: No JVD  Respiratory: CTAB, no wheezes, rales or rhonchi  Cardiovascular: S1, S2, RRR  Gastrointestinal: BS+, soft, NT/ND  Extremities: No cyanosis or clubbing. plus one edema lower ext   :  positive davis   Skin: No rashes    LABS:  05-10    136  |  93<L>  |  93<HH>  ----------------------------<  107<H>  4.5   |  21  |  5.2<HH>    Creatinine Trend: 5.2<--, 5.6<--, 5.6<--, 5.6<--, 5.6<--, 6.2<--    Ca    7.0<L>      10 May 2018 08:16  Phos  6.8     05-10  Mg     1.9     05-10                            9.2    9.44  )-----------( 411      ( 10 May 2018 08:16 )             27.5       Urine Studies:  Urinalysis Basic - ( 03 May 2018 17:57 )    Color: Yellow / Appearance: Cloudy / S.010 / pH:   Gluc:  / Ketone: Negative  / Bili: Negative / Urobili: 0.2 mg/dL   Blood:  / Protein: 30 mg/dL / Nitrite: Negative   Leuk Esterase: Large / RBC: 5-10 /HPF / WBC >50 /HPF   Sq Epi:  / Non Sq Epi: Occasional /HPF / Bacteria: Few /HPF        RADIOLOGY & ADDITIONAL STUDIES:

## 2018-05-11 LAB
ANION GAP SERPL CALC-SCNC: 18 MMOL/L — HIGH (ref 7–14)
BASOPHILS # BLD AUTO: 0.07 K/UL — SIGNIFICANT CHANGE UP (ref 0–0.2)
BASOPHILS NFR BLD AUTO: 0.9 % — SIGNIFICANT CHANGE UP (ref 0–1)
BUN SERPL-MCNC: 88 MG/DL — CRITICAL HIGH (ref 10–20)
CALCIUM SERPL-MCNC: 6.9 MG/DL — LOW (ref 8.5–10.1)
CHLORIDE SERPL-SCNC: 96 MMOL/L — LOW (ref 98–110)
CO2 SERPL-SCNC: 24 MMOL/L — SIGNIFICANT CHANGE UP (ref 17–32)
CREAT SERPL-MCNC: 4.9 MG/DL — CRITICAL HIGH (ref 0.7–1.5)
EOSINOPHIL # BLD AUTO: 0.43 K/UL — SIGNIFICANT CHANGE UP (ref 0–0.7)
EOSINOPHIL NFR BLD AUTO: 5.8 % — SIGNIFICANT CHANGE UP (ref 0–8)
GLUCOSE SERPL-MCNC: 158 MG/DL — HIGH (ref 70–99)
HCT VFR BLD CALC: 25.2 % — LOW (ref 37–47)
HGB BLD-MCNC: 8.4 G/DL — LOW (ref 12–16)
IMM GRANULOCYTES NFR BLD AUTO: 0.5 % — HIGH (ref 0.1–0.3)
LYMPHOCYTES # BLD AUTO: 0.95 K/UL — LOW (ref 1.2–3.4)
LYMPHOCYTES # BLD AUTO: 12.8 % — LOW (ref 20.5–51.1)
MAGNESIUM SERPL-MCNC: 1.9 MG/DL — SIGNIFICANT CHANGE UP (ref 1.8–2.4)
MCHC RBC-ENTMCNC: 25.5 PG — LOW (ref 27–31)
MCHC RBC-ENTMCNC: 33.3 G/DL — SIGNIFICANT CHANGE UP (ref 32–37)
MCV RBC AUTO: 76.6 FL — LOW (ref 81–99)
MONOCYTES # BLD AUTO: 0.47 K/UL — SIGNIFICANT CHANGE UP (ref 0.1–0.6)
MONOCYTES NFR BLD AUTO: 6.3 % — SIGNIFICANT CHANGE UP (ref 1.7–9.3)
NEUTROPHILS # BLD AUTO: 5.46 K/UL — SIGNIFICANT CHANGE UP (ref 1.4–6.5)
NEUTROPHILS NFR BLD AUTO: 73.7 % — SIGNIFICANT CHANGE UP (ref 42.2–75.2)
NRBC # BLD: 0 /100 WBCS — SIGNIFICANT CHANGE UP (ref 0–0)
PHOSPHATE SERPL-MCNC: 6.1 MG/DL — HIGH (ref 2.1–4.9)
PLATELET # BLD AUTO: 383 K/UL — SIGNIFICANT CHANGE UP (ref 130–400)
POTASSIUM SERPL-MCNC: 4.5 MMOL/L — SIGNIFICANT CHANGE UP (ref 3.5–5)
POTASSIUM SERPL-SCNC: 4.5 MMOL/L — SIGNIFICANT CHANGE UP (ref 3.5–5)
RBC # BLD: 3.29 M/UL — LOW (ref 4.2–5.4)
RBC # FLD: 16.9 % — HIGH (ref 11.5–14.5)
SODIUM SERPL-SCNC: 138 MMOL/L — SIGNIFICANT CHANGE UP (ref 135–146)
WBC # BLD: 7.42 K/UL — SIGNIFICANT CHANGE UP (ref 4.8–10.8)
WBC # FLD AUTO: 7.42 K/UL — SIGNIFICANT CHANGE UP (ref 4.8–10.8)

## 2018-05-11 RX ORDER — CIPROFLOXACIN LACTATE 400MG/40ML
250 VIAL (ML) INTRAVENOUS EVERY 12 HOURS
Qty: 0 | Refills: 0 | Status: DISCONTINUED | OUTPATIENT
Start: 2018-05-11 | End: 2018-05-17

## 2018-05-11 RX ADMIN — Medication 650 MILLIGRAM(S): at 14:24

## 2018-05-11 RX ADMIN — Medication 250 MILLIGRAM(S): at 17:19

## 2018-05-11 RX ADMIN — Medication 100 MILLIGRAM(S): at 14:23

## 2018-05-11 RX ADMIN — Medication 667 MILLIGRAM(S): at 08:11

## 2018-05-11 RX ADMIN — ATORVASTATIN CALCIUM 10 MILLIGRAM(S): 80 TABLET, FILM COATED ORAL at 21:21

## 2018-05-11 RX ADMIN — Medication 50 MILLIGRAM(S): at 06:13

## 2018-05-11 RX ADMIN — Medication 100 MILLIGRAM(S): at 21:21

## 2018-05-11 RX ADMIN — SENNA PLUS 1 TABLET(S): 8.6 TABLET ORAL at 21:22

## 2018-05-11 RX ADMIN — Medication 5 MILLIGRAM(S): at 21:21

## 2018-05-11 RX ADMIN — Medication 325 MILLIGRAM(S): at 11:26

## 2018-05-11 RX ADMIN — ESCITALOPRAM OXALATE 10 MILLIGRAM(S): 10 TABLET, FILM COATED ORAL at 11:26

## 2018-05-11 RX ADMIN — Medication 40 MILLIGRAM(S): at 06:13

## 2018-05-11 RX ADMIN — Medication 667 MILLIGRAM(S): at 12:10

## 2018-05-11 RX ADMIN — HEPARIN SODIUM 5000 UNIT(S): 5000 INJECTION INTRAVENOUS; SUBCUTANEOUS at 17:19

## 2018-05-11 RX ADMIN — HEPARIN SODIUM 5000 UNIT(S): 5000 INJECTION INTRAVENOUS; SUBCUTANEOUS at 06:13

## 2018-05-11 RX ADMIN — CEFTRIAXONE 100 GRAM(S): 500 INJECTION, POWDER, FOR SOLUTION INTRAMUSCULAR; INTRAVENOUS at 11:26

## 2018-05-11 RX ADMIN — Medication 650 MILLIGRAM(S): at 12:12

## 2018-05-11 RX ADMIN — Medication 100 MICROGRAM(S): at 06:13

## 2018-05-11 RX ADMIN — Medication 100 MILLIGRAM(S): at 06:13

## 2018-05-11 RX ADMIN — Medication 667 MILLIGRAM(S): at 17:19

## 2018-05-11 RX ADMIN — INSULIN GLARGINE 5 UNIT(S): 100 INJECTION, SOLUTION SUBCUTANEOUS at 21:21

## 2018-05-11 NOTE — PROGRESS NOTE ADULT - SUBJECTIVE AND OBJECTIVE BOX
Nephrology progress note    Patient is seen and examined, events over the last 24 h noted .  Still lethargic no chest pain no SOB   Nauseous no t eating     Allergies:  Plavix (Other (Moderate))    Hospital Medications:   MEDICATIONS  (STANDING):  atorvastatin 10 milliGRAM(s) Oral at bedtime  calcium acetate 667 milliGRAM(s) Oral three times a day with meals  ciprofloxacin     Tablet 250 milliGRAM(s) Oral every 12 hours  docusate sodium 100 milliGRAM(s) Oral three times a day  escitalopram 10 milliGRAM(s) Oral daily  ferrous    sulfate 325 milliGRAM(s) Oral daily  furosemide    Tablet 40 milliGRAM(s) Oral daily  heparin  Injectable 5000 Unit(s) SubCutaneous every 12 hours  insulin glargine Injectable (LANTUS) 5 Unit(s) SubCutaneous at bedtime  insulin lispro (HumaLOG) corrective regimen sliding scale   SubCutaneous three times a day before meals  insulin lispro Injectable (HumaLOG) 3 Unit(s) SubCutaneous three times a day before meals  levothyroxine 100 MICROGram(s) Oral daily  lidocaine   Patch 1 Patch Transdermal daily  melatonin 5 milliGRAM(s) Oral at bedtime  metoprolol succinate ER 50 milliGRAM(s) Oral daily  senna 1 Tablet(s) Oral at bedtime        VITALS:  T(F): 97.5 (18 @ 05:00), Max: 97.9 (05-10-18 @ 20:30)  HR: 61 (18 @ 05:00)  BP: 143/63 (18 @ 05:00)  RR: 18 (18 @ 05:00)       @ :  -  05-10 @ 07:00  --------------------------------------------------------  IN: 0 mL / OUT: 825 mL / NET: -825 mL    05-10 @ 07:01  -   @ 07:00  --------------------------------------------------------  IN: 0 mL / OUT: 1825 mL / NET: -1825 mL     @ 07:01  -   @ 14:03  --------------------------------------------------------  IN: 0 mL / OUT: 1000 mL / NET: -1000 mL          PHYSICAL EXAM:  Constitutional: lethargic   HEENT: anicteric sclera, oropharynx clear, MMM  Neck: No JVD no carotid murmur  Respiratory: CTAB, no wheezes, rales or rhonchi  Cardiovascular: S1, S2, RRR  Gastrointestinal: BS+, soft, NT/ND  Extremities: No cyanosis or clubbing. trace peripheral edema  :  No davis.   Skin: No rashes    LABS:      138  |  96<L>  |  88<HH>  ----------------------------<  158<H>  4.5   |  24  |  4.9<HH>    Creatinine Trend: 4.9<--, 5.2<--, 5.6<--, 5.6<--, 5.6<--, 5.6<--    Ca    6.9<L>      11 May 2018 09:32  Phos  6.1       Mg     1.9                                 8.4    7.42  )-----------( 383      ( 11 May 2018 09:32 )             25.2       Urine Studies:  Urinalysis Basic - ( 10 May 2018 17:03 )    Color: Yellow / Appearance: Clear / S.015 / pH:   Gluc:  / Ketone: Negative  / Bili: Negative / Urobili: 0.2   Blood:  / Protein: 30 / Nitrite: Negative   Leuk Esterase: Large / RBC: 5-10 /HPF / WBC >50 /HPF   Sq Epi:  / Non Sq Epi: Occasional /HPF / Bacteria: See Note        RADIOLOGY & ADDITIONAL STUDIES:

## 2018-05-11 NOTE — PROGRESS NOTE ADULT - ASSESSMENT
84 yo F with h/o DM, HTN, HL, RA, hypothyroidism, CHF (EF 55% in 12/17)< s/p PPM, s/p AICD, hemorrhoids, p/w weakness and diarrhea. Found to have DREW , acidosis and hyperkalemia/cystitis/neurogenic bladder:    #) Emphysematous cystitis  - Urine culture: 10,000 - 49,000 CFU/mL Escherichia coli (05.01.18)  - ID consult appreciated: start Ciprofloxacin 250 mg q12h for 3 weeks (till 06/01/18)  - f/u urology: continue Collins and antibiotics (for 2 weeks)     #) DM type 1  - continue with insulin   - monitor finger sticks    #) Chronic Systolic heart failure  - continue Lasix 40mg PO  - continue metoprolol ER 50mg daily     #) ESRD  - creatinine holding (5.2 <-- 5.6 <-- 5.6 <-- 5.6 <-- 6.2 <-- 5.7)  - Nephology following: no need for RRT  - continue feso4 po q8  - continue phoslo    #) DKA: resolved  - Gap closed and was re-opened periodically on the floor likely 2/2 to CKD  - c/w to monitor FS, keep blood sugar <180    #) Hypothyroidism  - continue levothyroxine     #) Disposition  - anticipated for discharge tomorrow    #) DNR/DNI

## 2018-05-11 NOTE — PROGRESS NOTE ADULT - ASSESSMENT
84 yo F with h/o DM, HTN, HL, RA, hypothyroidism, CHF (EF 55% in 12/17)< s/p PPM, s/p AICD, hemorrhoids, p/w weakness and diarrhea. Found to have DREW , acidosis and hyperkalemia/cystitis/neurogenic bladder:    # creatinine slightly better non oliguric no need for RRT   # continue lasix 40 mg q24h   # off antibiotics now   # no need for RRT   # Ca ok if corrected to albumin   #hyperphosphatemia on Pholso     # overall prognosis poor

## 2018-05-11 NOTE — PROGRESS NOTE ADULT - ATTENDING COMMENTS
2 yo F with h/o DM, HTN, HL, RA, hypothyroidism, CHF (EF 55% in 12/17)< s/p PPM, s/p AICD, hemorrhoids, p/w weakness and diarrhea. Found to have DREW , acidosis and hyperkalemia/emphysematous cystitis/neurogenic bladder:    #) Emphysematous cystitis  - Urine culture: 10,000 - 49,000 CFU/mL Escherichia coli (05.01.18)  - ID consult appreciated: continue with ceftriaxone 2gm q24. Cipro for 3 weeks starting tomorrow.  - f/u urology: continue Collins until antibiotics end    #) Metabolic encephalopathy 2/2 to uremia  - much improved    #) Hypothermia  - isolated event  - f/u septic workup: negative  - spoke to ID: continue current Abx management    #) Hyperkalemia   Kayexalate PRN  - Monitor bmp    #) DM type 1  - continue with insulin   - monitor finger sticks    #) Chronic Systolic heart failure  Per Nephro, resume Lasix. Will do PO 40 for today.   - continue metoprolol ER 50mg daily     #) DREW on CKD-5 / Recent ATN  - Creatinine improving very slowly.  - Nephology following: no need for RRT d/t her fraility.   - continue feso4 po q8, sevelamer    #) DKA: resolved      #) Hypothyroidism  - continue levothyroxine     #) DNR/DNI .    Encourage PT.

## 2018-05-11 NOTE — PROGRESS NOTE ADULT - SUBJECTIVE AND OBJECTIVE BOX
SUBJECTIVE:    Patient is a 83y old Female who presents with a chief complaint of DKA (01 May 2018 04:15)    Currently admitted to medicine with the primary diagnosis of DKA (diabetic ketoacidoses)     Today is hospital day 11d. This morning she is resting comfortably in bed and reports no new issues or overnight events. She states she feels weak.     PAST MEDICAL & SURGICAL HISTORY  Neuropathy  Diabetes  Hypothyroidism  HTN (hypertension)  CHF (congestive heart failure)  AICD (automatic cardioverter/defibrillator) present    SOCIAL HISTORY:  Negative for smoking/alcohol/drug use.     ALLERGIES:  Plavix (Other (Moderate))    MEDICATIONS:  STANDING MEDICATIONS  atorvastatin 10 milliGRAM(s) Oral at bedtime  calcium acetate 667 milliGRAM(s) Oral three times a day with meals  ciprofloxacin     Tablet 250 milliGRAM(s) Oral every 12 hours  dextrose 5%. 1000 milliLiter(s) IV Continuous <Continuous>  dextrose 50% Injectable 12.5 Gram(s) IV Push once  dextrose 50% Injectable 25 Gram(s) IV Push once  dextrose 50% Injectable 25 Gram(s) IV Push once  docusate sodium 100 milliGRAM(s) Oral three times a day  escitalopram 10 milliGRAM(s) Oral daily  ferrous    sulfate 325 milliGRAM(s) Oral daily  furosemide    Tablet 40 milliGRAM(s) Oral daily  heparin  Injectable 5000 Unit(s) SubCutaneous every 12 hours  insulin glargine Injectable (LANTUS) 5 Unit(s) SubCutaneous at bedtime  insulin lispro (HumaLOG) corrective regimen sliding scale   SubCutaneous three times a day before meals  insulin lispro Injectable (HumaLOG) 3 Unit(s) SubCutaneous three times a day before meals  levothyroxine 100 MICROGram(s) Oral daily  lidocaine   Patch 1 Patch Transdermal daily  lidocaine   Patch      melatonin 5 milliGRAM(s) Oral at bedtime  metoprolol succinate ER 50 milliGRAM(s) Oral daily  senna 1 Tablet(s) Oral at bedtime    PRN MEDICATIONS  acetaminophen   Tablet. 650 milliGRAM(s) Oral every 6 hours PRN  benzocaine 15 mG/menthol 3.6 mG Lozenge 1 Lozenge Oral every 3 hours PRN  dextrose Gel 1 Dose(s) Oral once PRN  diphenhydrAMINE   Capsule 25 milliGRAM(s) Oral every 4 hours PRN  glucagon  Injectable 1 milliGRAM(s) IntraMuscular once PRN    VITALS:   T(F): 97.5  HR: 61  BP: 143/63  RR: 18  SpO2: --    LABS:                        8.4    7.42  )-----------( 383      ( 11 May 2018 09:32 )             25.2     05-10    136  |  93<L>  |  93<HH>  ----------------------------<  107<H>  4.5   |  21  |  5.2<HH>    Ca    7.0<L>      10 May 2018 08:16  Phos  6.8     05-10  Mg     1.9     05-10      Urinalysis Basic - ( 10 May 2018 17:03 )    Color: Yellow / Appearance: Clear / S.015 / pH: x  Gluc: x / Ketone: Negative  / Bili: Negative / Urobili: 0.2   Blood: x / Protein: 30 / Nitrite: Negative   Leuk Esterase: Large / RBC: 5-10 /HPF / WBC >50 /HPF   Sq Epi: x / Non Sq Epi: Occasional /HPF / Bacteria: See Note      Culture - Blood (collected 09 May 2018 07:03)  Source: .Blood None  Preliminary Report (10 May 2018 12:01):    No growth to date.    RADIOLOGY:  Xray Chest 1 View- PORTABLE-Routine (18)  Increased bilateral interstitial opacities    US Retroperitoneal Complete (18)  No stones or hydronephrosis bilaterally.    CT Abdomen and Pelvis No Cont (18)  Findings compatible with emphysematous cystitis.      PHYSICAL EXAM:  GEN: No acute distress, frail  LUNGS: Clear to auscultation bilaterally   HEART: S1/S2 present. RRR.   ABD: Soft, non-tender, non-distended. Bowel sounds present  EXT: 1+ edema b/l LE  NEURO: AAOX3  Skin: No rashes  Collins's present

## 2018-05-12 LAB
ANION GAP SERPL CALC-SCNC: 17 MMOL/L — HIGH (ref 7–14)
BASOPHILS # BLD AUTO: 0.06 K/UL — SIGNIFICANT CHANGE UP (ref 0–0.2)
BASOPHILS NFR BLD AUTO: 0.7 % — SIGNIFICANT CHANGE UP (ref 0–1)
BUN SERPL-MCNC: 85 MG/DL — CRITICAL HIGH (ref 10–20)
CALCIUM SERPL-MCNC: 7.4 MG/DL — LOW (ref 8.5–10.1)
CHLORIDE SERPL-SCNC: 94 MMOL/L — LOW (ref 98–110)
CO2 SERPL-SCNC: 25 MMOL/L — SIGNIFICANT CHANGE UP (ref 17–32)
CREAT SERPL-MCNC: 4.9 MG/DL — CRITICAL HIGH (ref 0.7–1.5)
EOSINOPHIL # BLD AUTO: 0.18 K/UL — SIGNIFICANT CHANGE UP (ref 0–0.7)
EOSINOPHIL NFR BLD AUTO: 2.1 % — SIGNIFICANT CHANGE UP (ref 0–8)
GLUCOSE SERPL-MCNC: 122 MG/DL — HIGH (ref 70–99)
HCT VFR BLD CALC: 26.3 % — LOW (ref 37–47)
HGB BLD-MCNC: 8.4 G/DL — LOW (ref 12–16)
IMM GRANULOCYTES NFR BLD AUTO: 0.3 % — SIGNIFICANT CHANGE UP (ref 0.1–0.3)
LYMPHOCYTES # BLD AUTO: 0.65 K/UL — LOW (ref 1.2–3.4)
LYMPHOCYTES # BLD AUTO: 7.6 % — LOW (ref 20.5–51.1)
MAGNESIUM SERPL-MCNC: 1.8 MG/DL — SIGNIFICANT CHANGE UP (ref 1.8–2.4)
MCHC RBC-ENTMCNC: 25 PG — LOW (ref 27–31)
MCHC RBC-ENTMCNC: 31.9 G/DL — LOW (ref 32–37)
MCV RBC AUTO: 78.3 FL — LOW (ref 81–99)
MONOCYTES # BLD AUTO: 0.48 K/UL — SIGNIFICANT CHANGE UP (ref 0.1–0.6)
MONOCYTES NFR BLD AUTO: 5.6 % — SIGNIFICANT CHANGE UP (ref 1.7–9.3)
NEUTROPHILS # BLD AUTO: 7.2 K/UL — HIGH (ref 1.4–6.5)
NEUTROPHILS NFR BLD AUTO: 83.7 % — HIGH (ref 42.2–75.2)
NRBC # BLD: 0 /100 WBCS — SIGNIFICANT CHANGE UP (ref 0–0)
PHOSPHATE SERPL-MCNC: 5.7 MG/DL — HIGH (ref 2.1–4.9)
PLATELET # BLD AUTO: 405 K/UL — HIGH (ref 130–400)
POTASSIUM SERPL-MCNC: 4.6 MMOL/L — SIGNIFICANT CHANGE UP (ref 3.5–5)
POTASSIUM SERPL-SCNC: 4.6 MMOL/L — SIGNIFICANT CHANGE UP (ref 3.5–5)
RBC # BLD: 3.36 M/UL — LOW (ref 4.2–5.4)
RBC # FLD: 16.9 % — HIGH (ref 11.5–14.5)
SODIUM SERPL-SCNC: 136 MMOL/L — SIGNIFICANT CHANGE UP (ref 135–146)
WBC # BLD: 8.6 K/UL — SIGNIFICANT CHANGE UP (ref 4.8–10.8)
WBC # FLD AUTO: 8.6 K/UL — SIGNIFICANT CHANGE UP (ref 4.8–10.8)

## 2018-05-12 RX ORDER — NYSTATIN CREAM 100000 [USP'U]/G
1 CREAM TOPICAL
Qty: 0 | Refills: 0 | Status: DISCONTINUED | OUTPATIENT
Start: 2018-05-12 | End: 2018-05-17

## 2018-05-12 RX ORDER — INSULIN GLARGINE 100 [IU]/ML
3 INJECTION, SOLUTION SUBCUTANEOUS ONCE
Qty: 0 | Refills: 0 | Status: COMPLETED | OUTPATIENT
Start: 2018-05-12 | End: 2018-05-12

## 2018-05-12 RX ADMIN — ESCITALOPRAM OXALATE 10 MILLIGRAM(S): 10 TABLET, FILM COATED ORAL at 11:03

## 2018-05-12 RX ADMIN — Medication 40 MILLIGRAM(S): at 05:12

## 2018-05-12 RX ADMIN — Medication 325 MILLIGRAM(S): at 11:03

## 2018-05-12 RX ADMIN — Medication 650 MILLIGRAM(S): at 22:35

## 2018-05-12 RX ADMIN — Medication 50 MILLIGRAM(S): at 05:12

## 2018-05-12 RX ADMIN — Medication 250 MILLIGRAM(S): at 17:03

## 2018-05-12 RX ADMIN — SENNA PLUS 1 TABLET(S): 8.6 TABLET ORAL at 22:05

## 2018-05-12 RX ADMIN — Medication 650 MILLIGRAM(S): at 10:21

## 2018-05-12 RX ADMIN — ATORVASTATIN CALCIUM 10 MILLIGRAM(S): 80 TABLET, FILM COATED ORAL at 22:05

## 2018-05-12 RX ADMIN — Medication 667 MILLIGRAM(S): at 08:25

## 2018-05-12 RX ADMIN — Medication 100 MILLIGRAM(S): at 05:12

## 2018-05-12 RX ADMIN — Medication 667 MILLIGRAM(S): at 12:48

## 2018-05-12 RX ADMIN — HEPARIN SODIUM 5000 UNIT(S): 5000 INJECTION INTRAVENOUS; SUBCUTANEOUS at 17:04

## 2018-05-12 RX ADMIN — Medication 250 MILLIGRAM(S): at 05:12

## 2018-05-12 RX ADMIN — Medication 100 MILLIGRAM(S): at 22:05

## 2018-05-12 RX ADMIN — Medication 100 MICROGRAM(S): at 05:12

## 2018-05-12 RX ADMIN — Medication 650 MILLIGRAM(S): at 12:54

## 2018-05-12 RX ADMIN — HEPARIN SODIUM 5000 UNIT(S): 5000 INJECTION INTRAVENOUS; SUBCUTANEOUS at 05:13

## 2018-05-12 RX ADMIN — Medication 667 MILLIGRAM(S): at 17:03

## 2018-05-12 RX ADMIN — Medication 100 MILLIGRAM(S): at 13:02

## 2018-05-12 RX ADMIN — NYSTATIN CREAM 1 APPLICATION(S): 100000 CREAM TOPICAL at 17:05

## 2018-05-12 RX ADMIN — Medication 5 MILLIGRAM(S): at 22:05

## 2018-05-12 RX ADMIN — Medication 650 MILLIGRAM(S): at 22:05

## 2018-05-12 NOTE — PROGRESS NOTE ADULT - ASSESSMENT
84 yo F with h/o DM, HTN, HL, RA, hypothyroidism, CHF (EF 55% in 12/17)< s/p PPM, s/p AICD, hemorrhoids, p/w weakness and diarrhea. Found to have DREW , acidosis and hyperkalemia/cystitis/neurogenic bladder:    #) Emphysematous cystitis  - Urine culture: 10,000 - 49,000 CFU/mL Escherichia coli (05.01.18)  - ID consult appreciated: start Ciprofloxacin 250 mg q12h for 3 weeks (till 06/01/18)  - f/u urology: continue Collins and antibiotics (for 2 weeks)     #) DM type 1  - insulin discontinued for hypoglycemic episodes  - monitor finger sticks    #) Chronic Systolic heart failure  - continue Lasix 40mg PO  - continue metoprolol ER 50mg daily     #) ESRD  - creatinine improving  - Nephology following: no need for RRT  - continue feso4 po q8  - continue phoslo    #) DKA: resolved  - Gap closed and was re-opened periodically on the floor likely 2/2 to CKD  - c/w to monitor FS, keep blood sugar <180    #) Hypothyroidism  - continue levothyroxine     #) Disposition  - anticipated for discharge tomorrow    #) DNR/DNI

## 2018-05-12 NOTE — PROGRESS NOTE ADULT - SUBJECTIVE AND OBJECTIVE BOX
SUBJECTIVE:    Patient is a 83y old Female who presents with a chief complaint of DKA (01 May 2018 04:15)    Currently admitted to medicine with the primary diagnosis of DKA (diabetic ketoacidoses)     Today is hospital day 12d. This morning she is resting comfortably in bed and reports no new issues or overnight events.     PAST MEDICAL & SURGICAL HISTORY  Neuropathy  Diabetes  Hypothyroidism  HTN (hypertension)  CHF (congestive heart failure)  AICD (automatic cardioverter/defibrillator) present    SOCIAL HISTORY:  Negative for smoking/alcohol/drug use.     ALLERGIES:  Plavix (Other (Moderate))    MEDICATIONS:  STANDING MEDICATIONS  atorvastatin 10 milliGRAM(s) Oral at bedtime  calcium acetate 667 milliGRAM(s) Oral three times a day with meals  ciprofloxacin     Tablet 250 milliGRAM(s) Oral every 12 hours  dextrose 5%. 1000 milliLiter(s) IV Continuous <Continuous>  dextrose 50% Injectable 12.5 Gram(s) IV Push once  dextrose 50% Injectable 25 Gram(s) IV Push once  dextrose 50% Injectable 25 Gram(s) IV Push once  docusate sodium 100 milliGRAM(s) Oral three times a day  escitalopram 10 milliGRAM(s) Oral daily  ferrous    sulfate 325 milliGRAM(s) Oral daily  heparin  Injectable 5000 Unit(s) SubCutaneous every 12 hours  insulin glargine Injectable (LANTUS) 5 Unit(s) SubCutaneous at bedtime  insulin lispro (HumaLOG) corrective regimen sliding scale   SubCutaneous three times a day before meals  insulin lispro Injectable (HumaLOG) 3 Unit(s) SubCutaneous three times a day before meals  levothyroxine 100 MICROGram(s) Oral daily  lidocaine   Patch 1 Patch Transdermal daily  lidocaine   Patch      melatonin 5 milliGRAM(s) Oral at bedtime  metoprolol succinate ER 50 milliGRAM(s) Oral daily  nystatin Powder 1 Application(s) Topical two times a day  senna 1 Tablet(s) Oral at bedtime    PRN MEDICATIONS  acetaminophen   Tablet. 650 milliGRAM(s) Oral every 6 hours PRN  benzocaine 15 mG/menthol 3.6 mG Lozenge 1 Lozenge Oral every 3 hours PRN  dextrose Gel 1 Dose(s) Oral once PRN  diphenhydrAMINE   Capsule 25 milliGRAM(s) Oral every 4 hours PRN  glucagon  Injectable 1 milliGRAM(s) IntraMuscular once PRN    VITALS:   T(F): 97.5  HR: 58  BP: 142/69  RR: 17  SpO2: 93%    LABS:                        8.4    8.60  )-----------( 405      ( 12 May 2018 08:47 )             26.3     05-12    136  |  94<L>  |  85<HH>  ----------------------------<  122<H>  4.6   |  25  |  4.9<HH>    Ca    7.4<L>      12 May 2018 08:47  Phos  5.7       Mg     1.8     -        Urinalysis Basic - ( 10 May 2018 17:03 )    Color: Yellow / Appearance: Clear / S.015 / pH: x  Gluc: x / Ketone: Negative  / Bili: Negative / Urobili: 0.2   Blood: x / Protein: 30 / Nitrite: Negative   Leuk Esterase: Large / RBC: 5-10 /HPF / WBC >50 /HPF   Sq Epi: x / Non Sq Epi: Occasional /HPF / Bacteria: See Note      RADIOLOGY:  Xray Chest 1 View- PORTABLE-Routine (18)  Increased bilateral interstitial opacities    US Retroperitoneal Complete (18)  No stones or hydronephrosis bilaterally.    CT Abdomen and Pelvis No Cont (18)  Findings compatible with emphysematous cystitis.    PHYSICAL EXAM:  GEN: No acute distress, frail  LUNGS: Clear to auscultation bilaterally   HEART: S1/S2 present. RRR.   ABD: Soft, non-tender, non-distended. Bowel sounds present  EXT: 1+ edema b/l LE  NEURO: AAOX3  Skin: No rashes  Collins's present

## 2018-05-12 NOTE — PROGRESS NOTE ADULT - ATTENDING COMMENTS
84 yo F with h/o DM, HTN, HL, RA, hypothyroidism, CHF (EF 55% in 12/17)< s/p PPM, s/p AICD, hemorrhoids, p/w weakness and diarrhea. Found to have DREW , acidosis and hyperkalemia/emphysematous cystitis/neurogenic bladder:    #) Emphysematous cystitis  - Urine culture: 10,000 - 49,000 CFU/mL Escherichia coli (05.01.18)  - ID consult appreciated: initially CTX now Cipro for total 3 weeks   - f/u urology: continue Collins until antibiotics end    #) Metabolic encephalopathy 2/2 to UTI & ?uremia  - much improved    #) Hypothermia  - isolated event  - f/u septic workup: negative  - spoke to ID: continue current Abx management    #) Hyperkalemia   Kayexalate PRN  - Monitor bmp    #) DM type 1  - continue with insulin   - monitor finger sticks    #) Chronic Systolic heart failure  Holding lasix starting 5/12 since poor PO intake per Nephro.  - continue metoprolol ER 50mg daily     #) DREW on CKD-5 / Recent ATN  - Creatinine improving very slowly.  - Nephology following: no need for RRT d/t her fraility.   - continue feso4 po q8, sevelamer    #) DKA: resolved    #) Hypoglycemic episodes : D/janessa all Insulin.     #) Hypothyroidism  - continue levothyroxine     #) DNR/DNI .    Encourage PT. 84 yo F with h/o DM, HTN, HL, RA, hypothyroidism, CHF (EF 55% in 12/17)< s/p PPM, s/p AICD, hemorrhoids, p/w weakness and diarrhea. Found to have DREW , acidosis and hyperkalemia/emphysematous cystitis/neurogenic bladder:    #) Emphysematous cystitis  - Urine culture: 10,000 - 49,000 CFU/mL Escherichia coli (05.01.18)  - ID consult appreciated: initially CTX now Cipro for total 3 weeks   - f/u urology: continue Collins until antibiotics end    #) Metabolic encephalopathy 2/2 to UTI & ?uremia  - much improved    # Fatigue : Not getting better despite treatment for UTI .   PArtly is d/t Uremia . DREW from ATN will improve only over weeks . In the meanwhile, encourage feeding (glucerna etc) & Activity.   Had some MS, TR, Moderately reduced RV function, Moderate Pulm HTN on Echo.   5/12 complained of transient Chest pains that went away spontaneously. Doesn't sound so cardiac. EKG shows A sensed V Paced.   May consider AICD interrogation if not done recently.       #) Hypothermia  - isolated event  - f/u septic workup: negative  - spoke to ID: continue current Abx management    #) Hyperkalemia   Kayexalate PRN  - Monitor bmp    #) DM type 1  - continue with insulin   - monitor finger sticks    #) Chronic Systolic heart failure  Holding lasix starting 5/12 since poor PO intake per Nephro.  - continue metoprolol ER 50mg daily     #) DREW on CKD-5 / Recent ATN  - Creatinine improving very slowly.  - Nephology following: no need for RRT d/t her fraility.   - continue feso4 po q8, sevelamer    #) DKA: resolved    #) Hypoglycemic episodes : D/janessa all Insulin.     #) Hypothyroidism  - continue levothyroxine     #) DNR/DNI .    Encourage PT.

## 2018-05-12 NOTE — PROGRESS NOTE ADULT - SUBJECTIVE AND OBJECTIVE BOX
Nephrology progress note    Patient is seen and examined, events over the last 24 h noted .  lethargic not eating     Allergies:  Plavix (Other (Moderate))    Hospital Medications:   MEDICATIONS  (STANDING):  atorvastatin 10 milliGRAM(s) Oral at bedtime  calcium acetate 667 milliGRAM(s) Oral three times a day with meals  ciprofloxacin     Tablet 250 milliGRAM(s) Oral every 12 hours  docusate sodium 100 milliGRAM(s) Oral three times a day  escitalopram 10 milliGRAM(s) Oral daily  ferrous    sulfate 325 milliGRAM(s) Oral daily  furosemide    Tablet 40 milliGRAM(s) Oral daily  heparin  Injectable 5000 Unit(s) SubCutaneous every 12 hours  insulin glargine Injectable (LANTUS) 5 Unit(s) SubCutaneous at bedtime  insulin lispro (HumaLOG) corrective regimen sliding scale   SubCutaneous three times a day before meals  insulin lispro Injectable (HumaLOG) 3 Unit(s) SubCutaneous three times a day before meals  levothyroxine 100 MICROGram(s) Oral daily  lidocaine   Patch 1 Patch Transdermal daily   melatonin 5 milliGRAM(s) Oral at bedtime  metoprolol succinate ER 50 milliGRAM(s) Oral daily  nystatin Powder 1 Application(s) Topical two times a day  senna 1 Tablet(s) Oral at bedtime        VITALS:  T(F): 97.5 (18 @ 06:26), Max: 97.6 (18 @ 14:14)  HR: 58 (18 @ 06:26)  BP: 142/69 (18 @ 06:26)  RR: 17 (18 @ 06:26)  SpO2: 93% (18 @ 22:27)      05-10 @ 07:  -   @ 07:00  --------------------------------------------------------  IN: 0 mL / OUT: 1825 mL / NET: -1825 mL     @ 07:01  -   @ 07:00  --------------------------------------------------------  IN: 0 mL / OUT: 1700 mL / NET: -1700 mL     @ 07:01  -   @ 11:50  --------------------------------------------------------  IN: 200 mL / OUT: 0 mL / NET: 200 mL          PHYSICAL EXAM:  Constitutional: lethargic   HEENT: anicteric sclera, oropharynx clear, MMM  Neck: No JVD  Respiratory: CTAB, no wheezes, rales or rhonchi  Cardiovascular: S1, S2, RRR  Gastrointestinal: BS+, soft, NT/ND  Extremities: No cyanosis or clubbing. No peripheral edema  :  No davis.   Skin: No rashes    LABS:      136  |  94<L>  |  85<HH>  ----------------------------<  122<H>  4.6   |  25  |  4.9<HH>    Creatinine Trend: 4.9<--, 4.9<--, 5.2<--, 5.6<--, 5.6<--, 5.6<--    Ca    7.4<L>      12 May 2018 08:47  Phos  5.7       Mg     1.8                                 8.4    8.60  )-----------( 405      ( 12 May 2018 08:47 )             26.3       Urine Studies:  Urinalysis Basic - ( 10 May 2018 17:03 )    Color: Yellow / Appearance: Clear / S.015 / pH:   Gluc:  / Ketone: Negative  / Bili: Negative / Urobili: 0.2   Blood:  / Protein: 30 / Nitrite: Negative   Leuk Esterase: Large / RBC: 5-10 /HPF / WBC >50 /HPF   Sq Epi:  / Non Sq Epi: Occasional /HPF / Bacteria: See Note    RADIOLOGY & ADDITIONAL STUDIES:

## 2018-05-12 NOTE — PROGRESS NOTE ADULT - ASSESSMENT
82 yo F with h/o DM, HTN, HL, RA, hypothyroidism, CHF (EF 55% in 12/17)< s/p PPM, s/p AICD, hemorrhoids, p/w weakness and diarrhea. Found to have DREW , acidosis and hyperkalemia/cystitis/neurogenic bladder:    # creatinine stable non oliguric no need for RRT   # not eating will hold her lasix    # off antibiotics now   # no need for RRT   # Ca ok if corrected to albumin   #hyperphosphatemia on Pholso   # DC davis voiding trial     # overall prognosis poor

## 2018-05-13 LAB
ANION GAP SERPL CALC-SCNC: 15 MMOL/L — HIGH (ref 7–14)
BASOPHILS # BLD AUTO: 0.09 K/UL — SIGNIFICANT CHANGE UP (ref 0–0.2)
BASOPHILS NFR BLD AUTO: 1.1 % — HIGH (ref 0–1)
BUN SERPL-MCNC: 78 MG/DL — CRITICAL HIGH (ref 10–20)
CALCIUM SERPL-MCNC: 7.5 MG/DL — LOW (ref 8.5–10.1)
CHLORIDE SERPL-SCNC: 95 MMOL/L — LOW (ref 98–110)
CO2 SERPL-SCNC: 26 MMOL/L — SIGNIFICANT CHANGE UP (ref 17–32)
CREAT SERPL-MCNC: 4.6 MG/DL — CRITICAL HIGH (ref 0.7–1.5)
CULTURE RESULTS: SIGNIFICANT CHANGE UP
CULTURE RESULTS: SIGNIFICANT CHANGE UP
EOSINOPHIL # BLD AUTO: 0.34 K/UL — SIGNIFICANT CHANGE UP (ref 0–0.7)
EOSINOPHIL NFR BLD AUTO: 4.3 % — SIGNIFICANT CHANGE UP (ref 0–8)
GLUCOSE SERPL-MCNC: 168 MG/DL — HIGH (ref 70–99)
HCT VFR BLD CALC: 26.5 % — LOW (ref 37–47)
HGB BLD-MCNC: 8.5 G/DL — LOW (ref 12–16)
IMM GRANULOCYTES NFR BLD AUTO: 0.4 % — HIGH (ref 0.1–0.3)
LYMPHOCYTES # BLD AUTO: 0.95 K/UL — LOW (ref 1.2–3.4)
LYMPHOCYTES # BLD AUTO: 12.1 % — LOW (ref 20.5–51.1)
MCHC RBC-ENTMCNC: 25 PG — LOW (ref 27–31)
MCHC RBC-ENTMCNC: 32.1 G/DL — SIGNIFICANT CHANGE UP (ref 32–37)
MCV RBC AUTO: 77.9 FL — LOW (ref 81–99)
MONOCYTES # BLD AUTO: 0.5 K/UL — SIGNIFICANT CHANGE UP (ref 0.1–0.6)
MONOCYTES NFR BLD AUTO: 6.4 % — SIGNIFICANT CHANGE UP (ref 1.7–9.3)
NEUTROPHILS # BLD AUTO: 5.92 K/UL — SIGNIFICANT CHANGE UP (ref 1.4–6.5)
NEUTROPHILS NFR BLD AUTO: 75.7 % — HIGH (ref 42.2–75.2)
NRBC # BLD: 0 /100 WBCS — SIGNIFICANT CHANGE UP (ref 0–0)
PLATELET # BLD AUTO: 446 K/UL — HIGH (ref 130–400)
POTASSIUM SERPL-MCNC: 4.8 MMOL/L — SIGNIFICANT CHANGE UP (ref 3.5–5)
POTASSIUM SERPL-SCNC: 4.8 MMOL/L — SIGNIFICANT CHANGE UP (ref 3.5–5)
RBC # BLD: 3.4 M/UL — LOW (ref 4.2–5.4)
RBC # FLD: 17.1 % — HIGH (ref 11.5–14.5)
SODIUM SERPL-SCNC: 136 MMOL/L — SIGNIFICANT CHANGE UP (ref 135–146)
SPECIMEN SOURCE: SIGNIFICANT CHANGE UP
SPECIMEN SOURCE: SIGNIFICANT CHANGE UP
WBC # BLD: 7.83 K/UL — SIGNIFICANT CHANGE UP (ref 4.8–10.8)
WBC # FLD AUTO: 7.83 K/UL — SIGNIFICANT CHANGE UP (ref 4.8–10.8)

## 2018-05-13 RX ORDER — INSULIN GLARGINE 100 [IU]/ML
5 INJECTION, SOLUTION SUBCUTANEOUS ONCE
Qty: 0 | Refills: 0 | Status: COMPLETED | OUTPATIENT
Start: 2018-05-13 | End: 2018-05-13

## 2018-05-13 RX ADMIN — ATORVASTATIN CALCIUM 10 MILLIGRAM(S): 80 TABLET, FILM COATED ORAL at 21:27

## 2018-05-13 RX ADMIN — Medication 50 MILLIGRAM(S): at 05:22

## 2018-05-13 RX ADMIN — Medication 650 MILLIGRAM(S): at 16:13

## 2018-05-13 RX ADMIN — ESCITALOPRAM OXALATE 10 MILLIGRAM(S): 10 TABLET, FILM COATED ORAL at 11:03

## 2018-05-13 RX ADMIN — Medication 100 MILLIGRAM(S): at 05:22

## 2018-05-13 RX ADMIN — Medication 100 MILLIGRAM(S): at 12:26

## 2018-05-13 RX ADMIN — HEPARIN SODIUM 5000 UNIT(S): 5000 INJECTION INTRAVENOUS; SUBCUTANEOUS at 05:22

## 2018-05-13 RX ADMIN — INSULIN GLARGINE 5 UNIT(S): 100 INJECTION, SOLUTION SUBCUTANEOUS at 22:57

## 2018-05-13 RX ADMIN — Medication 667 MILLIGRAM(S): at 12:24

## 2018-05-13 RX ADMIN — Medication 325 MILLIGRAM(S): at 11:03

## 2018-05-13 RX ADMIN — Medication 5 MILLIGRAM(S): at 21:27

## 2018-05-13 RX ADMIN — HEPARIN SODIUM 5000 UNIT(S): 5000 INJECTION INTRAVENOUS; SUBCUTANEOUS at 17:13

## 2018-05-13 RX ADMIN — Medication 100 MICROGRAM(S): at 05:22

## 2018-05-13 RX ADMIN — Medication 100 MILLIGRAM(S): at 21:27

## 2018-05-13 RX ADMIN — INSULIN GLARGINE 3 UNIT(S): 100 INJECTION, SOLUTION SUBCUTANEOUS at 00:20

## 2018-05-13 RX ADMIN — Medication 250 MILLIGRAM(S): at 17:13

## 2018-05-13 RX ADMIN — SENNA PLUS 1 TABLET(S): 8.6 TABLET ORAL at 21:27

## 2018-05-13 RX ADMIN — Medication 667 MILLIGRAM(S): at 17:13

## 2018-05-13 RX ADMIN — NYSTATIN CREAM 1 APPLICATION(S): 100000 CREAM TOPICAL at 05:22

## 2018-05-13 RX ADMIN — Medication 250 MILLIGRAM(S): at 05:22

## 2018-05-13 RX ADMIN — Medication 667 MILLIGRAM(S): at 08:13

## 2018-05-13 NOTE — PROGRESS NOTE ADULT - ASSESSMENT
82 yo F with h/o DM, HTN, HL, RA, hypothyroidism, CHF (EF 55% in 12/17)< s/p PPM, s/p AICD, hemorrhoids, p/w weakness and diarrhea. Found to have DREW , acidosis and hyperkalemia/emphysematous cystitis/neurogenic bladder:    #) Emphysematous cystitis  - Urine culture: 10,000 - 49,000 CFU/mL Escherichia coli (05.01.18)  - ID consult appreciated: initially CTX now Cipro for total 3 weeks   - f/u urology: continue Collins until antibiotics end    #) Metabolic encephalopathy 2/2 to UTI & ?uremia  - much improved    # Fatigue : Not getting better despite treatment for UTI .   PArtly is d/t Uremia . DREW from ATN will improve only over weeks . In the meanwhile, encourage feeding (glucerna etc) & Activity.   Had some MS, TR, Moderately reduced RV function, Moderate Pulm HTN on Echo.       #h/o AICD :  EKG shows A sensed V Paced.   Get AICD interrogation if not done recently.       #) DREW on CKD-5 / Recent ATN  - Creatinine improving very slowly but surely.  - Nephology following: no need for RRT d/t her fraility.   - continue feso4 po q8, sevelamer    #) Hyperkalemia   - Monitor bmp  Kayexalate PRN    #) DM type 1  - Monitoring off Insulin d/t recent hypoglycemia.    #) DKA: resolved    #) Chronic Systolic heart failure  Holding lasix starting 5/12 since poor PO intake per Nephro.  - continue metoprolol ER 50mg daily       #) Hypothyroidism  - continue levothyroxine     #) DNR/DNI .    Encourage PT.

## 2018-05-13 NOTE — PROGRESS NOTE ADULT - ASSESSMENT
84 yo F with h/o DM, HTN, HL, RA, hypothyroidism, HFpEF, AICD, hemorrhoids, p/w weakness and diarrhea. Found to have DREW, acidosis and hyperkalemia/cystitis/neurogenic bladder    - sCr creatinine slightly better  - non oliguric  - no need for RRT   - continue lasix 40 mg q24h   - Ca wnl if corrected to albumin   - hyperphosphatemia on Pholso   - Pt will need access for HD on this admission or during close follow up  - overall prognosis poor

## 2018-05-13 NOTE — PROGRESS NOTE ADULT - SUBJECTIVE AND OBJECTIVE BOX
S : +FAtigue. OOB to chair for just a few hours a day.   Walked with minimal to moderate assistance with PT.   No CP/SOB      All other pertinent ROS negative.      05-12-18 @ 07:01  -  05-13-18 @ 07:00  --------------------------------------------------------  IN: 220 mL / OUT: 1075 mL / NET: -855 mL    05-13-18 @ 07:01  -  05-13-18 @ 17:30  --------------------------------------------------------  IN: 240 mL / OUT: 550 mL / NET: -310 mL      Vital Signs Last 24 Hrs  T(C): 35.9 (13 May 2018 12:30), Max: 36.6 (12 May 2018 20:06)  T(F): 96.6 (13 May 2018 12:30), Max: 97.9 (12 May 2018 20:06)  HR: 60 (13 May 2018 12:30) (60 - 63)  BP: 135/68 (13 May 2018 12:30) (135/68 - 149/67)  BP(mean): --  RR: 17 (13 May 2018 12:30) (17 - 18)  SpO2: 98% (12 May 2018 20:00) (98% - 98%)  PHYSICAL EXAM:    Constitutional: NAD, awake and alert, well-developed  HEENT: PERR, EOMI, Normal Hearing, MMM  Neck: Soft and supple, No LAD, No JVD  Respiratory: Breath sounds are clear bilaterally, No wheezing, rales or rhonchi  Cardiovascular: S1 and S2, regular rate and rhythm, no Murmurs, gallops or rubs  Gastrointestinal: Bowel Sounds present, soft, nontender, nondistended, no guarding, no rebound  Extremities: No peripheral edema      MEDICATIONS:  MEDICATIONS  (STANDING):  atorvastatin 10 milliGRAM(s) Oral at bedtime  calcium acetate 667 milliGRAM(s) Oral three times a day with meals  ciprofloxacin     Tablet 250 milliGRAM(s) Oral every 12 hours  dextrose 5%. 1000 milliLiter(s) (50 mL/Hr) IV Continuous <Continuous>  dextrose 50% Injectable 12.5 Gram(s) IV Push once  dextrose 50% Injectable 25 Gram(s) IV Push once  dextrose 50% Injectable 25 Gram(s) IV Push once  docusate sodium 100 milliGRAM(s) Oral three times a day  escitalopram 10 milliGRAM(s) Oral daily  ferrous    sulfate 325 milliGRAM(s) Oral daily  heparin  Injectable 5000 Unit(s) SubCutaneous every 12 hours  levothyroxine 100 MICROGram(s) Oral daily  lidocaine   Patch 1 Patch Transdermal daily  lidocaine   Patch      melatonin 5 milliGRAM(s) Oral at bedtime  metoprolol succinate ER 50 milliGRAM(s) Oral daily  nystatin Powder 1 Application(s) Topical two times a day  senna 1 Tablet(s) Oral at bedtime      LABS: All Labs Reviewed:                        8.5    7.83  )-----------( 446      ( 13 May 2018 09:38 )             26.5     05-13    136  |  95<L>  |  78<HH>  ----------------------------<  168<H>  4.8   |  26  |  4.6<HH>    Ca    7.5<L>      13 May 2018 09:38  Phos  5.7     05-12  Mg     1.8     05-12            Blood Culture: 05-09 @ 07:03  Organism --  Gram Stain Blood -- Gram Stain --  Specimen Source .Blood None  Culture-Blood --        Radiology: reviewed

## 2018-05-13 NOTE — PROGRESS NOTE ADULT - SUBJECTIVE AND OBJECTIVE BOX
Nephrology progress note    Patient is seen and examined, events over the last 24 h noted  Pt feels weak    Allergies:  Plavix (Other (Moderate))    Hospital Medications:   MEDICATIONS  (STANDING):  atorvastatin 10 milliGRAM(s) Oral at bedtime  calcium acetate 667 milliGRAM(s) Oral three times a day with meals  ciprofloxacin     Tablet 250 milliGRAM(s) Oral every 12 hours  docusate sodium 100 milliGRAM(s) Oral three times a day  escitalopram 10 milliGRAM(s) Oral daily  ferrous    sulfate 325 milliGRAM(s) Oral daily  heparin  Injectable 5000 Unit(s) SubCutaneous every 12 hours  levothyroxine 100 MICROGram(s) Oral daily  lidocaine   Patch 1 Patch Transdermal daily  melatonin 5 milliGRAM(s) Oral at bedtime  metoprolol succinate ER 50 milliGRAM(s) Oral daily  nystatin Powder 1 Application(s) Topical two times a day  senna 1 Tablet(s) Oral at bedtime      VITALS:  T(F): 96.6 (18 @ 12:30), Max: 97.9 (18 @ 20:06)  HR: 60 (18 @ 12:30)  BP: 135/68 (18 @ 12:30)  RR: 17 (18 @ 12:30)  SpO2: 98% (18 @ 20:00)  Wt(kg): --     @ 07:01  -   @ 07:00  --------------------------------------------------------  IN: 0 mL / OUT: 1700 mL / NET: -1700 mL     @ 07:01  -   @ 07:00  --------------------------------------------------------  IN: 220 mL / OUT: 1075 mL / NET: -855 mL     @ 07:01  -   @ 14:57  --------------------------------------------------------  IN: 240 mL / OUT: 550 mL / NET: -310 mL          PHYSICAL EXAM:  Constitutional: NAD  HEENT: anicteric sclera, oropharynx clear, MMM  Neck: No JVD  Respiratory: diffuse ronchi  Cardiovascular: S1, S2, RRR  Gastrointestinal: BS+, soft, NT/ND  Extremities: No cyanosis or clubbing. No peripheral edema  :  +davis  Skin: No rashes    LABS:      136  |  95<L>  |  78<HH>  ----------------------------<  168<H>  4.8   |  26  |  4.6<HH>    Ca    7.5<L>      13 May 2018 09:38  Phos  5.7     05-12  Mg     1.8     05-12    Creatinine, Serum: 4.9: Critical value: mg/dL (18 @ 08:47)  Creatinine, Serum: 5.6: Critical value: mg/dL (18 @ 07:03)  Creatinine, Serum: 6.2: Critical value: mg/dL (18 @ 19:10)                        8.5    7.83  )-----------( 446      ( 13 May 2018 09:38 )             26.5       Urine Studies:  Urinalysis Basic - ( 10 May 2018 17:03 )    Color: Yellow / Appearance: Clear / S.015 / pH:   Gluc:  / Ketone: Negative  / Bili: Negative / Urobili: 0.2   Blood:  / Protein: 30 / Nitrite: Negative   Leuk Esterase: Large / RBC: 5-10 /HPF / WBC >50 /HPF   Sq Epi:  / Non Sq Epi: Occasional /HPF / Bacteria: See Note        RADIOLOGY & ADDITIONAL STUDIES:

## 2018-05-14 LAB
ANION GAP SERPL CALC-SCNC: 13 MMOL/L — SIGNIFICANT CHANGE UP (ref 7–14)
BASOPHILS # BLD AUTO: 0.09 K/UL — SIGNIFICANT CHANGE UP (ref 0–0.2)
BASOPHILS NFR BLD AUTO: 1.2 % — HIGH (ref 0–1)
BUN SERPL-MCNC: 67 MG/DL — CRITICAL HIGH (ref 10–20)
CALCIUM SERPL-MCNC: 7.6 MG/DL — LOW (ref 8.5–10.1)
CHLORIDE SERPL-SCNC: 97 MMOL/L — LOW (ref 98–110)
CO2 SERPL-SCNC: 26 MMOL/L — SIGNIFICANT CHANGE UP (ref 17–32)
CREAT SERPL-MCNC: 4.6 MG/DL — CRITICAL HIGH (ref 0.7–1.5)
CULTURE RESULTS: SIGNIFICANT CHANGE UP
EOSINOPHIL # BLD AUTO: 0.33 K/UL — SIGNIFICANT CHANGE UP (ref 0–0.7)
EOSINOPHIL NFR BLD AUTO: 4.4 % — SIGNIFICANT CHANGE UP (ref 0–8)
GLUCOSE SERPL-MCNC: 124 MG/DL — HIGH (ref 70–99)
HCT VFR BLD CALC: 26.5 % — LOW (ref 37–47)
HGB BLD-MCNC: 8.6 G/DL — LOW (ref 12–16)
IMM GRANULOCYTES NFR BLD AUTO: 0.4 % — HIGH (ref 0.1–0.3)
LYMPHOCYTES # BLD AUTO: 1.02 K/UL — LOW (ref 1.2–3.4)
LYMPHOCYTES # BLD AUTO: 13.6 % — LOW (ref 20.5–51.1)
MCHC RBC-ENTMCNC: 25.5 PG — LOW (ref 27–31)
MCHC RBC-ENTMCNC: 32.5 G/DL — SIGNIFICANT CHANGE UP (ref 32–37)
MCV RBC AUTO: 78.6 FL — LOW (ref 81–99)
MONOCYTES # BLD AUTO: 0.52 K/UL — SIGNIFICANT CHANGE UP (ref 0.1–0.6)
MONOCYTES NFR BLD AUTO: 6.9 % — SIGNIFICANT CHANGE UP (ref 1.7–9.3)
NEUTROPHILS # BLD AUTO: 5.51 K/UL — SIGNIFICANT CHANGE UP (ref 1.4–6.5)
NEUTROPHILS NFR BLD AUTO: 73.5 % — SIGNIFICANT CHANGE UP (ref 42.2–75.2)
NRBC # BLD: 0 /100 WBCS — SIGNIFICANT CHANGE UP (ref 0–0)
PLATELET # BLD AUTO: 436 K/UL — HIGH (ref 130–400)
POTASSIUM SERPL-MCNC: 4.9 MMOL/L — SIGNIFICANT CHANGE UP (ref 3.5–5)
POTASSIUM SERPL-SCNC: 4.9 MMOL/L — SIGNIFICANT CHANGE UP (ref 3.5–5)
RBC # BLD: 3.37 M/UL — LOW (ref 4.2–5.4)
RBC # FLD: 17.2 % — HIGH (ref 11.5–14.5)
SODIUM SERPL-SCNC: 136 MMOL/L — SIGNIFICANT CHANGE UP (ref 135–146)
SPECIMEN SOURCE: SIGNIFICANT CHANGE UP
WBC # BLD: 7.5 K/UL — SIGNIFICANT CHANGE UP (ref 4.8–10.8)
WBC # FLD AUTO: 7.5 K/UL — SIGNIFICANT CHANGE UP (ref 4.8–10.8)

## 2018-05-14 RX ORDER — INSULIN LISPRO 100/ML
2 VIAL (ML) SUBCUTANEOUS
Qty: 0 | Refills: 0 | Status: DISCONTINUED | OUTPATIENT
Start: 2018-05-14 | End: 2018-05-17

## 2018-05-14 RX ORDER — INSULIN GLARGINE 100 [IU]/ML
5 INJECTION, SOLUTION SUBCUTANEOUS EVERY MORNING
Qty: 0 | Refills: 0 | Status: DISCONTINUED | OUTPATIENT
Start: 2018-05-14 | End: 2018-05-17

## 2018-05-14 RX ORDER — DRONABINOL 2.5 MG
2.5 CAPSULE ORAL
Qty: 0 | Refills: 0 | Status: DISCONTINUED | OUTPATIENT
Start: 2018-05-14 | End: 2018-05-17

## 2018-05-14 RX ORDER — INSULIN LISPRO 100/ML
VIAL (ML) SUBCUTANEOUS
Qty: 0 | Refills: 0 | Status: DISCONTINUED | OUTPATIENT
Start: 2018-05-14 | End: 2018-05-17

## 2018-05-14 RX ORDER — DEXTROSE 50 % IN WATER 50 %
12.5 SYRINGE (ML) INTRAVENOUS ONCE
Qty: 0 | Refills: 0 | Status: DISCONTINUED | OUTPATIENT
Start: 2018-05-14 | End: 2018-05-17

## 2018-05-14 RX ORDER — DEXTROSE 50 % IN WATER 50 %
15 SYRINGE (ML) INTRAVENOUS ONCE
Qty: 0 | Refills: 0 | Status: DISCONTINUED | OUTPATIENT
Start: 2018-05-14 | End: 2018-05-17

## 2018-05-14 RX ORDER — GLUCAGON INJECTION, SOLUTION 0.5 MG/.1ML
1 INJECTION, SOLUTION SUBCUTANEOUS ONCE
Qty: 0 | Refills: 0 | Status: DISCONTINUED | OUTPATIENT
Start: 2018-05-14 | End: 2018-05-17

## 2018-05-14 RX ORDER — SODIUM CHLORIDE 9 MG/ML
1000 INJECTION, SOLUTION INTRAVENOUS
Qty: 0 | Refills: 0 | Status: DISCONTINUED | OUTPATIENT
Start: 2018-05-14 | End: 2018-05-17

## 2018-05-14 RX ORDER — DEXTROSE 50 % IN WATER 50 %
25 SYRINGE (ML) INTRAVENOUS ONCE
Qty: 0 | Refills: 0 | Status: DISCONTINUED | OUTPATIENT
Start: 2018-05-14 | End: 2018-05-17

## 2018-05-14 RX ADMIN — Medication 100 MILLIGRAM(S): at 13:49

## 2018-05-14 RX ADMIN — Medication 100 MICROGRAM(S): at 06:19

## 2018-05-14 RX ADMIN — Medication 667 MILLIGRAM(S): at 09:48

## 2018-05-14 RX ADMIN — Medication 5 MILLIGRAM(S): at 21:48

## 2018-05-14 RX ADMIN — Medication 667 MILLIGRAM(S): at 12:22

## 2018-05-14 RX ADMIN — HEPARIN SODIUM 5000 UNIT(S): 5000 INJECTION INTRAVENOUS; SUBCUTANEOUS at 18:16

## 2018-05-14 RX ADMIN — Medication 100 MILLIGRAM(S): at 06:20

## 2018-05-14 RX ADMIN — Medication 650 MILLIGRAM(S): at 13:48

## 2018-05-14 RX ADMIN — Medication 50 MILLIGRAM(S): at 06:20

## 2018-05-14 RX ADMIN — Medication 100 MILLIGRAM(S): at 21:47

## 2018-05-14 RX ADMIN — INSULIN GLARGINE 5 UNIT(S): 100 INJECTION, SOLUTION SUBCUTANEOUS at 12:15

## 2018-05-14 RX ADMIN — ATORVASTATIN CALCIUM 10 MILLIGRAM(S): 80 TABLET, FILM COATED ORAL at 21:48

## 2018-05-14 RX ADMIN — Medication 250 MILLIGRAM(S): at 06:19

## 2018-05-14 RX ADMIN — ESCITALOPRAM OXALATE 10 MILLIGRAM(S): 10 TABLET, FILM COATED ORAL at 12:22

## 2018-05-14 RX ADMIN — Medication 650 MILLIGRAM(S): at 06:20

## 2018-05-14 RX ADMIN — Medication 667 MILLIGRAM(S): at 18:16

## 2018-05-14 RX ADMIN — Medication 325 MILLIGRAM(S): at 12:22

## 2018-05-14 RX ADMIN — SENNA PLUS 1 TABLET(S): 8.6 TABLET ORAL at 21:47

## 2018-05-14 RX ADMIN — Medication 650 MILLIGRAM(S): at 13:21

## 2018-05-14 RX ADMIN — NYSTATIN CREAM 1 APPLICATION(S): 100000 CREAM TOPICAL at 18:14

## 2018-05-14 RX ADMIN — Medication 250 MILLIGRAM(S): at 18:16

## 2018-05-14 NOTE — PROGRESS NOTE ADULT - ASSESSMENT
82 yo F with h/o DM, HTN, HL, RA, hypothyroidism, HFpEF, AICD, hemorrhoids, p/w weakness and diarrhea. Found to have DREW, acidosis and hyperkalemia/cystitis/neurogenic bladder    - creatinine trending down   - non oliguric  - no need for RRT   - voiding trial    -MOSHE , on FESO4, will increase to q 8 , will need JEAN   - on phoslo, check repeat ph, check repeat PTH   - overall prognosis poor

## 2018-05-14 NOTE — PROGRESS NOTE ADULT - SUBJECTIVE AND OBJECTIVE BOX
seen and examined  no new complaints  lying comfortable        Standing Inpatient Medications  atorvastatin 10 milliGRAM(s) Oral at bedtime  calcium acetate 667 milliGRAM(s) Oral three times a day with meals  ciprofloxacin     Tablet 250 milliGRAM(s) Oral every 12 hours  dextrose 5%. 1000 milliLiter(s) IV Continuous <Continuous>  dextrose 50% Injectable 12.5 Gram(s) IV Push once  dextrose 50% Injectable 25 Gram(s) IV Push once  dextrose 50% Injectable 25 Gram(s) IV Push once  docusate sodium 100 milliGRAM(s) Oral three times a day  escitalopram 10 milliGRAM(s) Oral daily  ferrous    sulfate 325 milliGRAM(s) Oral daily  heparin  Injectable 5000 Unit(s) SubCutaneous every 12 hours  levothyroxine 100 MICROGram(s) Oral daily  lidocaine   Patch 1 Patch Transdermal daily  lidocaine   Patch      melatonin 5 milliGRAM(s) Oral at bedtime  metoprolol succinate ER 50 milliGRAM(s) Oral daily  nystatin Powder 1 Application(s) Topical two times a day  senna 1 Tablet(s) Oral at bedtime              VITALS/PHYSICAL EXAM  --------------------------------------------------------------------------------  T(C): 35.9 (05-14-18 @ 06:00), Max: 37.9 (05-13-18 @ 23:08)  HR: 71 (05-14-18 @ 06:00) (60 - 71)  BP: 124/60 (05-14-18 @ 06:00) (124/60 - 143/72)  RR: 18 (05-14-18 @ 06:00) (17 - 18)  SpO2: 99% (05-13-18 @ 20:17) (99% - 99%)  Wt(kg): --        05-13-18 @ 07:01  -  05-14-18 @ 07:00  --------------------------------------------------------  IN: 240 mL / OUT: 950 mL / NET: -710 mL      Physical Exam:  	Gen: NAD  	Pulm: CTA B/L  	CV:S1S2; no rub  	Abd: nontender/nondistended  	LE:  no edema    LABS/STUDIES  --------------------------------------------------------------------------------              8.5    7.83  >-----------<  446      [05-13-18 @ 09:38]              26.5     136  |  95  |  78  ----------------------------<  168      [05-13-18 @ 09:38]  4.8   |  26  |  4.6        Ca     7.5     [05-13-18 @ 09:38]            Creatinine Trend:  SCr 4.6 [05-13 @ 09:38]  SCr 4.9 [05-12 @ 08:47]  SCr 4.9 [05-11 @ 09:32]  SCr 5.2 [05-10 @ 08:16]  SCr 5.6 [05-09 @ 07:03]    U      Iron 32, TIBC 114, %sat 28      [05-03-18 @ 05:50]  Ferritin 618      [05-03-18 @ 05:50]  PTH -- (Ca 6.9)      [05-09-18 @ 01:48]   47  PTH -- (Ca 7.9)      [05-08-18 @ 08:42]   42  HbA1c 12.4      [05-03-18 @ 05:50] seen and examined  no new complaints  lying comfortable        Standing Inpatient Medications  atorvastatin 10 milliGRAM(s) Oral at bedtime  calcium acetate 667 milliGRAM(s) Oral three times a day with meals  ciprofloxacin     Tablet 250 milliGRAM(s) Oral every 12 hours  dextrose 5%. 1000 milliLiter(s) IV Continuous <Continuous>  dextrose 50% Injectable 12.5 Gram(s) IV Push once  dextrose 50% Injectable 25 Gram(s) IV Push once  dextrose 50% Injectable 25 Gram(s) IV Push once  docusate sodium 100 milliGRAM(s) Oral three times a day  escitalopram 10 milliGRAM(s) Oral daily  ferrous    sulfate 325 milliGRAM(s) Oral daily  heparin  Injectable 5000 Unit(s) SubCutaneous every 12 hours  levothyroxine 100 MICROGram(s) Oral daily  lidocaine   Patch 1 Patch Transdermal daily  lidocaine   Patch      melatonin 5 milliGRAM(s) Oral at bedtime  metoprolol succinate ER 50 milliGRAM(s) Oral daily  nystatin Powder 1 Application(s) Topical two times a day  senna 1 Tablet(s) Oral at bedtime              VITALS/PHYSICAL EXAM  --------------------------------------------------------------------------------  T(C): 35.9 (05-14-18 @ 06:00), Max: 37.9 (05-13-18 @ 23:08)  HR: 71 (05-14-18 @ 06:00) (60 - 71)  BP: 124/60 (05-14-18 @ 06:00) (124/60 - 143/72)  RR: 18 (05-14-18 @ 06:00) (17 - 18)  SpO2: 99% (05-13-18 @ 20:17) (99% - 99%)  Wt(kg): --        05-13-18 @ 07:01  -  05-14-18 @ 07:00  --------------------------------------------------------  IN: 240 mL / OUT: 950 mL / NET: -710 mL      Physical Exam:  	Gen: NAD  	Pulm: CTA B/L  	CV:S1S2; no rub  	Abd: nontender/nondistended  	LE:  no edema    LABS/STUDIES  --------------------------------------------------------------------------------              8.5    7.83  >-----------<  446      [05-13-18 @ 09:38]              26.5     136  |  95  |  78  ----------------------------<  168      [05-13-18 @ 09:38]  4.8   |  26  |  4.6        Ca     7.5     [05-13-18 @ 09:38]            Creatinine Trend:  SCr 4.6 [05-13 @ 09:38]  SCr 4.9 [05-12 @ 08:47]  SCr 4.9 [05-11 @ 09:32]  SCr 5.2 [05-10 @ 08:16]  SCr 5.6 [05-09 @ 07:03]        Iron 32, TIBC 114, %sat 28      [05-03-18 @ 05:50]  Ferritin 618      [05-03-18 @ 05:50]  PTH -- (Ca 6.9)      [05-09-18 @ 01:48]   47  PTH -- (Ca 7.9)      [05-08-18 @ 08:42]   42  HbA1c 12.4      [05-03-18 @ 05:50]

## 2018-05-14 NOTE — CHART NOTE - NSCHARTNOTEFT_GEN_A_CORE
Registered Dietitian Follow-Up (T2-19b)    ***Scroll to the bottom for RD recommendation***    Patient Profile Reviewed                           Yes [x]   No []  Nutrition History Previously Obtained        Yes [x]  No []        PERTINENT MEDICAL INFORMATIONS:  (1) p/w DKA, now f/u urology for emphysematous cystitis. ID consulted. c/w abx. improved metabolic encephalopathy. encouraged PO.  (2) Fatigue continues. CHF c/w lasix. Cr slowly improving for DREW. HyperK resolved. DKA resolved. DNR/DNI. SNF?  (3) NST consulted on 5/11 but no note found on EMR at this time. Contacted NST with no respond.     PERTINENT SUBJECTIVE INFORMATION:  (1) Pt reported no problem with everything and does not appear to want to speak with me. However, her roommate is all up in her business and reported that pt is improving eating, had about 25-50% of breakfast this morning (4-5 bites of egg, 1 corn muffin, and entire coffee). Her roommate is likely pt's advocate. Her roommate states pt does not like GLucerna Shake but pt says she does. Reported not on HD.      DIET ORDER:  Dysphagia 2 Ground + Thins + DASH + CC with evening snack + GLucerna q8hr.         ANTHROPOMETRICS:  - Ht.  - Wt.  pt's weight was 51.3kg on 5/2. and 59.6kg on 5/10. however, today bedscale 5/14 showed 54.5kg. appears stable.         PERTINENT LAB DATA:  5/13: H/h 8.5/26.5, BUN 78, Cr 4.6, glucose 168, Ca 7.5, GFR 8,   PERTINENT MEDS:  heparin, abx, tabatha acetate, senna, iron, atorvastatin, levothyroxine, metoprolol      PHYSICAL FINDINGS  - APPEARANCE:         lethargic and appears alert. no edema noted. had 2+ edema previously.  - GI FUNCTION:        none per pt. LBM 2 days ago (constipation?)  - TUBES:                       - ORAL/MOUTH:      none reported per pt  - SKIN:                        excoriation        NUTRITION REQUIREMENTS  WEIGHT USED:                          likely admitted (lowest weight)  ESTIMATED ENERGY NEEDS:       CONTINUE [  x]      ADJUST [  ]    ESTIMATED ENERGY NEEDS:         1138-1232kcal/day  ESTIMATED PROTEIN NEEDS:        46-51g (0.9-1g/kg CBW- dosing weight) or up to 60g/day for DREW on CKD 5 not on HD.  ESTIMATED FLUID NEEDS:             per LIP     CURRENT NUTRIENT NEEDS:     Pt has a slight improvement in PO but still not meeting.          [ x ] PREVIOUS NUTRITION DIAGNOSIS:   (1) inadequate oral intake - improving slowly            [ x ] ONGOING        [  ] RESOLVED        NUTRITION INTERVENTION:    [ x ] ORAL        [ ] EN/TF     GOAL/EXPECTED OUTCOME:     pt to consume and tolerate >50% of all meals consistently upon f/u due 5/17.  INDICATOR/MONITORING:       RD to monitor diet order, energy intake, body composition, nutrition focused physical findings (appetite)  PATIENT's INTERVENTION:        Meals and snacks, medical food supplements.    RECS: (1)  COnsider adding appetite stimulant. Continue current diet and modifications and supplements.

## 2018-05-14 NOTE — CONSULT NOTE ADULT - SUBJECTIVE AND OBJECTIVE BOX
84 yo F with h/o DM, HTN, HL, RA, hypothyroidism, CHF (EF 55% in 12/17)< s/p PPM, s/p AICD, hemorrhoids, p/w weakness and diarrhea. Pt adm 4/30/18 c/o  profuse diarrhea x 3 days, having multiple episodes of watery, dark stool with associated increasing weakness. Pt has not been able to get out of bed and has not been eating so not taking her meds. Pt states sx started after eating out. Pt denies any fever/chills, ha, chest pain, sob, constipation, dysuria.  Pt is found to have DKA in ED. VBG pH 7.2, anion gap 24, small acetone, glucose 637,  / 5.8    Past Medical History:  CHF (congestive heart failure)    Diabetes    HTN (hypertension)    Hypothyroidism    Neuropathy  AICD   RA    EXAM:  A&O, c/o generalized not feeling well, weakness, fed-up  abd soft, ND, NT  severe RA noted in hands and feet deformitites  skin turgor poor  + Collins with good clear u.o.  no LE edema  + bitemp and other area wasting > than expected for age; + reported weight loss over past couple of years  T(F): 96.7 (14 May 2018 14:17), Max: 100.2 (13 May 2018 23:08)  HR: 63 (14 May 2018 14:17) (63 - 71)  BP: 105/58 (14 May 2018 14:17) (105/58 - 143/72)  Height (cm): 160.02 (07 May 2018 12:21)  Weight (kg): 51.3 (02 May 2018 17:30)  BMI (kg/m2): 20 (07 May 2018 12:21)    MEDICATIONS  (STANDING):  atorvastatin 10 milliGRAM(s) Oral at bedtime  calcium acetate 667 milliGRAM(s) Oral three times a day with meals  ciprofloxacin     Tablet 250 milliGRAM(s) Oral every 12 hours  docusate sodium 100 milliGRAM(s) Oral three times a day  escitalopram 10 milliGRAM(s) Oral daily  ferrous    sulfate 325 milliGRAM(s) Oral daily  heparin  Injectable 5000 Unit(s) SubCutaneous every 12 hours  insulin glargine Injectable (LANTUS) 5 Unit(s) SubCutaneous every morning  insulin lispro (HumaLOG) corrective regimen sliding scale 2 units +/-, SubCutaneous three times a day before meals  levothyroxine 100 MICROGram(s) Oral daily  lidocaine   Patch 1 Patch Transdermal daily  melatonin 5 milliGRAM(s) Oral at bedtime  metoprolol succinate ER 50 milliGRAM(s) Oral daily  nystatin Powder 1 Application(s) Topical two times a day  senna 1 Tablet(s) Oral at bedtime  CURRENT DIET; DYSPHAGIA 2 SOFT WITH THIN LIQUIDS, CARB CONS, DASH, GLUCERNA SHAKE 3/D                        8.6    7.50  )-----------( 436      ( 14 May 2018 10:01 )             26.5   05-14    136  |  97<L>  |  67<HH>  ----------------------------<  124<H>  4.9   |  26  |  4.6<HH>    Ca    7.6<L>      14 May 2018 10:01

## 2018-05-14 NOTE — PROGRESS NOTE ADULT - ASSESSMENT
82 yo F with h/o DM, HTN, HL, RA, hypothyroidism, CHF (EF 55% in 12/17) s/p PPM, s/p AICD, hemorrhoids, p/w weakness and diarrhea. Found to have DREW , acidosis and hyperkalemia/emphysematous cystitis/neurogenic bladder:    #) Emphysematous cystitis  - Urine culture: 10,000 - 49,000 CFU/mL Escherichia coli (05.01.18)  - ID consult appreciated: initially CTX now Cipro for total 3 weeks   - f/u urology: continue Collins until antibiotics end    #) Metabolic encephalopathy 2/2 to UTI & ?uremia  - much improved    #) Fatigue   - encourage feeding; f/u nutrition consult  - continue PT    #) Chronic Systolic heart failure (EF 55% in 12/17) s/p PPM, s/p AICD  - continue lasix 40mg PO,metoprolol ER 50mg daily   - AICD interrogation if not done recently.     #) DREW on CKD-5 / Recent ATN  - Creatinine improving very slowly but surely.  - Nephology following: no need for RRT d/t her fraility.   - continue feso4 po q8, sevelamer    #) Hyperkalemia   - resolved  - monitor bmp  - Kayexalate PRN    #) DM type 1  - Monitoring off Insulin d/t recent hypoglycemia.    #) DKA: resolved    #) Hypothyroidism  - continue levothyroxine     #) Disposition   - discharge to NH within 48 hrs    #) DNR/DNI 84 yo F with h/o DM, HTN, HL, RA, hypothyroidism, CHF (EF 55% in 12/17) s/p PPM, s/p AICD, hemorrhoids, p/w weakness and diarrhea. Found to have DREW , acidosis and hyperkalemia/emphysematous cystitis/neurogenic bladder:    #) Emphysematous cystitis  - Urine culture: 10,000 - 49,000 CFU/mL Escherichia coli (05.01.18)  - ID consult appreciated: initially CTX now Cipro for total 3 weeks   - f/u urology: continue Collins until antibiotics end    #) Metabolic encephalopathy 2/2 to UTI & ?uremia  - much improved    #) Fatigue   - encourage feeding; f/u nutrition consult  - continue PT    #) Chronic Systolic heart failure (EF 55% in 12/17) s/p PPM, s/p AICD  - hold lasix 40mg PO for now  - continue metoprolol ER 50mg daily   - AICD interrogation if not done recently.     #) DREW on CKD-5 / Recent ATN  - Creatinine improving very slowly but surely.  - Nephology following: no need for RRT d/t her frailty   - continue feso4 po q8, phoslo    #) Hyperkalemia   - resolved  - monitor bmp  - Kayexalate PRN    #) DM type 1  - start lantus 5U in am and 2/2/2 lispro  - monitor fingerstick    #) DKA: resolved    #) Hypothyroidism  - continue levothyroxine     #) Disposition   - discharge to NH within 48 hrs    #) DNR/DNI

## 2018-05-14 NOTE — CONSULT NOTE ADULT - ASSESSMENT
IMP:  - DKA resolved  - DREW due to diarrhea - the latter is resolved  - CKD with hyperkalemia and hyperphosphatemia  -  now on Phoslo, no RRT necessary at the current time  - RA  - cystitis, currently Collins dependent  - appetite poor and variable, but sense of taste ok and no c/o dysphagia or abdominal complaints    SUGGEST:  - d/w pt together with a daughter and several family members. pt with variable compliance with diet and meds, partly r/t variable appetite.  - allow pt to try Nepro instead of Glucerna Shake, as Nepro contains more calories and minimally more carb, with less K and less phos.  - add Corbett 2.5 mg po an hour before lunch, and observe effect. Can add 2nd dose at 4pm, if helpful. If pt does well at home x 2-3 weeks, can decrease or hold dose until needed again.  - no enteral support needed or wanted at this time.

## 2018-05-14 NOTE — PROGRESS NOTE ADULT - SUBJECTIVE AND OBJECTIVE BOX
SUBJECTIVE:    Patient is a 83y old Female who presents with a chief complaint of DKA (01 May 2018 04:15)    Currently admitted to medicine with the primary diagnosis of DKA (diabetic ketoacidoses)     Today is hospital day 14d. This morning she is resting comfortably in bed and reports no new issues or overnight events.     PAST MEDICAL & SURGICAL HISTORY  Neuropathy  Diabetes  Hypothyroidism  HTN (hypertension)  CHF (congestive heart failure)  AICD (automatic cardioverter/defibrillator) present    SOCIAL HISTORY:  Negative for smoking/alcohol/drug use.     ALLERGIES:  Plavix (Other (Moderate))    MEDICATIONS:  STANDING MEDICATIONS  atorvastatin 10 milliGRAM(s) Oral at bedtime  calcium acetate 667 milliGRAM(s) Oral three times a day with meals  ciprofloxacin     Tablet 250 milliGRAM(s) Oral every 12 hours  dextrose 5%. 1000 milliLiter(s) IV Continuous <Continuous>  dextrose 50% Injectable 12.5 Gram(s) IV Push once  dextrose 50% Injectable 25 Gram(s) IV Push once  dextrose 50% Injectable 25 Gram(s) IV Push once  docusate sodium 100 milliGRAM(s) Oral three times a day  escitalopram 10 milliGRAM(s) Oral daily  ferrous    sulfate 325 milliGRAM(s) Oral daily  heparin  Injectable 5000 Unit(s) SubCutaneous every 12 hours  levothyroxine 100 MICROGram(s) Oral daily  lidocaine   Patch 1 Patch Transdermal daily  lidocaine   Patch      melatonin 5 milliGRAM(s) Oral at bedtime  metoprolol succinate ER 50 milliGRAM(s) Oral daily  nystatin Powder 1 Application(s) Topical two times a day  senna 1 Tablet(s) Oral at bedtime    PRN MEDICATIONS  acetaminophen   Tablet. 650 milliGRAM(s) Oral every 6 hours PRN  aluminum hydroxide/magnesium hydroxide/simethicone Suspension 30 milliLiter(s) Oral once PRN  benzocaine 15 mG/menthol 3.6 mG Lozenge 1 Lozenge Oral every 3 hours PRN  dextrose Gel 1 Dose(s) Oral once PRN  diphenhydrAMINE   Capsule 25 milliGRAM(s) Oral every 4 hours PRN  glucagon  Injectable 1 milliGRAM(s) IntraMuscular once PRN    VITALS:   T(F): 100.2  HR: 65  BP: 143/72  RR: 18  SpO2: 99%    LABS:                        8.5    7.83  )-----------( 446      ( 13 May 2018 09:38 )             26.5     05-13    136  |  95<L>  |  78<HH>  ----------------------------<  168<H>  4.8   |  26  |  4.6<HH>    Ca    7.5<L>      13 May 2018 09:38  Phos  5.7     05-12  Mg     1.8     05-12        RADIOLOGY:  Xray Chest 1 View AP/PA (05.08.18)  Small left pleural effusion and right basilar atelectasis without definitive consolidation identified.    PHYSICAL EXAM:  GEN: No acute distress, frail  LUNGS: Clear to auscultation bilaterally   HEART: S1/S2 present. RRR.   ABD: Soft, non-tender, non-distended. Bowel sounds present  EXT: NC/NC/NE/2+PP/PLASENCIA  NEURO: AAOX3  Collins +

## 2018-05-14 NOTE — PROGRESS NOTE ADULT - ATTENDING COMMENTS
82 yo F with h/o DM, HTN, HL, RA, hypothyroidism, CHF (EF 55% in 12/17)< s/p PPM, s/p AICD, hemorrhoids, p/w weakness and diarrhea. Found to have DREW , acidosis and hyperkalemia/emphysematous cystitis/neurogenic bladder:    #) Emphysematous cystitis  - Urine culture: 10,000 - 49,000 CFU/mL Escherichia coli (05.01.18)  - ID consult appreciated: initially CTX now Cipro for total 3 weeks   - f/u urology: continue Collins until antibiotics end    #) Metabolic encephalopathy 2/2 to UTI & ?uremia  - much improved    # Fatigue : Not getting better despite treatment for UTI .   PArtly is d/t Uremia . DREW from ATN will improve only over weeks . In the meanwhile, encourage feeding (glucerna etc) & Activity.   Had some MS, TR, Moderately reduced RV function, Moderate Pulm HTN on Echo.   change glucerna to Nepro.     #h/o AICD :  EKG shows A sensed V Paced.   Get AICD interrogation if not done recently.       #) DREW on CKD-5 / Recent ATN  - Creatinine improving very slowly but surely.  - Nephology following: no need for RRT d/t her fraility.   - continue feso4 po q8, sevelamer    #) Hyperkalemia   - Monitor bmp  Kayexalate PRN    #) DM type 1  - Monitoring off Insulin d/t recent hypoglycemia.    #) DKA: resolved    #) Chronic Systolic heart failure  Holding lasix starting 5/12 since poor PO intake per Nephro.  - continue metoprolol ER 50mg daily       #) Hypothyroidism  - continue levothyroxine     #) DNR/DNI .    Encourage PT.   Spoke to family at length today saying that although she may do ok this time but overall her strength and health will only get worse with time. And asked to consider GOC.

## 2018-05-15 LAB
ANION GAP SERPL CALC-SCNC: 14 MMOL/L — SIGNIFICANT CHANGE UP (ref 7–14)
BASOPHILS # BLD AUTO: 0.08 K/UL — SIGNIFICANT CHANGE UP (ref 0–0.2)
BASOPHILS NFR BLD AUTO: 1.3 % — HIGH (ref 0–1)
BUN SERPL-MCNC: 61 MG/DL — CRITICAL HIGH (ref 10–20)
CALCIUM SERPL-MCNC: 7.4 MG/DL — LOW (ref 8.5–10.1)
CHLORIDE SERPL-SCNC: 98 MMOL/L — SIGNIFICANT CHANGE UP (ref 98–110)
CO2 SERPL-SCNC: 25 MMOL/L — SIGNIFICANT CHANGE UP (ref 17–32)
CREAT SERPL-MCNC: 4.3 MG/DL — CRITICAL HIGH (ref 0.7–1.5)
EOSINOPHIL # BLD AUTO: 0.33 K/UL — SIGNIFICANT CHANGE UP (ref 0–0.7)
EOSINOPHIL NFR BLD AUTO: 5.5 % — SIGNIFICANT CHANGE UP (ref 0–8)
GLUCOSE SERPL-MCNC: 224 MG/DL — HIGH (ref 70–99)
HCT VFR BLD CALC: 25.6 % — LOW (ref 37–47)
HGB BLD-MCNC: 8.3 G/DL — LOW (ref 12–16)
IMM GRANULOCYTES NFR BLD AUTO: 0.2 % — SIGNIFICANT CHANGE UP (ref 0.1–0.3)
LYMPHOCYTES # BLD AUTO: 0.85 K/UL — LOW (ref 1.2–3.4)
LYMPHOCYTES # BLD AUTO: 14.2 % — LOW (ref 20.5–51.1)
MAGNESIUM SERPL-MCNC: 1.9 MG/DL — SIGNIFICANT CHANGE UP (ref 1.8–2.4)
MCHC RBC-ENTMCNC: 25.9 PG — LOW (ref 27–31)
MCHC RBC-ENTMCNC: 32.4 G/DL — SIGNIFICANT CHANGE UP (ref 32–37)
MCV RBC AUTO: 79.8 FL — LOW (ref 81–99)
MONOCYTES # BLD AUTO: 0.4 K/UL — SIGNIFICANT CHANGE UP (ref 0.1–0.6)
MONOCYTES NFR BLD AUTO: 6.7 % — SIGNIFICANT CHANGE UP (ref 1.7–9.3)
NEUTROPHILS # BLD AUTO: 4.33 K/UL — SIGNIFICANT CHANGE UP (ref 1.4–6.5)
NEUTROPHILS NFR BLD AUTO: 72.1 % — SIGNIFICANT CHANGE UP (ref 42.2–75.2)
NRBC # BLD: 0 /100 WBCS — SIGNIFICANT CHANGE UP (ref 0–0)
PHOSPHATE SERPL-MCNC: 4 MG/DL — SIGNIFICANT CHANGE UP (ref 2.1–4.9)
PLATELET # BLD AUTO: 367 K/UL — SIGNIFICANT CHANGE UP (ref 130–400)
POTASSIUM SERPL-MCNC: 4.4 MMOL/L — SIGNIFICANT CHANGE UP (ref 3.5–5)
POTASSIUM SERPL-SCNC: 4.4 MMOL/L — SIGNIFICANT CHANGE UP (ref 3.5–5)
RBC # BLD: 3.21 M/UL — LOW (ref 4.2–5.4)
RBC # FLD: 17.8 % — HIGH (ref 11.5–14.5)
SODIUM SERPL-SCNC: 137 MMOL/L — SIGNIFICANT CHANGE UP (ref 135–146)
WBC # BLD: 6 K/UL — SIGNIFICANT CHANGE UP (ref 4.8–10.8)
WBC # FLD AUTO: 6 K/UL — SIGNIFICANT CHANGE UP (ref 4.8–10.8)

## 2018-05-15 RX ORDER — FERROUS SULFATE 325(65) MG
325 TABLET ORAL THREE TIMES A DAY
Qty: 0 | Refills: 0 | Status: DISCONTINUED | OUTPATIENT
Start: 2018-05-15 | End: 2018-05-17

## 2018-05-15 RX ORDER — ERYTHROPOIETIN 10000 [IU]/ML
5000 INJECTION, SOLUTION INTRAVENOUS; SUBCUTANEOUS
Qty: 0 | Refills: 0 | Status: DISCONTINUED | OUTPATIENT
Start: 2018-05-15 | End: 2018-05-17

## 2018-05-15 RX ADMIN — Medication 100 MICROGRAM(S): at 05:29

## 2018-05-15 RX ADMIN — ATORVASTATIN CALCIUM 10 MILLIGRAM(S): 80 TABLET, FILM COATED ORAL at 21:29

## 2018-05-15 RX ADMIN — HEPARIN SODIUM 5000 UNIT(S): 5000 INJECTION INTRAVENOUS; SUBCUTANEOUS at 16:47

## 2018-05-15 RX ADMIN — Medication 667 MILLIGRAM(S): at 11:41

## 2018-05-15 RX ADMIN — ESCITALOPRAM OXALATE 10 MILLIGRAM(S): 10 TABLET, FILM COATED ORAL at 11:42

## 2018-05-15 RX ADMIN — SENNA PLUS 1 TABLET(S): 8.6 TABLET ORAL at 21:31

## 2018-05-15 RX ADMIN — Medication 2.5 MILLIGRAM(S): at 11:32

## 2018-05-15 RX ADMIN — Medication 1: at 09:10

## 2018-05-15 RX ADMIN — HEPARIN SODIUM 5000 UNIT(S): 5000 INJECTION INTRAVENOUS; SUBCUTANEOUS at 05:29

## 2018-05-15 RX ADMIN — Medication 325 MILLIGRAM(S): at 11:42

## 2018-05-15 RX ADMIN — LIDOCAINE 1 PATCH: 4 CREAM TOPICAL at 11:42

## 2018-05-15 RX ADMIN — Medication 650 MILLIGRAM(S): at 05:29

## 2018-05-15 RX ADMIN — ERYTHROPOIETIN 5000 UNIT(S): 10000 INJECTION, SOLUTION INTRAVENOUS; SUBCUTANEOUS at 13:48

## 2018-05-15 RX ADMIN — Medication 2 UNIT(S): at 09:09

## 2018-05-15 RX ADMIN — Medication 250 MILLIGRAM(S): at 16:47

## 2018-05-15 RX ADMIN — Medication 100 MILLIGRAM(S): at 21:31

## 2018-05-15 RX ADMIN — Medication 650 MILLIGRAM(S): at 18:02

## 2018-05-15 RX ADMIN — Medication 50 MILLIGRAM(S): at 05:29

## 2018-05-15 RX ADMIN — Medication 325 MILLIGRAM(S): at 21:31

## 2018-05-15 RX ADMIN — Medication 3: at 18:09

## 2018-05-15 RX ADMIN — Medication 250 MILLIGRAM(S): at 05:29

## 2018-05-15 RX ADMIN — Medication 2 UNIT(S): at 18:09

## 2018-05-15 RX ADMIN — Medication 650 MILLIGRAM(S): at 11:32

## 2018-05-15 RX ADMIN — INSULIN GLARGINE 5 UNIT(S): 100 INJECTION, SOLUTION SUBCUTANEOUS at 17:20

## 2018-05-15 RX ADMIN — Medication 100 MILLIGRAM(S): at 05:29

## 2018-05-15 RX ADMIN — Medication 650 MILLIGRAM(S): at 06:07

## 2018-05-15 RX ADMIN — Medication 667 MILLIGRAM(S): at 11:24

## 2018-05-15 RX ADMIN — Medication 667 MILLIGRAM(S): at 16:46

## 2018-05-15 RX ADMIN — NYSTATIN CREAM 1 APPLICATION(S): 100000 CREAM TOPICAL at 18:10

## 2018-05-15 RX ADMIN — Medication 5 MILLIGRAM(S): at 21:29

## 2018-05-15 NOTE — PROGRESS NOTE ADULT - SUBJECTIVE AND OBJECTIVE BOX
SUBJECTIVE:    Patient is a 83y old Female who presents with a chief complaint of DKA (01 May 2018 04:15)    Currently admitted to medicine with the primary diagnosis of DKA (diabetic ketoacidoses)     Today is hospital day 15d. This morning she is resting comfortably in bed and reports no new issues or overnight events.     PAST MEDICAL & SURGICAL HISTORY  Neuropathy  Diabetes  Hypothyroidism  HTN (hypertension)  CHF (congestive heart failure)  AICD (automatic cardioverter/defibrillator) present    SOCIAL HISTORY:  Negative for smoking/alcohol/drug use.     ALLERGIES:  Plavix (Other (Moderate))    MEDICATIONS:  STANDING MEDICATIONS  atorvastatin 10 milliGRAM(s) Oral at bedtime  calcium acetate 667 milliGRAM(s) Oral three times a day with meals  ciprofloxacin     Tablet 250 milliGRAM(s) Oral every 12 hours  dextrose 5%. 1000 milliLiter(s) IV Continuous <Continuous>  dextrose 5%. 1000 milliLiter(s) IV Continuous <Continuous>  dextrose 50% Injectable 12.5 Gram(s) IV Push once  dextrose 50% Injectable 25 Gram(s) IV Push once  dextrose 50% Injectable 12.5 Gram(s) IV Push once  dextrose 50% Injectable 25 Gram(s) IV Push once  dextrose 50% Injectable 25 Gram(s) IV Push once  docusate sodium 100 milliGRAM(s) Oral three times a day  dronabinol 2.5 milliGRAM(s) Oral before lunch  epoetin mohsen Injectable 5000 Unit(s) SubCutaneous every 7 days  escitalopram 10 milliGRAM(s) Oral daily  ferrous    sulfate 325 milliGRAM(s) Oral three times a day  heparin  Injectable 5000 Unit(s) SubCutaneous every 12 hours  insulin glargine Injectable (LANTUS) 5 Unit(s) SubCutaneous every morning  insulin lispro (HumaLOG) corrective regimen sliding scale   SubCutaneous three times a day before meals  insulin lispro Injectable (HumaLOG) 2 Unit(s) SubCutaneous before breakfast  insulin lispro Injectable (HumaLOG) 2 Unit(s) SubCutaneous before lunch  insulin lispro Injectable (HumaLOG) 2 Unit(s) SubCutaneous before dinner  levothyroxine 100 MICROGram(s) Oral daily  lidocaine   Patch 1 Patch Transdermal daily  lidocaine   Patch      melatonin 5 milliGRAM(s) Oral at bedtime  metoprolol succinate ER 50 milliGRAM(s) Oral daily  nystatin Powder 1 Application(s) Topical two times a day  senna 1 Tablet(s) Oral at bedtime    PRN MEDICATIONS  acetaminophen   Tablet. 650 milliGRAM(s) Oral every 6 hours PRN  aluminum hydroxide/magnesium hydroxide/simethicone Suspension 30 milliLiter(s) Oral once PRN  aluminum hydroxide/magnesium hydroxide/simethicone Suspension 30 milliLiter(s) Oral every 6 hours PRN  benzocaine 15 mG/menthol 3.6 mG Lozenge 1 Lozenge Oral every 3 hours PRN  dextrose 40% Gel 15 Gram(s) Oral once PRN  dextrose Gel 1 Dose(s) Oral once PRN  diphenhydrAMINE   Capsule 25 milliGRAM(s) Oral every 4 hours PRN  glucagon  Injectable 1 milliGRAM(s) IntraMuscular once PRN  glucagon  Injectable 1 milliGRAM(s) IntraMuscular once PRN    VITALS:   T(F): 96.7  HR: 62  BP: 130/74  RR: 18  SpO2: 97%    LABS:                        8.6    7.50  )-----------( 436      ( 14 May 2018 10:01 )             26.5     05-14    136  |  97<L>  |  67<HH>  ----------------------------<  124<H>  4.9   |  26  |  4.6<HH>    Ca    7.6<L>      14 May 2018 10:01      RADIOLOGY:  Xray Chest 1 View AP/PA (05.08.18)  Small left pleural effusion and right basilar atelectasis without definitive consolidation identified.    PHYSICAL EXAM:  GEN: No acute distress, frail  LUNGS: Clear to auscultation bilaterally   HEART: S1/S2 present. RRR.   ABD: Soft, non-tender, non-distended. Bowel sounds present  EXT: NC/NC/NE/2+PP/PLASENCIA  NEURO: AAOX3  Collins +

## 2018-05-15 NOTE — PROGRESS NOTE ADULT - SUBJECTIVE AND OBJECTIVE BOX
seen and examined  lying comfortable  no distress       Standing Inpatient Medications  atorvastatin 10 milliGRAM(s) Oral at bedtime  calcium acetate 667 milliGRAM(s) Oral three times a day with meals  ciprofloxacin     Tablet 250 milliGRAM(s) Oral every 12 hours  dextrose 5%. 1000 milliLiter(s) IV Continuous <Continuous>  dextrose 5%. 1000 milliLiter(s) IV Continuous <Continuous>  dextrose 50% Injectable 12.5 Gram(s) IV Push once  dextrose 50% Injectable 25 Gram(s) IV Push once  dextrose 50% Injectable 12.5 Gram(s) IV Push once  dextrose 50% Injectable 25 Gram(s) IV Push once  dextrose 50% Injectable 25 Gram(s) IV Push once  docusate sodium 100 milliGRAM(s) Oral three times a day  dronabinol 2.5 milliGRAM(s) Oral before lunch  escitalopram 10 milliGRAM(s) Oral daily  ferrous    sulfate 325 milliGRAM(s) Oral daily  heparin  Injectable 5000 Unit(s) SubCutaneous every 12 hours  insulin glargine Injectable (LANTUS) 5 Unit(s) SubCutaneous every morning  insulin lispro (HumaLOG) corrective regimen sliding scale   SubCutaneous three times a day before meals  insulin lispro Injectable (HumaLOG) 2 Unit(s) SubCutaneous before breakfast  insulin lispro Injectable (HumaLOG) 2 Unit(s) SubCutaneous before lunch  insulin lispro Injectable (HumaLOG) 2 Unit(s) SubCutaneous before dinner  levothyroxine 100 MICROGram(s) Oral daily  lidocaine   Patch 1 Patch Transdermal daily  lidocaine   Patch      melatonin 5 milliGRAM(s) Oral at bedtime  metoprolol succinate ER 50 milliGRAM(s) Oral daily  nystatin Powder 1 Application(s) Topical two times a day  senna 1 Tablet(s) Oral at bedtime        VITALS/PHYSICAL EXAM  --------------------------------------------------------------------------------  T(C): 35.9 (05-15-18 @ 05:15), Max: 36.8 (05-14-18 @ 20:53)  HR: 62 (05-15-18 @ 05:15) (62 - 68)  BP: 130/74 (05-15-18 @ 05:15) (105/58 - 130/74)  RR: 18 (05-15-18 @ 05:15) (18 - 18)  SpO2: --  Wt(kg): --        05-13-18 @ 07:01  -  05-14-18 @ 07:00  --------------------------------------------------------  IN: 240 mL / OUT: 950 mL / NET: -710 mL    05-14-18 @ 07:01  -  05-15-18 @ 06:01  --------------------------------------------------------  IN: 0 mL / OUT: 800 mL / NET: -800 mL      Physical Exam:  	Gen: NAD  	Pulm: CTA B/L  	CV:  S1S2; no rub  	Abd:  soft, nontender/nondistended  	: susan   	LE:  no edema  	    LABS/STUDIES  --------------------------------------------------------------------------------              8.6    7.50  >-----------<  436      [05-14-18 @ 10:01]              26.5     136  |  97  |  67  ----------------------------<  124      [05-14-18 @ 10:01]  4.9   |  26  |  4.6        Ca     7.6     [05-14-18 @ 10:01]            Creatinine Trend:  SCr 4.6 [05-14 @ 10:01]  SCr 4.6 [05-13 @ 09:38]  SCr 4.9 [05-12 @ 08:47]  SCr 4.9 [05-11 @ 09:32]  SCr 5.2 [05-10 @ 08:16]    Urinalysis - [05-10-18 @ 17:03]      Color Yellow / Appearance Clear / SG 1.015 / pH 7.0      Gluc 100 / Ketone Negative  / Bili Negative / Urobili 0.2       Blood Moderate / Protein 30 / Leuk Est Large / Nitrite Negative      RBC 5-10 / WBC >50 / Hyaline  / Gran  / Sq Epi  / Non Sq Epi Occasional / Bacteria See Note      Iron 32, TIBC 114, %sat 28      [05-03-18 @ 05:50]  Ferritin 618      [05-03-18 @ 05:50]  PTH -- (Ca 6.9)      [05-09-18 @ 01:48]   47  PTH -- (Ca 7.9)      [05-08-18 @ 08:42]   42  HbA1c 12.4      [05-03-18 @ 05:50]

## 2018-05-15 NOTE — PROGRESS NOTE ADULT - ASSESSMENT
82 yo F with h/o DM, HTN, HL, RA, hypothyroidism, CHF (EF 55% in 12/17) s/p PPM, s/p AICD, hemorrhoids, p/w weakness and diarrhea. Found to have DREW , acidosis and hyperkalemia/emphysematous cystitis/neurogenic bladder:    #) Emphysematous cystitis  - Urine culture: 10,000 - 49,000 CFU/mL Escherichia coli (05.01.18)  - ID consult appreciated: initially CTX now Cipro for total 3 weeks   - f/u urology: continue Collins until antibiotics end    #) Metabolic encephalopathy 2/2 ?uremia  - much improved    #) Fatigue   - encourage feeding  - nutritino consult appreciated: continue Dronabinol   - continue PT    #) Chronic Systolic heart failure (EF 55% in 12/17) s/p PPM, s/p AICD  - hold lasix 40mg PO for now  - continue metoprolol ER 50mg daily   - EP consulted for AICD interrogation    #) DREW on CKD-5 / Recent ATN  - Creatinine improving very slowly but surely.  - Nephology following: no need for RRT d/t her frailty   - continue feso4 po q8, phoslo    #) Hyperkalemia   - resolved  - monitor bmp  - Kayexalate PRN    #) DM type 1  - continue Lantus 5U in am and 2/2/2 lispro  - monitor fingerstick    #) DKA: resolved    #) Hypothyroidism  - continue levothyroxine     #) Disposition   - discharge to NH within 48 hrs    #) DNR/DNI

## 2018-05-15 NOTE — PROGRESS NOTE ADULT - ATTENDING COMMENTS
82 yo F with h/o DM, HTN, HL, RA, hypothyroidism, CHF (EF 55% in 12/17)< s/p PPM, s/p AICD, hemorrhoids, p/w weakness and diarrhea. Found to have DREW , acidosis and hyperkalemia/emphysematous cystitis/neurogenic bladder:    #) Emphysematous cystitis  - Urine culture: 10,000 - 49,000 CFU/mL Escherichia coli (05.01.18)  - ID consult appreciated: initially CTX now Cipro for total 3 weeks   - f/u urology: continue Collins until antibiotics end    #) Metabolic encephalopathy 2/2 to UTI & ?uremia  - much improved    # ***Fatigue : Not getting better despite treatment for UTI .   Not sure if the Uremia is contributing to it. But many studies have shown that Rheumatoid Arthritis causes significant Fatigue. Patient doesn't really have a regular Rheumatologist. Tell the family tomorrow to have her schedule an appointment with a rheumatologist after she goes to [facility]. Research have shown that some biologics used for RA treatment do imrpove fatigue.     The DREW from ATN will improve only over weeks . In the meanwhile, encourage feeding (glucerna etc) & Activity. PT will also help her fatigue.   Had some MS, TR, Moderately reduced RV function, Moderate Pulm HTN on Echo.   change glucerna to Nepro.     #h/o AICD :  EKG shows A sensed V Paced with some possiblity of pacing problem ?   Get AICD interrogation if not done recently.       #) DREW on CKD-5 / Recent ATN  - Creatinine improving very slowly but surely.  - Nephology following: no need for RRT d/t her fraility.   - continue feso4 po q8, sevelamer    #) Hyperkalemia   - Monitor bmp  Kayexalate PRN    #) DM type 1  - Monitoring off Insulin d/t recent hypoglycemia.    #) DKA: resolved    #) Chronic Systolic heart failure  Holding lasix starting 5/12 since poor PO intake per Nephro.  - continue metoprolol ER 50mg daily       #) Hypothyroidism  - continue levothyroxine     #) DNR/DNI .    Encourage PT.   Spoke to family at length today saying that although she may do ok this time but overall her strength and health will only get worse with time. And asked to consider GOC.

## 2018-05-15 NOTE — PROGRESS NOTE ADULT - ASSESSMENT
82 yo F with h/o DM, HTN, HL, RA, hypothyroidism, HFpEF, AICD, hemorrhoids, p/w weakness and diarrhea. Found to have DREW, acidosis and hyperkalemia/cystitis/neurogenic bladder    - creatinine stable   - non oliguric  - no need for RRT   - voiding trial    -MOSHE , on FESO4, will increase to q 8 ,start procrit 5000 units s/c weekly   - on phoslo, check repeat ph, pth at goal   - overall prognosis poor

## 2018-05-16 LAB
ANION GAP SERPL CALC-SCNC: 13 MMOL/L — SIGNIFICANT CHANGE UP (ref 7–14)
BASOPHILS # BLD AUTO: 0.12 K/UL — SIGNIFICANT CHANGE UP (ref 0–0.2)
BASOPHILS NFR BLD AUTO: 1.8 % — HIGH (ref 0–1)
BUN SERPL-MCNC: 59 MG/DL — HIGH (ref 10–20)
CALCIUM SERPL-MCNC: 8 MG/DL — LOW (ref 8.4–10.5)
CALCIUM SERPL-MCNC: 8.1 MG/DL — LOW (ref 8.5–10.1)
CHLORIDE SERPL-SCNC: 100 MMOL/L — SIGNIFICANT CHANGE UP (ref 98–110)
CO2 SERPL-SCNC: 27 MMOL/L — SIGNIFICANT CHANGE UP (ref 17–32)
CREAT SERPL-MCNC: 4.2 MG/DL — CRITICAL HIGH (ref 0.7–1.5)
EOSINOPHIL # BLD AUTO: 0.33 K/UL — SIGNIFICANT CHANGE UP (ref 0–0.7)
EOSINOPHIL NFR BLD AUTO: 5 % — SIGNIFICANT CHANGE UP (ref 0–8)
GLUCOSE SERPL-MCNC: 159 MG/DL — HIGH (ref 70–99)
HCT VFR BLD CALC: 28.6 % — LOW (ref 37–47)
HGB BLD-MCNC: 9.2 G/DL — LOW (ref 12–16)
IMM GRANULOCYTES NFR BLD AUTO: 0.5 % — HIGH (ref 0.1–0.3)
LYMPHOCYTES # BLD AUTO: 0.95 K/UL — LOW (ref 1.2–3.4)
LYMPHOCYTES # BLD AUTO: 14.3 % — LOW (ref 20.5–51.1)
MAGNESIUM SERPL-MCNC: 2 MG/DL — SIGNIFICANT CHANGE UP (ref 1.8–2.4)
MCHC RBC-ENTMCNC: 26.1 PG — LOW (ref 27–31)
MCHC RBC-ENTMCNC: 32.2 G/DL — SIGNIFICANT CHANGE UP (ref 32–37)
MCV RBC AUTO: 81 FL — SIGNIFICANT CHANGE UP (ref 81–99)
MONOCYTES # BLD AUTO: 0.38 K/UL — SIGNIFICANT CHANGE UP (ref 0.1–0.6)
MONOCYTES NFR BLD AUTO: 5.7 % — SIGNIFICANT CHANGE UP (ref 1.7–9.3)
NEUTROPHILS # BLD AUTO: 4.82 K/UL — SIGNIFICANT CHANGE UP (ref 1.4–6.5)
NEUTROPHILS NFR BLD AUTO: 72.7 % — SIGNIFICANT CHANGE UP (ref 42.2–75.2)
NRBC # BLD: 0 /100 WBCS — SIGNIFICANT CHANGE UP (ref 0–0)
PHOSPHATE SERPL-MCNC: 3.7 MG/DL — SIGNIFICANT CHANGE UP (ref 2.1–4.9)
PLATELET # BLD AUTO: 388 K/UL — SIGNIFICANT CHANGE UP (ref 130–400)
POTASSIUM SERPL-MCNC: 5.1 MMOL/L — HIGH (ref 3.5–5)
POTASSIUM SERPL-SCNC: 5.1 MMOL/L — HIGH (ref 3.5–5)
PTH-INTACT FLD-MCNC: 79 PG/ML — HIGH (ref 15–65)
RBC # BLD: 3.53 M/UL — LOW (ref 4.2–5.4)
RBC # FLD: 18.4 % — HIGH (ref 11.5–14.5)
SODIUM SERPL-SCNC: 140 MMOL/L — SIGNIFICANT CHANGE UP (ref 135–146)
WBC # BLD: 6.63 K/UL — SIGNIFICANT CHANGE UP (ref 4.8–10.8)
WBC # FLD AUTO: 6.63 K/UL — SIGNIFICANT CHANGE UP (ref 4.8–10.8)

## 2018-05-16 RX ORDER — SIMETHICONE 80 MG/1
80 TABLET, CHEWABLE ORAL EVERY 8 HOURS
Qty: 0 | Refills: 0 | Status: DISCONTINUED | OUTPATIENT
Start: 2018-05-16 | End: 2018-05-17

## 2018-05-16 RX ORDER — SODIUM POLYSTYRENE SULFONATE 4.1 MEQ/G
15 POWDER, FOR SUSPENSION ORAL
Qty: 0 | Refills: 0 | Status: DISCONTINUED | OUTPATIENT
Start: 2018-05-16 | End: 2018-05-17

## 2018-05-16 RX ADMIN — Medication 5 MILLIGRAM(S): at 21:58

## 2018-05-16 RX ADMIN — Medication 250 MILLIGRAM(S): at 06:01

## 2018-05-16 RX ADMIN — Medication 100 MILLIGRAM(S): at 21:58

## 2018-05-16 RX ADMIN — ATORVASTATIN CALCIUM 10 MILLIGRAM(S): 80 TABLET, FILM COATED ORAL at 21:58

## 2018-05-16 RX ADMIN — NYSTATIN CREAM 1 APPLICATION(S): 100000 CREAM TOPICAL at 13:44

## 2018-05-16 RX ADMIN — LIDOCAINE 1 PATCH: 4 CREAM TOPICAL at 23:30

## 2018-05-16 RX ADMIN — SODIUM POLYSTYRENE SULFONATE 15 GRAM(S): 4.1 POWDER, FOR SUSPENSION ORAL at 13:44

## 2018-05-16 RX ADMIN — Medication 667 MILLIGRAM(S): at 11:19

## 2018-05-16 RX ADMIN — Medication 325 MILLIGRAM(S): at 06:01

## 2018-05-16 RX ADMIN — Medication 1: at 18:01

## 2018-05-16 RX ADMIN — Medication 1: at 12:40

## 2018-05-16 RX ADMIN — Medication 250 MILLIGRAM(S): at 16:45

## 2018-05-16 RX ADMIN — HEPARIN SODIUM 5000 UNIT(S): 5000 INJECTION INTRAVENOUS; SUBCUTANEOUS at 06:02

## 2018-05-16 RX ADMIN — Medication 100 MILLIGRAM(S): at 06:01

## 2018-05-16 RX ADMIN — ESCITALOPRAM OXALATE 10 MILLIGRAM(S): 10 TABLET, FILM COATED ORAL at 11:20

## 2018-05-16 RX ADMIN — Medication 325 MILLIGRAM(S): at 21:58

## 2018-05-16 RX ADMIN — SENNA PLUS 1 TABLET(S): 8.6 TABLET ORAL at 21:58

## 2018-05-16 RX ADMIN — Medication 100 MICROGRAM(S): at 06:03

## 2018-05-16 RX ADMIN — SIMETHICONE 80 MILLIGRAM(S): 80 TABLET, CHEWABLE ORAL at 22:51

## 2018-05-16 RX ADMIN — Medication 667 MILLIGRAM(S): at 16:45

## 2018-05-16 RX ADMIN — Medication 325 MILLIGRAM(S): at 13:43

## 2018-05-16 RX ADMIN — Medication 2 UNIT(S): at 12:39

## 2018-05-16 RX ADMIN — Medication 2.5 MILLIGRAM(S): at 11:20

## 2018-05-16 RX ADMIN — Medication 2 UNIT(S): at 18:00

## 2018-05-16 RX ADMIN — Medication 100 MILLIGRAM(S): at 13:43

## 2018-05-16 RX ADMIN — Medication 2 UNIT(S): at 08:38

## 2018-05-16 RX ADMIN — HEPARIN SODIUM 5000 UNIT(S): 5000 INJECTION INTRAVENOUS; SUBCUTANEOUS at 16:46

## 2018-05-16 RX ADMIN — LIDOCAINE 1 PATCH: 4 CREAM TOPICAL at 00:08

## 2018-05-16 RX ADMIN — Medication 650 MILLIGRAM(S): at 09:21

## 2018-05-16 RX ADMIN — INSULIN GLARGINE 5 UNIT(S): 100 INJECTION, SOLUTION SUBCUTANEOUS at 13:42

## 2018-05-16 RX ADMIN — LIDOCAINE 1 PATCH: 4 CREAM TOPICAL at 11:10

## 2018-05-16 RX ADMIN — Medication 50 MILLIGRAM(S): at 06:01

## 2018-05-16 RX ADMIN — NYSTATIN CREAM 1 APPLICATION(S): 100000 CREAM TOPICAL at 16:40

## 2018-05-16 NOTE — PROGRESS NOTE ADULT - ASSESSMENT
82 yo F with h/o DM, HTN, HL, RA, hypothyroidism, HFpEF, AICD, hemorrhoids, p/w weakness and diarrhea. Found to have DREW, acidosis and hyperkalemia/cystitis/neurogenic bladder    - creatinine stable non oliguric   - no need for RRT   - DC davis -->voiding trial    - MOSHE , on FESO4 po q 8 ,on Procrit  - on phoslo, Phosphorus noted   - overall prognosis poor

## 2018-05-16 NOTE — PROGRESS NOTE ADULT - SUBJECTIVE AND OBJECTIVE BOX
Patient is a 83y old  Female who presents with a chief complaint of DKA (01 May 2018 04:15)  pt seen and evaluated laying in bed comfortably, family at bedside. pt with poor oral intake   on mechanical soft with thin liquid/carbohydrate consistent. and p.o supplement glucerna shake.    LABS  05-16    140  |  100  |  59<H>  ----------------------------<  159<H>  5.1<H>   |  27  |  4.2<HH>    Ca    8.1<L>      16 May 2018 09:10  Phos  3.7     05-16  Mg     2.0     05-16                            9.2    6.63  )-----------( 388      ( 16 May 2018 09:10 )             28.6     Allergies  Plavix (Other (Moderate))    Drug Dosing Weight  Height (cm): 160.02 (07 May 2018 12:21)  Weight (kg): 51.3 (02 May 2018 17:30)  BMI (kg/m2): 20 (07 May 2018 12:21)  BSA (m2): 1.52 (07 May 2018 12:21)  T(C): 36.3 (05-16-18 @ 14:05), Max: 36.6 (05-15-18 @ 21:10)  HR: 62 (05-16-18 @ 14:05) (60 - 67)  BP: 129/79 (05-16-18 @ 14:05) (104/54 - 133/71)  RR: 16 (05-16-18 @ 14:05) (16 - 18)    05-15-18 @ 07:01  -  05-16-18 @ 07:00  --------------------------------------------------------  IN: 220 mL / OUT: 1075 mL / NET: -855 mL        acetaminophen   Tablet. 650 milliGRAM(s) Oral every 6 hours PRN  aluminum hydroxide/magnesium hydroxide/simethicone Suspension 30 milliLiter(s) Oral once PRN  aluminum hydroxide/magnesium hydroxide/simethicone Suspension 30 milliLiter(s) Oral every 6 hours PRN  atorvastatin 10 milliGRAM(s) Oral at bedtime  benzocaine 15 mG/menthol 3.6 mG Lozenge 1 Lozenge Oral every 3 hours PRN  calcium acetate 667 milliGRAM(s) Oral three times a day with meals  ciprofloxacin     Tablet 250 milliGRAM(s) Oral every 12 hours  dextrose 40% Gel 15 Gram(s) Oral once PRN  dextrose 5%. 1000 milliLiter(s) IV Continuous <Continuous>  dextrose 5%. 1000 milliLiter(s) IV Continuous <Continuous>  dextrose 50% Injectable 12.5 Gram(s) IV Push once  dextrose 50% Injectable 25 Gram(s) IV Push once  dextrose 50% Injectable 12.5 Gram(s) IV Push once  dextrose 50% Injectable 25 Gram(s) IV Push once  dextrose 50% Injectable 25 Gram(s) IV Push once  dextrose Gel 1 Dose(s) Oral once PRN  diphenhydrAMINE   Capsule 25 milliGRAM(s) Oral every 4 hours PRN  docusate sodium 100 milliGRAM(s) Oral three times a day  dronabinol 2.5 milliGRAM(s) Oral before lunch  epoetin mohsen Injectable 5000 Unit(s) SubCutaneous every 7 days  escitalopram 10 milliGRAM(s) Oral daily  ferrous    sulfate 325 milliGRAM(s) Oral three times a day  glucagon  Injectable 1 milliGRAM(s) IntraMuscular once PRN  glucagon  Injectable 1 milliGRAM(s) IntraMuscular once PRN  heparin  Injectable 5000 Unit(s) SubCutaneous every 12 hours  insulin glargine Injectable (LANTUS) 5 Unit(s) SubCutaneous every morning  insulin lispro (HumaLOG) corrective regimen sliding scale   SubCutaneous three times a day before meals  insulin lispro Injectable (HumaLOG) 2 Unit(s) SubCutaneous before breakfast  insulin lispro Injectable (HumaLOG) 2 Unit(s) SubCutaneous before lunch  insulin lispro Injectable (HumaLOG) 2 Unit(s) SubCutaneous before dinner  levothyroxine 100 MICROGram(s) Oral daily  lidocaine   Patch 1 Patch Transdermal daily  lidocaine   Patch      melatonin 5 milliGRAM(s) Oral at bedtime  metoprolol succinate ER 50 milliGRAM(s) Oral daily  nystatin Powder 1 Application(s) Topical two times a day  senna 1 Tablet(s) Oral at bedtime  sodium polystyrene sulfonate Suspension 15 Gram(s) Oral two times a day

## 2018-05-16 NOTE — PROGRESS NOTE ADULT - ASSESSMENT
82 yo F with h/o DM, HTN, HL, RA, hypothyroidism, CHF (EF 55% in 12/17) s/p PPM, s/p AICD, hemorrhoids, p/w weakness and diarrhea. Found to have DREW , acidosis and hyperkalemia/emphysematous cystitis/neurogenic bladder.    #) Emphysematous cystitis  - Urine culture: 10,000 - 49,000 CFU/mL Escherichia coli (05.01.18)  - ID consult appreciated: initially CTX now Cipro for total 3 weeks   - f/u urology: continue Collins until antibiotics end    #) Metabolic encephalopathy 2/2 uremia  - resolved    #) Fatigue   - encourage feeding  - nutritino consult appreciated: continue Dronabinol   - continue PT    #) Chronic Systolic heart failure (EF 55% in 12/17) s/p PPM, s/p AICD  - hold lasix 40mg PO for now  - continue metoprolol ER 50mg daily   - EP consulted for AICD interrogation    #) DREW on CKD-5 / Recent ATN  - Creatinine improving very slowly but surely.  - Nephology following: no need for RRT d/t her frailty   - continue feso4 po q8, phoslo    #) Hyperkalemia   - monitor bmp  - Kayexalate PRN    #) DM type 1  - continue Lantus 5U in am and 2/2/2 lispro  - monitor fingerstick    #) DKA: resolved    #) Hypothyroidism  - continue levothyroxine     #) Disposition   - awaiting NH approval    #) DNR/DNI 82 yo F with h/o DM, HTN, HL, RA, hypothyroidism, CHF (EF 55% in 12/17) s/p PPM, s/p AICD, hemorrhoids, p/w weakness and diarrhea. Found to have DREW , acidosis and hyperkalemia/emphysematous cystitis/neurogenic bladder.    #) Emphysematous cystitis  - Urine culture: 10,000 - 49,000 CFU/mL Escherichia coli (05.01.18)  - ID consult appreciated: initially CTX now Cipro for total 3 weeks   - f/u urology: continue Collins until antibiotics end    #) Metabolic encephalopathy 2/2 uremia  - resolved    #) Fatigue   - encourage feeding  - nutritino consult appreciated: continue Dronabinol   - continue PT    #) Chronic Systolic heart failure (EF 55% in 12/17) s/p PPM, s/p AICD  - hold lasix 40mg PO for now  - continue metoprolol ER 50mg daily   - EP consulted for AICD interrogation    #) DREW on CKD-5 / Recent ATN  - Creatinine improving very slowly but surely.  - Nephology following: no need for RRT d/t her frailty   - continue feso4 po q8, phoslo    #) Hyperkalemia   - monitor bmp  - Kayexalate PRN    #) DM type 1  - continue Lantus 5U in am and 2/2/2 lispro  - monitor fingerstick    #) DKA: resolved    #) Hypothyroidism  - continue levothyroxine     #) Disposition   - awaiting NH approval    #) DNR/DNI     Attending Attestation:  Pt seen and examined. Case and Plan discussed with pt and team during rounds. Agree with above progress note documentation as corrected. Pt is chronically ill DNR/DNI not on HD overall getting better and awaiting SNF Transfer when available. Spoke to the family and patient at length at bedside regarding overall condition and above plan.     Dispo: D/C SNF when avail

## 2018-05-16 NOTE — PROGRESS NOTE ADULT - SUBJECTIVE AND OBJECTIVE BOX
Nephrology progress note    Patient is seen and examined, events over the last 24 h noted .    Allergies:  Plavix (Other (Moderate))    Hospital Medications:   MEDICATIONS  (STANDING):  atorvastatin 10 milliGRAM(s) Oral at bedtime  calcium acetate 667 milliGRAM(s) Oral three times a day with meals  ciprofloxacin     Tablet 250 milliGRAM(s) Oral every 12 hours  dextrose 5%. 1000 milliLiter(s) (50 mL/Hr) IV Continuous <Continuous>  dextrose 5%. 1000 milliLiter(s) (50 mL/Hr) IV Continuous <Continuous>  docusate sodium 100 milliGRAM(s) Oral three times a day  dronabinol 2.5 milliGRAM(s) Oral before lunch  epoetin mohsen Injectable 5000 Unit(s) SubCutaneous every 7 days  escitalopram 10 milliGRAM(s) Oral daily  ferrous    sulfate 325 milliGRAM(s) Oral three times a day  heparin  Injectable 5000 Unit(s) SubCutaneous every 12 hours  insulin glargine Injectable (LANTUS) 5 Unit(s) SubCutaneous every morning  insulin lispro (HumaLOG) corrective regimen sliding scale   SubCutaneous three times a day before meals  insulin lispro Injectable (HumaLOG) 2 Unit(s) SubCutaneous before breakfast  insulin lispro Injectable (HumaLOG) 2 Unit(s) SubCutaneous before lunch  insulin lispro Injectable (HumaLOG) 2 Unit(s) SubCutaneous before dinner  levothyroxine 100 MICROGram(s) Oral daily  lidocaine   Patch 1 Patch Transdermal daily  lidocaine   Patch      melatonin 5 milliGRAM(s) Oral at bedtime  metoprolol succinate ER 50 milliGRAM(s) Oral daily  nystatin Powder 1 Application(s) Topical two times a day  senna 1 Tablet(s) Oral at bedtime        VITALS:  T(F): 97.3 (18 @ 05:02), Max: 97.8 (05-15-18 @ 21:10)  HR: 67 (18 @ 05:30)  BP: 125/71 (18 @ 05:30)  RR: 18 (18 @ 05:02)      05-14 @ 07:01  -  05-15 @ 07:00  --------------------------------------------------------  IN: 0 mL / OUT: 1400 mL / NET: -1400 mL    05-15 @ 07:01  -   @ 07:00  --------------------------------------------------------  IN: 220 mL / OUT: 1075 mL / NET: -855 mL          PHYSICAL EXAM:  Constitutional: NAD  HEENT: anicteric sclera, oropharynx clear, MMM  Neck: No JVD  Respiratory: CTAB, no wheezes, rales or rhonchi  Cardiovascular: S1, S2, RRR  Gastrointestinal: BS+, soft, NT/ND  Extremities: No cyanosis or clubbing. No peripheral edema  :  No davis.   Skin: No rashes    LABS:      140  |  100  |  59<H>  ----------------------------<  159<H>  5.1<H>   |  27  |  4.2<HH>  Creatinine Trend: 4.2<--, 4.3<--, 4.6<--, 4.6<--, 4.9<--, 4.9<--  Ca    8.1<L>      16 May 2018 09:10  Phos  3.7     -  Mg     2.0     -16                            9.2    6.63  )-----------( 388      ( 16 May 2018 09:10 )             28.6       Urine Studies:  Urinalysis Basic - ( 10 May 2018 17:03 )    Color: Yellow / Appearance: Clear / S.015 / pH:   Gluc:  / Ketone: Negative  / Bili: Negative / Urobili: 0.2   Blood:  / Protein: 30 / Nitrite: Negative   Leuk Esterase: Large / RBC: 5-10 /HPF / WBC >50 /HPF   Sq Epi:  / Non Sq Epi: Occasional /HPF / Bacteria: See Note        RADIOLOGY & ADDITIONAL STUDIES: Nephrology progress note    Patient is seen and examined, events over the last 24 h noted .  lethargic not eating     Allergies:  Plavix (Other (Moderate))    Hospital Medications:   MEDICATIONS  (STANDING):  atorvastatin 10 milliGRAM(s) Oral at bedtime  calcium acetate 667 milliGRAM(s) Oral three times a day with meals  ciprofloxacin     Tablet 250 milliGRAM(s) Oral every 12 hours  dextrose 5%. 1000 milliLiter(s) (50 mL/Hr) IV Continuous <Continuous>  dextrose 5%. 1000 milliLiter(s) (50 mL/Hr) IV Continuous <Continuous>  docusate sodium 100 milliGRAM(s) Oral three times a day  dronabinol 2.5 milliGRAM(s) Oral before lunch  epoetin mohsen Injectable 5000 Unit(s) SubCutaneous every 7 days  escitalopram 10 milliGRAM(s) Oral daily  ferrous    sulfate 325 milliGRAM(s) Oral three times a day  heparin  Injectable 5000 Unit(s) SubCutaneous every 12 hours  insulin glargine Injectable (LANTUS) 5 Unit(s) SubCutaneous every morning  insulin lispro (HumaLOG) corrective regimen sliding scale   SubCutaneous three times a day before meals  levothyroxine 100 MICROGram(s) Oral daily  lidocaine   Patch 1 Patch Transdermal daily     melatonin 5 milliGRAM(s) Oral at bedtime  metoprolol succinate ER 50 milliGRAM(s) Oral daily  nystatin Powder 1 Application(s) Topical two times a day  senna 1 Tablet(s) Oral at bedtime        VITALS:  T(F): 97.3 (18 @ 05:02), Max: 97.8 (05-15-18 @ 21:10)  HR: 67 (18 @ 05:30)  BP: 125/71 (18 @ 05:30)  RR: 18 (18 @ 05:02)      14 @ 07:01  -  05-15 @ 07:00  --------------------------------------------------------  IN: 0 mL / OUT: 1400 mL / NET: -1400 mL    05-15 @ 07:01  -   @ 07:00  --------------------------------------------------------  IN: 220 mL / OUT: 1075 mL / NET: -855 mL          PHYSICAL EXAM:  Constitutional: NAD  HEENT: anicteric sclera, oropharynx clear, MMM  Neck: No JVD  Respiratory: CTAB, no wheezes, rales or rhonchi  Cardiovascular: S1, S2, RRR  Gastrointestinal: BS+, soft, NT/ND  Extremities: No cyanosis or clubbing. No peripheral edema  :  No davis.   Skin: No rashes    LABS:      140  |  100  |  59<H>  ----------------------------<  159<H>  5.1<H>   |  27  |  4.2<HH>    Creatinine Trend: 4.2<--, 4.3<--, 4.6<--, 4.6<--, 4.9<--, 4.9<--    Ca    8.1<L>      16 May 2018 09:10  Phos  3.7       Mg     2.0                                 9.2    6.63  )-----------( 388      ( 16 May 2018 09:10 )             28.6       Urine Studies:  Urinalysis Basic - ( 10 May 2018 17:03 )    Color: Yellow / Appearance: Clear / S.015 / pH:   Gluc:  / Ketone: Negative  / Bili: Negative / Urobili: 0.2   Blood:  / Protein: 30 / Nitrite: Negative   Leuk Esterase: Large / RBC: 5-10 /HPF / WBC >50 /HPF   Sq Epi:  / Non Sq Epi: Occasional /HPF / Bacteria: See Note        RADIOLOGY & ADDITIONAL STUDIES:

## 2018-05-16 NOTE — PROGRESS NOTE ADULT - ASSESSMENT
ASSESSMENT/PLAN  add Corbett 2.5 mg po an hour before lunch, and observe effect. Can add 2nd dose at 4pm,  Add nepro 8 ounces twice a day  encourage oral intake especially protein  check bmp/phos/mg and correct lytes

## 2018-05-16 NOTE — PROGRESS NOTE ADULT - SUBJECTIVE AND OBJECTIVE BOX
SUBJECTIVE:    Patient is a 83y old Female who presents with a chief complaint of DKA (01 May 2018 04:15)    Currently admitted to medicine with the primary diagnosis of DKA (diabetic ketoacidoses)     Today is hospital day 16d. This morning she is resting comfortably in bed and reports no new issues or overnight events.     PAST MEDICAL & SURGICAL HISTORY  Neuropathy  Diabetes  Hypothyroidism  HTN (hypertension)  CHF (congestive heart failure)  AICD (automatic cardioverter/defibrillator) present    SOCIAL HISTORY:  Negative for smoking/alcohol/drug use.     ALLERGIES:  Plavix (Other (Moderate))    MEDICATIONS:  STANDING MEDICATIONS  atorvastatin 10 milliGRAM(s) Oral at bedtime  calcium acetate 667 milliGRAM(s) Oral three times a day with meals  ciprofloxacin     Tablet 250 milliGRAM(s) Oral every 12 hours  dextrose 5%. 1000 milliLiter(s) IV Continuous <Continuous>  dextrose 5%. 1000 milliLiter(s) IV Continuous <Continuous>  dextrose 50% Injectable 12.5 Gram(s) IV Push once  dextrose 50% Injectable 25 Gram(s) IV Push once  dextrose 50% Injectable 12.5 Gram(s) IV Push once  dextrose 50% Injectable 25 Gram(s) IV Push once  dextrose 50% Injectable 25 Gram(s) IV Push once  docusate sodium 100 milliGRAM(s) Oral three times a day  dronabinol 2.5 milliGRAM(s) Oral before lunch  epoetin mohsen Injectable 5000 Unit(s) SubCutaneous every 7 days  escitalopram 10 milliGRAM(s) Oral daily  ferrous    sulfate 325 milliGRAM(s) Oral three times a day  heparin  Injectable 5000 Unit(s) SubCutaneous every 12 hours  insulin glargine Injectable (LANTUS) 5 Unit(s) SubCutaneous every morning  insulin lispro (HumaLOG) corrective regimen sliding scale   SubCutaneous three times a day before meals  insulin lispro Injectable (HumaLOG) 2 Unit(s) SubCutaneous before breakfast  insulin lispro Injectable (HumaLOG) 2 Unit(s) SubCutaneous before lunch  insulin lispro Injectable (HumaLOG) 2 Unit(s) SubCutaneous before dinner  levothyroxine 100 MICROGram(s) Oral daily  lidocaine   Patch 1 Patch Transdermal daily  lidocaine   Patch      melatonin 5 milliGRAM(s) Oral at bedtime  metoprolol succinate ER 50 milliGRAM(s) Oral daily  nystatin Powder 1 Application(s) Topical two times a day  senna 1 Tablet(s) Oral at bedtime  sodium polystyrene sulfonate Suspension 15 Gram(s) Oral two times a day    PRN MEDICATIONS  acetaminophen   Tablet. 650 milliGRAM(s) Oral every 6 hours PRN  aluminum hydroxide/magnesium hydroxide/simethicone Suspension 30 milliLiter(s) Oral once PRN  aluminum hydroxide/magnesium hydroxide/simethicone Suspension 30 milliLiter(s) Oral every 6 hours PRN  benzocaine 15 mG/menthol 3.6 mG Lozenge 1 Lozenge Oral every 3 hours PRN  dextrose 40% Gel 15 Gram(s) Oral once PRN  dextrose Gel 1 Dose(s) Oral once PRN  diphenhydrAMINE   Capsule 25 milliGRAM(s) Oral every 4 hours PRN  glucagon  Injectable 1 milliGRAM(s) IntraMuscular once PRN  glucagon  Injectable 1 milliGRAM(s) IntraMuscular once PRN    VITALS:   T(F): 97.3  HR: 67  BP: 125/71  RR: 18  SpO2: --    LABS:                        9.2    6.63  )-----------( 388      ( 16 May 2018 09:10 )             28.6     05-16    140  |  100  |  59<H>  ----------------------------<  159<H>  5.1<H>   |  27  |  4.2<HH>    Ca    8.1<L>      16 May 2018 09:10  Phos  3.7     05-16  Mg     2.0     05-16        RADIOLOGY:  Xray Chest 1 View AP/PA (05.08.18)  Small left pleural effusion and right basilar atelectasis without definitive consolidation identified.    PHYSICAL EXAM:  GEN: No acute distress, frail  LUNGS: Clear to auscultation bilaterally   HEART: S1/S2 present. RRR.   ABD: Soft, non-tender, non-distended. Bowel sounds present  EXT: NC/NC/NE/2+PP/PLASENCIA  NEURO: AAOX3  Collins +

## 2018-05-17 ENCOUNTER — TRANSCRIPTION ENCOUNTER (OUTPATIENT)
Age: 83
End: 2018-05-17

## 2018-05-17 VITALS
HEART RATE: 79 BPM | OXYGEN SATURATION: 94 % | RESPIRATION RATE: 16 BRPM | TEMPERATURE: 98 F | HEIGHT: 62 IN | SYSTOLIC BLOOD PRESSURE: 126 MMHG | WEIGHT: 155.21 LBS | DIASTOLIC BLOOD PRESSURE: 61 MMHG

## 2018-05-17 LAB
ANION GAP SERPL CALC-SCNC: 14 MMOL/L — SIGNIFICANT CHANGE UP (ref 7–14)
BUN SERPL-MCNC: 56 MG/DL — HIGH (ref 10–20)
CALCIUM SERPL-MCNC: 7.8 MG/DL — LOW (ref 8.5–10.1)
CHLORIDE SERPL-SCNC: 101 MMOL/L — SIGNIFICANT CHANGE UP (ref 98–110)
CO2 SERPL-SCNC: 25 MMOL/L — SIGNIFICANT CHANGE UP (ref 17–32)
CREAT SERPL-MCNC: 4.4 MG/DL — CRITICAL HIGH (ref 0.7–1.5)
GLUCOSE SERPL-MCNC: 165 MG/DL — HIGH (ref 70–99)
PHOSPHATE SERPL-MCNC: 4.1 MG/DL — SIGNIFICANT CHANGE UP (ref 2.1–4.9)
POTASSIUM SERPL-MCNC: 4.4 MMOL/L — SIGNIFICANT CHANGE UP (ref 3.5–5)
POTASSIUM SERPL-SCNC: 4.4 MMOL/L — SIGNIFICANT CHANGE UP (ref 3.5–5)
SODIUM SERPL-SCNC: 140 MMOL/L — SIGNIFICANT CHANGE UP (ref 135–146)

## 2018-05-17 RX ORDER — SPIRONOLACTONE 25 MG/1
1 TABLET, FILM COATED ORAL
Qty: 0 | Refills: 0 | COMMUNITY

## 2018-05-17 RX ORDER — NYSTATIN CREAM 100000 [USP'U]/G
1 CREAM TOPICAL
Qty: 0 | Refills: 0 | COMMUNITY
Start: 2018-05-17

## 2018-05-17 RX ORDER — FUROSEMIDE 40 MG
1 TABLET ORAL
Qty: 0 | Refills: 0 | COMMUNITY

## 2018-05-17 RX ORDER — METOPROLOL TARTRATE 50 MG
1 TABLET ORAL
Qty: 0 | Refills: 0 | COMMUNITY
Start: 2018-05-17

## 2018-05-17 RX ORDER — DOCUSATE SODIUM 100 MG
1 CAPSULE ORAL
Qty: 0 | Refills: 0 | COMMUNITY
Start: 2018-05-17

## 2018-05-17 RX ORDER — INSULIN GLARGINE 100 [IU]/ML
5 INJECTION, SOLUTION SUBCUTANEOUS
Qty: 0 | Refills: 0 | COMMUNITY
Start: 2018-05-17

## 2018-05-17 RX ORDER — INSULIN LISPRO 100/ML
2 VIAL (ML) SUBCUTANEOUS
Qty: 0 | Refills: 0 | COMMUNITY
Start: 2018-05-17

## 2018-05-17 RX ORDER — LEVOTHYROXINE SODIUM 125 MCG
1 TABLET ORAL
Qty: 0 | Refills: 0 | COMMUNITY

## 2018-05-17 RX ORDER — LEVOTHYROXINE SODIUM 125 MCG
1 TABLET ORAL
Qty: 0 | Refills: 0 | COMMUNITY
Start: 2018-05-17

## 2018-05-17 RX ORDER — INSULIN DEGLUDEC 100 U/ML
0 INJECTION, SOLUTION SUBCUTANEOUS
Qty: 0 | Refills: 0 | COMMUNITY

## 2018-05-17 RX ORDER — SIMETHICONE 80 MG/1
1 TABLET, CHEWABLE ORAL
Qty: 0 | Refills: 0 | COMMUNITY
Start: 2018-05-17

## 2018-05-17 RX ORDER — DRONABINOL 2.5 MG
1 CAPSULE ORAL
Qty: 0 | Refills: 0 | COMMUNITY
Start: 2018-05-17

## 2018-05-17 RX ORDER — CIPROFLOXACIN LACTATE 400MG/40ML
1 VIAL (ML) INTRAVENOUS
Qty: 0 | Refills: 0 | COMMUNITY
Start: 2018-05-17 | End: 2018-05-31

## 2018-05-17 RX ORDER — FERROUS SULFATE 325(65) MG
1 TABLET ORAL
Qty: 0 | Refills: 0 | COMMUNITY
Start: 2018-05-17

## 2018-05-17 RX ORDER — METOPROLOL TARTRATE 50 MG
1 TABLET ORAL
Qty: 0 | Refills: 0 | COMMUNITY

## 2018-05-17 RX ORDER — SENNA PLUS 8.6 MG/1
1 TABLET ORAL
Qty: 0 | Refills: 0 | COMMUNITY
Start: 2018-05-17

## 2018-05-17 RX ORDER — SACUBITRIL AND VALSARTAN 24; 26 MG/1; MG/1
1 TABLET, FILM COATED ORAL
Qty: 0 | Refills: 0 | COMMUNITY

## 2018-05-17 RX ORDER — METOPROLOL TARTRATE 50 MG
25 TABLET ORAL DAILY
Qty: 0 | Refills: 0 | Status: DISCONTINUED | OUTPATIENT
Start: 2018-05-17 | End: 2018-05-17

## 2018-05-17 RX ORDER — GLIMEPIRIDE 1 MG
1 TABLET ORAL
Qty: 0 | Refills: 0 | COMMUNITY

## 2018-05-17 RX ORDER — CIPROFLOXACIN LACTATE 400MG/40ML
1 VIAL (ML) INTRAVENOUS
Qty: 0 | Refills: 0 | COMMUNITY
Start: 2018-05-17

## 2018-05-17 RX ADMIN — Medication 100 MILLIGRAM(S): at 13:19

## 2018-05-17 RX ADMIN — Medication 667 MILLIGRAM(S): at 11:38

## 2018-05-17 RX ADMIN — Medication 667 MILLIGRAM(S): at 09:13

## 2018-05-17 RX ADMIN — Medication 50 MILLIGRAM(S): at 05:16

## 2018-05-17 RX ADMIN — Medication 650 MILLIGRAM(S): at 11:10

## 2018-05-17 RX ADMIN — Medication 1: at 09:12

## 2018-05-17 RX ADMIN — Medication 325 MILLIGRAM(S): at 05:16

## 2018-05-17 RX ADMIN — Medication 325 MILLIGRAM(S): at 13:19

## 2018-05-17 RX ADMIN — Medication 2.5 MILLIGRAM(S): at 11:30

## 2018-05-17 RX ADMIN — Medication 650 MILLIGRAM(S): at 10:40

## 2018-05-17 RX ADMIN — Medication 100 MICROGRAM(S): at 05:16

## 2018-05-17 RX ADMIN — HEPARIN SODIUM 5000 UNIT(S): 5000 INJECTION INTRAVENOUS; SUBCUTANEOUS at 05:16

## 2018-05-17 RX ADMIN — Medication 1: at 11:28

## 2018-05-17 RX ADMIN — Medication 2 UNIT(S): at 11:28

## 2018-05-17 RX ADMIN — Medication 25 MILLIGRAM(S): at 11:27

## 2018-05-17 RX ADMIN — Medication 2 UNIT(S): at 09:12

## 2018-05-17 RX ADMIN — ESCITALOPRAM OXALATE 10 MILLIGRAM(S): 10 TABLET, FILM COATED ORAL at 11:27

## 2018-05-17 RX ADMIN — Medication 100 MILLIGRAM(S): at 05:16

## 2018-05-17 RX ADMIN — INSULIN GLARGINE 5 UNIT(S): 100 INJECTION, SOLUTION SUBCUTANEOUS at 09:13

## 2018-05-17 RX ADMIN — SODIUM POLYSTYRENE SULFONATE 15 GRAM(S): 4.1 POWDER, FOR SUSPENSION ORAL at 05:28

## 2018-05-17 RX ADMIN — Medication 250 MILLIGRAM(S): at 05:16

## 2018-05-17 NOTE — PROGRESS NOTE ADULT - PROVIDER SPECIALTY LIST ADULT
Critical Care
Endocrinology
Endocrinology
Hospitalist
Infectious Disease
Infectious Disease
Internal Medicine
Nephrology
Nutrition Support
Urology
Urology
Internal Medicine
Nephrology
Infectious Disease
Infectious Disease
Internal Medicine
Internal Medicine
Infectious Disease

## 2018-05-17 NOTE — DISCHARGE NOTE ADULT - PLAN OF CARE
Medical management - Urine culture: 10,000 - 49,000 CFU/mL Escherichia coli (05.01.18)  - ID consult appreciated: Cipro for one more week   - continue Collins until antibiotics end  - outpt follow up with urology once abx course completed continue insulin as directed  monitor finger sticks resolved Resolved continue to monitor bmp  follow up with nephrology outpt continue with metoprolol  monitor blood pressure - Urine culture: 10,000 - 49,000 CFU/mL Escherichia coli (05.01.18)  - ID consult appreciated: Ciprofloxacin till May 31st   - continue Collins until antibiotics end  - outpt follow up with urology once abx course completed

## 2018-05-17 NOTE — DISCHARGE NOTE ADULT - HOSPITAL COURSE
84 yo F with h/o DM, HTN, HL, RA, hypothyroidism, CHF (EF 55% in 12/17)< s/p PPM, s/p AICD, hemorrhoids, presented with weakness and diarrhea. Pt is found to have DKA in ED. 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 Patient was admitted to ICU for close monitoring of DKA. Patient was placed on insulin and NaHCO3 drip, then was bridged to insulin SQ. Once stable, pt was transferred to the floor for management of emphysematous cystitis, metabolic encephalopathy secondary to uremia, DREW on CKD-5 with recent ATN, hyperkalemia, and fatigue. Pt was seen by Infectious Diseases, Urology, Nephrology, and Nutrition. Pt was given IV antibiotics, davis catheter, iron, phoslo, kayexalate PRN, and dronabinol. Pt responded well to treatment and was not dialyzed Pt is now vitally stable with poor PO intake, and ambulating at baseline with assistance. Pt to be discharged to nursing home with PO ciprofloxacin 250mg q12 for 7 days and indwelling catheter until antibiotics are finished. She will follow up with urology outpatient after completing her abx course and also will be following nephrology outpt with Dr. Gonzalez Herrera. 82 yo F with h/o DM, HTN, HL, RA, hypothyroidism, CHF (EF 55% in 12/17)< s/p PPM, s/p AICD, hemorrhoids, presented with weakness and diarrhea. Pt is found to have DKA in ED. 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 Patient was admitted to ICU for close monitoring of DKA. Patient was placed on insulin and NaHCO3 drip, then was bridged to insulin SQ. Once stable, pt was transferred to the floor for management of emphysematous cystitis, metabolic encephalopathy secondary to uremia, DREW on CKD-5 with recent ATN, hyperkalemia, and fatigue. Pt was seen by Infectious Diseases, Urology, Nephrology, and Nutrition. Pt was given IV antibiotics, davis catheter, iron, phoslo, kayexalate PRN, and dronabinol. Pt responded well to treatment and was not dialyzed Pt is now vitally stable with poor PO intake, and ambulating at baseline with assistance. Pt to be discharged to nursing home with PO ciprofloxacin 250mg q12 till May 31 and indwelling catheter until antibiotics are finished. She will follow up with urology outpatient after completing her abx course and also will be following nephrology outpt with Dr. Gonzalez Herrera.

## 2018-05-17 NOTE — DISCHARGE NOTE ADULT - MEDICATION SUMMARY - MEDICATIONS TO STOP TAKING
I will STOP taking the medications listed below when I get home from the hospital:    Lasix 20 mg oral tablet  -- 1 tab(s) by mouth once a day    spironolactone 25 mg oral tablet  -- 1 tab(s) by mouth 2 times a day    metoprolol succinate 50 mg oral tablet, extended release  -- 1 tab(s) by mouth once a day    Entresto 97 mg-103 mg oral tablet  -- 1 tab(s) by mouth 2 times a day    glimepiride 2 mg oral tablet  -- 1 tab(s) by mouth once a day    Tresiba FlexTouch 100 units/mL subcutaneous solution

## 2018-05-17 NOTE — PROGRESS NOTE ADULT - ASSESSMENT
84 yo F with h/o DM, HTN, HL, RA, hypothyroidism, CHF (EF 55% in 12/17) s/p PPM, s/p AICD, hemorrhoids, p/w weakness and diarrhea. Found to have DREW , acidosis and hyperkalemia/emphysematous cystitis/neurogenic bladder.    #) Emphysematous cystitis  - Urine culture: 10,000 - 49,000 CFU/mL Escherichia coli (05.01.18)  - ID consult appreciated: initially CTX now Cipro for total 3 weeks   - f/u urology: continue Collins until antibiotics end    #) Metabolic encephalopathy 2/2 uremia  - resolved    #) Fatigue   - encourage feeding  - nutritino consult appreciated: continue Dronabinol   - continue PT    #) Chronic Systolic heart failure (EF 55% in 12/17) s/p PPM, s/p AICD  - hold lasix 40mg PO for now  - continue metoprolol ER 50mg daily   - EP consulted for AICD interrogation: pending    #) DREW on CKD-5 / Recent ATN  - Creatinine improving very slowly  - Nephology following: no need for RRT d/t her frailty   - continue feso4 po q8, phoslo    #) Hyperkalemia   - monitor bmp  - Kayexalate PRN    #) DM type 1  - continue Lantus 5U in am and 2/2/2 lispro  - monitor fingerstick    #) DKA: resolved    #) Hypothyroidism  - continue levothyroxine     #) Disposition   - awaiting [facility] approval    #) DNR/DNI

## 2018-05-17 NOTE — PROGRESS NOTE ADULT - SUBJECTIVE AND OBJECTIVE BOX
Standing Inpatient Medications  atorvastatin 10 milliGRAM(s) Oral at bedtime  calcium acetate 667 milliGRAM(s) Oral three times a day with meals  ciprofloxacin     Tablet 250 milliGRAM(s) Oral every 12 hours  dextrose 5%. 1000 milliLiter(s) IV Continuous <Continuous>  dextrose 5%. 1000 milliLiter(s) IV Continuous <Continuous>  dextrose 50% Injectable 12.5 Gram(s) IV Push once  dextrose 50% Injectable 25 Gram(s) IV Push once  dextrose 50% Injectable 12.5 Gram(s) IV Push once  dextrose 50% Injectable 25 Gram(s) IV Push once  dextrose 50% Injectable 25 Gram(s) IV Push once  docusate sodium 100 milliGRAM(s) Oral three times a day  dronabinol 2.5 milliGRAM(s) Oral before lunch  epoetin mohsen Injectable 5000 Unit(s) SubCutaneous every 7 days  escitalopram 10 milliGRAM(s) Oral daily  ferrous    sulfate 325 milliGRAM(s) Oral three times a day  heparin  Injectable 5000 Unit(s) SubCutaneous every 12 hours  insulin glargine Injectable (LANTUS) 5 Unit(s) SubCutaneous every morning  insulin lispro (HumaLOG) corrective regimen sliding scale   SubCutaneous three times a day before meals  insulin lispro Injectable (HumaLOG) 2 Unit(s) SubCutaneous before breakfast  insulin lispro Injectable (HumaLOG) 2 Unit(s) SubCutaneous before lunch  insulin lispro Injectable (HumaLOG) 2 Unit(s) SubCutaneous before dinner  levothyroxine 100 MICROGram(s) Oral daily  lidocaine   Patch 1 Patch Transdermal daily  lidocaine   Patch      melatonin 5 milliGRAM(s) Oral at bedtime  metoprolol succinate ER 50 milliGRAM(s) Oral daily  nystatin Powder 1 Application(s) Topical two times a day  senna 1 Tablet(s) Oral at bedtime  sodium polystyrene sulfonate Suspension 15 Gram(s) Oral two times a day        VITALS/PHYSICAL EXAM  --------------------------------------------------------------------------------  T(C): 36.2 (05-17-18 @ 04:52), Max: 36.7 (05-16-18 @ 20:50)  HR: 66 (05-17-18 @ 04:52) (62 - 70)  BP: 103/57 (05-17-18 @ 04:52) (103/57 - 129/79)  RR: 18 (05-17-18 @ 04:52) (16 - 18)  SpO2: --  Wt(kg): --        05-15-18 @ 07:01  -  05-16-18 @ 07:00  --------------------------------------------------------  IN: 220 mL / OUT: 1075 mL / NET: -855 mL    05-16-18 @ 07:01  -  05-17-18 @ 05:51  --------------------------------------------------------  IN: 0 mL / OUT: 600 mL / NET: -600 mL      Physical Exam:  	Gen: NAD  	Pulm:decrease BS B/L  	CV: S1S2; no rub  	Abd:  nontender/nondistended  	:susan   	LE: no edema  	  LABS/STUDIES  --------------------------------------------------------------------------------              9.2    6.63  >-----------<  388      [05-16-18 @ 09:10]              28.6     140  |  100  |  59  ----------------------------<  159      [05-16-18 @ 09:10]  5.1   |  27  |  4.2        Ca     8.1     [05-16-18 @ 09:10]      Mg     2.0     [05-16-18 @ 09:10]      Phos  3.7     [05-16-18 @ 09:10]            Creatinine Trend:  SCr 4.2 [05-16 @ 09:10]  SCr 4.3 [05-15 @ 10:32]  SCr 4.6 [05-14 @ 10:01]  SCr 4.6 [05-13 @ 09:38]  SCr 4.9 [05-12 @ 08:47]    Urinalysis - [05-10-18 @ 17:03]      Color Yellow / Appearance Clear / SG 1.015 / pH 7.0      Gluc 100 / Ketone Negative  / Bili Negative / Urobili 0.2       Blood Moderate / Protein 30 / Leuk Est Large / Nitrite Negative      RBC 5-10 / WBC >50 / Hyaline  / Gran  / Sq Epi  / Non Sq Epi Occasional / Bacteria See Note      Iron 32, TIBC 114, %sat 28      [05-03-18 @ 05:50]  Ferritin 618      [05-03-18 @ 05:50]  PTH -- (Ca 8.0)      [05-15-18 @ 10:32]   79  PTH -- (Ca 6.9)      [05-09-18 @ 01:48]   47  PTH -- (Ca 7.9)      [05-08-18 @ 08:42]   42  HbA1c 12.4      [05-03-18 @ 05:50]

## 2018-05-17 NOTE — DISCHARGE NOTE ADULT - CARE PROVIDER_API CALL
Dwayne Hunt), Internal Medicine; Nephrology  [address] [place] [address]  Phone: [phone]  Fax: [phone]    Maurice Molina), Urology  [address] [place] [address]  Phone: [phone]  Fax: [phone]

## 2018-05-17 NOTE — PROGRESS NOTE ADULT - ASSESSMENT
82 yo F with h/o DM, HTN, HL, RA, hypothyroidism, HFpEF, AICD, hemorrhoids, p/w weakness and diarrhea. Found to have DREW, acidosis and hyperkalemia/cystitis/neurogenic bladder    - creatinine trending down   - non oliguric  - no need for RRT   - voiding trial    -MOSHE , on FESO4 and  procrit 5000 units s/c weekly   - d/c phoslo  - d/c kayexalate  - check TSH   - JHONATAN on the low side, decrease metoprolol to 25   - overall prognosis poor

## 2018-05-17 NOTE — DISCHARGE NOTE ADULT - PATIENT PORTAL LINK FT
You can access the [facility] Patient Portal, offered by [facility], by registering with the following website: [email or web address]

## 2018-05-17 NOTE — DISCHARGE NOTE ADULT - MEDICATION SUMMARY - MEDICATIONS TO TAKE
I will START or STAY ON the medications listed below when I get home from the hospital:    aspirin 81 mg oral tablet  -- 1 tab(s) by mouth once a day  -- Indication: For CAD    escitalopram 10 mg oral tablet  -- 1 tab(s) by mouth once a day  -- Indication: For Anxiety    insulin glargine  -- 5 unit(s) subcutaneous once a day  -- Indication: For Diabetes    insulin lispro (concentrated) 200 units/mL subcutaneous solution  -- 2 unit(s) subcutaneous 3 times a day (before meals)  -- Indication: For Diabetes    dronabinol 2.5 mg oral capsule  -- 1 cap(s) by mouth once a day (before a meal)  -- Indication: For lack of appetite    pravastatin 10 mg oral tablet  -- 1 tab(s) by mouth once a day  -- Indication: For Dyslipidemia    metoprolol succinate 25 mg oral tablet, extended release  -- 1 tab(s) by mouth once a day  -- Indication: For Hypertension    nystatin 100,000 units/g topical powder  -- 1 application on skin 2 times a day  -- Indication: For skin rash    FeroSul 325 mg (65 mg elemental iron) oral tablet  -- 1 tab(s) by mouth 3 times a day  -- Indication: For Anemia    docusate sodium 100 mg oral capsule  -- 1 cap(s) by mouth 3 times a day  -- Indication: For Constipation    senna oral tablet  -- 1 tab(s) by mouth once a day (at bedtime)  -- Indication: For Constipation    simethicone 80 mg oral tablet, chewable  -- 1 tab(s) by mouth every 8 hours, As needed, Gas  -- Indication: For Constipation    ciprofloxacin 250 mg oral tablet  -- 1 tab(s) by mouth every 12 hours  -- Indication: For Cystitis    levothyroxine 100 mcg (0.1 mg) oral tablet  -- 1 tab(s) by mouth once a day  -- Indication: For Hypothyroidism

## 2018-05-17 NOTE — DISCHARGE NOTE ADULT - CARE PLAN
Principal Discharge DX:	Emphysematous cystitis  Goal:	Medical management  Assessment and plan of treatment:	- Urine culture: 10,000 - 49,000 CFU/mL Escherichia coli (05.01.18)  - ID consult appreciated: Cipro for one more week   - continue Collins until antibiotics end  - outpt follow up with urology once abx course completed  Secondary Diagnosis:	Diabetes  Goal:	Medical management  Assessment and plan of treatment:	continue insulin as directed  monitor finger sticks  Secondary Diagnosis:	DKA (diabetic ketoacidoses)  Goal:	resolved  Assessment and plan of treatment:	Resolved  Secondary Diagnosis:	CKD (chronic kidney disease)  Goal:	Medical management  Assessment and plan of treatment:	continue to monitor bmp  follow up with nephrology outpt  Secondary Diagnosis:	HTN (hypertension)  Goal:	Medical management  Assessment and plan of treatment:	continue with metoprolol  monitor blood pressure Principal Discharge DX:	Emphysematous cystitis  Goal:	Medical management  Assessment and plan of treatment:	- Urine culture: 10,000 - 49,000 CFU/mL Escherichia coli (05.01.18)  - ID consult appreciated: Ciprofloxacin till May 31st   - continue Collins until antibiotics end  - outpt follow up with urology once abx course completed  Secondary Diagnosis:	Diabetes  Goal:	Medical management  Assessment and plan of treatment:	continue insulin as directed  monitor finger sticks  Secondary Diagnosis:	DKA (diabetic ketoacidoses)  Goal:	resolved  Assessment and plan of treatment:	Resolved  Secondary Diagnosis:	CKD (chronic kidney disease)  Goal:	Medical management  Assessment and plan of treatment:	continue to monitor bmp  follow up with nephrology outpt  Secondary Diagnosis:	HTN (hypertension)  Goal:	Medical management  Assessment and plan of treatment:	continue with metoprolol  monitor blood pressure

## 2018-05-21 DIAGNOSIS — I12.0 HYPERTENSIVE CHRONIC KIDNEY DISEASE WITH STAGE 5 CHRONIC KIDNEY DISEASE OR END STAGE RENAL DISEASE: ICD-10-CM

## 2018-05-21 DIAGNOSIS — E87.70 FLUID OVERLOAD, UNSPECIFIED: ICD-10-CM

## 2018-05-21 DIAGNOSIS — Z95.810 PRESENCE OF AUTOMATIC (IMPLANTABLE) CARDIAC DEFIBRILLATOR: ICD-10-CM

## 2018-05-21 DIAGNOSIS — R19.7 DIARRHEA, UNSPECIFIED: ICD-10-CM

## 2018-05-21 DIAGNOSIS — Z66 DO NOT RESUSCITATE: ICD-10-CM

## 2018-05-21 DIAGNOSIS — E78.5 HYPERLIPIDEMIA, UNSPECIFIED: ICD-10-CM

## 2018-05-21 DIAGNOSIS — E86.0 DEHYDRATION: ICD-10-CM

## 2018-05-21 DIAGNOSIS — I50.23 ACUTE ON CHRONIC SYSTOLIC (CONGESTIVE) HEART FAILURE: ICD-10-CM

## 2018-05-21 DIAGNOSIS — R68.0 HYPOTHERMIA, NOT ASSOCIATED WITH LOW ENVIRONMENTAL TEMPERATURE: ICD-10-CM

## 2018-05-21 DIAGNOSIS — N17.0 ACUTE KIDNEY FAILURE WITH TUBULAR NECROSIS: ICD-10-CM

## 2018-05-21 DIAGNOSIS — N18.6 END STAGE RENAL DISEASE: ICD-10-CM

## 2018-05-21 DIAGNOSIS — E10.40 TYPE 1 DIABETES MELLITUS WITH DIABETIC NEUROPATHY, UNSPECIFIED: ICD-10-CM

## 2018-05-21 DIAGNOSIS — A41.9 SEPSIS, UNSPECIFIED ORGANISM: ICD-10-CM

## 2018-05-21 DIAGNOSIS — M06.9 RHEUMATOID ARTHRITIS, UNSPECIFIED: ICD-10-CM

## 2018-05-21 DIAGNOSIS — G93.41 METABOLIC ENCEPHALOPATHY: ICD-10-CM

## 2018-05-21 DIAGNOSIS — E87.1 HYPO-OSMOLALITY AND HYPONATREMIA: ICD-10-CM

## 2018-05-21 DIAGNOSIS — E10.10 TYPE 1 DIABETES MELLITUS WITH KETOACIDOSIS WITHOUT COMA: ICD-10-CM

## 2018-05-21 DIAGNOSIS — B96.20 UNSPECIFIED ESCHERICHIA COLI [E. COLI] AS THE CAUSE OF DISEASES CLASSIFIED ELSEWHERE: ICD-10-CM

## 2018-05-21 DIAGNOSIS — N30.80 OTHER CYSTITIS WITHOUT HEMATURIA: ICD-10-CM

## 2018-05-21 DIAGNOSIS — N31.9 NEUROMUSCULAR DYSFUNCTION OF BLADDER, UNSPECIFIED: ICD-10-CM

## 2018-05-21 DIAGNOSIS — E83.39 OTHER DISORDERS OF PHOSPHORUS METABOLISM: ICD-10-CM

## 2018-05-21 DIAGNOSIS — R65.20 SEVERE SEPSIS WITHOUT SEPTIC SHOCK: ICD-10-CM

## 2018-05-21 DIAGNOSIS — E87.5 HYPERKALEMIA: ICD-10-CM

## 2018-05-21 DIAGNOSIS — D63.1 ANEMIA IN CHRONIC KIDNEY DISEASE: ICD-10-CM

## 2018-05-21 DIAGNOSIS — E03.9 HYPOTHYROIDISM, UNSPECIFIED: ICD-10-CM

## 2018-06-01 ENCOUNTER — OUTPATIENT (OUTPATIENT)
Dept: OUTPATIENT SERVICES | Facility: HOSPITAL | Age: 83
LOS: 1 days | Discharge: HOME | End: 2018-06-01

## 2018-06-01 DIAGNOSIS — R79.9 ABNORMAL FINDING OF BLOOD CHEMISTRY, UNSPECIFIED: ICD-10-CM

## 2018-06-01 DIAGNOSIS — Z95.810 PRESENCE OF AUTOMATIC (IMPLANTABLE) CARDIAC DEFIBRILLATOR: Chronic | ICD-10-CM

## 2018-06-01 PROBLEM — G62.9 POLYNEUROPATHY, UNSPECIFIED: Chronic | Status: ACTIVE | Noted: 2018-04-30

## 2018-06-01 PROBLEM — I10 ESSENTIAL (PRIMARY) HYPERTENSION: Chronic | Status: ACTIVE | Noted: 2018-04-30

## 2018-06-01 PROBLEM — E11.9 TYPE 2 DIABETES MELLITUS WITHOUT COMPLICATIONS: Chronic | Status: ACTIVE | Noted: 2018-04-30

## 2018-06-01 PROBLEM — I50.9 HEART FAILURE, UNSPECIFIED: Chronic | Status: ACTIVE | Noted: 2018-04-30

## 2018-06-01 PROBLEM — E03.9 HYPOTHYROIDISM, UNSPECIFIED: Chronic | Status: ACTIVE | Noted: 2018-04-30

## 2018-06-04 ENCOUNTER — OUTPATIENT (OUTPATIENT)
Dept: OUTPATIENT SERVICES | Facility: HOSPITAL | Age: 83
LOS: 1 days | Discharge: HOME | End: 2018-06-04

## 2018-06-04 DIAGNOSIS — Z95.810 PRESENCE OF AUTOMATIC (IMPLANTABLE) CARDIAC DEFIBRILLATOR: Chronic | ICD-10-CM

## 2018-06-04 DIAGNOSIS — R79.9 ABNORMAL FINDING OF BLOOD CHEMISTRY, UNSPECIFIED: ICD-10-CM

## 2018-06-07 ENCOUNTER — OUTPATIENT (OUTPATIENT)
Dept: OUTPATIENT SERVICES | Facility: HOSPITAL | Age: 83
LOS: 1 days | Discharge: HOME | End: 2018-06-07

## 2018-06-07 DIAGNOSIS — R79.9 ABNORMAL FINDING OF BLOOD CHEMISTRY, UNSPECIFIED: ICD-10-CM

## 2018-06-07 DIAGNOSIS — Z95.810 PRESENCE OF AUTOMATIC (IMPLANTABLE) CARDIAC DEFIBRILLATOR: Chronic | ICD-10-CM

## 2018-06-11 ENCOUNTER — OUTPATIENT (OUTPATIENT)
Dept: OUTPATIENT SERVICES | Facility: HOSPITAL | Age: 83
LOS: 1 days | Discharge: HOME | End: 2018-06-11

## 2018-06-11 DIAGNOSIS — D64.9 ANEMIA, UNSPECIFIED: ICD-10-CM

## 2018-06-11 DIAGNOSIS — Z95.810 PRESENCE OF AUTOMATIC (IMPLANTABLE) CARDIAC DEFIBRILLATOR: Chronic | ICD-10-CM

## 2018-06-11 DIAGNOSIS — R79.9 ABNORMAL FINDING OF BLOOD CHEMISTRY, UNSPECIFIED: ICD-10-CM

## 2018-06-12 ENCOUNTER — OUTPATIENT (OUTPATIENT)
Dept: OUTPATIENT SERVICES | Facility: HOSPITAL | Age: 83
LOS: 1 days | Discharge: HOME | End: 2018-06-12

## 2018-06-12 DIAGNOSIS — R79.9 ABNORMAL FINDING OF BLOOD CHEMISTRY, UNSPECIFIED: ICD-10-CM

## 2018-06-12 DIAGNOSIS — Z95.810 PRESENCE OF AUTOMATIC (IMPLANTABLE) CARDIAC DEFIBRILLATOR: Chronic | ICD-10-CM

## 2018-06-13 ENCOUNTER — INPATIENT (INPATIENT)
Facility: HOSPITAL | Age: 83
LOS: 12 days | Discharge: NURSING, REHAB OR LONG-TERM CARE | End: 2018-06-26
Attending: INTERNAL MEDICINE | Admitting: INTERNAL MEDICINE
Payer: MEDICARE

## 2018-06-13 ENCOUNTER — OUTPATIENT (OUTPATIENT)
Dept: OUTPATIENT SERVICES | Facility: HOSPITAL | Age: 83
LOS: 1 days | Discharge: HOME | End: 2018-06-13

## 2018-06-13 VITALS
HEART RATE: 59 BPM | WEIGHT: 113.1 LBS | DIASTOLIC BLOOD PRESSURE: 80 MMHG | HEIGHT: 62 IN | SYSTOLIC BLOOD PRESSURE: 120 MMHG | OXYGEN SATURATION: 98 % | TEMPERATURE: 96 F | RESPIRATION RATE: 17 BRPM

## 2018-06-13 DIAGNOSIS — N28.9 DISORDER OF KIDNEY AND URETER, UNSPECIFIED: ICD-10-CM

## 2018-06-13 DIAGNOSIS — R74.8 ABNORMAL LEVELS OF OTHER SERUM ENZYMES: ICD-10-CM

## 2018-06-13 DIAGNOSIS — R79.9 ABNORMAL FINDING OF BLOOD CHEMISTRY, UNSPECIFIED: ICD-10-CM

## 2018-06-13 DIAGNOSIS — E03.9 HYPOTHYROIDISM, UNSPECIFIED: ICD-10-CM

## 2018-06-13 DIAGNOSIS — J44.9 CHRONIC OBSTRUCTIVE PULMONARY DISEASE, UNSPECIFIED: ICD-10-CM

## 2018-06-13 DIAGNOSIS — Z95.810 PRESENCE OF AUTOMATIC (IMPLANTABLE) CARDIAC DEFIBRILLATOR: Chronic | ICD-10-CM

## 2018-06-13 DIAGNOSIS — E11.9 TYPE 2 DIABETES MELLITUS WITHOUT COMPLICATIONS: ICD-10-CM

## 2018-06-13 LAB
ACETONE SERPL-MCNC: NEGATIVE — SIGNIFICANT CHANGE UP
ANION GAP SERPL CALC-SCNC: 18 MMOL/L — HIGH (ref 7–14)
ANION GAP SERPL CALC-SCNC: 18 MMOL/L — HIGH (ref 7–14)
APPEARANCE UR: CLEAR — SIGNIFICANT CHANGE UP
BACTERIA # UR AUTO: (no result)
BASE EXCESS BLDV CALC-SCNC: -9.8 MMOL/L — LOW (ref -2–2)
BILIRUB UR-MCNC: NEGATIVE — SIGNIFICANT CHANGE UP
BUN SERPL-MCNC: 77 MG/DL — CRITICAL HIGH (ref 10–20)
BUN SERPL-MCNC: 79 MG/DL — CRITICAL HIGH (ref 10–20)
CA-I SERPL-SCNC: 1.09 MMOL/L — LOW (ref 1.12–1.3)
CALCIUM SERPL-MCNC: 7.5 MG/DL — LOW (ref 8.5–10.1)
CALCIUM SERPL-MCNC: 7.6 MG/DL — LOW (ref 8.5–10.1)
CHLORIDE SERPL-SCNC: 102 MMOL/L — SIGNIFICANT CHANGE UP (ref 98–110)
CHLORIDE SERPL-SCNC: 106 MMOL/L — SIGNIFICANT CHANGE UP (ref 98–110)
CK MB CFR SERPL CALC: 4 NG/ML — SIGNIFICANT CHANGE UP (ref 0.6–6.3)
CK SERPL-CCNC: 34 U/L — SIGNIFICANT CHANGE UP (ref 0–225)
CK SERPL-CCNC: 99 U/L — SIGNIFICANT CHANGE UP (ref 0–225)
CO2 SERPL-SCNC: 15 MMOL/L — LOW (ref 17–32)
CO2 SERPL-SCNC: 16 MMOL/L — LOW (ref 17–32)
COD CRY URNS QL: NEGATIVE — SIGNIFICANT CHANGE UP
COLOR SPEC: YELLOW — SIGNIFICANT CHANGE UP
CREAT SERPL-MCNC: 6 MG/DL — CRITICAL HIGH (ref 0.7–1.5)
CREAT SERPL-MCNC: 6.1 MG/DL — CRITICAL HIGH (ref 0.7–1.5)
DIFF PNL FLD: (no result)
EPI CELLS # UR: (no result) /HPF
GAS PNL BLDV: 139 MMOL/L — SIGNIFICANT CHANGE UP (ref 136–145)
GAS PNL BLDV: SIGNIFICANT CHANGE UP
GLUCOSE SERPL-MCNC: 127 MG/DL — HIGH (ref 70–99)
GLUCOSE SERPL-MCNC: 202 MG/DL — HIGH (ref 70–99)
GLUCOSE UR QL: NEGATIVE MG/DL — SIGNIFICANT CHANGE UP
GRAN CASTS # UR COMP ASSIST: NEGATIVE — SIGNIFICANT CHANGE UP
HCO3 BLDV-SCNC: 18 MMOL/L — LOW (ref 22–29)
HCT VFR BLD CALC: 36 % — LOW (ref 37–47)
HCT VFR BLDA CALC: 35.4 % — SIGNIFICANT CHANGE UP (ref 34–44)
HGB BLD CALC-MCNC: 11.5 G/DL — LOW (ref 14–18)
HGB BLD-MCNC: 11.4 G/DL — LOW (ref 12–16)
HOROWITZ INDEX BLDV+IHG-RTO: 21 — SIGNIFICANT CHANGE UP
HYALINE CASTS # UR AUTO: NEGATIVE — SIGNIFICANT CHANGE UP
KETONES UR-MCNC: NEGATIVE — SIGNIFICANT CHANGE UP
LACTATE BLDV-MCNC: 1.9 MMOL/L — HIGH (ref 0.5–1.6)
LEUKOCYTE ESTERASE UR-ACNC: (no result)
MCHC RBC-ENTMCNC: 28.4 PG — SIGNIFICANT CHANGE UP (ref 27–31)
MCHC RBC-ENTMCNC: 31.7 G/DL — LOW (ref 32–37)
MCV RBC AUTO: 89.8 FL — SIGNIFICANT CHANGE UP (ref 81–99)
NITRITE UR-MCNC: NEGATIVE — SIGNIFICANT CHANGE UP
NRBC # BLD: 0 /100 WBCS — SIGNIFICANT CHANGE UP (ref 0–0)
PCO2 BLDV: 44 MMHG — SIGNIFICANT CHANGE UP (ref 41–51)
PH BLDV: 7.21 — LOW (ref 7.26–7.43)
PH UR: 5.5 — SIGNIFICANT CHANGE UP (ref 5–8)
PLATELET # BLD AUTO: 212 K/UL — SIGNIFICANT CHANGE UP (ref 130–400)
PO2 BLDV: 26 MMHG — SIGNIFICANT CHANGE UP (ref 20–40)
POTASSIUM BLDV-SCNC: 4.6 MMOL/L — SIGNIFICANT CHANGE UP (ref 3.3–5.6)
POTASSIUM SERPL-MCNC: 5.3 MMOL/L — HIGH (ref 3.5–5)
POTASSIUM SERPL-MCNC: 7.8 MMOL/L — CRITICAL HIGH (ref 3.5–5)
POTASSIUM SERPL-SCNC: 5.3 MMOL/L — HIGH (ref 3.5–5)
POTASSIUM SERPL-SCNC: 7.8 MMOL/L — CRITICAL HIGH (ref 3.5–5)
PROT UR-MCNC: 100 MG/DL
RBC # BLD: 4.01 M/UL — LOW (ref 4.2–5.4)
RBC # FLD: 21.9 % — HIGH (ref 11.5–14.5)
RBC CASTS # UR COMP ASSIST: (no result) /HPF
SAO2 % BLDV: 32 % — SIGNIFICANT CHANGE UP
SODIUM SERPL-SCNC: 135 MMOL/L — SIGNIFICANT CHANGE UP (ref 135–146)
SODIUM SERPL-SCNC: 140 MMOL/L — SIGNIFICANT CHANGE UP (ref 135–146)
SP GR SPEC: 1.01 — SIGNIFICANT CHANGE UP (ref 1.01–1.03)
TRI-PHOS CRY UR QL COMP ASSIST: NEGATIVE — SIGNIFICANT CHANGE UP
TROPONIN T SERPL-MCNC: 0.1 NG/ML — CRITICAL HIGH
TROPONIN T SERPL-MCNC: 0.1 NG/ML — CRITICAL HIGH
URATE CRY FLD QL MICRO: NEGATIVE — SIGNIFICANT CHANGE UP
UROBILINOGEN FLD QL: 0.2 MG/DL — SIGNIFICANT CHANGE UP (ref 0.2–0.2)
WBC # BLD: 6.52 K/UL — SIGNIFICANT CHANGE UP (ref 4.8–10.8)
WBC # FLD AUTO: 6.52 K/UL — SIGNIFICANT CHANGE UP (ref 4.8–10.8)
WBC UR QL: (no result) /HPF

## 2018-06-13 RX ORDER — SENNA PLUS 8.6 MG/1
1 TABLET ORAL AT BEDTIME
Qty: 0 | Refills: 0 | Status: DISCONTINUED | OUTPATIENT
Start: 2018-06-13 | End: 2018-06-26

## 2018-06-13 RX ORDER — LEVOTHYROXINE SODIUM 125 MCG
1 TABLET ORAL
Qty: 0 | Refills: 0 | COMMUNITY

## 2018-06-13 RX ORDER — SODIUM CHLORIDE 9 MG/ML
1000 INJECTION INTRAMUSCULAR; INTRAVENOUS; SUBCUTANEOUS ONCE
Qty: 0 | Refills: 0 | Status: COMPLETED | OUTPATIENT
Start: 2018-06-13 | End: 2018-06-13

## 2018-06-13 RX ORDER — ASPIRIN/CALCIUM CARB/MAGNESIUM 324 MG
81 TABLET ORAL DAILY
Qty: 0 | Refills: 0 | Status: DISCONTINUED | OUTPATIENT
Start: 2018-06-13 | End: 2018-06-26

## 2018-06-13 RX ORDER — MIRTAZAPINE 45 MG/1
15 TABLET, ORALLY DISINTEGRATING ORAL AT BEDTIME
Qty: 0 | Refills: 0 | Status: DISCONTINUED | OUTPATIENT
Start: 2018-06-13 | End: 2018-06-26

## 2018-06-13 RX ORDER — ESCITALOPRAM OXALATE 10 MG/1
10 TABLET, FILM COATED ORAL DAILY
Qty: 0 | Refills: 0 | Status: DISCONTINUED | OUTPATIENT
Start: 2018-06-13 | End: 2018-06-26

## 2018-06-13 RX ORDER — ESCITALOPRAM OXALATE 10 MG/1
1 TABLET, FILM COATED ORAL
Qty: 0 | Refills: 0 | COMMUNITY

## 2018-06-13 RX ORDER — INSULIN LISPRO 100/ML
2 VIAL (ML) SUBCUTANEOUS
Qty: 0 | Refills: 0 | Status: DISCONTINUED | OUTPATIENT
Start: 2018-06-13 | End: 2018-06-21

## 2018-06-13 RX ORDER — ASPIRIN/CALCIUM CARB/MAGNESIUM 324 MG
1 TABLET ORAL
Qty: 0 | Refills: 0 | COMMUNITY

## 2018-06-13 RX ORDER — HEPARIN SODIUM 5000 [USP'U]/ML
5000 INJECTION INTRAVENOUS; SUBCUTANEOUS EVERY 8 HOURS
Qty: 0 | Refills: 0 | Status: DISCONTINUED | OUTPATIENT
Start: 2018-06-13 | End: 2018-06-26

## 2018-06-13 RX ORDER — LEVOTHYROXINE SODIUM 125 MCG
125 TABLET ORAL DAILY
Qty: 0 | Refills: 0 | Status: DISCONTINUED | OUTPATIENT
Start: 2018-06-13 | End: 2018-06-26

## 2018-06-13 RX ORDER — DRONABINOL 2.5 MG
2.5 CAPSULE ORAL
Qty: 0 | Refills: 0 | Status: DISCONTINUED | OUTPATIENT
Start: 2018-06-13 | End: 2018-06-20

## 2018-06-13 RX ORDER — METOPROLOL TARTRATE 50 MG
25 TABLET ORAL DAILY
Qty: 0 | Refills: 0 | Status: DISCONTINUED | OUTPATIENT
Start: 2018-06-13 | End: 2018-06-26

## 2018-06-13 RX ORDER — INSULIN GLARGINE 100 [IU]/ML
5 INJECTION, SOLUTION SUBCUTANEOUS AT BEDTIME
Qty: 0 | Refills: 0 | Status: DISCONTINUED | OUTPATIENT
Start: 2018-06-13 | End: 2018-06-21

## 2018-06-13 RX ORDER — MIRTAZAPINE 45 MG/1
1 TABLET, ORALLY DISINTEGRATING ORAL
Qty: 0 | Refills: 0 | COMMUNITY

## 2018-06-13 RX ADMIN — SENNA PLUS 1 TABLET(S): 8.6 TABLET ORAL at 21:45

## 2018-06-13 RX ADMIN — HEPARIN SODIUM 5000 UNIT(S): 5000 INJECTION INTRAVENOUS; SUBCUTANEOUS at 21:53

## 2018-06-13 RX ADMIN — INSULIN GLARGINE 5 UNIT(S): 100 INJECTION, SOLUTION SUBCUTANEOUS at 21:46

## 2018-06-13 RX ADMIN — SODIUM CHLORIDE 1000 MILLILITER(S): 9 INJECTION INTRAMUSCULAR; INTRAVENOUS; SUBCUTANEOUS at 13:08

## 2018-06-13 RX ADMIN — MIRTAZAPINE 15 MILLIGRAM(S): 45 TABLET, ORALLY DISINTEGRATING ORAL at 21:46

## 2018-06-13 RX ADMIN — Medication 81 MILLIGRAM(S): at 16:48

## 2018-06-13 NOTE — ED PROVIDER NOTE - PROGRESS NOTE DETAILS
Discussed case with Dr. Newman regarding trop of 0.1 and was cleared for ccu tele, and approved for low risk tele. Discussed case with Nephrologist on call and sts she will see pt as inpatient to help determine if pt needs HD. dISCUSSED CASE WITH HOSPITALIST Jennifer Patel, WILL ADMIT TO LOW RISK TELE

## 2018-06-13 NOTE — ED PROVIDER NOTE - NS ED ROS FT
Eyes:  No visual changes, eye pain or discharge.  ENMT:  No hearing changes, pain, discharge or infections. No neck pain or stiffness.  Cardiac:  No chest pain, SOB or edema. No chest pain with exertion.  Respiratory:  No cough or respiratory distress. No hemoptysis. No history of asthma or RAD.  GI:  No nausea, vomiting, diarrhea or abdominal pain.  :  No dysuria, frequency or burning.  MS:  No myalgia, muscle weakness, joint pain or back pain.  Neuro:  No headache or weakness.  No LOC.  Skin:  No skin rash.   Endocrine: + diabetes.  Except as documented in the HPI,  all other systems are negative.

## 2018-06-13 NOTE — ED PROVIDER NOTE - OBJECTIVE STATEMENT
Pt is a 84y/o female with a pmhx of HTN, HLD, AICD, COPD, DM, CKD presents today from nursing home for progressively worsening renal function. Pt has appt with Nephrologist next week to decide whether or not pt is candidate for dialysis. Pt denies CP, SOB, fever, chills, abd pain, n/v/d

## 2018-06-13 NOTE — H&P ADULT - ASSESSMENT
83 y M PMH HTN, DM, CKD IV-V, recently hospitalized, sent from NH for worsening renal function. Patient may need dialysis, has been following with nephrology at NH. Poor PO intake, generalized weakness.

## 2018-06-13 NOTE — ED ADULT NURSE NOTE - PMH
Arthritis, rheumatoid    CHF (congestive heart failure)    COPD (chronic obstructive pulmonary disease)    Cystitis    Diabetes    HTN (hypertension)    Hypothyroidism    Neuropathy

## 2018-06-13 NOTE — ED PROVIDER NOTE - ATTENDING CONTRIBUTION TO CARE
83 y M PMH HTN, DM, pacer, recently admitted for DKA, discharged to NH. was pending decision whether patient needs new dialysis. Poor PO intake, appearance of dehydration,   Exam: NAD, cachectic, NCAT, HEENT: dry mm, EOMI, PERRLA, Neck: supple, nontender, nl ROM, Heart: RRR, no murmur, Lungs: BCTA, no signs of increased WOB, Abd: NTND, no guarding or rebound, no hernia palpated, no CVAT. MSK: chest, back, and ext nontender, nl rom, no deformity. Neuro: Alert, CN II-XII intact, normal strength 5/5 all 4 ext, nl sensation throughout, normal speech and coordination.   A/P: Eval for worsening kidney fct vs recurrent DKA, contact nephrology, fluid, reassess.

## 2018-06-13 NOTE — H&P ADULT - NSHPLABSRESULTS_GEN_ALL_CORE
11.4   6.52  )-----------( 212      ( 13 Jun 2018 12:04 )             36.0   06-13    135  |  102  |  77<HH>  ----------------------------<  202<H>  7.8<HH>   |  15<L>  |  6.1<HH>    Ca    7.5<L>      13 Jun 2018 12:04    Blood Gas Venous - Lactate: 1.9 mmoL/L (06.13.18 @ 14:10)  Blood Gas Venous - Potassium: 4.6 mmoL/L (06.13.18 @ 14:10)      CARDIAC MARKERS ( 13 Jun 2018 13:29 )  x     / 0.10 ng/mL / 99 U/L / x     / 4.0 ng/mL    < from: Xray Chest 1 View- PORTABLE-Urgent (06.13.18 @ 12:32) >    Impression:      Improved left pleural effusion and right basilar atelectasis

## 2018-06-13 NOTE — ED PROVIDER NOTE - PHYSICAL EXAMINATION
VITAL SIGNS: I have reviewed nursing notes and confirm.  CONSTITUTIONAL: Well-developed; well-nourished; in no acute distress.   SKIN:  skin exam is warm and dry, no acute rash.    HEAD: Normocephalic; atraumatic.  EYES: conjunctiva and sclera clear.  ENT: No nasal discharge; airway clear.  CARD: S1, S2 normal; no murmurs, gallops, or rubs. Regular rate and rhythm.   RESP: No wheezes, rales or rhonchi.  ABD: Normal bowel sounds; soft; non-distended; non-tender  EXT: Normal ROM.  No clubbing, cyanosis or edema.   NEURO: Alert, oriented, grossly unremarkable

## 2018-06-13 NOTE — H&P ADULT - NSHPPHYSICALEXAM_GEN_ALL_CORE
Vital Signs Last 24 Hrs  T(C): 36.2 (13 Jun 2018 16:57), Max: 36.2 (13 Jun 2018 16:57)  T(F): 97.1 (13 Jun 2018 16:57), Max: 97.1 (13 Jun 2018 16:57)  HR: 58 (13 Jun 2018 16:57) (58 - 59)  BP: 148/69 (13 Jun 2018 16:57) (120/80 - 148/69)  BP(mean): --  RR: 18 (13 Jun 2018 16:57) (17 - 18)  SpO2: 98% (13 Jun 2018 16:57) (98% - 98%)      Gen: NAD  CVS: S1/S2  Lungs: Cta b/l  ABd: soft/nt  Ext: no edema  Neuro: aao x 3

## 2018-06-14 DIAGNOSIS — Z02.9 ENCOUNTER FOR ADMINISTRATIVE EXAMINATIONS, UNSPECIFIED: ICD-10-CM

## 2018-06-14 LAB
ANION GAP SERPL CALC-SCNC: 21 MMOL/L — HIGH (ref 7–14)
BUN SERPL-MCNC: 76 MG/DL — CRITICAL HIGH (ref 10–20)
CALCIUM SERPL-MCNC: 7.8 MG/DL — LOW (ref 8.5–10.1)
CHLORIDE SERPL-SCNC: 104 MMOL/L — SIGNIFICANT CHANGE UP (ref 98–110)
CK SERPL-CCNC: 34 U/L — SIGNIFICANT CHANGE UP (ref 0–225)
CO2 SERPL-SCNC: 14 MMOL/L — LOW (ref 17–32)
CREAT ?TM UR-MCNC: 41 MG/DL — SIGNIFICANT CHANGE UP
CREAT SERPL-MCNC: 6.3 MG/DL — CRITICAL HIGH (ref 0.7–1.5)
CULTURE RESULTS: SIGNIFICANT CHANGE UP
CULTURE RESULTS: SIGNIFICANT CHANGE UP
GLUCOSE SERPL-MCNC: 119 MG/DL — HIGH (ref 70–99)
HCT VFR BLD CALC: 36.8 % — LOW (ref 37–47)
HGB BLD-MCNC: 11.5 G/DL — LOW (ref 12–16)
INR BLD: 1.15 RATIO — SIGNIFICANT CHANGE UP (ref 0.65–1.3)
MCHC RBC-ENTMCNC: 28.2 PG — SIGNIFICANT CHANGE UP (ref 27–31)
MCHC RBC-ENTMCNC: 31.3 G/DL — LOW (ref 32–37)
MCV RBC AUTO: 90.2 FL — SIGNIFICANT CHANGE UP (ref 81–99)
NRBC # BLD: 0 /100 WBCS — SIGNIFICANT CHANGE UP (ref 0–0)
PLATELET # BLD AUTO: 212 K/UL — SIGNIFICANT CHANGE UP (ref 130–400)
POTASSIUM SERPL-MCNC: 5.2 MMOL/L — HIGH (ref 3.5–5)
POTASSIUM SERPL-SCNC: 5.2 MMOL/L — HIGH (ref 3.5–5)
POTASSIUM UR-SCNC: 11 MMOL/L — SIGNIFICANT CHANGE UP
PROTHROM AB SERPL-ACNC: 12.5 SEC — SIGNIFICANT CHANGE UP (ref 9.95–12.87)
RBC # BLD: 4.08 M/UL — LOW (ref 4.2–5.4)
RBC # FLD: 22 % — HIGH (ref 11.5–14.5)
SODIUM SERPL-SCNC: 139 MMOL/L — SIGNIFICANT CHANGE UP (ref 135–146)
SODIUM UR-SCNC: 63 MMOL/L — SIGNIFICANT CHANGE UP
SPECIMEN SOURCE: SIGNIFICANT CHANGE UP
TROPONIN T SERPL-MCNC: 0.1 NG/ML — CRITICAL HIGH
WBC # BLD: 5.15 K/UL — SIGNIFICANT CHANGE UP (ref 4.8–10.8)
WBC # FLD AUTO: 5.15 K/UL — SIGNIFICANT CHANGE UP (ref 4.8–10.8)

## 2018-06-14 RX ORDER — ACETAMINOPHEN 500 MG
650 TABLET ORAL EVERY 6 HOURS
Qty: 0 | Refills: 0 | Status: DISCONTINUED | OUTPATIENT
Start: 2018-06-14 | End: 2018-06-26

## 2018-06-14 RX ADMIN — HEPARIN SODIUM 5000 UNIT(S): 5000 INJECTION INTRAVENOUS; SUBCUTANEOUS at 06:17

## 2018-06-14 RX ADMIN — MIRTAZAPINE 15 MILLIGRAM(S): 45 TABLET, ORALLY DISINTEGRATING ORAL at 21:25

## 2018-06-14 RX ADMIN — INSULIN GLARGINE 5 UNIT(S): 100 INJECTION, SOLUTION SUBCUTANEOUS at 21:31

## 2018-06-14 RX ADMIN — HEPARIN SODIUM 5000 UNIT(S): 5000 INJECTION INTRAVENOUS; SUBCUTANEOUS at 21:25

## 2018-06-14 RX ADMIN — ESCITALOPRAM OXALATE 10 MILLIGRAM(S): 10 TABLET, FILM COATED ORAL at 12:19

## 2018-06-14 RX ADMIN — Medication 650 MILLIGRAM(S): at 21:51

## 2018-06-14 RX ADMIN — Medication 2 UNIT(S): at 17:51

## 2018-06-14 RX ADMIN — Medication 2 UNIT(S): at 12:19

## 2018-06-14 RX ADMIN — SENNA PLUS 1 TABLET(S): 8.6 TABLET ORAL at 21:25

## 2018-06-14 RX ADMIN — Medication 81 MILLIGRAM(S): at 14:14

## 2018-06-14 RX ADMIN — Medication 25 MILLIGRAM(S): at 06:18

## 2018-06-14 RX ADMIN — HEPARIN SODIUM 5000 UNIT(S): 5000 INJECTION INTRAVENOUS; SUBCUTANEOUS at 14:15

## 2018-06-14 RX ADMIN — Medication 125 MICROGRAM(S): at 06:18

## 2018-06-14 RX ADMIN — Medication 650 MILLIGRAM(S): at 14:15

## 2018-06-14 RX ADMIN — Medication 2 UNIT(S): at 08:34

## 2018-06-14 RX ADMIN — Medication 2.5 MILLIGRAM(S): at 06:18

## 2018-06-14 NOTE — PROGRESS NOTE ADULT - ASSESSMENT
Hypothyroidism: Hypothyroidism  Diabetes: Diabetes  COPD (chronic obstructive pulmonary disease): COPD (chronic obstructive pulmonary disease)  Elevated troponin: Elevated troponin  Renal dysfunction: Renal dysfunction

## 2018-06-14 NOTE — PROGRESS NOTE ADULT - SUBJECTIVE AND OBJECTIVE BOX
VI MOBLEY  83y  Female      Patient is a 83y old  Female who presents with a chief complaint of sent from NH for worsening kidney function (13 Jun 2018 16:26)      INTERVAL HPI/OVERNIGHT EVENTS:  none          T(C): 35.6 (06-14-18 @ 06:14), Max: 36.2 (06-13-18 @ 16:57)  HR: 76 (06-14-18 @ 06:14) (58 - 76)  BP: 136/73 (06-14-18 @ 06:14) (120/80 - 148/69)  RR: 16 (06-14-18 @ 06:14) (16 - 18)  SpO2: 98% (06-13-18 @ 16:57) (98% - 98%)  Wt(kg): --  Vital Signs Last 24 Hrs  T(C): 35.6 (14 Jun 2018 06:14), Max: 36.2 (13 Jun 2018 16:57)  T(F): 96 (14 Jun 2018 06:14), Max: 97.1 (13 Jun 2018 16:57)  HR: 76 (14 Jun 2018 06:14) (58 - 76)  BP: 136/73 (14 Jun 2018 06:14) (120/80 - 148/69)  BP(mean): --  RR: 16 (14 Jun 2018 06:14) (16 - 18)  SpO2: 98% (13 Jun 2018 16:57) (98% - 98%)    PHYSICAL EXAM:  GENERAL: NAD   HEAD:  Atraumatic, Normocephalic  CHEST/LUNG: Clear to auscultation   HEART: S1/S2  ABDOMEN: Soft, Nontender   EXTREMITIES:  no edema    LABS:                          11.5   5.15  )-----------( 212      ( 14 Jun 2018 06:50 )             36.8     06-13    140  |  106  |  79<HH>  ----------------------------<  127<H>  5.3<H>   |  16<L>  |  6.0<HH>    Ca    7.6<L>      13 Jun 2018 19:41      CARDIAC MARKERS ( 14 Jun 2018 06:50 )  x     / x     / 34 U/L / x     / x      CARDIAC MARKERS ( 13 Jun 2018 19:41 )  x     / 0.10 ng/mL / 34 U/L / x     / x      CARDIAC MARKERS ( 13 Jun 2018 13:29 )  x     / 0.10 ng/mL / 99 U/L / x     / 4.0 ng/mL      MEDICATIONS  (STANDING):  aspirin  chewable 81 milliGRAM(s) Oral daily  dronabinol 2.5 milliGRAM(s) Oral before breakfast  escitalopram 10 milliGRAM(s) Oral daily  heparin  Injectable 5000 Unit(s) SubCutaneous every 8 hours  insulin glargine Injectable (LANTUS) 5 Unit(s) SubCutaneous at bedtime  insulin lispro Injectable (HumaLOG) 2 Unit(s) SubCutaneous three times a day before meals  levothyroxine 125 MICROGram(s) Oral daily  metoprolol succinate ER 25 milliGRAM(s) Oral daily  mirtazapine 15 milliGRAM(s) Oral at bedtime  senna 1 Tablet(s) Oral at bedtime    MEDICATIONS  (PRN):        Consultant(s) Notes Reviewed:  [x ] YES  [ ] NO  Care Discussed with Consultants/Other Providers [ x] YES  [ ] NO        RADIOLOGY & ADDITIONAL TESTS:      Imaging Personally Reviewed:  [ ] YES  [ ] NO    HEALTH ISSUES - PROBLEM Dx:  Hypothyroidism: Hypothyroidism  Diabetes: Diabetes  COPD (chronic obstructive pulmonary disease): COPD (chronic obstructive pulmonary disease)  Elevated troponin: Elevated troponin  Renal dysfunction: Renal dysfunction          Case discussed with housestaff

## 2018-06-15 LAB
ANION GAP SERPL CALC-SCNC: 20 MMOL/L — HIGH (ref 7–14)
ANISOCYTOSIS BLD QL: SIGNIFICANT CHANGE UP
BASOPHILS # BLD AUTO: 0.03 K/UL — SIGNIFICANT CHANGE UP (ref 0–0.2)
BASOPHILS NFR BLD AUTO: 0.6 % — SIGNIFICANT CHANGE UP (ref 0–1)
BUN SERPL-MCNC: 79 MG/DL — CRITICAL HIGH (ref 10–20)
BURR CELLS BLD QL SMEAR: SIGNIFICANT CHANGE UP
CALCIUM SERPL-MCNC: 8 MG/DL — LOW (ref 8.5–10.1)
CHLORIDE SERPL-SCNC: 103 MMOL/L — SIGNIFICANT CHANGE UP (ref 98–110)
CO2 SERPL-SCNC: 16 MMOL/L — LOW (ref 17–32)
CREAT SERPL-MCNC: 6.3 MG/DL — CRITICAL HIGH (ref 0.7–1.5)
DACRYOCYTES BLD QL SMEAR: SLIGHT — SIGNIFICANT CHANGE UP
EOSINOPHIL # BLD AUTO: 0.04 K/UL — SIGNIFICANT CHANGE UP (ref 0–0.7)
EOSINOPHIL NFR BLD AUTO: 0.8 % — SIGNIFICANT CHANGE UP (ref 0–8)
GLUCOSE SERPL-MCNC: 130 MG/DL — HIGH (ref 70–99)
HCT VFR BLD CALC: 37 % — SIGNIFICANT CHANGE UP (ref 37–47)
HGB BLD-MCNC: 11.7 G/DL — LOW (ref 12–16)
IMM GRANULOCYTES NFR BLD AUTO: 0.2 % — SIGNIFICANT CHANGE UP (ref 0.1–0.3)
LYMPHOCYTES # BLD AUTO: 0.74 K/UL — LOW (ref 1.2–3.4)
LYMPHOCYTES # BLD AUTO: 14.5 % — LOW (ref 20.5–51.1)
MANUAL SMEAR VERIFICATION: YES — SIGNIFICANT CHANGE UP
MCHC RBC-ENTMCNC: 28.7 PG — SIGNIFICANT CHANGE UP (ref 27–31)
MCHC RBC-ENTMCNC: 31.6 G/DL — LOW (ref 32–37)
MCV RBC AUTO: 90.9 FL — SIGNIFICANT CHANGE UP (ref 81–99)
MONOCYTES # BLD AUTO: 0.35 K/UL — SIGNIFICANT CHANGE UP (ref 0.1–0.6)
MONOCYTES NFR BLD AUTO: 6.9 % — SIGNIFICANT CHANGE UP (ref 1.7–9.3)
NEUTROPHILS # BLD AUTO: 3.93 K/UL — SIGNIFICANT CHANGE UP (ref 1.4–6.5)
NEUTROPHILS NFR BLD AUTO: 77 % — HIGH (ref 42.2–75.2)
NRBC # BLD: 0 /100 WBCS — SIGNIFICANT CHANGE UP (ref 0–0)
OVALOCYTES BLD QL SMEAR: SLIGHT — SIGNIFICANT CHANGE UP
PHOSPHATE SERPL-MCNC: 7.4 MG/DL — HIGH (ref 2.1–4.9)
PLAT MORPH BLD: NORMAL — SIGNIFICANT CHANGE UP
PLATELET # BLD AUTO: 209 K/UL — SIGNIFICANT CHANGE UP (ref 130–400)
PLATELET COUNT - ESTIMATE: NORMAL — SIGNIFICANT CHANGE UP
POTASSIUM SERPL-MCNC: 5.9 MMOL/L — HIGH (ref 3.5–5)
POTASSIUM SERPL-SCNC: 5.9 MMOL/L — HIGH (ref 3.5–5)
RBC # BLD: 4.07 M/UL — LOW (ref 4.2–5.4)
RBC # FLD: 22.5 % — HIGH (ref 11.5–14.5)
RBC BLD AUTO: (no result)
SODIUM SERPL-SCNC: 139 MMOL/L — SIGNIFICANT CHANGE UP (ref 135–146)
WBC # BLD: 5.1 K/UL — SIGNIFICANT CHANGE UP (ref 4.8–10.8)
WBC # FLD AUTO: 5.1 K/UL — SIGNIFICANT CHANGE UP (ref 4.8–10.8)

## 2018-06-15 RX ORDER — ZOLPIDEM TARTRATE 10 MG/1
5 TABLET ORAL ONCE
Qty: 0 | Refills: 0 | Status: DISCONTINUED | OUTPATIENT
Start: 2018-06-15 | End: 2018-06-15

## 2018-06-15 RX ADMIN — Medication 125 MICROGRAM(S): at 05:58

## 2018-06-15 RX ADMIN — Medication 81 MILLIGRAM(S): at 11:27

## 2018-06-15 RX ADMIN — ZOLPIDEM TARTRATE 5 MILLIGRAM(S): 10 TABLET ORAL at 00:51

## 2018-06-15 RX ADMIN — MIRTAZAPINE 15 MILLIGRAM(S): 45 TABLET, ORALLY DISINTEGRATING ORAL at 22:35

## 2018-06-15 RX ADMIN — ESCITALOPRAM OXALATE 10 MILLIGRAM(S): 10 TABLET, FILM COATED ORAL at 11:27

## 2018-06-15 RX ADMIN — HEPARIN SODIUM 5000 UNIT(S): 5000 INJECTION INTRAVENOUS; SUBCUTANEOUS at 05:58

## 2018-06-15 RX ADMIN — HEPARIN SODIUM 5000 UNIT(S): 5000 INJECTION INTRAVENOUS; SUBCUTANEOUS at 22:35

## 2018-06-15 RX ADMIN — Medication 2.5 MILLIGRAM(S): at 09:18

## 2018-06-15 RX ADMIN — SENNA PLUS 1 TABLET(S): 8.6 TABLET ORAL at 22:35

## 2018-06-15 RX ADMIN — Medication 25 MILLIGRAM(S): at 05:58

## 2018-06-15 NOTE — PROGRESS NOTE ADULT - SUBJECTIVE AND OBJECTIVE BOX
VI MOBLEY  83y  Female      Patient is a 83y old  Female who presents with a chief complaint of sent from NH for worsening kidney function (13 Jun 2018 16:26)      INTERVAL HPI/OVERNIGHT EVENTS:  [place] catheter placed by surgical resident  patient scheduled for HD today           T(C): 35.6 (06-15-18 @ 06:25), Max: 36.1 (06-14-18 @ 21:42)  HR: 73 (06-15-18 @ 06:25) (73 - 81)  BP: 128/68 (06-15-18 @ 06:25) (117/76 - 135/81)  RR: 16 (06-15-18 @ 06:25) (16 - 16)  SpO2: 95% (06-14-18 @ 22:41) (95% - 96%)  Wt(kg): --  Vital Signs Last 24 Hrs  T(C): 35.6 (15 Eder 2018 06:25), Max: 36.1 (14 Jun 2018 21:42)  T(F): 96 (15 Eder 2018 06:25), Max: 97 (14 Jun 2018 21:42)  HR: 73 (15 Eder 2018 06:25) (73 - 81)  BP: 128/68 (15 Eder 2018 06:25) (117/76 - 135/81)  BP(mean): --  RR: 16 (15 Eder 2018 06:25) (16 - 16)  SpO2: 95% (14 Jun 2018 22:41) (95% - 96%)    PHYSICAL EXAM:  GENERAL: NAD   HEAD:  Atraumatic, Normocephalic  CHEST/LUNG: Clear to auscultation   HEART: S1/S2  ABDOMEN: Soft, Nontender   EXTREMITIES:  no edema    LABS:                          11.5   5.15  )-----------( 212      ( 14 Jun 2018 06:50 )             36.8     06-14    139  |  104  |  76<HH>  ----------------------------<  119<H>  5.2<H>   |  14<L>  |  6.3<HH>    Ca    7.8<L>      14 Jun 2018 06:50      CARDIAC MARKERS ( 14 Jun 2018 06:50 )  x     / 0.10 ng/mL / 34 U/L / x     / x      CARDIAC MARKERS ( 13 Jun 2018 19:41 )  x     / 0.10 ng/mL / 34 U/L / x     / x      CARDIAC MARKERS ( 13 Jun 2018 13:29 )  x     / 0.10 ng/mL / 99 U/L / x     / 4.0 ng/mL      MEDICATIONS  (STANDING):  aspirin  chewable 81 milliGRAM(s) Oral daily  dronabinol 2.5 milliGRAM(s) Oral before breakfast  escitalopram 10 milliGRAM(s) Oral daily  heparin  Injectable 5000 Unit(s) SubCutaneous every 8 hours  insulin glargine Injectable (LANTUS) 5 Unit(s) SubCutaneous at bedtime  insulin lispro Injectable (HumaLOG) 2 Unit(s) SubCutaneous three times a day before meals  levothyroxine 125 MICROGram(s) Oral daily  metoprolol succinate ER 25 milliGRAM(s) Oral daily  mirtazapine 15 milliGRAM(s) Oral at bedtime  senna 1 Tablet(s) Oral at bedtime    MEDICATIONS  (PRN):  acetaminophen   Tablet 650 milliGRAM(s) Oral every 6 hours PRN pain      Culture - Urine (collected 13 Jun 2018 15:40)  Source: .Urine Clean Catch (Midstream)  Final Report (14 Jun 2018 20:58):    Culture grew 3 or more types of organisms which indicate    collection contamination; consider recollection only if        Consultant(s) Notes Reviewed:  [x ] YES  [ ] NO  Care Discussed with Consultants/Other Providers [ x] YES  [ ] NO        RADIOLOGY & ADDITIONAL TESTS:      Imaging Personally Reviewed:  [ ] YES  [ ] NO    HEALTH ISSUES - PROBLEM Dx:  Hypothyroidism: Hypothyroidism  Diabetes: Diabetes  COPD (chronic obstructive pulmonary disease): COPD (chronic obstructive pulmonary disease)  Elevated troponin: Elevated troponin  Renal dysfunction: Renal dysfunction          Case discussed with housestaff

## 2018-06-15 NOTE — PROGRESS NOTE ADULT - ASSESSMENT
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    Pt agreed to start a trial of HD and if tolerated to proceed with permacath and AVF placement.    Access - Rt femoral [place] cath place by surgery (appreciated)    CKD 5 - will start HD 1tx today  - 2K    cc No heparin  - next HD saturday 2.5 hrs  - kidney sono neg for hydro, ?mild retention? 150 cc - no need for Collins, chronic cystitis    HAG Met Acidosis - may give NA bciarb 325 mg tid for now    Anorexia - on marinol  - may give Reglan 5 mg qac for nausea     DM - on Insulin, frequent FS to avoid hypoglycemia    Spoke to CM to start process for setting pt for outpt HD.    Will follow

## 2018-06-15 NOTE — PROGRESS NOTE ADULT - SUBJECTIVE AND OBJECTIVE BOX
Nephrology progress note    Patient is seen and examined, events over the last 24 h noted .  c/o nausea. No vomiting.  Rt femoral [place] cath was placed overnight.  Allergies:  Plavix (Other (Moderate))    Hospital Medications:   MEDICATIONS  (STANDING):  aspirin  chewable 81 milliGRAM(s) Oral daily  dronabinol 2.5 milliGRAM(s) Oral before breakfast  escitalopram 10 milliGRAM(s) Oral daily  heparin  Injectable 5000 Unit(s) SubCutaneous every 8 hours  insulin glargine Injectable (LANTUS) 5 Unit(s) SubCutaneous at bedtime  insulin lispro Injectable (HumaLOG) 2 Unit(s) SubCutaneous three times a day before meals  levothyroxine 125 MICROGram(s) Oral daily  metoprolol succinate ER 25 milliGRAM(s) Oral daily  mirtazapine 15 milliGRAM(s) Oral at bedtime  senna 1 Tablet(s) Oral at bedtime        VITALS:  T(F): 96 (06-15-18 @ 06:25), Max: 97 (18 @ 21:42)  HR: 73 (06-15-18 @ 06:25)  BP: 128/68 (06-15-18 @ 06:25)  RR: 16 (06-15-18 @ 06:25)  SpO2: 95% (18 @ 22:41)  Wt(kg): --     @ 07:  -   @ 07:00  --------------------------------------------------------  IN: 240 mL / OUT: 0 mL / NET: 240 mL     @ 07:01  -  06-15 @ 07:00  --------------------------------------------------------  IN: 0 mL / OUT: 2 mL / NET: -2 mL          PHYSICAL EXAM:  Constitutional: NAD  HEENT: anicteric sclera, oropharynx clear, MMM  Neck: No JVD  Respiratory: CTAB, no wheezes, rales or rhonchi  Cardiovascular: S1, S2, RRR  Gastrointestinal: BS+, soft, NT/ND  Extremities: Rt fem [place] cath, no hematoma. No peripheral edema  Neurological: A/O x 3, no focal deficits  : No CVA tenderness. No davis.   Skin: No rashes  Vascular Access: Rt femoral [place] cath    LABS:      139  |  104  |  76<HH>  ----------------------------<  119<H>  5.2<H>   |  14<L>  |  6.3<HH>    Ca    7.8<L>      2018 06:50                            11.5   5.15  )-----------( 212      ( 2018 06:50 )             36.8       Urine Studies:  Urinalysis Basic - ( 2018 15:40 )    Color: Yellow / Appearance: Clear / S.015 / pH:   Gluc:  / Ketone: Negative  / Bili: Negative / Urobili: 0.2 mg/dL   Blood:  / Protein: 100 mg/dL / Nitrite: Negative   Leuk Esterase: Moderate / RBC: 10-25 /HPF / WBC 10-25 /HPF   Sq Epi:  / Non Sq Epi: Few /HPF / Bacteria: Few      Potassium, Random Urine: 11 mmol/L ( @ 13:56)  Sodium, Random Urine: 63.0 mmoL/L ( @ 13:56)  Creatinine, Random Urine: 41 mg/dL ( @ 13:56)    RADIOLOGY & ADDITIONAL STUDIES:

## 2018-06-15 NOTE — CONSULT NOTE ADULT - ASSESSMENT
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    Pt agreed to start a trial of HD and if tolerated to proceed with permacath and AVF placement.     - Case D/W : [place] catheter placed today by surgical resident   - Pt booked for OR on Monday 6/18 for tesio palcement - please provide medical optimization   - Pt on ASA 81 mg
IMPRESSION: Rehab of 84 y/o f  rehab  for debility      PRECAUTIONS: [x] Cardiac  [ x ] Respiratory  [  ] Seizures [  ] Contact Isolation  [  ] Droplet Isolation  [x  ] Other fall    Weight Bearing Status:     RECOMMENDATION:    Out of Bed to Chair     DVT/Decubiti Prophylaxis    REHAB PLAN:     [   x] Bedside P/T 3-5 times a week   [   ]   Bedside O/T  2-3 times a week             [   ] No Rehab Therapy Indicated                   [   ]  Speech Therapy   Conditioning/ROM                                    ADL  Bed Mobility                                               Conditioning/ROM  Transfers                                                     Bed Mobility  Sitting /Standing Balance                         Transfers                                        Gait Training                                               Sitting/Standing Balance  Stair Training [   ]Applicable                    Home equipment Eval                                                                        Splinting  [   ] Only      GOALS:   ADL   [  x   Independent                    Transfers  [  x ] Independent                          Ambulation  [  x ] Independent     [    x] With device                            [   ]  CG                                                         [   ]  CG                                                                  [   ] CG                            [    ] Min A                                                   [   ] Min A                                                              [   ] Min  A          DISCHARGE PLAN:   [   ]  Good candidate for Intensive Rehabilitation/Hospital based-4A [facility]                                             Will tolerate 3hrs Intensive Rehab Daily                                       [  xx ]  Short Term Rehab in Skilled Nursing Facility d/w  ptn  dtr may  need LTC                                       [    ]  Home with Outpatient or  services                                         [    ]  Possible Candidate for Intensive Hospital based Rehab
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    Pt agreed to start a trial of HD and if tolerated to proceed with permacath and AVF placement.    Access - I called surgery to place a [place] cath for HD tomorrow    CKD 5 - will start HD 1tx tomorrow   - please follow renal diet low K  - no need for Kayaxalate today  - kidney sono neg for hydro, ?mild retention? 150 cc - no need for Collins, chronic cystitis    HAG Met Acidosis - may give NA bciarb 325 mg tid for now    Anorexia - on marinol  - may give Reglan 5 mg qac for nausea     DM - on Insulin, frequent FS to avoid hypoglycemia    Will follow

## 2018-06-15 NOTE — PHYSICAL THERAPY INITIAL EVALUATION ADULT - SPECIFY REASON(S)
As confirmed with Nursing, patient currently at Hemodialysis. Will follow up and complete eval when able and patient available.

## 2018-06-15 NOTE — CONSULT NOTE ADULT - SUBJECTIVE AND OBJECTIVE BOX
Patient is a 83y Female who presented to the hospital with worsening renal failure and hyperkalemia for evaluation for initiation of dialysis.   Pt with CKD stage 4/5, long standing DM, HTN, AICD, COPD who has been getting progressively weaker with anorexia, SOB. Denies vomiting and diarrhea. Baseline creat 4.5 (18) now 6.5 with hyperkalemia (K~6.) and met acidosis.  Making some urine.    PAST MEDICAL & SURGICAL HISTORY:  Arthritis, rheumatoid  Cystitis  COPD (chronic obstructive pulmonary disease)  Neuropathy  Diabetes  Hypothyroidism  HTN (hypertension)  CHF (congestive heart failure)  AICD (automatic cardioverter/defibrillator) present    Allergies:  Plavix (Other (Moderate))    Home Medications Reviewed  Hospital Medications:   MEDICATIONS  (STANDING):  aspirin  chewable 81 milliGRAM(s) Oral daily  dronabinol 2.5 milliGRAM(s) Oral before breakfast  escitalopram 10 milliGRAM(s) Oral daily  heparin  Injectable 5000 Unit(s) SubCutaneous every 8 hours  insulin glargine Injectable (LANTUS) 5 Unit(s) SubCutaneous at bedtime  insulin lispro Injectable (HumaLOG) 2 Unit(s) SubCutaneous three times a day before meals  levothyroxine 125 MICROGram(s) Oral daily  metoprolol succinate ER 25 milliGRAM(s) Oral daily  mirtazapine 15 milliGRAM(s) Oral at bedtime  senna 1 Tablet(s) Oral at bedtime      SOCIAL HISTORY:  Denies ETOH,Smoking,   FAMILY HISTORY:  No pertinent family history in first degree relatives        REVIEW OF SYSTEMS:  CONSTITUTIONAL: No weakness, fevers or chills  RESPIRATORY: No cough, wheezing, hemoptysis; Has shortness of breath  CARDIOVASCULAR: No chest pain or palpitations.  GASTROINTESTINAL: No abdominal or epigastric pain. Has nausea, no vomiting, or hematemesis; No diarrhea or constipation.   GENITOURINARY: No dysuria, frequency  NEUROLOGICAL: No numbness or weakness  SKIN: No itching, burning, rashes, or lesions   VASCULAR: No bilateral lower extremity edema.   All other review of systems is negative unless indicated above.    VITALS:  Vital Signs Last 24 Hrs  T(C): 36 (2018 14:00), Max: 36 (2018 14:00)  T(F): 96.8 (2018 14:00), Max: 96.8 (2018 14:00)  HR: 81 (2018 14:00) (76 - 81)  BP: 135/81 (2018 14:00) (135/81 - 136/73)  BP(mean): --  RR: 16 (2018 14:00) (16 - 16)  SpO2: 96% (2018 11:20) (96% - 96%) @ 07:01  -   @ 07:00  --------------------------------------------------------  I&O's Detail    2018 07:01  -  2018 07:00  --------------------------------------------------------  IN:    Oral Fluid: 240 mL  Total IN: 240 mL    OUT:  Total OUT: 0 mL    Total NET: 240 mL      2018 07:01  -  2018 18:52  --------------------------------------------------------  IN:  Total IN: 0 mL    OUT:    Voided: 2 mL  Total OUT: 2 mL    Total NET: -2 mL      Creatine Kinase, Serum: 34 U/L (18 @ 06:50)  Creatine Kinase, Serum: 34 U/L (18 @ 19:41)  Creatine Kinase, Serum: 99 U/L (18 @ 13:29)      PHYSICAL EXAM:  Constitutional: NAD  HEENT: anicteric sclera, oropharynx clear, MMM  Neck: No JVD  Respiratory: CTAB, no wheezes, rales or rhonchi  Cardiovascular: S1, S2, RRR  Gastrointestinal: BS+, soft, NT/ND  Extremities:  No peripheral edema  Neurological: A/O x 3, no tremors  Psychiatric: Normal mood, normal affect  : No CVA tenderness. No davis.   Skin: No rashes  Vascular Access:    LABS:      139  |  104  |  76<HH>  ----------------------------<  119<H>  5.2<H>   |  14<L>  |  6.3<HH>    Ca    7.8<L>      2018 06:50      Creatinine Trend: 6.3 <--, 6.0 <--, 6.1 <--, 4.4 <--, 4.2 <--, 4.3 <--                        11.5   5.15  )-----------( 212      ( 2018 06:50 )             36.8     Urine Studies:  Urinalysis Basic - ( 2018 15:40 )    Color: Yellow / Appearance: Clear / S.015 / pH:   Gluc:  / Ketone: Negative  / Bili: Negative / Urobili: 0.2 mg/dL   Blood:  / Protein: 100 mg/dL / Nitrite: Negative   Leuk Esterase: Moderate / RBC: 10-25 /HPF / WBC 10-25 /HPF   Sq Epi:  / Non Sq Epi: Few /HPF / Bacteria: Few          05-15 @ 10:32  8.0  79  --        RADIOLOGY & ADDITIONAL STUDIES:  < from: US Kidney and Bladder (18 @ 18:17) >  Right kidney measures 10.8 cm x 6 cm x 4.3 cm. It is normal in size and   in echogenicity. There is no hydronephrosis or mass. Cysts seen within   the right kidney on the prior examination is not as well identified.    The left kidney measures 10.5 cm x 4 cm x 5 cm. Contains a 3 mm midpole   echogenic structure with posterior shadowing consistent with a small   nonobstructing calculus. There is no hydronephrosis.    Evaluation the urinary bladder reveals prevoid volume of 150 cc. Ureteral   jets are not well seen. The bladder wall somewhat thickened. The patient   could not void.    Impression:    Nonobstructing left renal calculus. Otherwise normal-appearing kidneys.    Limited evaluation of the urinary bladder.    < end of copied text >  < from: Xray Chest 1 View- PORTABLE-Urgent (18 @ 12:32) >  Impression:      Improved left pleural effusion and right basilar atelectasis    < end of copied text >
HPI:  83 y M PMH HTN, DM, CKD IV-V, recently hospitalized, sent from NH for worsening renal function. Patient may need dialysis, has been following with nephrology at NH. Poor PO intake, generalized weakness.     PTN  REFERRED TO ACUTE  REHAB  FOR  EVAL AND  TX   PAST MEDICAL & SURGICAL HISTORY:  Arthritis, rheumatoid  Cystitis  COPD (chronic obstructive pulmonary disease)  Neuropathy  Diabetes  Hypothyroidism  HTN (hypertension)  CHF (congestive heart failure)  AICD (automatic cardioverter/defibrillator) present      Hospital Course:    TODAY'S SUBJECTIVE & REVIEW OF SYMPTOMS:     Constitutional WNL   Cardio WNL   Resp WNL   GI WNL  Heme WNL  Endo WNL  Skin WNL  MSK WNL  Neuro WNL  Cognitive WNL  Psych WNL      MEDICATIONS  (STANDING):  aspirin  chewable 81 milliGRAM(s) Oral daily  dronabinol 2.5 milliGRAM(s) Oral before breakfast  escitalopram 10 milliGRAM(s) Oral daily  heparin  Injectable 5000 Unit(s) SubCutaneous every 8 hours  insulin glargine Injectable (LANTUS) 5 Unit(s) SubCutaneous at bedtime  insulin lispro Injectable (HumaLOG) 2 Unit(s) SubCutaneous three times a day before meals  levothyroxine 125 MICROGram(s) Oral daily  metoprolol succinate ER 25 milliGRAM(s) Oral daily  mirtazapine 15 milliGRAM(s) Oral at bedtime  senna 1 Tablet(s) Oral at bedtime    MEDICATIONS  (PRN):  acetaminophen   Tablet 650 milliGRAM(s) Oral every 6 hours PRN pain      FAMILY HISTORY:  No pertinent family history in first degree relatives      Allergies    Plavix (Other (Moderate))    Intolerances        SOCIAL HISTORY:    [  ] Etoh  [  ] Smoking  [  ] Substance abuse     Home Environment:  [ x ] Home Alone  [  ] Lives with Family  [  ] Home Health Aid    Dwelling:  [  ] Apartment  [ x ] Private House  [  ] Adult Home  [  ] Skilled Nursing Facility      [  ] Short Term  [  ] Long Term  [ x ] Stairs       Elevator [  ]    FUNCTIONAL STATUS PTA: (Check all that apply)  Ambulation: [   x]Independent    [  ] Dependent     [  ] Non-Ambulatory  Assistive Device: [  x] SA Cane  [  ]  Q Cane  [  x] Walker  [  ]  Wheelchair  ADL : [ x ] Independent  [  ]  Dependent       Vital Signs Last 24 Hrs  T(C): 35.6 (15 Eder 2018 06:25), Max: 36.1 (2018 21:42)  T(F): 96 (15 Eder 2018 06:25), Max: 97 (2018 21:42)  HR: 73 (15 Eder 2018 06:25) (73 - 81)  BP: 128/68 (15 Eder 2018 06:25) (117/76 - 135/81)  BP(mean): --  RR: 16 (15 Eder 2018 06:25) (16 - 16)  SpO2: 97% (15 Eder 2018 07:40) (95% - 97%)      PHYSICAL EXAM: Alert & Oriented X1   GENERAL: NAD, well-groomed, well-developed weakness   HEAD:  Atraumatic, Normocephalic  EYES: EOMI, PERRLA, conjunctiva and sclera clear  NECK: Supple, No JVD, Normal thyroid  CHEST/LUNG: Clear to percussion bilaterally; No rales, rhonchi, wheezing, or rubs  HEART: Regular rate and rhythm; No murmurs, rubs, or gallops  ABDOMEN: Soft, Nontender, Nondistended; Bowel sounds present  EXTREMITIES:  2+ Peripheral Pulses, No clubbing, cyanosis, or edema    NERVOUS SYSTEM:  Cranial Nerves 2-12 intact [x  ] Abnormal  [  ]  ROM: WFL all extremities [  ]  Abnormal [ x]  Motor Strength: WFL all extremities  [  ]  Abnormal [x ]  Sensation: intact to light touch [  ] Abnormal [ x ]  Reflexes: Symmetric [  x]  Abnormal [  ]    FUNCTIONAL STATUS:  Bed Mobility: Independent [  ]  Supervision [  ]  Needs Assistance [ x ]  N/A [  ]  Transfers: Independent [  ]  Supervision [  ]  Needs Assistance [ x ]  N/A [  ]   Ambulation: Independent [  ]  Supervision [  ]  Needs Assistance [  x]  N/A [  ]  ADL: Independent [x  ] Requires Assistance [  ] N/A [  ]      LABS:                        11.5   5.15  )-----------( 212      ( 2018 06:50 )             36.8     06-14    139  |  104  |  76<HH>  ----------------------------<  119<H>  5.2<H>   |  14<L>  |  6.3<HH>    Ca    7.8<L>      2018 06:50      PT/INR - ( 2018 06:50 )   PT: 12.50 sec;   INR: 1.15 ratio           Urinalysis Basic - ( 2018 15:40 )    Color: Yellow / Appearance: Clear / S.015 / pH: x  Gluc: x / Ketone: Negative  / Bili: Negative / Urobili: 0.2 mg/dL   Blood: x / Protein: 100 mg/dL / Nitrite: Negative   Leuk Esterase: Moderate / RBC: 10-25 /HPF / WBC 10-25 /HPF   Sq Epi: x / Non Sq Epi: Few /HPF / Bacteria: Few        RADIOLOGY & ADDITIONAL STUDIES:    Assesment:
NEPHROLOGY CONSULTATION NOTE    Patient is a 83y Female who presented to the hospital with worsening renal failure and hyperkalemia for evaluation for initiation of dialysis.   Pt with CKD stage 4/5, long standing DM, HTN, AICD, COPD who has been getting progressively weaker with anorexia, SOB. Denies vomiting and diarrhea. Baseline creat 4.5 (18) now 6.5 with hyperkalemia (K~6.) and met acidosis.  Making some urine.    PAST MEDICAL & SURGICAL HISTORY:  Arthritis, rheumatoid  Cystitis  COPD (chronic obstructive pulmonary disease)  Neuropathy  Diabetes  Hypothyroidism  HTN (hypertension)  CHF (congestive heart failure)  AICD (automatic cardioverter/defibrillator) present    Allergies:  Plavix (Other (Moderate))    Home Medications Reviewed  Hospital Medications:   MEDICATIONS  (STANDING):  aspirin  chewable 81 milliGRAM(s) Oral daily  dronabinol 2.5 milliGRAM(s) Oral before breakfast  escitalopram 10 milliGRAM(s) Oral daily  heparin  Injectable 5000 Unit(s) SubCutaneous every 8 hours  insulin glargine Injectable (LANTUS) 5 Unit(s) SubCutaneous at bedtime  insulin lispro Injectable (HumaLOG) 2 Unit(s) SubCutaneous three times a day before meals  levothyroxine 125 MICROGram(s) Oral daily  metoprolol succinate ER 25 milliGRAM(s) Oral daily  mirtazapine 15 milliGRAM(s) Oral at bedtime  senna 1 Tablet(s) Oral at bedtime      SOCIAL HISTORY:  Denies ETOH,Smoking,   FAMILY HISTORY:  No pertinent family history in first degree relatives        REVIEW OF SYSTEMS:  CONSTITUTIONAL: No weakness, fevers or chills  RESPIRATORY: No cough, wheezing, hemoptysis; Has shortness of breath  CARDIOVASCULAR: No chest pain or palpitations.  GASTROINTESTINAL: No abdominal or epigastric pain. Has nausea, no vomiting, or hematemesis; No diarrhea or constipation.   GENITOURINARY: No dysuria, frequency  NEUROLOGICAL: No numbness or weakness  SKIN: No itching, burning, rashes, or lesions   VASCULAR: No bilateral lower extremity edema.   All other review of systems is negative unless indicated above.    VITALS:  T(F): 96 (18 @ 06:14), Max: 97.1 (18 @ 16:57)  HR: 76 (18 @ 06:14)  BP: 136/73 (18 @ 06:14)  RR: 16 (18 @ 06:14)  SpO2: 98% (18 @ 16:57)     @ 07:01  -   @ 07:00  --------------------------------------------------------  IN: 240 mL / OUT: 0 mL / NET: 240 mL      Height (cm): 160.02 ( @ 17:27)  Weight (kg): 51.3 ( @ 10:53)  BMI (kg/m2): 20 ( @ 17:27)  BSA (m2): 1.52 ( @ 17:27)      I&O's Detail    2018 07:01  -  2018 07:00  --------------------------------------------------------  IN:    Oral Fluid: 240 mL  Total IN: 240 mL    OUT:  Total OUT: 0 mL    Total NET: 240 mL        Creatine Kinase, Serum: 34 U/L (18 @ 06:50)  Creatine Kinase, Serum: 34 U/L (18 @ 19:41)  Creatine Kinase, Serum: 99 U/L (18 @ 13:29)      PHYSICAL EXAM:  Constitutional: NAD  HEENT: anicteric sclera, oropharynx clear, MMM  Neck: No JVD  Respiratory: CTAB, no wheezes, rales or rhonchi  Cardiovascular: S1, S2, RRR  Gastrointestinal: BS+, soft, NT/ND  Extremities:  No peripheral edema  Neurological: A/O x 3, no tremors  Psychiatric: Normal mood, normal affect  : No CVA tenderness. No davis.   Skin: No rashes  Vascular Access:    LABS:      139  |  104  |  76<HH>  ----------------------------<  119<H>  5.2<H>   |  14<L>  |  6.3<HH>    Ca    7.8<L>      2018 06:50      Creatinine Trend: 6.3 <--, 6.0 <--, 6.1 <--, 4.4 <--, 4.2 <--, 4.3 <--                        11.5   5.15  )-----------( 212      ( 2018 06:50 )             36.8     Urine Studies:  Urinalysis Basic - ( 2018 15:40 )    Color: Yellow / Appearance: Clear / S.015 / pH:   Gluc:  / Ketone: Negative  / Bili: Negative / Urobili: 0.2 mg/dL   Blood:  / Protein: 100 mg/dL / Nitrite: Negative   Leuk Esterase: Moderate / RBC: 10-25 /HPF / WBC 10-25 /HPF   Sq Epi:  / Non Sq Epi: Few /HPF / Bacteria: Few          05-15 @ 10:32  8.0  79  --        RADIOLOGY & ADDITIONAL STUDIES:  < from: US Kidney and Bladder (18 @ 18:17) >  Right kidney measures 10.8 cm x 6 cm x 4.3 cm. It is normal in size and   in echogenicity. There is no hydronephrosis or mass. Cysts seen within   the right kidney on the prior examination is not as well identified.    The left kidney measures 10.5 cm x 4 cm x 5 cm. Contains a 3 mm midpole   echogenic structure with posterior shadowing consistent with a small   nonobstructing calculus. There is no hydronephrosis.    Evaluation the urinary bladder reveals prevoid volume of 150 cc. Ureteral   jets are not well seen. The bladder wall somewhat thickened. The patient   could not void.    Impression:    Nonobstructing left renal calculus. Otherwise normal-appearing kidneys.    Limited evaluation of the urinary bladder.    < end of copied text >  < from: Xray Chest 1 View- PORTABLE-Urgent (18 @ 12:32) >  Impression:      Improved left pleural effusion and right basilar atelectasis    < end of copied text >

## 2018-06-16 DIAGNOSIS — N18.6 END STAGE RENAL DISEASE: ICD-10-CM

## 2018-06-16 DIAGNOSIS — N18.9 CHRONIC KIDNEY DISEASE, UNSPECIFIED: ICD-10-CM

## 2018-06-16 DIAGNOSIS — E87.2 ACIDOSIS: ICD-10-CM

## 2018-06-16 LAB
ANION GAP SERPL CALC-SCNC: 19 MMOL/L — HIGH (ref 7–14)
BASOPHILS # BLD AUTO: 0.03 K/UL — SIGNIFICANT CHANGE UP (ref 0–0.2)
BASOPHILS NFR BLD AUTO: 0.7 % — SIGNIFICANT CHANGE UP (ref 0–1)
BUN SERPL-MCNC: 59 MG/DL — HIGH (ref 10–20)
CALCIUM SERPL-MCNC: 7.7 MG/DL — LOW (ref 8.4–10.5)
CALCIUM SERPL-MCNC: 7.8 MG/DL — LOW (ref 8.5–10.1)
CHLORIDE SERPL-SCNC: 102 MMOL/L — SIGNIFICANT CHANGE UP (ref 98–110)
CO2 SERPL-SCNC: 18 MMOL/L — SIGNIFICANT CHANGE UP (ref 17–32)
CREAT SERPL-MCNC: 5.4 MG/DL — CRITICAL HIGH (ref 0.7–1.5)
EOSINOPHIL # BLD AUTO: 0.2 K/UL — SIGNIFICANT CHANGE UP (ref 0–0.7)
EOSINOPHIL NFR BLD AUTO: 4.3 % — SIGNIFICANT CHANGE UP (ref 0–8)
GLUCOSE SERPL-MCNC: 114 MG/DL — HIGH (ref 70–99)
HBV CORE AB SER-ACNC: SIGNIFICANT CHANGE UP
HBV SURFACE AB SER-ACNC: SIGNIFICANT CHANGE UP
HBV SURFACE AG SER-ACNC: SIGNIFICANT CHANGE UP
HCT VFR BLD CALC: 34.8 % — LOW (ref 37–47)
HCV AB S/CO SERPL IA: 0.2 S/CO — SIGNIFICANT CHANGE UP
HCV AB SERPL-IMP: SIGNIFICANT CHANGE UP
HGB BLD-MCNC: 10.8 G/DL — LOW (ref 12–16)
IMM GRANULOCYTES NFR BLD AUTO: 0.2 % — SIGNIFICANT CHANGE UP (ref 0.1–0.3)
LYMPHOCYTES # BLD AUTO: 1.06 K/UL — LOW (ref 1.2–3.4)
LYMPHOCYTES # BLD AUTO: 23 % — SIGNIFICANT CHANGE UP (ref 20.5–51.1)
MCHC RBC-ENTMCNC: 27.7 PG — SIGNIFICANT CHANGE UP (ref 27–31)
MCHC RBC-ENTMCNC: 31 G/DL — LOW (ref 32–37)
MCV RBC AUTO: 89.2 FL — SIGNIFICANT CHANGE UP (ref 81–99)
MONOCYTES # BLD AUTO: 0.47 K/UL — SIGNIFICANT CHANGE UP (ref 0.1–0.6)
MONOCYTES NFR BLD AUTO: 10.2 % — HIGH (ref 1.7–9.3)
NEUTROPHILS # BLD AUTO: 2.84 K/UL — SIGNIFICANT CHANGE UP (ref 1.4–6.5)
NEUTROPHILS NFR BLD AUTO: 61.6 % — SIGNIFICANT CHANGE UP (ref 42.2–75.2)
NRBC # BLD: 0 /100 WBCS — SIGNIFICANT CHANGE UP (ref 0–0)
PHOSPHATE SERPL-MCNC: 6.1 MG/DL — HIGH (ref 2.1–4.9)
PLATELET # BLD AUTO: 164 K/UL — SIGNIFICANT CHANGE UP (ref 130–400)
POTASSIUM SERPL-MCNC: 5 MMOL/L — SIGNIFICANT CHANGE UP (ref 3.5–5)
POTASSIUM SERPL-SCNC: 5 MMOL/L — SIGNIFICANT CHANGE UP (ref 3.5–5)
PTH-INTACT FLD-MCNC: 93 PG/ML — HIGH (ref 15–65)
RBC # BLD: 3.9 M/UL — LOW (ref 4.2–5.4)
RBC # FLD: 22.2 % — HIGH (ref 11.5–14.5)
SODIUM SERPL-SCNC: 139 MMOL/L — SIGNIFICANT CHANGE UP (ref 135–146)
WBC # BLD: 4.61 K/UL — LOW (ref 4.8–10.8)
WBC # FLD AUTO: 4.61 K/UL — LOW (ref 4.8–10.8)

## 2018-06-16 RX ORDER — LANOLIN ALCOHOL/MO/W.PET/CERES
3 CREAM (GRAM) TOPICAL ONCE
Qty: 0 | Refills: 0 | Status: COMPLETED | OUTPATIENT
Start: 2018-06-16 | End: 2018-06-16

## 2018-06-16 RX ADMIN — HEPARIN SODIUM 5000 UNIT(S): 5000 INJECTION INTRAVENOUS; SUBCUTANEOUS at 14:16

## 2018-06-16 RX ADMIN — Medication 81 MILLIGRAM(S): at 14:16

## 2018-06-16 RX ADMIN — Medication 25 MILLIGRAM(S): at 05:50

## 2018-06-16 RX ADMIN — Medication 650 MILLIGRAM(S): at 00:46

## 2018-06-16 RX ADMIN — MIRTAZAPINE 15 MILLIGRAM(S): 45 TABLET, ORALLY DISINTEGRATING ORAL at 21:36

## 2018-06-16 RX ADMIN — SENNA PLUS 1 TABLET(S): 8.6 TABLET ORAL at 21:36

## 2018-06-16 RX ADMIN — HEPARIN SODIUM 5000 UNIT(S): 5000 INJECTION INTRAVENOUS; SUBCUTANEOUS at 21:36

## 2018-06-16 RX ADMIN — Medication 2.5 MILLIGRAM(S): at 08:23

## 2018-06-16 RX ADMIN — ESCITALOPRAM OXALATE 10 MILLIGRAM(S): 10 TABLET, FILM COATED ORAL at 14:16

## 2018-06-16 RX ADMIN — HEPARIN SODIUM 5000 UNIT(S): 5000 INJECTION INTRAVENOUS; SUBCUTANEOUS at 05:50

## 2018-06-16 RX ADMIN — Medication 3 MILLIGRAM(S): at 23:19

## 2018-06-16 RX ADMIN — Medication 125 MICROGRAM(S): at 05:50

## 2018-06-16 NOTE — PROGRESS NOTE ADULT - SUBJECTIVE AND OBJECTIVE BOX
DIAGNOSIS:   HOSPITAL DAY #:    STATUS POST:    POST OPERATIVE DAY #:     Vital Signs Last 24 Hrs  T(C): 35.8 (16 Jun 2018 22:15), Max: 36.2 (16 Jun 2018 06:13)  T(F): 96.5 (16 Jun 2018 22:15), Max: 97.2 (16 Jun 2018 06:13)  HR: 70 (16 Jun 2018 22:15) (63 - 82)  BP: 138/73 (16 Jun 2018 22:15) (127/75 - 138/73)  BP(mean): --  RR: 16 (16 Jun 2018 22:15) (16 - 16)  SpO2: 99% (16 Jun 2018 14:00) (99% - 99%)    SUBJECTIVE: Pt seen    Pain: YES  [ ]   NO [ ]   Nausea: [ ] YES [ ] NO           Vomiting: [ ] YES [ ] NO  Flatus: [ ] YES [ ] NO             Bowel Movement: [ ] YES [ ] NO     Void: [ ]YES [ ]No      MARSHA DRAINAGE: SIGNIFICANT [ ]   NOT SIGNIFICANT [ ]   NOT APPLICABLE [ ]  YES [ ] NO    General Appearance: Appears well, NAD  Neck: Supple  Chest: Equal expansion bilaterally, equal breath sounds  CV: Pulse regular presently  Abdomen: Soft [ ] YES [ ]NO  DISTENDED [ ] YES [ ] NO TENDERNESS [ ]YES [ ]NO  INCISIONS: HEALING WELL [ ] YES  [ ] NO ERYTHEMA [ ] YES [ ] NO DRAINAGE [ ] YES  [ ] NO  Extremities: Grossly symmetric, CALF TENDERNESS [ ] YES  [ ] NO  neck no infection for tessio on monday    LABS:                        10.8   4.61  )-----------( 164      ( 16 Jun 2018 06:48 )             34.8     06-16    139  |  102  |  59<H>  ----------------------------<  114<H>  5.0   |  18  |  5.4<HH>    Ca    7.8<L>      16 Jun 2018 06:48  Phos  6.1     06-16              ASSESSMENT:     GOOD POST OP COURSE [ ]  YES  [ ] NO  CONDITION IMPROVING  []  YES  [ ]  NO          PLAN:    CONTINUE PRESENT MANAGEMENT  [ ] YES  [ ] NO

## 2018-06-16 NOTE — PROGRESS NOTE ADULT - ASSESSMENT
1)  New ESRD w/ what appears to be uremic symptoms.  S/P HD # 1 yesterday via femoral [place], which she tolerated well.    2)  Metabolic acidosis due to above    3)  Anemia likely due to ESRD

## 2018-06-16 NOTE — PROGRESS NOTE ADULT - SUBJECTIVE AND OBJECTIVE BOX
VI MOBLEY  83y  Female      Patient is a 83y old  Female who presents with a chief complaint of sent from NH for worsening kidney function (13 Jun 2018 16:26)      INTERVAL HPI/OVERNIGHT EVENTS:  events noted          T(C): 36.2 (06-16-18 @ 06:13), Max: 36.2 (06-16-18 @ 06:13)  HR: 74 (06-16-18 @ 06:13) (68 - 78)  BP: 137/84 (06-16-18 @ 06:13) (123/68 - 144/75)  RR: 16 (06-16-18 @ 06:13) (16 - 18)  SpO2: --  Wt(kg): --  Vital Signs Last 24 Hrs  T(C): 36.2 (16 Jun 2018 06:13), Max: 36.2 (16 Jun 2018 06:13)  T(F): 97.2 (16 Jun 2018 06:13), Max: 97.2 (16 Jun 2018 06:13)  HR: 74 (16 Jun 2018 06:13) (68 - 78)  BP: 137/84 (16 Jun 2018 06:13) (123/68 - 144/75)  BP(mean): --  RR: 16 (16 Jun 2018 06:13) (16 - 18)  SpO2: --    PHYSICAL EXAM:  GENERAL: NAD   HEAD:  Atraumatic, Normocephalic  CHEST/LUNG: Clear to auscultation   HEART: S1/S2  ABDOMEN: Soft, Nontender   EXTREMITIES:  no edema    LABS:                          11.7   5.10  )-----------( 209      ( 15 Eder 2018 11:18 )             37.0     06-15    139  |  103  |  79<HH>  ----------------------------<  130<H>  5.9<H>   |  16<L>  |  6.3<HH>    Ca    8.0<L>      15 Eder 2018 11:18  Phos  7.4     06-15          MEDICATIONS  (STANDING):  aspirin  chewable 81 milliGRAM(s) Oral daily  dronabinol 2.5 milliGRAM(s) Oral before breakfast  escitalopram 10 milliGRAM(s) Oral daily  heparin  Injectable 5000 Unit(s) SubCutaneous every 8 hours  insulin glargine Injectable (LANTUS) 5 Unit(s) SubCutaneous at bedtime  insulin lispro Injectable (HumaLOG) 2 Unit(s) SubCutaneous three times a day before meals  levothyroxine 125 MICROGram(s) Oral daily  metoprolol succinate ER 25 milliGRAM(s) Oral daily  mirtazapine 15 milliGRAM(s) Oral at bedtime  senna 1 Tablet(s) Oral at bedtime    MEDICATIONS  (PRN):  acetaminophen   Tablet 650 milliGRAM(s) Oral every 6 hours PRN pain      Culture - Urine (collected 13 Jun 2018 15:40)  Source: .Urine Clean Catch (Midstream)  Final Report (14 Jun 2018 20:58):    Culture grew 3 or more types of organisms which indicate    collection contamination; consider recollection only if        Consultant(s) Notes Reviewed:  [x ] YES  [ ] NO  Care Discussed with Consultants/Other Providers [ x] YES  [ ] NO        RADIOLOGY & ADDITIONAL TESTS:      Imaging Personally Reviewed:  [ ] YES  [ ] NO    HEALTH ISSUES - PROBLEM Dx:  Hypothyroidism: Hypothyroidism  Diabetes: Diabetes  COPD (chronic obstructive pulmonary disease): COPD (chronic obstructive pulmonary disease)  Elevated troponin: Elevated troponin  Renal dysfunction: Renal dysfunction          Case discussed with housestaff

## 2018-06-16 NOTE — PROGRESS NOTE ADULT - SUBJECTIVE AND OBJECTIVE BOX
[facility] FOLLOW UP NOTE  --------------------------------------------------------------------------------  Chief Complaint:    24 hour events/subjective:        PAST HISTORY  --------------------------------------------------------------------------------  No significant changes to PMH, PSH, FHx, SHx, unless otherwise noted    ALLERGIES & MEDICATIONS  --------------------------------------------------------------------------------  Allergies    Plavix (Other (Moderate))    Intolerances      Standing Inpatient Medications  aspirin  chewable 81 milliGRAM(s) Oral daily  dronabinol 2.5 milliGRAM(s) Oral before breakfast  escitalopram 10 milliGRAM(s) Oral daily  heparin  Injectable 5000 Unit(s) SubCutaneous every 8 hours  insulin glargine Injectable (LANTUS) 5 Unit(s) SubCutaneous at bedtime  insulin lispro Injectable (HumaLOG) 2 Unit(s) SubCutaneous three times a day before meals  levothyroxine 125 MICROGram(s) Oral daily  metoprolol succinate ER 25 milliGRAM(s) Oral daily  mirtazapine 15 milliGRAM(s) Oral at bedtime  senna 1 Tablet(s) Oral at bedtime    PRN Inpatient Medications  acetaminophen   Tablet 650 milliGRAM(s) Oral every 6 hours PRN      REVIEW OF SYSTEMS  --------------------------------------------------------------------------------  Gen: No weight changes, fatigue, fevers/chills, weakness  Skin: No rashes  Head/Eyes/Ears/Mouth: No headache; Normal hearing; Normal vision w/o blurriness; No sinus pain/discomfort, sore throat  Respiratory: No dyspnea, cough, wheezing, hemoptysis  CV: No chest pain, PND, orthopnea  GI: No abdominal pain, diarrhea, constipation, nausea, vomiting, melena, hematochezia  : No increased frequency, dysuria, hematuria, nocturia  MSK: No joint pain/swelling; no back pain; no edema  Neuro: No dizziness/lightheadedness, weakness, seizures, numbness, tingling  Heme: No easy bruising or bleeding  Endo: No heat/cold intolerance  Psych: No significant nervousness, anxiety, stress, depression    All other systems were reviewed and are negative, except as noted.    VITALS/PHYSICAL EXAM  --------------------------------------------------------------------------------  T(C): 36.2 (06-16-18 @ 06:13), Max: 36.2 (06-16-18 @ 06:13)  HR: 71 (06-16-18 @ 12:30) (63 - 77)  BP: 130/74 (06-16-18 @ 12:30) (123/68 - 144/75)  RR: 16 (06-16-18 @ 12:30) (16 - 18)  SpO2: --  Wt(kg): --        06-15-18 @ 07:01  -  06-16-18 @ 07:00  --------------------------------------------------------  IN: 400 mL / OUT: 502 mL / NET: -102 mL    06-16-18 @ 07:01  -  06-16-18 @ 14:06  --------------------------------------------------------  IN: 140 mL / OUT: 1000 mL / NET: -860 mL      Physical Exam:  	Gen: NAD, well-appearing  	HEENT: PERRL, supple neck, clear oropharynx  	Pulm: CTA B/L  	CV: RRR, S1S2; no rub  	Back: No spinal or CVA tenderness; no sacral edema  	Abd: +BS, soft, nontender/nondistended  	: No suprapubic tenderness  	UE: Warm, FROM, no clubbing, intact strength; no edema; no asterixis  	LE: Warm, FROM, no clubbing, intact strength; no edema  	Neuro: No focal deficits, intact gait  	Psych: Normal affect and mood  	Skin: Warm, without rashes  	Vascular access:    LABS/STUDIES  --------------------------------------------------------------------------------              10.8   4.61  >-----------<  164      [06-16-18 @ 06:48]              34.8     139  |  102  |  59  ----------------------------<  114      [06-16-18 @ 06:48]  5.0   |  18  |  5.4        Ca     7.8     [06-16-18 @ 06:48]      Phos  6.1     [06-16-18 @ 06:48]            Creatinine Trend:  SCr 5.4 [06-16 @ 06:48]  SCr 6.3 [06-15 @ 11:18]  SCr 6.3 [06-14 @ 06:50]  SCr 6.0 [06-13 @ 19:41]  SCr 6.1 [06-13 @ 12:04]    Urinalysis - [06-13-18 @ 15:40]      Color Yellow / Appearance Clear / SG 1.015 / pH 5.5      Gluc Negative / Ketone Negative  / Bili Negative / Urobili 0.2       Blood Moderate / Protein 100 / Leuk Est Moderate / Nitrite Negative      RBC 10-25 / WBC 10-25 / Hyaline Negative / Gran Negative / Sq Epi  / Non Sq Epi Few / Bacteria Few    Urine Creatinine 41      [06-14-18 @ 13:56]  Urine Sodium 63.0      [06-14-18 @ 13:56]  Urine Potassium 11      [06-14-18 @ 13:56]    Iron 32, TIBC 114, %sat 28      [05-03-18 @ 05:50]  Ferritin 618      [05-03-18 @ 05:50]  PTH -- (Ca 7.7)      [06-15-18 @ 11:18]   93  PTH -- (Ca 8.0)      [05-15-18 @ 10:32]   79  PTH -- (Ca 6.9)      [05-09-18 @ 01:48]   47  PTH -- (Ca 7.9)      [05-08-18 @ 08:42]   42  HbA1c 12.4      [05-03-18 @ 05:50]    HBsAb Nonreact      [06-15-18 @ 11:18]  HBsAg Nonreact      [06-15-18 @ 11:18]  HBcAb Nonreact      [06-15-18 @ 11:18]  HCV 0.20, Nonreact      [06-15-18 @ 11:18]

## 2018-06-17 LAB
ALBUMIN SERPL ELPH-MCNC: 3.4 G/DL — LOW (ref 3.5–5.2)
ALLERGY+IMMUNOLOGY DIAG STUDY NOTE: (no result)
ALLERGY+IMMUNOLOGY DIAG STUDY NOTE: SIGNIFICANT CHANGE UP
ALP SERPL-CCNC: 94 U/L — SIGNIFICANT CHANGE UP (ref 30–115)
ALT FLD-CCNC: 7 U/L — SIGNIFICANT CHANGE UP (ref 0–41)
ANION GAP SERPL CALC-SCNC: 17 MMOL/L — HIGH (ref 7–14)
APTT BLD: 34 SEC — SIGNIFICANT CHANGE UP (ref 27–39.2)
AST SERPL-CCNC: 10 U/L — SIGNIFICANT CHANGE UP (ref 0–41)
BILIRUB SERPL-MCNC: 0.3 MG/DL — SIGNIFICANT CHANGE UP (ref 0.2–1.2)
BUN SERPL-MCNC: 34 MG/DL — HIGH (ref 10–20)
CALCIUM SERPL-MCNC: 7.6 MG/DL — LOW (ref 8.5–10.1)
CHLORIDE SERPL-SCNC: 101 MMOL/L — SIGNIFICANT CHANGE UP (ref 98–110)
CO2 SERPL-SCNC: 24 MMOL/L — SIGNIFICANT CHANGE UP (ref 17–32)
CREAT SERPL-MCNC: 4.4 MG/DL — CRITICAL HIGH (ref 0.7–1.5)
DIR ANTIGLOB POLYSPECIFIC INTERPRETATION: SIGNIFICANT CHANGE UP
GLUCOSE SERPL-MCNC: 155 MG/DL — HIGH (ref 70–99)
HCT VFR BLD CALC: 33.5 % — LOW (ref 37–47)
HGB BLD-MCNC: 10.8 G/DL — LOW (ref 12–16)
INR BLD: 1.13 RATIO — SIGNIFICANT CHANGE UP (ref 0.65–1.3)
MCHC RBC-ENTMCNC: 28.9 PG — SIGNIFICANT CHANGE UP (ref 27–31)
MCHC RBC-ENTMCNC: 32.2 G/DL — SIGNIFICANT CHANGE UP (ref 32–37)
MCV RBC AUTO: 89.6 FL — SIGNIFICANT CHANGE UP (ref 81–99)
NRBC # BLD: 0 /100 WBCS — SIGNIFICANT CHANGE UP (ref 0–0)
PLATELET # BLD AUTO: 127 K/UL — LOW (ref 130–400)
POTASSIUM SERPL-MCNC: 4.5 MMOL/L — SIGNIFICANT CHANGE UP (ref 3.5–5)
POTASSIUM SERPL-SCNC: 4.5 MMOL/L — SIGNIFICANT CHANGE UP (ref 3.5–5)
PROT SERPL-MCNC: 5.1 G/DL — LOW (ref 6–8)
PROTHROM AB SERPL-ACNC: 12.2 SEC — SIGNIFICANT CHANGE UP (ref 9.95–12.87)
RBC # BLD: 3.74 M/UL — LOW (ref 4.2–5.4)
RBC # FLD: 22.2 % — HIGH (ref 11.5–14.5)
SODIUM SERPL-SCNC: 142 MMOL/L — SIGNIFICANT CHANGE UP (ref 135–146)
TYPE + AB SCN PNL BLD: SIGNIFICANT CHANGE UP
WBC # BLD: 4.29 K/UL — LOW (ref 4.8–10.8)
WBC # FLD AUTO: 4.29 K/UL — LOW (ref 4.8–10.8)

## 2018-06-17 RX ORDER — LANOLIN ALCOHOL/MO/W.PET/CERES
3 CREAM (GRAM) TOPICAL AT BEDTIME
Qty: 0 | Refills: 0 | Status: DISCONTINUED | OUTPATIENT
Start: 2018-06-17 | End: 2018-06-26

## 2018-06-17 RX ADMIN — Medication 125 MICROGRAM(S): at 05:58

## 2018-06-17 RX ADMIN — HEPARIN SODIUM 5000 UNIT(S): 5000 INJECTION INTRAVENOUS; SUBCUTANEOUS at 14:43

## 2018-06-17 RX ADMIN — Medication 3 MILLIGRAM(S): at 23:49

## 2018-06-17 RX ADMIN — Medication 100 MILLIGRAM(S): at 18:13

## 2018-06-17 RX ADMIN — MIRTAZAPINE 15 MILLIGRAM(S): 45 TABLET, ORALLY DISINTEGRATING ORAL at 22:04

## 2018-06-17 RX ADMIN — ESCITALOPRAM OXALATE 10 MILLIGRAM(S): 10 TABLET, FILM COATED ORAL at 11:57

## 2018-06-17 RX ADMIN — Medication 25 MILLIGRAM(S): at 05:58

## 2018-06-17 RX ADMIN — Medication 2.5 MILLIGRAM(S): at 09:11

## 2018-06-17 RX ADMIN — Medication 650 MILLIGRAM(S): at 18:13

## 2018-06-17 RX ADMIN — Medication 650 MILLIGRAM(S): at 01:48

## 2018-06-17 RX ADMIN — SENNA PLUS 1 TABLET(S): 8.6 TABLET ORAL at 22:03

## 2018-06-17 RX ADMIN — HEPARIN SODIUM 5000 UNIT(S): 5000 INJECTION INTRAVENOUS; SUBCUTANEOUS at 05:57

## 2018-06-17 RX ADMIN — Medication 81 MILLIGRAM(S): at 11:57

## 2018-06-17 NOTE — PROGRESS NOTE ADULT - SUBJECTIVE AND OBJECTIVE BOX
Surgery Pre-op. Note:      Diagnosis:  ESRD with need for Hemodialysis.    Planned procedure:  Tesio catheter placement on Monday 6/18/18.    Surgeon:  Dr. Baltazar.      Labs:                10.8   4.29  )-----------( 127      ( 17 Jun 2018 06:34 )             33.5       06-17    142  |  101  |  34<H>  ----------------------------<  155<H>  4.5   |  24  |  4.4<HH>    Ca    7.6<L>      17 Jun 2018 06:34  Phos  6.1     06-16    TPro  5.1<L>  /  Alb  3.4<L>  /  TBili  0.3  /  DBili  x   /  AST  10  /  ALT  7   /  AlkPhos  94  06-17        PT/INR - ( 17 Jun 2018 06:34 )   PT: 12.20 sec;   INR: 1.13 ratio    PTT - ( 17 Jun 2018 06:34 )  PTT:34.0 sec          CXR:  Xray Chest 1 View- PORTABLE-Urgent (06.13.18 @ 12:32)   Impression:      Improved left pleural effusion and right basilar atelectasis          12 Lead ECG (06.16.18 @ 21:34)   Diagnosis Line *** Suspect unspecified pacemaker failure  Atrial-sensed ventricular-paced rhythm  Abnormal ECG    Confirmed by HIEU REBOLLEDO MD ([id number]) on 6/17/2018 9:19:37 AM

## 2018-06-17 NOTE — PROGRESS NOTE ADULT - ASSESSMENT
Impression:  ESRD with need for Hemodialysis for Tesio catheter placement by Dr. Baltazar on Monday 6/18/18.

## 2018-06-17 NOTE — PROGRESS NOTE ADULT - SUBJECTIVE AND OBJECTIVE BOX
DIAGNOSIS:   HOSPITAL DAY #:    STATUS POST:    POST OPERATIVE DAY #:     Vital Signs Last 24 Hrs  T(C): 35.6 (17 Jun 2018 06:00), Max: 36.1 (16 Jun 2018 14:00)  T(F): 96 (17 Jun 2018 06:00), Max: 97 (16 Jun 2018 14:00)  HR: 72 (17 Jun 2018 06:00) (70 - 82)  BP: 124/70 (17 Jun 2018 06:00) (124/70 - 138/73)  BP(mean): --  RR: 16 (17 Jun 2018 06:00) (16 - 16)  SpO2: 99% (16 Jun 2018 14:00) (99% - 99%)    SUBJECTIVE: Pt seen    Pain: YES  [ ]   NO [ ]   Nausea: [ ] YES [ ] NO           Vomiting: [ ] YES [ ] NO  Flatus: [ ] YES [ ] NO             Bowel Movement: [ ] YES [ ] NO     Void: [ ]YES [ ]No      MARSHA DRAINAGE: SIGNIFICANT [ ]   NOT SIGNIFICANT [ ]   NOT APPLICABLE [ ]  YES [ ] NO    General Appearance: Appears well, NAD  Neck: Supple  Chest: Equal expansion bilaterally, equal breath sounds  CV: Pulse regular presently  Abdomen: Soft [x ] YES [ ]NO  DISTENDED [ ] YES [x ] NO TENDERNESS [ ]YES [ ]NO  INCISIONS: HEALING WELL [ ] YES  [ ] NO ERYTHEMA [ ] YES [ ] NO DRAINAGE [ ] YES  [ ] NO  Extremities: Grossly symmetric, CALF TENDERNESS [ ] YES  [ ] NO  neck no mass for tessio    LABS:                        10.8   4.29  )-----------( 127      ( 17 Jun 2018 06:34 )             33.5     06-17    142  |  101  |  34<H>  ----------------------------<  155<H>  4.5   |  24  |  4.4<HH>    Ca    7.6<L>      17 Jun 2018 06:34  Phos  6.1     06-16    TPro  5.1<L>  /  Alb  3.4<L>  /  TBili  0.3  /  DBili  x   /  AST  10  /  ALT  7   /  AlkPhos  94  06-17    PT/INR - ( 17 Jun 2018 06:34 )   PT: 12.20 sec;   INR: 1.13 ratio         PTT - ( 17 Jun 2018 06:34 )  PTT:34.0 sec        ASSESSMENT:     GOOD POST OP COURSE [ ]  YES  [ ] NO  CONDITION IMPROVING  []  YES  [ ]  NO          PLAN:    CONTINUE PRESENT MANAGEMENT  [xs ] YES  [ ] NO

## 2018-06-17 NOTE — PROGRESS NOTE ADULT - SUBJECTIVE AND OBJECTIVE BOX
RAKELTADEO VI  83y  Female      Patient is a 83y old  Female who presents with a chief complaint of sent from NH for worsening kidney function (13 Jun 2018 16:26)      INTERVAL HPI/OVERNIGHT EVENTS:  events noted  received HD through femoral udall catheter            T(C): 35.6 (06-17-18 @ 06:00), Max: 36.1 (06-16-18 @ 14:00)  HR: 72 (06-17-18 @ 06:00) (63 - 82)  BP: 124/70 (06-17-18 @ 06:00) (124/70 - 138/73)  RR: 16 (06-17-18 @ 06:00) (16 - 16)  SpO2: 99% (06-16-18 @ 14:00) (99% - 99%)  Wt(kg): --  Vital Signs Last 24 Hrs  T(C): 35.6 (17 Jun 2018 06:00), Max: 36.1 (16 Jun 2018 14:00)  T(F): 96 (17 Jun 2018 06:00), Max: 97 (16 Jun 2018 14:00)  HR: 72 (17 Jun 2018 06:00) (63 - 82)  BP: 124/70 (17 Jun 2018 06:00) (124/70 - 138/73)  BP(mean): --  RR: 16 (17 Jun 2018 06:00) (16 - 16)  SpO2: 99% (16 Jun 2018 14:00) (99% - 99%)    PHYSICAL EXAM:  GENERAL: NAD   HEAD:  Atraumatic, Normocephalic  CHEST/LUNG: Clear to auscultation   HEART: S1/S2  ABDOMEN: Soft, Nontender   EXTREMITIES:  no edema    LABS:                          10.8   4.61  )-----------( 164      ( 16 Jun 2018 06:48 )             34.8     06-16    139  |  102  |  59<H>  ----------------------------<  114<H>  5.0   |  18  |  5.4<HH>    Ca    7.8<L>      16 Jun 2018 06:48  Phos  6.1     06-16          MEDICATIONS  (STANDING):  aspirin  chewable 81 milliGRAM(s) Oral daily  dronabinol 2.5 milliGRAM(s) Oral before breakfast  escitalopram 10 milliGRAM(s) Oral daily  heparin  Injectable 5000 Unit(s) SubCutaneous every 8 hours  insulin glargine Injectable (LANTUS) 5 Unit(s) SubCutaneous at bedtime  insulin lispro Injectable (HumaLOG) 2 Unit(s) SubCutaneous three times a day before meals  levothyroxine 125 MICROGram(s) Oral daily  metoprolol succinate ER 25 milliGRAM(s) Oral daily  mirtazapine 15 milliGRAM(s) Oral at bedtime  senna 1 Tablet(s) Oral at bedtime    MEDICATIONS  (PRN):  acetaminophen   Tablet 650 milliGRAM(s) Oral every 6 hours PRN pain        Consultant(s) Notes Reviewed:  [x ] YES  [ ] NO  Care Discussed with Consultants/Other Providers [ x] YES  [ ] NO        RADIOLOGY & ADDITIONAL TESTS:      Imaging Personally Reviewed:  [ ] YES  [ ] NO    HEALTH ISSUES - PROBLEM Dx:  Anemia due to chronic kidney disease: Anemia due to chronic kidney disease  Acidosis, metabolic: Acidosis, metabolic  ESRD (end stage renal disease): ESRD (end stage renal disease)  Hypothyroidism: Hypothyroidism  Diabetes: Diabetes  COPD (chronic obstructive pulmonary disease): COPD (chronic obstructive pulmonary disease)  Elevated troponin: Elevated troponin  Renal dysfunction: Renal dysfunction          Case discussed with housestaff

## 2018-06-17 NOTE — PROGRESS NOTE ADULT - ASSESSMENT
Anemia due to chronic kidney disease: Anemia due to chronic kidney disease  Acidosis, metabolic: Acidosis, metabolic  ESRD (end stage renal disease): ESRD (end stage renal disease)  Hypothyroidism: Hypothyroidism  Diabetes: Diabetes  COPD (chronic obstructive pulmonary disease): COPD (chronic obstructive pulmonary disease)  Elevated troponin: Elevated troponin  Renal dysfunction: Renal dysfunction

## 2018-06-18 LAB
ANION GAP SERPL CALC-SCNC: 19 MMOL/L — HIGH (ref 7–14)
BUN SERPL-MCNC: 40 MG/DL — HIGH (ref 10–20)
CALCIUM SERPL-MCNC: 7.9 MG/DL — LOW (ref 8.5–10.1)
CHLORIDE SERPL-SCNC: 98 MMOL/L — SIGNIFICANT CHANGE UP (ref 98–110)
CO2 SERPL-SCNC: 20 MMOL/L — SIGNIFICANT CHANGE UP (ref 17–32)
CREAT SERPL-MCNC: 4.4 MG/DL — CRITICAL HIGH (ref 0.7–1.5)
GLUCOSE SERPL-MCNC: 226 MG/DL — HIGH (ref 70–99)
HCT VFR BLD CALC: 36 % — LOW (ref 37–47)
HGB BLD-MCNC: 11.2 G/DL — LOW (ref 12–16)
MCHC RBC-ENTMCNC: 28.2 PG — SIGNIFICANT CHANGE UP (ref 27–31)
MCHC RBC-ENTMCNC: 31.1 G/DL — LOW (ref 32–37)
MCV RBC AUTO: 90.7 FL — SIGNIFICANT CHANGE UP (ref 81–99)
NRBC # BLD: 0 /100 WBCS — SIGNIFICANT CHANGE UP (ref 0–0)
PLATELET # BLD AUTO: 145 K/UL — SIGNIFICANT CHANGE UP (ref 130–400)
POTASSIUM SERPL-MCNC: 4.9 MMOL/L — SIGNIFICANT CHANGE UP (ref 3.5–5)
POTASSIUM SERPL-SCNC: 4.9 MMOL/L — SIGNIFICANT CHANGE UP (ref 3.5–5)
RBC # BLD: 3.97 M/UL — LOW (ref 4.2–5.4)
RBC # FLD: 21.7 % — HIGH (ref 11.5–14.5)
SODIUM SERPL-SCNC: 137 MMOL/L — SIGNIFICANT CHANGE UP (ref 135–146)
WBC # BLD: 4.95 K/UL — SIGNIFICANT CHANGE UP (ref 4.8–10.8)
WBC # FLD AUTO: 4.95 K/UL — SIGNIFICANT CHANGE UP (ref 4.8–10.8)

## 2018-06-18 RX ORDER — MORPHINE SULFATE 50 MG/1
1 CAPSULE, EXTENDED RELEASE ORAL
Qty: 0 | Refills: 0 | Status: DISCONTINUED | OUTPATIENT
Start: 2018-06-18 | End: 2018-06-19

## 2018-06-18 RX ORDER — SODIUM CHLORIDE 9 MG/ML
1000 INJECTION INTRAMUSCULAR; INTRAVENOUS; SUBCUTANEOUS
Qty: 0 | Refills: 0 | Status: DISCONTINUED | OUTPATIENT
Start: 2018-06-18 | End: 2018-06-19

## 2018-06-18 RX ORDER — MORPHINE SULFATE 50 MG/1
2 CAPSULE, EXTENDED RELEASE ORAL ONCE
Qty: 0 | Refills: 0 | Status: DISCONTINUED | OUTPATIENT
Start: 2018-06-18 | End: 2018-06-18

## 2018-06-18 RX ORDER — ONDANSETRON 8 MG/1
4 TABLET, FILM COATED ORAL ONCE
Qty: 0 | Refills: 0 | Status: DISCONTINUED | OUTPATIENT
Start: 2018-06-18 | End: 2018-06-19

## 2018-06-18 RX ADMIN — SODIUM CHLORIDE 50 MILLILITER(S): 9 INJECTION INTRAMUSCULAR; INTRAVENOUS; SUBCUTANEOUS at 18:52

## 2018-06-18 RX ADMIN — Medication 2 UNIT(S): at 11:26

## 2018-06-18 RX ADMIN — MIRTAZAPINE 15 MILLIGRAM(S): 45 TABLET, ORALLY DISINTEGRATING ORAL at 21:06

## 2018-06-18 RX ADMIN — Medication 2 UNIT(S): at 09:16

## 2018-06-18 RX ADMIN — SENNA PLUS 1 TABLET(S): 8.6 TABLET ORAL at 21:06

## 2018-06-18 RX ADMIN — Medication 650 MILLIGRAM(S): at 21:06

## 2018-06-18 RX ADMIN — MORPHINE SULFATE 2 MILLIGRAM(S): 50 CAPSULE, EXTENDED RELEASE ORAL at 23:31

## 2018-06-18 NOTE — PROGRESS NOTE ADULT - SUBJECTIVE AND OBJECTIVE BOX
DLPAULAVI  83y  Female      Patient is a 83y old  Female who presents with a chief complaint of sent from NH for worsening kidney function (13 Jun 2018 16:26)      INTERVAL HPI/OVERNIGHT EVENTS:  events noted  NPO for tesio catheter placement today          T(C): 35.6 (06-18-18 @ 06:00), Max: 35.9 (06-17-18 @ 14:00)  HR: 78 (06-18-18 @ 06:00) (64 - 78)  BP: 135/82 (06-18-18 @ 06:00) (131/82 - 137/83)  RR: 16 (06-18-18 @ 06:00) (16 - 16)  SpO2: --  Wt(kg): --  Vital Signs Last 24 Hrs  T(C): 35.6 (18 Jun 2018 06:00), Max: 35.9 (17 Jun 2018 14:00)  T(F): 96 (18 Jun 2018 06:00), Max: 96.6 (17 Jun 2018 14:00)  HR: 78 (18 Jun 2018 06:00) (64 - 78)  BP: 135/82 (18 Jun 2018 06:00) (131/82 - 137/83)  BP(mean): --  RR: 16 (18 Jun 2018 06:00) (16 - 16)  SpO2: --    PHYSICAL EXAM:  GENERAL: NAD   HEAD:  Atraumatic, Normocephalic  CHEST/LUNG: Clear to auscultation   HEART: S1/S2  ABDOMEN: Soft, Nontender   EXTREMITIES:  no edema    LABS:                          11.2   4.95  )-----------( 145      ( 18 Jun 2018 06:28 )             36.0     06-17    142  |  101  |  34<H>  ----------------------------<  155<H>  4.5   |  24  |  4.4<HH>    Ca    7.6<L>      17 Jun 2018 06:34    TPro  5.1<L>  /  Alb  3.4<L>  /  TBili  0.3  /  DBili  x   /  AST  10  /  ALT  7   /  AlkPhos  94  06-17        MEDICATIONS  (STANDING):  aspirin  chewable 81 milliGRAM(s) Oral daily  dronabinol 2.5 milliGRAM(s) Oral before breakfast  escitalopram 10 milliGRAM(s) Oral daily  heparin  Injectable 5000 Unit(s) SubCutaneous every 8 hours  insulin glargine Injectable (LANTUS) 5 Unit(s) SubCutaneous at bedtime  insulin lispro Injectable (HumaLOG) 2 Unit(s) SubCutaneous three times a day before meals  levothyroxine 125 MICROGram(s) Oral daily  metoprolol succinate ER 25 milliGRAM(s) Oral daily  mirtazapine 15 milliGRAM(s) Oral at bedtime  senna 1 Tablet(s) Oral at bedtime    MEDICATIONS  (PRN):  acetaminophen   Tablet 650 milliGRAM(s) Oral every 6 hours PRN pain  guaiFENesin    Syrup 100 milliGRAM(s) Oral every 6 hours PRN Cough  melatonin 3 milliGRAM(s) Oral at bedtime PRN Insomnia        Consultant(s) Notes Reviewed:  [x ] YES  [ ] NO  Care Discussed with Consultants/Other Providers [ x] YES  [ ] NO        RADIOLOGY & ADDITIONAL TESTS:      Imaging Personally Reviewed:  [ ] YES  [ ] NO    HEALTH ISSUES - PROBLEM Dx:  Anemia due to chronic kidney disease: Anemia due to chronic kidney disease  Acidosis, metabolic: Acidosis, metabolic  ESRD (end stage renal disease): ESRD (end stage renal disease)  Hypothyroidism: Hypothyroidism  Diabetes: Diabetes  COPD (chronic obstructive pulmonary disease): COPD (chronic obstructive pulmonary disease)  Elevated troponin: Elevated troponin  Renal dysfunction: Renal dysfunction          Case discussed with housestaff

## 2018-06-18 NOTE — PROGRESS NOTE ADULT - SUBJECTIVE AND OBJECTIVE BOX
Nephrology progress note    Patient is seen and examined, events over the last 24 h noted .  NPO for Tesio cath today  Tolerated HD on Fri and SAt well  c/o weakness    Allergies:  Plavix (Other (Moderate))    Hospital Medications:   MEDICATIONS  (STANDING):  aspirin  chewable 81 milliGRAM(s) Oral daily  dronabinol 2.5 milliGRAM(s) Oral before breakfast  escitalopram 10 milliGRAM(s) Oral daily  heparin  Injectable 5000 Unit(s) SubCutaneous every 8 hours  insulin glargine Injectable (LANTUS) 5 Unit(s) SubCutaneous at bedtime  insulin lispro Injectable (HumaLOG) 2 Unit(s) SubCutaneous three times a day before meals  levothyroxine 125 MICROGram(s) Oral daily  metoprolol succinate ER 25 milliGRAM(s) Oral daily  mirtazapine 15 milliGRAM(s) Oral at bedtime  senna 1 Tablet(s) Oral at bedtime        VITALS:  T(F): 96 (18 @ 06:00), Max: 96.6 (18 @ 14:00)  HR: 78 (18 @ 06:00)  BP: 135/82 (18 @ 06:00)  RR: 18 (18 @ 08:07)  SpO2: 98% (18 @ 08:07)  Wt(kg): --     @ 07:01  -   @ 07:00  --------------------------------------------------------  IN: 260 mL / OUT: 1000 mL / NET: -740 mL     @ 07:01  -   @ 07:00  --------------------------------------------------------  IN: 300 mL / OUT: 100 mL / NET: 200 mL     @ 07:01  -   @ 12:53  --------------------------------------------------------  IN: 0 mL / OUT: 150 mL / NET: -150 mL          PHYSICAL EXAM:  Constitutional: NAD  HEENT: anicteric sclera, oropharynx clear, MMM  Neck: No JVD  Respiratory: CTAB, no wheezes, rales or rhonchi  Cardiovascular: S1, S2, RRR  Gastrointestinal: BS+, soft, NT/ND  Extremities:  No peripheral edema  Neurological: A/O x 3, no focal deficits  : No CVA tenderness. No davis.   Skin: No rashes  Vascular Access: femoral [place] cath    LABS:      137  |  98  |  40<H>  ----------------------------<  226<H>  4.9   |  20  |  4.4<HH>    Ca    7.9<L>      2018 06:28    TPro  5.1<L>  /  Alb  3.4<L>  /  TBili  0.3  /  DBili      /  AST  10  /  ALT  7   /  AlkPhos  94                            11.2   4.95  )-----------( 145      ( 2018 06:28 )             36.0       Urine Studies:  Urinalysis Basic - ( 2018 15:40 )    Color: Yellow / Appearance: Clear / S.015 / pH:   Gluc:  / Ketone: Negative  / Bili: Negative / Urobili: 0.2 mg/dL   Blood:  / Protein: 100 mg/dL / Nitrite: Negative   Leuk Esterase: Moderate / RBC: 10-25 /HPF / WBC 10-25 /HPF   Sq Epi:  / Non Sq Epi: Few /HPF / Bacteria: Few      Potassium, Random Urine: 11 mmol/L ( @ 13:56)  Sodium, Random Urine: 63.0 mmoL/L ( @ 13:56)  Creatinine, Random Urine: 41 mg/dL ( @ 13:56)    RADIOLOGY & ADDITIONAL STUDIES:

## 2018-06-18 NOTE — PROGRESS NOTE ADULT - ASSESSMENT
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    Pt started on HD - had 2x tx tolerated well, for a Tesio cath today.    ESRD - HD due tomorrow    DM - on Insulin, frequent FS to avoid hypoglycemia    Pt needs set up for outpt HD at [facility] or [facility] with HD    Will follow

## 2018-06-18 NOTE — PHYSICAL THERAPY INITIAL EVALUATION ADULT - SPECIFY REASON(S)
As discussed with RN, will hold PT eval at this time as pt has (R) femoral line for HD and awaiting tesio placement. Will follow up and complete eval when indicated.

## 2018-06-18 NOTE — PRE-ANESTHESIA EVALUATION ADULT - NSANTHOSAYNRD_GEN_A_CORE
No. ELEN screening performed.  STOP BANG Legend: 0-2 = LOW Risk; 3-4 = INTERMEDIATE Risk; 5-8 = HIGH Risk

## 2018-06-19 LAB
ANION GAP SERPL CALC-SCNC: 17 MMOL/L — HIGH (ref 7–14)
BUN SERPL-MCNC: 42 MG/DL — HIGH (ref 10–20)
CALCIUM SERPL-MCNC: 8 MG/DL — LOW (ref 8.5–10.1)
CHLORIDE SERPL-SCNC: 101 MMOL/L — SIGNIFICANT CHANGE UP (ref 98–110)
CO2 SERPL-SCNC: 22 MMOL/L — SIGNIFICANT CHANGE UP (ref 17–32)
CREAT SERPL-MCNC: 5.3 MG/DL — CRITICAL HIGH (ref 0.7–1.5)
GLUCOSE SERPL-MCNC: 163 MG/DL — HIGH (ref 70–99)
HCT VFR BLD CALC: 33.9 % — LOW (ref 37–47)
HGB BLD-MCNC: 10.7 G/DL — LOW (ref 12–16)
MCHC RBC-ENTMCNC: 29.2 PG — SIGNIFICANT CHANGE UP (ref 27–31)
MCHC RBC-ENTMCNC: 31.6 G/DL — LOW (ref 32–37)
MCV RBC AUTO: 92.4 FL — SIGNIFICANT CHANGE UP (ref 81–99)
NRBC # BLD: 0 /100 WBCS — SIGNIFICANT CHANGE UP (ref 0–0)
PLATELET # BLD AUTO: 147 K/UL — SIGNIFICANT CHANGE UP (ref 130–400)
POTASSIUM SERPL-MCNC: 5 MMOL/L — SIGNIFICANT CHANGE UP (ref 3.5–5)
POTASSIUM SERPL-SCNC: 5 MMOL/L — SIGNIFICANT CHANGE UP (ref 3.5–5)
RBC # BLD: 3.67 M/UL — LOW (ref 4.2–5.4)
RBC # FLD: 21.7 % — HIGH (ref 11.5–14.5)
SODIUM SERPL-SCNC: 140 MMOL/L — SIGNIFICANT CHANGE UP (ref 135–146)
WBC # BLD: 5.67 K/UL — SIGNIFICANT CHANGE UP (ref 4.8–10.8)
WBC # FLD AUTO: 5.67 K/UL — SIGNIFICANT CHANGE UP (ref 4.8–10.8)

## 2018-06-19 RX ORDER — CHLORHEXIDINE GLUCONATE 213 G/1000ML
1 SOLUTION TOPICAL DAILY
Qty: 0 | Refills: 0 | Status: DISCONTINUED | OUTPATIENT
Start: 2018-06-19 | End: 2018-06-26

## 2018-06-19 RX ORDER — DIPHENHYDRAMINE HCL 50 MG
25 CAPSULE ORAL EVERY 6 HOURS
Qty: 0 | Refills: 0 | Status: DISCONTINUED | OUTPATIENT
Start: 2018-06-19 | End: 2018-06-20

## 2018-06-19 RX ORDER — HYDROCORTISONE 1 %
1 OINTMENT (GRAM) TOPICAL DAILY
Qty: 0 | Refills: 0 | Status: DISCONTINUED | OUTPATIENT
Start: 2018-06-19 | End: 2018-06-26

## 2018-06-19 RX ADMIN — Medication 1 APPLICATION(S): at 13:56

## 2018-06-19 RX ADMIN — HEPARIN SODIUM 5000 UNIT(S): 5000 INJECTION INTRAVENOUS; SUBCUTANEOUS at 13:57

## 2018-06-19 RX ADMIN — Medication 650 MILLIGRAM(S): at 18:45

## 2018-06-19 RX ADMIN — Medication 2 UNIT(S): at 12:26

## 2018-06-19 RX ADMIN — Medication 81 MILLIGRAM(S): at 12:14

## 2018-06-19 RX ADMIN — Medication 125 MICROGRAM(S): at 05:54

## 2018-06-19 RX ADMIN — Medication 2 UNIT(S): at 17:06

## 2018-06-19 RX ADMIN — HEPARIN SODIUM 5000 UNIT(S): 5000 INJECTION INTRAVENOUS; SUBCUTANEOUS at 05:54

## 2018-06-19 RX ADMIN — ESCITALOPRAM OXALATE 10 MILLIGRAM(S): 10 TABLET, FILM COATED ORAL at 12:14

## 2018-06-19 RX ADMIN — INSULIN GLARGINE 5 UNIT(S): 100 INJECTION, SOLUTION SUBCUTANEOUS at 21:28

## 2018-06-19 RX ADMIN — SENNA PLUS 1 TABLET(S): 8.6 TABLET ORAL at 21:29

## 2018-06-19 RX ADMIN — MIRTAZAPINE 15 MILLIGRAM(S): 45 TABLET, ORALLY DISINTEGRATING ORAL at 21:29

## 2018-06-19 RX ADMIN — HEPARIN SODIUM 5000 UNIT(S): 5000 INJECTION INTRAVENOUS; SUBCUTANEOUS at 21:28

## 2018-06-19 RX ADMIN — Medication 25 MILLIGRAM(S): at 05:54

## 2018-06-19 RX ADMIN — MORPHINE SULFATE 2 MILLIGRAM(S): 50 CAPSULE, EXTENDED RELEASE ORAL at 01:00

## 2018-06-19 RX ADMIN — Medication 2.5 MILLIGRAM(S): at 12:13

## 2018-06-19 NOTE — PROGRESS NOTE ADULT - PROBLEM SELECTOR PROBLEM 3
COPD (chronic obstructive pulmonary disease)
Anemia due to chronic kidney disease
COPD (chronic obstructive pulmonary disease)

## 2018-06-19 NOTE — PHYSICAL THERAPY INITIAL EVALUATION ADULT - ACTIVE RANGE OF MOTION EXAMINATION, REHAB EVAL
Both upper extremities/Both lower extremities joints within functional limits and painfree AAROM/AROM

## 2018-06-19 NOTE — PROGRESS NOTE ADULT - SUBJECTIVE AND OBJECTIVE BOX
Patient is scratching herself though staff states she seems a little confused- I spoke to patient who is about to go to sleep but answered me appropriately. Has hydrocortisone already ordered, will try 1 dose of benadryl since patient has scratched off the dressing covering her newly placed tessio (watch for any new changes in mental status with 1 time order of benadryl

## 2018-06-19 NOTE — PROGRESS NOTE ADULT - PROBLEM SELECTOR PLAN 5
continue synthroid

## 2018-06-19 NOTE — PROGRESS NOTE ADULT - PROBLEM SELECTOR PLAN 1
- Case d/w Dr. Baltazar.  - NPO after midnight except medications.  - For Tesio catheter placement tomorrow.
CKD 5  udall catheter placed for HD today  nephrology following  monitor BMP  plan for tesio catheter on Monday
ESRD being started on HD  nephrology following  monitor BMP  plan for tesio catheter on Monday
ESRD started on HD  nephrology following  monitor BMP  plan for tesio catheter on Monday  NPO after midnight
ESRD started on HD  nephrology following  monitor BMP  s/p tesio catheter placement  for HD today
Will repeat dialysis Monday.  Will need tunnelled catheter and AVF next week
nephrology consult for possible HD  monitor BMP  check renal/bladder US
ESRD started on HD  nephrology following  monitor BMP  plan for tesio today

## 2018-06-19 NOTE — PROGRESS NOTE ADULT - PROBLEM SELECTOR PROBLEM 1
ESRD (end stage renal disease)
ESRD (end stage renal disease)
Renal dysfunction

## 2018-06-19 NOTE — PROGRESS NOTE ADULT - PROBLEM SELECTOR PLAN 3
stable  nebs PRN
JEAN (Aranesp, Epogen) once Hgb < 10  Check iron studies
stable  nebs PRN

## 2018-06-19 NOTE — PROGRESS NOTE ADULT - SUBJECTIVE AND OBJECTIVE BOX
VI MOBLEY  83y  Female      Patient is a 83y old  Female who presents with a chief complaint of sent from NH for worsening kidney function (13 Jun 2018 16:26)      INTERVAL HPI/OVERNIGHT EVENTS:  tesio catheter placement  HD            T(C): 35.6 (06-19-18 @ 06:14), Max: 36.6 (06-18-18 @ 18:01)  HR: 81 (06-19-18 @ 06:14) (74 - 81)  BP: 131/70 (06-19-18 @ 06:14) (120/70 - 152/93)  RR: 16 (06-19-18 @ 06:14) (10 - 16)  SpO2: 99% (06-18-18 @ 18:01) (98% - 100%)  Wt(kg): --  Vital Signs Last 24 Hrs  T(C): 35.6 (19 Jun 2018 06:14), Max: 36.6 (18 Jun 2018 18:01)  T(F): 96 (19 Jun 2018 06:14), Max: 97.8 (18 Jun 2018 18:01)  HR: 81 (19 Jun 2018 06:14) (74 - 81)  BP: 131/70 (19 Jun 2018 06:14) (120/70 - 152/93)  BP(mean): --  RR: 16 (19 Jun 2018 06:14) (10 - 16)  SpO2: 99% (18 Jun 2018 18:01) (98% - 100%)    PHYSICAL EXAM:  GENERAL: NAD   HEAD:  Atraumatic, Normocephalic  CHEST/LUNG: Clear to auscultation, tesio catheter  HEART: S1/S2  ABDOMEN: Soft, Nontender   EXTREMITIES:  no edema    LABS:                          10.7   5.67  )-----------( 147      ( 19 Jun 2018 06:38 )             33.9     06-18    137  |  98  |  40<H>  ----------------------------<  226<H>  4.9   |  20  |  4.4<HH>    Ca    7.9<L>      18 Jun 2018 06:28          MEDICATIONS  (STANDING):  aspirin  chewable 81 milliGRAM(s) Oral daily  dronabinol 2.5 milliGRAM(s) Oral before breakfast  escitalopram 10 milliGRAM(s) Oral daily  heparin  Injectable 5000 Unit(s) SubCutaneous every 8 hours  insulin glargine Injectable (LANTUS) 5 Unit(s) SubCutaneous at bedtime  insulin lispro Injectable (HumaLOG) 2 Unit(s) SubCutaneous three times a day before meals  levothyroxine 125 MICROGram(s) Oral daily  metoprolol succinate ER 25 milliGRAM(s) Oral daily  mirtazapine 15 milliGRAM(s) Oral at bedtime  senna 1 Tablet(s) Oral at bedtime  sodium chloride 0.9%. 1000 milliLiter(s) (50 mL/Hr) IV Continuous <Continuous>    MEDICATIONS  (PRN):  acetaminophen   Tablet 650 milliGRAM(s) Oral every 6 hours PRN pain  guaiFENesin    Syrup 100 milliGRAM(s) Oral every 6 hours PRN Cough  melatonin 3 milliGRAM(s) Oral at bedtime PRN Insomnia  morphine  - Injectable 1 milliGRAM(s) IV Push every 10 minutes PRN Moderate Pain (4 - 6)  ondansetron Injectable 4 milliGRAM(s) IV Push once PRN Nausea and/or Vomiting        Consultant(s) Notes Reviewed:  [x ] YES  [ ] NO  Care Discussed with Consultants/Other Providers [ x] YES  [ ] NO        RADIOLOGY & ADDITIONAL TESTS:      Imaging Personally Reviewed:  [ ] YES  [ ] NO    HEALTH ISSUES - PROBLEM Dx:  Anemia due to chronic kidney disease: Anemia due to chronic kidney disease  Acidosis, metabolic: Acidosis, metabolic  ESRD (end stage renal disease): ESRD (end stage renal disease)  Hypothyroidism: Hypothyroidism  Diabetes: Diabetes  COPD (chronic obstructive pulmonary disease): COPD (chronic obstructive pulmonary disease)  Elevated troponin: Elevated troponin  Renal dysfunction: Renal dysfunction          Case discussed with housestaff

## 2018-06-19 NOTE — PROGRESS NOTE ADULT - PROBLEM SELECTOR PROBLEM 2
Elevated troponin
Acidosis, metabolic
Elevated troponin

## 2018-06-19 NOTE — PROGRESS NOTE ADULT - SUBJECTIVE AND OBJECTIVE BOX
Nephrology progress note    Patient is seen and examined, events over the last 24 h noted .  sp Tesio catheter   for HD today     Allergies:  Plavix (Other (Moderate))    Hospital Medications:     MEDICATIONS  (STANDING):    aspirin  chewable 81 milliGRAM(s) Oral daily  dronabinol 2.5 milliGRAM(s) Oral before breakfast  escitalopram 10 milliGRAM(s) Oral daily  heparin  Injectable 5000 Unit(s) SubCutaneous every 8 hours  insulin glargine Injectable (LANTUS) 5 Unit(s) SubCutaneous at bedtime  insulin lispro Injectable (HumaLOG) 2 Unit(s) SubCutaneous three times a day before meals  levothyroxine 125 MICROGram(s) Oral daily  metoprolol succinate ER 25 milliGRAM(s) Oral daily  mirtazapine 15 milliGRAM(s) Oral at bedtime  senna 1 Tablet(s) Oral at bedtime  sodium chloride 0.9%. 1000 milliLiter(s) (50 mL/Hr) IV Continuous <Continuous>        VITALS:  T(F): 96 (18 @ 06:14), Max: 97.8 (18 @ 18:01)  HR: 81 (18 @ 06:14)  BP: 131/70 (18 @ 06:14)  RR: 16 (18 @ 06:14)  SpO2: 99% (18 @ 18:01)       @ 07:01  -   @ 07:00  --------------------------------------------------------  IN: 300 mL / OUT: 100 mL / NET: 200 mL     @ 07:01  -   @ 07:00  --------------------------------------------------------  IN: 800 mL / OUT: 150 mL / NET: 650 mL      Height (cm): 160.02 ( 15:24)  Weight (kg): 51.3 (06-18 @ 15:24)  BMI (kg/m2): 20 ( @ 15:24)  BSA (m2): 1.52 ( @ 15:24)    PHYSICAL EXAM:  Constitutional: NAD  HEENT: anicteric sclera, oropharynx clear, MMM  Neck: No JVD  Respiratory: CTAB, no wheezes, rales or rhonchi, right chest wall Link   Cardiovascular: S1, S2, RRR  Gastrointestinal: BS+, soft, NT/ND  Extremities: No cyanosis or clubbing. No peripheral edema  :  No davis.   Skin: No rashes    LABS:      137  |  98  |  40<H>  ----------------------------<  226<H>  4.9   |  20  |  4.4<HH>    Ca    7.9<L>      2018 06:28                            10.7   5.67  )-----------( 147      ( 2018 06:38 )             33.9       Urine Studies:  Urinalysis Basic - ( 2018 15:40 )    Color: Yellow / Appearance: Clear / S.015 / pH:   Gluc:  / Ketone: Negative  / Bili: Negative / Urobili: 0.2 mg/dL   Blood:  / Protein: 100 mg/dL / Nitrite: Negative   Leuk Esterase: Moderate / RBC: 10-25 /HPF / WBC 10-25 /HPF   Sq Epi:  / Non Sq Epi: Few /HPF / Bacteria: Few      Potassium, Random Urine: 11 mmol/L ( @ 13:56)  Sodium, Random Urine: 63.0 mmoL/L ( @ 13:56)  Creatinine, Random Urine: 41 mg/dL ( @ 13:56)    RADIOLOGY & ADDITIONAL STUDIES:

## 2018-06-19 NOTE — PHYSICAL THERAPY INITIAL EVALUATION ADULT - IMPAIRMENTS CONTRIBUTING TO GAIT DEVIATIONS, PT EVAL
impaired balance/narrow base of support/impaired postural control/decreased endurance/decreased strength

## 2018-06-19 NOTE — CHART NOTE - NSCHARTNOTEFT_GEN_A_CORE
Vital Signs Last 24 Hrs  T(C): 35.6 (19 Jun 2018 06:14), Max: 36.6 (18 Jun 2018 18:01)  T(F): 96 (19 Jun 2018 06:14), Max: 97.8 (18 Jun 2018 18:01)  HR: 81 (19 Jun 2018 06:14) (74 - 81)  BP: 131/70 (19 Jun 2018 06:14) (120/70 - 152/93)  BP(mean): --  RR: 16 (19 Jun 2018 06:14) (10 - 18)  SpO2: 99% (18 Jun 2018 18:01) (98% - 100%)  No active bleeding appreciated at [facility].

## 2018-06-19 NOTE — PROGRESS NOTE ADULT - PROBLEM SELECTOR PLAN 4
monitor FS  insulin

## 2018-06-19 NOTE — PHYSICAL THERAPY INITIAL EVALUATION ADULT - GENERAL OBSERVATIONS, REHAB EVAL
14:00-14:25 Chart reviewed. Pt encountered semireclined in bed,  may be seen by Physical Therapist as confirmed with Nurse. Patient denied pain at rest and ready to get up now; +tesio

## 2018-06-19 NOTE — PROGRESS NOTE ADULT - PROBLEM SELECTOR PLAN 2
likely secondary to kidney failure  resolved
Dialysis as above
d/c tele  likely secondary to kidney failure  no Chest.pain
d/c tele  likely secondary to kidney failure  no Chest.pain
likely secondary to kidney failure  no Chest.pain
repeat CE stable  no r/o ACS  check serial cardiac enzymes  serial EKG  check echocardiogram  c/w ASA   cardiology consult  d/c tele  likely secondary to kidney failure
resolved

## 2018-06-19 NOTE — PHYSICAL THERAPY INITIAL EVALUATION ADULT - GAIT DEVIATIONS NOTED, PT EVAL
stooped posture, dec heel strike/pushoff/decreased weight-shifting ability/decreased geeta/decreased step length

## 2018-06-20 LAB
ANION GAP SERPL CALC-SCNC: 13 MMOL/L — SIGNIFICANT CHANGE UP (ref 7–14)
APPEARANCE UR: CLEAR — SIGNIFICANT CHANGE UP
BILIRUB UR-MCNC: NEGATIVE — SIGNIFICANT CHANGE UP
BUN SERPL-MCNC: 24 MG/DL — HIGH (ref 10–20)
CALCIUM SERPL-MCNC: 8 MG/DL — LOW (ref 8.5–10.1)
CHLORIDE SERPL-SCNC: 104 MMOL/L — SIGNIFICANT CHANGE UP (ref 98–110)
CO2 SERPL-SCNC: 28 MMOL/L — SIGNIFICANT CHANGE UP (ref 17–32)
COLOR SPEC: YELLOW — SIGNIFICANT CHANGE UP
CREAT SERPL-MCNC: 3.5 MG/DL — HIGH (ref 0.7–1.5)
DIFF PNL FLD: (no result)
EPI CELLS # UR: (no result) /HPF
GLUCOSE SERPL-MCNC: 100 MG/DL — HIGH (ref 70–99)
GLUCOSE UR QL: NEGATIVE MG/DL — SIGNIFICANT CHANGE UP
HCT VFR BLD CALC: 33.9 % — LOW (ref 37–47)
HGB BLD-MCNC: 10.5 G/DL — LOW (ref 12–16)
KETONES UR-MCNC: NEGATIVE — SIGNIFICANT CHANGE UP
LEUKOCYTE ESTERASE UR-ACNC: SIGNIFICANT CHANGE UP
MCHC RBC-ENTMCNC: 28.5 PG — SIGNIFICANT CHANGE UP (ref 27–31)
MCHC RBC-ENTMCNC: 31 G/DL — LOW (ref 32–37)
MCV RBC AUTO: 92.1 FL — SIGNIFICANT CHANGE UP (ref 81–99)
NITRITE UR-MCNC: NEGATIVE — SIGNIFICANT CHANGE UP
NRBC # BLD: 0 /100 WBCS — SIGNIFICANT CHANGE UP (ref 0–0)
PH UR: 7 — SIGNIFICANT CHANGE UP (ref 5–8)
PLATELET # BLD AUTO: 143 K/UL — SIGNIFICANT CHANGE UP (ref 130–400)
POTASSIUM SERPL-MCNC: 4.5 MMOL/L — SIGNIFICANT CHANGE UP (ref 3.5–5)
POTASSIUM SERPL-SCNC: 4.5 MMOL/L — SIGNIFICANT CHANGE UP (ref 3.5–5)
PROT UR-MCNC: 100 MG/DL
RBC # BLD: 3.68 M/UL — LOW (ref 4.2–5.4)
RBC # FLD: 21.2 % — HIGH (ref 11.5–14.5)
RBC CASTS # UR COMP ASSIST: (no result) /HPF
SODIUM SERPL-SCNC: 145 MMOL/L — SIGNIFICANT CHANGE UP (ref 135–146)
SP GR SPEC: 1.01 — SIGNIFICANT CHANGE UP (ref 1.01–1.03)
UROBILINOGEN FLD QL: 0.2 MG/DL — SIGNIFICANT CHANGE UP (ref 0.2–0.2)
WBC # BLD: 4.77 K/UL — LOW (ref 4.8–10.8)
WBC # FLD AUTO: 4.77 K/UL — LOW (ref 4.8–10.8)
WBC UR QL: >50 /HPF

## 2018-06-20 PROCEDURE — G0365: CPT | Mod: 26

## 2018-06-20 RX ORDER — MEGESTROL ACETATE 40 MG/ML
400 SUSPENSION ORAL DAILY
Qty: 0 | Refills: 0 | Status: DISCONTINUED | OUTPATIENT
Start: 2018-06-20 | End: 2018-06-26

## 2018-06-20 RX ADMIN — ESCITALOPRAM OXALATE 10 MILLIGRAM(S): 10 TABLET, FILM COATED ORAL at 13:48

## 2018-06-20 RX ADMIN — INSULIN GLARGINE 5 UNIT(S): 100 INJECTION, SOLUTION SUBCUTANEOUS at 21:34

## 2018-06-20 RX ADMIN — SENNA PLUS 1 TABLET(S): 8.6 TABLET ORAL at 21:34

## 2018-06-20 RX ADMIN — HEPARIN SODIUM 5000 UNIT(S): 5000 INJECTION INTRAVENOUS; SUBCUTANEOUS at 21:34

## 2018-06-20 RX ADMIN — Medication 1 APPLICATION(S): at 13:48

## 2018-06-20 RX ADMIN — MEGESTROL ACETATE 400 MILLIGRAM(S): 40 SUSPENSION ORAL at 17:10

## 2018-06-20 RX ADMIN — HEPARIN SODIUM 5000 UNIT(S): 5000 INJECTION INTRAVENOUS; SUBCUTANEOUS at 14:27

## 2018-06-20 RX ADMIN — Medication 2 UNIT(S): at 17:09

## 2018-06-20 RX ADMIN — Medication 2.5 MILLIGRAM(S): at 09:28

## 2018-06-20 RX ADMIN — Medication 81 MILLIGRAM(S): at 13:48

## 2018-06-20 RX ADMIN — Medication 25 MILLIGRAM(S): at 06:37

## 2018-06-20 RX ADMIN — CHLORHEXIDINE GLUCONATE 1 APPLICATION(S): 213 SOLUTION TOPICAL at 13:50

## 2018-06-20 RX ADMIN — MIRTAZAPINE 15 MILLIGRAM(S): 45 TABLET, ORALLY DISINTEGRATING ORAL at 21:34

## 2018-06-20 RX ADMIN — Medication 2 UNIT(S): at 13:51

## 2018-06-20 RX ADMIN — Medication 650 MILLIGRAM(S): at 09:28

## 2018-06-20 RX ADMIN — Medication 125 MICROGRAM(S): at 06:37

## 2018-06-20 RX ADMIN — HEPARIN SODIUM 5000 UNIT(S): 5000 INJECTION INTRAVENOUS; SUBCUTANEOUS at 06:37

## 2018-06-20 RX ADMIN — Medication 25 MILLIGRAM(S): at 04:03

## 2018-06-20 NOTE — DIETITIAN INITIAL EVALUATION ADULT. - ORAL INTAKE PTA
as per SNF transfer sheets pt was on a CHO consistent chopped YOVANA diet with thin liquids and nepro 240ml q 8hrs, poor po intake PTA as per family at bedside/poor

## 2018-06-20 NOTE — DIETITIAN INITIAL EVALUATION ADULT. - OTHER INFO
pt presents from SNF with worsening kidney fxn, poor po intake and weakness, s/p R femoral [employer] placement on 6/14 for HD and tesio cath placed on 6/18. LBMS for last 3 plascencia. pt was on marinol at SNF without any increase in appetite as per family, spoke with MD to start megace. PMHX: HTN, DM, CKDm RA, CHF, COPD, AICD

## 2018-06-20 NOTE — DIETITIAN INITIAL EVALUATION ADULT. - FACTORS AFF FOOD INTAKE
presently on  renal diet with nepro 240ml q 8hrs, pt consumed 100% of one nepro only today/persistent lack of appetite

## 2018-06-20 NOTE — PROGRESS NOTE ADULT - ASSESSMENT
84 yo F with h/o DM, HTN, HL, RA, hypothyroidism, CHF (EF 55% in 12/17)< s/p PPM, s/p AICD, hemorrhoids, p/w weakness and diarrhea. Pt reports profuse diarrhea x 3 days, having multiple episodes of watery, dark stool with associated increasing weakness. Pt has not been able to get out of bed and has not been eating so not taking her meds. Pt states sx started after eating out. Pt denies any fever/chills, ha, chest pain, sob, constipation, dysuria.  Pt is found to have DKA in ED. VBG pH 7.2, anion gap 24, small acetone, glucose >600. Pt is hemodynamically stable in ED.  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 Pt denies having dysuria or pain in bladder.   - s/p IV NS 1000cc bolus      83 y M PMH HTN, DM, CKD IV-V, recently hospitalized, sent from NH for worsening renal function. Patient may need dialysis, has been following with nephrology at NH. Poor PO intake, generalized weakness. 82 yo F with h/o DM, HTN, HL, RA, COPD, CKD IV-V, hypothyroidism, CHF (EF 55% in 12/17), s/p AICD, hemorrhoids, admitted form NH due to worsening renal function with complaints of generalized weakness and Anorexia. 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 On admission blood work showed a Cr of 6.5 (Baseline creat 4.5 (6/4/18)) with hyperkalemia (K~6.) and met acidosis. Patient was thus admitted for renal replacement therapy. She denied Anuria, vomiting or diarrhea.    Assessment and Plan:    Problem/Plan - 1:  ·  Problem: ESRD   Nephrology following: Started on HD, TTS.  Monitor electrolytes.  Out patient HD at [facility] or [facility] to be set up. Hepatitis screen negative.  s/p Tesio catheter placement 6/18/18. Vein mapping done.      Problem/Plan - 2:  ·  Problem: Elevated troponin.   Plan: Most likely due to ESRD.  Asymptomatic. ? ECHO      Problem/Plan - 3:  ·  Problem: COPD (chronic obstructive pulmonary disease).    Plan: stable, Duoneb PRN.       Problem/Plan - 4:  ·  Problem: Uncontrolled Diabetes.   Plan: monitor FS whilst on Insulin. Hemoglobin A1C, Whole Blood: 12.4 % (05.03.18 @ 05:50)       Problem/Plan - 5:  ·  Problem: Hypothyroidism.    Plan: continue synthroid. Follow up TSH.      Problem/Plan - 6:  Anorexia  Nutrition consulted. Patient on Mirtazapine. Per Family was on Marinol but this was not helping.  Started on Megace. Continue supplements.      Problem/Plan - 7:  Fatigue ? Related to ESRD and poor nutritional intake.  Rule out any Infection. Follow up Chest X-ray and Urinalysis, blood cultures.      DVT prophylaxis: Heparin  Disposition: NH when stable.

## 2018-06-20 NOTE — PROGRESS NOTE ADULT - SUBJECTIVE AND OBJECTIVE BOX
VI MOBLEY  83y  Female    Patient is a 83y old  Female who presents with a chief complaint of sent from NH for worsening kidney function (13 Jun 2018 16:26)      INTERVAL HPI/OVERNIGHT EVENTS:      REVIEW OF SYSTEMS:  CONSTITUTIONAL: No fever, weight loss, or fatigue  EYES: No eye pain, visual disturbances, or discharge  ENMT:  No difficulty hearing, tinnitus, vertigo; No sinus or throat pain  NECK: No pain or stiffness  BREASTS: No pain, masses, or nipple discharge  RESPIRATORY: No cough, wheezing, chills or hemoptysis; No shortness of breath  CARDIOVASCULAR: No chest pain, palpitations, dizziness, or leg swelling  GASTROINTESTINAL: No abdominal or epigastric pain. No nausea, vomiting, or hematemesis; No diarrhea or constipation. No melena or hematochezia.  GENITOURINARY: No dysuria, frequency, hematuria, or incontinence  NEUROLOGICAL: No headaches, memory loss, loss of strength, numbness, or tremors  SKIN: No itching, burning, rashes, or lesions   LYMPH NODES: No enlarged glands  ENDOCRINE: No heat or cold intolerance; No hair loss  MUSCULOSKELETAL: No joint pain or swelling; No muscle, back, or extremity pain  PSYCHIATRIC: No depression, anxiety, mood swings, or difficulty sleeping  HEME/LYMPH: No easy bruising, or bleeding gums  ALLERY AND IMMUNOLOGIC: No hives or eczema    VITALS:  T(F): 97.6 (06-20-18 @ 05:12), Max: 97.6 (06-20-18 @ 05:12)  HR: 80 (06-20-18 @ 05:12) (76 - 89)  BP: 110/64 (06-20-18 @ 05:12) (108/68 - 135/82)  RR: 16 (06-20-18 @ 05:12) (16 - 16)  SpO2: --    PHYSICAL EXAM:  GENERAL: NAD, well-groomed, well-developed  HEAD:  Atraumatic, Normocephalic  EYES: EOMI, PERRLA, conjunctiva and sclera clear  ENMT: No tonsillar erythema, exudates, or enlargement; Moist mucous membranes, Good dentition, No lesions  NECK: Supple, No JVD, Normal thyroid  NERVOUS SYSTEM:  Alert & Oriented X3, Good concentration; Motor Strength 5/5 B/L upper and lower extremities; DTRs 2+ intact and symmetric  CHEST/LUNG: Clear to percussion bilaterally; No rales, rhonchi, wheezing, or rubs  HEART: Regular rate and rhythm; No murmurs, rubs, or gallops  ABDOMEN: Soft, Nontender, Nondistended; Bowel sounds present  EXTREMITIES:  2+ Peripheral Pulses, No clubbing, cyanosis, or edema  LYMPH: No lymphadenopathy noted  SKIN: No rashes or lesions    Consultant(s) Notes Reviewed:  [x ] YES  [ ] NO  Care Discussed with Consultants/Other Providers [ x] YES  [ ] NO    LABS:  MICROBIOLOGY:      RADIOLOGY & ADDITIONAL TESTS:    Imaging Personally Reviewed:  [ ] YES  [ ] NO  MEDICATION:  acetaminophen   Tablet 650 milliGRAM(s) Oral every 6 hours PRN  aspirin  chewable 81 milliGRAM(s) Oral daily  chlorhexidine 2% Cloths 1 Application(s) Topical daily  diphenhydrAMINE   Capsule 25 milliGRAM(s) Oral every 6 hours PRN  escitalopram 10 milliGRAM(s) Oral daily  guaiFENesin    Syrup 100 milliGRAM(s) Oral every 6 hours PRN  heparin  Injectable 5000 Unit(s) SubCutaneous every 8 hours  hydrocortisone 1% Cream 1 Application(s) Topical daily  insulin glargine Injectable (LANTUS) 5 Unit(s) SubCutaneous at bedtime  insulin lispro Injectable (HumaLOG) 2 Unit(s) SubCutaneous three times a day before meals  levothyroxine 125 MICROGram(s) Oral daily  melatonin 3 milliGRAM(s) Oral at bedtime PRN  metoprolol succinate ER 25 milliGRAM(s) Oral daily  mirtazapine 15 milliGRAM(s) Oral at bedtime  senna 1 Tablet(s) Oral at bedtime      HEALTH ISSUES:  HEALTH ISSUES - PROBLEM Dx:  Anemia due to chronic kidney disease: Anemia due to chronic kidney disease  Acidosis, metabolic: Acidosis, metabolic  ESRD (end stage renal disease): ESRD (end stage renal disease)  Hypothyroidism: Hypothyroidism  Diabetes: Diabetes  COPD (chronic obstructive pulmonary disease): COPD (chronic obstructive pulmonary disease)  Elevated troponin: Elevated troponin  Renal dysfunction: Renal dysfunction VI MOBLEY  83y  Female    Patient is a 83y old  Female who presents with a chief complaint of sent from NH for worsening kidney function (13 Jun 2018 16:26)      INTERVAL HPI/OVERNIGHT EVENTS:  Patient given benadryl overnight due to pruritus. Dialyzed yesterday. Patient seen with Family at bedside: concerned about her lethargy and Poor PO intake.    REVIEW OF SYSTEMS:  CONSTITUTIONAL: No fever, weight loss, + fatigue, Anorexia  EYES: No eye pain, visual disturbances, or discharge  ENMT:  No difficulty hearing, tinnitus, vertigo; No sinus or throat pain  RESPIRATORY: No cough, wheezing, chills or hemoptysis; No shortness of breath  CARDIOVASCULAR: No chest pain, palpitations, dizziness, or leg swelling  GASTROINTESTINAL: No abdominal or epigastric pain. No nausea, vomiting, or hematemesis; No diarrhea or constipation. No melena or hematochezia.  GENITOURINARY: No dysuria, frequency, hematuria, or incontinence  NEUROLOGICAL: No headaches, memory loss, loss of strength, numbness, or tremors  SKIN: No itching, burning, rashes, or lesions   LYMPH NODES: No enlarged glands  ENDOCRINE: No heat or cold intolerance; No hair loss  MUSCULOSKELETAL: No joint pain or swelling; No muscle, back, or extremity pain  PSYCHIATRIC: No depression, anxiety, mood swings, or difficulty sleeping  HEME/LYMPH: No easy bruising, or bleeding gums  ALLERGY AND IMMUNOLOGIC: No hives or eczema      VITALS:  T(F): 97.6 (06-20-18 @ 05:12), Max: 97.6 (06-20-18 @ 05:12)  HR: 80 (06-20-18 @ 05:12) (76 - 89)  BP: 110/64 (06-20-18 @ 05:12) (108/68 - 135/82)  RR: 16 (06-20-18 @ 05:12) (16 - 16)  SpO2: --      CAPILLARY BLOOD GLUCOSE  236 (20 Jun 2018 16:37)  229 (20 Jun 2018 11:28)  95 (20 Jun 2018 07:28)  113 (19 Jun 2018 22:10)        PHYSICAL EXAM:  GENERAL: NAD  HEAD:  Atraumatic, Normocephalic  EYES: EOMI, PERRLA, conjunctiva and sclera clear  ENMT: Moist mucous membranes  NECK: Supple, Normal thyroid  NERVOUS SYSTEM:  Alert & Oriented X 2  CHEST/LUNG: Decreased AE at the bases bilaterally; + rales, No rhonchi, wheezing, or rubs  HEART: Regular rate and rhythm; No murmurs, rubs, or gallops  ABDOMEN: Soft, Nontender, Nondistended; Bowel sounds present  EXTREMITIES:  No clubbing, cyanosis, or edema  LYMPH: No lymphadenopathy noted  SKIN: Please see nursing assessment.  Right  chest Tesio.    Consultant(s) Notes Reviewed:  [x ] YES  [ ] NO  Care Discussed with Consultants/Other Providers [ x] YES  [ ] NO    LABS:                        10.5   4.77  )-----------( 143      ( 20 Jun 2018 06:49 )             33.9     06-20    145  |  104  |  24<H>  ----------------------------<  100<H>  4.5   |  28  |  3.5<H>    Ca    8.0<L>      20 Jun 2018 06:49      MICROBIOLOGY: Culture - Urine (06.13.18 @ 15:40)    Specimen Source: .Urine Clean Catch (Midstream)    Culture Results:   Culture grew 3 or more types of organisms which indicate  collection contamination; consider recollection only if      RADIOLOGY & ADDITIONAL TESTS:  CT Abdomen and Pelvis No Cont (04.30.18 @ 20:21)   Findings compatible with emphysematous cystitis.        Imaging Personally Reviewed:  [x] YES  [ ] NO    MEDICATIONS  (STANDING):  aspirin  chewable 81 milliGRAM(s) Oral daily  chlorhexidine 2% Cloths 1 Application(s) Topical daily  escitalopram 10 milliGRAM(s) Oral daily  heparin  Injectable 5000 Unit(s) SubCutaneous every 8 hours  hydrocortisone 1% Cream 1 Application(s) Topical daily  insulin glargine Injectable (LANTUS) 5 Unit(s) SubCutaneous at bedtime  insulin lispro Injectable (HumaLOG) 2 Unit(s) SubCutaneous three times a day before meals  levothyroxine 125 MICROGram(s) Oral daily  megestrol Suspension 400 milliGRAM(s) Oral daily  metoprolol succinate ER 25 milliGRAM(s) Oral daily  mirtazapine 15 milliGRAM(s) Oral at bedtime  senna 1 Tablet(s) Oral at bedtime    MEDICATIONS  (PRN):  acetaminophen   Tablet 650 milliGRAM(s) Oral every 6 hours PRN pain  guaiFENesin    Syrup 100 milliGRAM(s) Oral every 6 hours PRN Cough  melatonin 3 milliGRAM(s) Oral at bedtime PRN Insomnia    HEALTH ISSUES - PROBLEM Dx:  Anemia due to chronic kidney disease  Acidosis, metabolic  ESRD (end stage renal disease)  Hypothyroidism  Diabetes  COPD (chronic obstructive pulmonary disease)  Elevated troponin  Renal dysfunction

## 2018-06-21 LAB
ALBUMIN SERPL ELPH-MCNC: 3.4 G/DL — LOW (ref 3.5–5.2)
ALP SERPL-CCNC: 92 U/L — SIGNIFICANT CHANGE UP (ref 30–115)
ALT FLD-CCNC: 6 U/L — SIGNIFICANT CHANGE UP (ref 0–41)
ANION GAP SERPL CALC-SCNC: 15 MMOL/L — HIGH (ref 7–14)
AST SERPL-CCNC: 12 U/L — SIGNIFICANT CHANGE UP (ref 0–41)
BASOPHILS # BLD AUTO: 0.02 K/UL — SIGNIFICANT CHANGE UP (ref 0–0.2)
BASOPHILS NFR BLD AUTO: 0.4 % — SIGNIFICANT CHANGE UP (ref 0–1)
BILIRUB SERPL-MCNC: 0.4 MG/DL — SIGNIFICANT CHANGE UP (ref 0.2–1.2)
BUN SERPL-MCNC: 29 MG/DL — HIGH (ref 10–20)
CALCIUM SERPL-MCNC: 8 MG/DL — LOW (ref 8.5–10.1)
CHLORIDE SERPL-SCNC: 102 MMOL/L — SIGNIFICANT CHANGE UP (ref 98–110)
CO2 SERPL-SCNC: 26 MMOL/L — SIGNIFICANT CHANGE UP (ref 17–32)
CREAT SERPL-MCNC: 4 MG/DL — HIGH (ref 0.7–1.5)
EOSINOPHIL # BLD AUTO: 0.2 K/UL — SIGNIFICANT CHANGE UP (ref 0–0.7)
EOSINOPHIL NFR BLD AUTO: 3.9 % — SIGNIFICANT CHANGE UP (ref 0–8)
GLUCOSE SERPL-MCNC: 73 MG/DL — SIGNIFICANT CHANGE UP (ref 70–99)
HCT VFR BLD CALC: 32.4 % — LOW (ref 37–47)
HGB BLD-MCNC: 10 G/DL — LOW (ref 12–16)
IMM GRANULOCYTES NFR BLD AUTO: 0.4 % — HIGH (ref 0.1–0.3)
LYMPHOCYTES # BLD AUTO: 0.85 K/UL — LOW (ref 1.2–3.4)
LYMPHOCYTES # BLD AUTO: 16.7 % — LOW (ref 20.5–51.1)
MCHC RBC-ENTMCNC: 28.7 PG — SIGNIFICANT CHANGE UP (ref 27–31)
MCHC RBC-ENTMCNC: 30.9 G/DL — LOW (ref 32–37)
MCV RBC AUTO: 93.1 FL — SIGNIFICANT CHANGE UP (ref 81–99)
MONOCYTES # BLD AUTO: 0.46 K/UL — SIGNIFICANT CHANGE UP (ref 0.1–0.6)
MONOCYTES NFR BLD AUTO: 9 % — SIGNIFICANT CHANGE UP (ref 1.7–9.3)
NEUTROPHILS # BLD AUTO: 3.55 K/UL — SIGNIFICANT CHANGE UP (ref 1.4–6.5)
NEUTROPHILS NFR BLD AUTO: 69.6 % — SIGNIFICANT CHANGE UP (ref 42.2–75.2)
NRBC # BLD: 0 /100 WBCS — SIGNIFICANT CHANGE UP (ref 0–0)
PHOSPHATE SERPL-MCNC: 4.5 MG/DL — SIGNIFICANT CHANGE UP (ref 2.1–4.9)
PLATELET # BLD AUTO: 149 K/UL — SIGNIFICANT CHANGE UP (ref 130–400)
POTASSIUM SERPL-MCNC: 4.5 MMOL/L — SIGNIFICANT CHANGE UP (ref 3.5–5)
POTASSIUM SERPL-SCNC: 4.5 MMOL/L — SIGNIFICANT CHANGE UP (ref 3.5–5)
PROT SERPL-MCNC: 5.6 G/DL — LOW (ref 6–8)
RBC # BLD: 3.48 M/UL — LOW (ref 4.2–5.4)
RBC # FLD: 20.7 % — HIGH (ref 11.5–14.5)
SODIUM SERPL-SCNC: 143 MMOL/L — SIGNIFICANT CHANGE UP (ref 135–146)
WBC # BLD: 5.1 K/UL — SIGNIFICANT CHANGE UP (ref 4.8–10.8)
WBC # FLD AUTO: 5.1 K/UL — SIGNIFICANT CHANGE UP (ref 4.8–10.8)

## 2018-06-21 RX ORDER — CEFTRIAXONE 500 MG/1
1 INJECTION, POWDER, FOR SOLUTION INTRAMUSCULAR; INTRAVENOUS ONCE
Qty: 0 | Refills: 0 | Status: COMPLETED | OUTPATIENT
Start: 2018-06-21 | End: 2018-06-21

## 2018-06-21 RX ORDER — CEFTRIAXONE 500 MG/1
1 INJECTION, POWDER, FOR SOLUTION INTRAMUSCULAR; INTRAVENOUS EVERY 24 HOURS
Qty: 0 | Refills: 0 | Status: DISCONTINUED | OUTPATIENT
Start: 2018-06-22 | End: 2018-06-25

## 2018-06-21 RX ORDER — CEFTRIAXONE 500 MG/1
INJECTION, POWDER, FOR SOLUTION INTRAMUSCULAR; INTRAVENOUS
Qty: 0 | Refills: 0 | Status: DISCONTINUED | OUTPATIENT
Start: 2018-06-21 | End: 2018-06-25

## 2018-06-21 RX ADMIN — SENNA PLUS 1 TABLET(S): 8.6 TABLET ORAL at 22:30

## 2018-06-21 RX ADMIN — CEFTRIAXONE 100 GRAM(S): 500 INJECTION, POWDER, FOR SOLUTION INTRAMUSCULAR; INTRAVENOUS at 11:47

## 2018-06-21 RX ADMIN — Medication 125 MICROGRAM(S): at 06:05

## 2018-06-21 RX ADMIN — Medication 81 MILLIGRAM(S): at 11:44

## 2018-06-21 RX ADMIN — Medication 1 APPLICATION(S): at 11:47

## 2018-06-21 RX ADMIN — HEPARIN SODIUM 5000 UNIT(S): 5000 INJECTION INTRAVENOUS; SUBCUTANEOUS at 13:27

## 2018-06-21 RX ADMIN — MEGESTROL ACETATE 400 MILLIGRAM(S): 40 SUSPENSION ORAL at 11:46

## 2018-06-21 RX ADMIN — Medication 3 MILLIGRAM(S): at 01:03

## 2018-06-21 RX ADMIN — MIRTAZAPINE 15 MILLIGRAM(S): 45 TABLET, ORALLY DISINTEGRATING ORAL at 22:30

## 2018-06-21 RX ADMIN — ESCITALOPRAM OXALATE 10 MILLIGRAM(S): 10 TABLET, FILM COATED ORAL at 11:44

## 2018-06-21 RX ADMIN — HEPARIN SODIUM 5000 UNIT(S): 5000 INJECTION INTRAVENOUS; SUBCUTANEOUS at 22:30

## 2018-06-21 RX ADMIN — HEPARIN SODIUM 5000 UNIT(S): 5000 INJECTION INTRAVENOUS; SUBCUTANEOUS at 06:05

## 2018-06-21 NOTE — PROGRESS NOTE ADULT - ASSESSMENT
84 yo F with h/o DM, HTN, HL, RA, COPD, CKD IV-V, hypothyroidism, CHF (EF 55% in 12/17), s/p AICD, hemorrhoids, admitted form NH due to worsening renal function with complaints of generalized weakness and Anorexia. 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 On admission blood work showed a Cr of 6.5 (Baseline creat 4.5 (6/4/18)) with hyperkalemia (K~6.) and met acidosis. Patient was thus admitted for renal replacement therapy. She denied Anuria, vomiting or diarrhea.    Assessment and Plan:    Problem/Plan - 1:  ·  Problem: ESRD   Nephrology following: Started on HD, TTS.  Monitor electrolytes.  Out patient HD at [facility] or [facility] to be set up. Hepatitis screen negative.  s/p Tesio catheter placement 6/18/18. Vein mapping done.      Problem/Plan - 2:  ·  Problem: Elevated troponin.   Plan: Most likely due to ESRD.  Asymptomatic. ? ECHO      Problem/Plan - 3:  ·  Problem: COPD (chronic obstructive pulmonary disease).    Plan: stable, Duoneb PRN.       Problem/Plan - 4:  ·  Problem: Uncontrolled Diabetes.   Plan: monitor FS off Insulin. Blood sugars low this am.  Hemoglobin A1C, Whole Blood: 12.4 % (05.03.18 @ 05:50)       Problem/Plan - 5:  ·  Problem: Hypothyroidism.    Plan: continue synthroid. Follow up TSH.      Problem/Plan - 6:  Anorexia  Nutrition consulted. Patient on Mirtazapine. Per Family was on Marinol but this was not helping.  Continue Megace. Continue supplements.      Problem/Plan - 7:  Fatigue ? Related to ESRD and poor nutritional intake.  Rule out any Infection: Chest X-ray negative.  Urinalysis: UTI started on Rocephin. Blood cultures pending.      Problem/Plan - 8:  UTI: Started on Rocephin  Follow up urine cultures.      DVT prophylaxis: Heparin  Disposition: NH when stable and Dialysis spot secured.

## 2018-06-21 NOTE — PROGRESS NOTE ADULT - SUBJECTIVE AND OBJECTIVE BOX
DIAGNOSIS:   HOSPITAL DAY #:    STATUS POST:    POST OPERATIVE DAY #:     Vital Signs Last 24 Hrs  T(C): 36.3 (21 Jun 2018 22:15), Max: 36.6 (21 Jun 2018 12:38)  T(F): 97.4 (21 Jun 2018 22:15), Max: 97.8 (21 Jun 2018 12:38)  HR: 89 (21 Jun 2018 22:15) (62 - 89)  BP: 114/64 (21 Jun 2018 22:15) (101/59 - 136/81)  BP(mean): --  RR: 16 (21 Jun 2018 22:15) (16 - 16)  SpO2: --    SUBJECTIVE: Pt seen    Pain: YES  [ ]   NO [ ]   Nausea: [ ] YES [ ] NO           Vomiting: [ ] YES [ ] NO  Flatus: [ ] YES [ ] NO             Bowel Movement: [ ] YES [ ] NO     Void: [ ]YES [ ]No      MARSHA DRAINAGE: SIGNIFICANT [ ]   NOT SIGNIFICANT [ ]   NOT APPLICABLE [ ]  YES [ ] NO    General Appearance: Appears well, NAD  Neck: Supple  Chest: Equal expansion bilaterally, equal breath sounds  CV: Pulse regular presently  Abdomen: Soft [x ] YES [ ]NO  DISTENDED [ ] YES [x ] NO TENDERNESS [ ]YES [ ]NO  INCISIONS: HEALING WELL [ ] YES  [ ] NO ERYTHEMA [ ] YES [ ] NO DRAINAGE [ ] YES  [ ] NO  Extremities: Grossly symmetric, CALF TENDERNESS [ ] YES  [ ] NO  tessio site ok    LABS:                        10.0   5.10  )-----------( 149      ( 21 Jun 2018 06:38 )             32.4     06-21    143  |  102  |  29<H>  ----------------------------<  73  4.5   |  26  |  4.0<H>    Ca    8.0<L>      21 Jun 2018 06:38  Phos  4.5     06-21    TPro  5.6<L>  /  Alb  3.4<L>  /  TBili  0.4  /  DBili  x   /  AST  12  /  ALT  6   /  AlkPhos  92  06-21            ASSESSMENT:     GOOD POST OP COURSE [ ]  YES  [ ] NO  CONDITION IMPROVING  x]  YES  [ ]  NO          PLAN:    CONTINUE PRESENT MANAGEMENT  [x ] YES  [ ] NO

## 2018-06-21 NOTE — PROGRESS NOTE ADULT - ASSESSMENT
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    ·	CKD 5 progressive on H  ·	for HD today 3h opti 160 tesio 2k UF 1l as tolerated     ·	DM - on Insulin, frequent FS to avoid hypoglycemia    ·	Pt needs set up for outpt HD at [facility] or [place] with HD  ·	Spoke to CM to set up pt for [facility] for HD - pt is cleared for d/c from renal standpoint if she has a HD spot    Will follow

## 2018-06-21 NOTE — PROGRESS NOTE ADULT - SUBJECTIVE AND OBJECTIVE BOX
Nephrology progress note    Patient is seen and examined, events over the last 24 h noted .  Pt seen on HD, feels weak, no SOB    Allergies:  Plavix (Other (Moderate))    Hospital Medications:   MEDICATIONS  (STANDING):  aspirin  chewable 81 milliGRAM(s) Oral daily  cefTRIAXone   IVPB      cefTRIAXone   IVPB 1 Gram(s) IV Intermittent once  chlorhexidine 2% Cloths 1 Application(s) Topical daily  escitalopram 10 milliGRAM(s) Oral daily  heparin  Injectable 5000 Unit(s) SubCutaneous every 8 hours  hydrocortisone 1% Cream 1 Application(s) Topical daily  levothyroxine 125 MICROGram(s) Oral daily  megestrol Suspension 400 milliGRAM(s) Oral daily  metoprolol succinate ER 25 milliGRAM(s) Oral daily  mirtazapine 15 milliGRAM(s) Oral at bedtime  senna 1 Tablet(s) Oral at bedtime        VITALS:  T(F): 96.1 (18 @ 05:52), Max: 97.5 (18 @ 22:04)  HR: 81 (18 @ 08:30)  BP: 136/81 (18 @ 08:30)  RR: 16 (18 @ 08:30)  SpO2: --  Wt(kg): --     @ 07:01  -   @ 07:00  --------------------------------------------------------  IN: 0 mL / OUT: 1000 mL / NET: -1000 mL     @ 07:01  -   @ 07:00  --------------------------------------------------------  IN: 340 mL / OUT: 300 mL / NET: 40 mL          PHYSICAL EXAM:  Constitutional: NAD  HEENT: anicteric sclera, oropharynx clear, MMM  Neck: No JVD  Respiratory: CTAB, no wheezes, rales or rhonchi  Cardiovascular: S1, S2, RRR  Gastrointestinal: BS+, soft, NT/ND  Extremities: No cyanosis or clubbing. No peripheral edema  Neurological: A/O x 3, no focal deficits  : No CVA tenderness. No davis.   Skin: No rashes  Vascular Access: Rt TEsio cath    LABS:      143  |  102  |  29<H>  ----------------------------<  73  4.5   |  26  |  4.0<H>    Ca    8.0<L>      2018 06:38  Phos  4.5         TPro  5.6<L>  /  Alb  3.4<L>  /  TBili  0.4  /  DBili      /  AST  12  /  ALT  6   /  AlkPhos  92                            10.0   5.10  )-----------( 149      ( 2018 06:38 )             32.4       Urine Studies:  Urinalysis Basic - ( 2018 17:54 )    Color: Yellow / Appearance: Clear / S.015 / pH:   Gluc:  / Ketone: Negative  / Bili: Negative / Urobili: 0.2 mg/dL   Blood:  / Protein: 100 mg/dL / Nitrite: Negative   Leuk Esterase: Large / RBC: 5-10 /HPF / WBC >50 /HPF   Sq Epi:  / Non Sq Epi: Occasional /HPF / Bacteria:       Potassium, Random Urine: 11 mmol/L ( @ 13:56)  Sodium, Random Urine: 63.0 mmoL/L ( @ 13:56)  Creatinine, Random Urine: 41 mg/dL ( @ 13:56)    RADIOLOGY & ADDITIONAL STUDIES:

## 2018-06-21 NOTE — PROGRESS NOTE ADULT - SUBJECTIVE AND OBJECTIVE BOX
DLPAULAVI  83y  Female    Patient is a 83y old  Female who presents with a chief complaint of sent from NH for worsening kidney function (13 Jun 2018 16:26)      INTERVAL HPI/OVERNIGHT EVENTS:  No overnight events. Dialyzed to be done today. Patient feels better today. She has no complaints.       REVIEW OF SYSTEMS:  CONSTITUTIONAL: No fever, weight loss, + fatigue, + Anorexia  EYES: No eye pain, visual disturbances, or discharge  ENMT:  No difficulty hearing, tinnitus, vertigo; No sinus or throat pain  RESPIRATORY: No cough, wheezing, chills or hemoptysis; No shortness of breath  CARDIOVASCULAR: No chest pain, palpitations, dizziness, or leg swelling  GASTROINTESTINAL: No abdominal or epigastric pain. No nausea, vomiting, or hematemesis; No diarrhea or constipation. No melena or hematochezia.  GENITOURINARY: No dysuria, frequency, hematuria, or incontinence  NEUROLOGICAL: No headaches, memory loss, loss of strength, numbness, or tremors  SKIN: No itching, burning, rashes, or lesions   LYMPH NODES: No enlarged glands  ENDOCRINE: No heat or cold intolerance; No hair loss  MUSCULOSKELETAL: No joint pain or swelling; No muscle, back, or extremity pain  PSYCHIATRIC: No depression, anxiety, mood swings, or difficulty sleeping  HEME/LYMPH: No easy bruising, or bleeding gums  ALLERGY AND IMMUNOLOGIC: No hives or eczema      VITALS:  T(C): 35.6 (21 Jun 2018 05:52), Max: 36.4 (20 Jun 2018 22:04)  T(F): 96.1 (21 Jun 2018 05:52), Max: 97.5 (20 Jun 2018 22:04)  HR: 81 (21 Jun 2018 08:30) (67 - 81)  BP: 136/81 (21 Jun 2018 08:30) (101/59 - 136/81)  BP(mean): --  RR: 16 (21 Jun 2018 08:30) (16 - 16)  SpO2: --      CAPILLARY BLOOD GLUCOSE  89 (21 Jun 2018 07:26)  95 (20 Jun 2018 20:57)  236 (20 Jun 2018 16:37)  229 (20 Jun 2018 11:28)      PHYSICAL EXAM:  GENERAL: NAD  HEAD:  Atraumatic, Normocephalic  EYES: EOMI, PERRLA, conjunctiva and sclera clear  ENMT: Moist mucous membranes  NECK: Supple, Normal thyroid  NERVOUS SYSTEM:  Alert & Oriented X 3  CHEST/LUNG: Decreased AE at the bases bilaterally; + rales, No rhonchi, wheezing, or rubs  HEART: Regular rate and rhythm; No murmurs, rubs, or gallops  ABDOMEN: Soft, Nontender, Nondistended; Bowel sounds present  EXTREMITIES:  No clubbing, cyanosis, or edema  LYMPH: No lymphadenopathy noted  SKIN: Please see nursing assessment.  Right  chest Tesio.    Consultant(s) Notes Reviewed:  [x ] YES  [ ] NO  Care Discussed with Consultants/Other Providers [ x] YES  [ ] NO    LABS:                          10.0   5.10  )-----------( 149      ( 21 Jun 2018 06:38 )             32.4            06-21    143  |  102  |  29<H>  ----------------------------<  73  4.5   |  26  |  4.0<H>    Ca    8.0<L>      21 Jun 2018 06:38  Phos  4.5     06-21    TPro  5.6<L>  /  Alb  3.4<L>  /  TBili  0.4  /  DBili  x   /  AST  12  /  ALT  6   /  AlkPhos  92  06-21      MICROBIOLOGY: Culture - Urine (06.13.18 @ 15:40)    Specimen Source: .Urine Clean Catch (Midstream)    Culture Results:   Culture grew 3 or more types of organisms which indicate  collection contamination; consider recollection only if        RADIOLOGY & ADDITIONAL TESTS:  CT Abdomen and Pelvis No Cont (04.30.18 @ 20:21)   Findings compatible with emphysematous cystitis.      Xray Chest 1 View- PORTABLE-Urgent (06.20.18 @ 11:08)   Support devices: Stable.    Cardiac/mediastinum/hilum: Stable cardiomegaly.    Lung parenchyma/Pleura: Resolving pulmonary congestion and left lower   lobe atelectasis.     Skeleton/soft tissues: Unchanged      Imaging Personally Reviewed:  [x] YES  [ ] NO    MEDICATIONS  (STANDING):  aspirin  chewable 81 milliGRAM(s) Oral daily   cefTRIAXone   IVPB 1 Gram(s) IV Intermittent once  chlorhexidine 2% Cloths 1 Application(s) Topical daily  escitalopram 10 milliGRAM(s) Oral daily  heparin  Injectable 5000 Unit(s) SubCutaneous every 8 hours  hydrocortisone 1% Cream 1 Application(s) Topical daily  levothyroxine 125 MICROGram(s) Oral daily  megestrol Suspension 400 milliGRAM(s) Oral daily  metoprolol succinate ER 25 milliGRAM(s) Oral daily  mirtazapine 15 milliGRAM(s) Oral at bedtime  senna 1 Tablet(s) Oral at bedtime    MEDICATIONS  (PRN):  acetaminophen   Tablet 650 milliGRAM(s) Oral every 6 hours PRN pain  guaiFENesin    Syrup 100 milliGRAM(s) Oral every 6 hours PRN Cough  melatonin 3 milliGRAM(s) Oral at bedtime PRN Insomnia      HEALTH ISSUES - PROBLEM Dx:  Anemia due to chronic kidney disease  Acidosis, metabolic  ESRD (end stage renal disease)  Hypothyroidism  Diabetes  COPD (chronic obstructive pulmonary disease)  Elevated troponin  Renal dysfunction

## 2018-06-22 LAB
CULTURE RESULTS: NO GROWTH — SIGNIFICANT CHANGE UP
FOLATE SERPL-MCNC: 11.8 NG/ML — SIGNIFICANT CHANGE UP
SPECIMEN SOURCE: SIGNIFICANT CHANGE UP
TSH SERPL-MCNC: 2.83 UIU/ML — SIGNIFICANT CHANGE UP (ref 0.27–4.2)
VIT B12 SERPL-MCNC: 1111 PG/ML — SIGNIFICANT CHANGE UP (ref 232–1245)

## 2018-06-22 RX ORDER — DEXTROSE 50 % IN WATER 50 %
25 SYRINGE (ML) INTRAVENOUS ONCE
Qty: 0 | Refills: 0 | Status: DISCONTINUED | OUTPATIENT
Start: 2018-06-22 | End: 2018-06-26

## 2018-06-22 RX ORDER — INSULIN LISPRO 100/ML
5 VIAL (ML) SUBCUTANEOUS ONCE
Qty: 0 | Refills: 0 | Status: COMPLETED | OUTPATIENT
Start: 2018-06-22 | End: 2018-06-22

## 2018-06-22 RX ORDER — INSULIN LISPRO 100/ML
3 VIAL (ML) SUBCUTANEOUS
Qty: 0 | Refills: 0 | Status: DISCONTINUED | OUTPATIENT
Start: 2018-06-22 | End: 2018-06-23

## 2018-06-22 RX ORDER — ACETAMINOPHEN 500 MG
975 TABLET ORAL EVERY 6 HOURS
Qty: 0 | Refills: 0 | Status: DISCONTINUED | OUTPATIENT
Start: 2018-06-22 | End: 2018-06-26

## 2018-06-22 RX ORDER — GLUCAGON INJECTION, SOLUTION 0.5 MG/.1ML
1 INJECTION, SOLUTION SUBCUTANEOUS ONCE
Qty: 0 | Refills: 0 | Status: DISCONTINUED | OUTPATIENT
Start: 2018-06-22 | End: 2018-06-26

## 2018-06-22 RX ORDER — DEXTROSE 50 % IN WATER 50 %
12.5 SYRINGE (ML) INTRAVENOUS ONCE
Qty: 0 | Refills: 0 | Status: DISCONTINUED | OUTPATIENT
Start: 2018-06-22 | End: 2018-06-26

## 2018-06-22 RX ORDER — INSULIN GLARGINE 100 [IU]/ML
5 INJECTION, SOLUTION SUBCUTANEOUS AT BEDTIME
Qty: 0 | Refills: 0 | Status: DISCONTINUED | OUTPATIENT
Start: 2018-06-22 | End: 2018-06-22

## 2018-06-22 RX ORDER — DEXTROSE 50 % IN WATER 50 %
15 SYRINGE (ML) INTRAVENOUS ONCE
Qty: 0 | Refills: 0 | Status: DISCONTINUED | OUTPATIENT
Start: 2018-06-22 | End: 2018-06-26

## 2018-06-22 RX ORDER — INSULIN LISPRO 100/ML
VIAL (ML) SUBCUTANEOUS
Qty: 0 | Refills: 0 | Status: DISCONTINUED | OUTPATIENT
Start: 2018-06-22 | End: 2018-06-26

## 2018-06-22 RX ORDER — INSULIN GLARGINE 100 [IU]/ML
10 INJECTION, SOLUTION SUBCUTANEOUS AT BEDTIME
Qty: 0 | Refills: 0 | Status: DISCONTINUED | OUTPATIENT
Start: 2018-06-22 | End: 2018-06-23

## 2018-06-22 RX ORDER — SODIUM CHLORIDE 9 MG/ML
1000 INJECTION, SOLUTION INTRAVENOUS
Qty: 0 | Refills: 0 | Status: DISCONTINUED | OUTPATIENT
Start: 2018-06-22 | End: 2018-06-26

## 2018-06-22 RX ADMIN — Medication 3 MILLIGRAM(S): at 22:22

## 2018-06-22 RX ADMIN — Medication 4: at 12:22

## 2018-06-22 RX ADMIN — Medication 81 MILLIGRAM(S): at 12:22

## 2018-06-22 RX ADMIN — INSULIN GLARGINE 10 UNIT(S): 100 INJECTION, SOLUTION SUBCUTANEOUS at 22:22

## 2018-06-22 RX ADMIN — MIRTAZAPINE 15 MILLIGRAM(S): 45 TABLET, ORALLY DISINTEGRATING ORAL at 21:13

## 2018-06-22 RX ADMIN — ESCITALOPRAM OXALATE 10 MILLIGRAM(S): 10 TABLET, FILM COATED ORAL at 12:22

## 2018-06-22 RX ADMIN — Medication 650 MILLIGRAM(S): at 16:17

## 2018-06-22 RX ADMIN — SENNA PLUS 1 TABLET(S): 8.6 TABLET ORAL at 21:14

## 2018-06-22 RX ADMIN — Medication 100 MILLIGRAM(S): at 04:39

## 2018-06-22 RX ADMIN — Medication 3 UNIT(S): at 17:17

## 2018-06-22 RX ADMIN — HEPARIN SODIUM 5000 UNIT(S): 5000 INJECTION INTRAVENOUS; SUBCUTANEOUS at 05:17

## 2018-06-22 RX ADMIN — Medication 650 MILLIGRAM(S): at 22:30

## 2018-06-22 RX ADMIN — CEFTRIAXONE 100 GRAM(S): 500 INJECTION, POWDER, FOR SOLUTION INTRAMUSCULAR; INTRAVENOUS at 06:14

## 2018-06-22 RX ADMIN — Medication 1 APPLICATION(S): at 12:23

## 2018-06-22 RX ADMIN — MEGESTROL ACETATE 400 MILLIGRAM(S): 40 SUSPENSION ORAL at 12:20

## 2018-06-22 RX ADMIN — Medication 650 MILLIGRAM(S): at 09:50

## 2018-06-22 RX ADMIN — HEPARIN SODIUM 5000 UNIT(S): 5000 INJECTION INTRAVENOUS; SUBCUTANEOUS at 14:25

## 2018-06-22 RX ADMIN — Medication 25 MILLIGRAM(S): at 05:17

## 2018-06-22 RX ADMIN — HEPARIN SODIUM 5000 UNIT(S): 5000 INJECTION INTRAVENOUS; SUBCUTANEOUS at 21:13

## 2018-06-22 RX ADMIN — Medication 125 MICROGRAM(S): at 05:17

## 2018-06-22 RX ADMIN — Medication 5 UNIT(S): at 09:14

## 2018-06-22 NOTE — PROGRESS NOTE ADULT - SUBJECTIVE AND OBJECTIVE BOX
Nephrology progress note    Patient is seen and examined, events over the last 24 h noted .  C/O weakness, no SOB    Allergies:  Plavix (Other (Moderate))    Hospital Medications:   MEDICATIONS  (STANDING):  aspirin  chewable 81 milliGRAM(s) Oral daily  cefTRIAXone   IVPB      cefTRIAXone   IVPB 1 Gram(s) IV Intermittent every 24 hours  chlorhexidine 2% Cloths 1 Application(s) Topical daily  dextrose 5%. 1000 milliLiter(s) (50 mL/Hr) IV Continuous <Continuous>  dextrose 50% Injectable 12.5 Gram(s) IV Push once  dextrose 50% Injectable 25 Gram(s) IV Push once  dextrose 50% Injectable 25 Gram(s) IV Push once  escitalopram 10 milliGRAM(s) Oral daily  heparin  Injectable 5000 Unit(s) SubCutaneous every 8 hours  hydrocortisone 1% Cream 1 Application(s) Topical daily  insulin glargine Injectable (LANTUS) 5 Unit(s) SubCutaneous at bedtime  insulin lispro (HumaLOG) corrective regimen sliding scale   SubCutaneous three times a day before meals  levothyroxine 125 MICROGram(s) Oral daily  megestrol Suspension 400 milliGRAM(s) Oral daily  metoprolol succinate ER 25 milliGRAM(s) Oral daily  mirtazapine 15 milliGRAM(s) Oral at bedtime  senna 1 Tablet(s) Oral at bedtime        VITALS:  T(F): 98 (18 @ 05:25), Max: 98 (18 @ 05:25)  HR: 91 (18 @ 05:25)  BP: 153/89 (18 @ 05:25)  RR: 16 (18 @ 05:25)  SpO2: --  Wt(kg): --     @ 07:  -   @ 07:00  --------------------------------------------------------  IN: 340 mL / OUT: 300 mL / NET: 40 mL     @ 07:01  -   @ 07:00  --------------------------------------------------------  IN: 0 mL / OUT: 1000 mL / NET: -1000 mL          PHYSICAL EXAM:  Constitutional: NAD  HEENT: anicteric sclera, oropharynx clear, MMM  Neck: No JVD  Respiratory: CTAB, no wheezes, rales or rhonchi  Cardiovascular: S1, S2, RRR  Gastrointestinal: BS+, soft, NT/ND  Extremities: No cyanosis or clubbing. No peripheral edema  Neurological: A/O x 3  : No CVA tenderness. No davis.   Skin: No rashes  Vascular Access: Rt CW TEsio cath    LABS:      143  |  102  |  29<H>  ----------------------------<  73  4.5   |  26  |  4.0<H>    Ca    8.0<L>      2018 06:38  Phos  4.5         TPro  5.6<L>  /  Alb  3.4<L>  /  TBili  0.4  /  DBili      /  AST  12  /  ALT  6   /  AlkPhos  92                            10.0   5.10  )-----------( 149      ( 2018 06:38 )             32.4       Urine Studies:  Urinalysis Basic - ( 2018 17:54 )    Color: Yellow / Appearance: Clear / S.015 / pH:   Gluc:  / Ketone: Negative  / Bili: Negative / Urobili: 0.2 mg/dL   Blood:  / Protein: 100 mg/dL / Nitrite: Negative   Leuk Esterase: Large / RBC: 5-10 /HPF / WBC >50 /HPF   Sq Epi:  / Non Sq Epi: Occasional /HPF / Bacteria:         RADIOLOGY & ADDITIONAL STUDIES:

## 2018-06-22 NOTE — PROGRESS NOTE ADULT - SUBJECTIVE AND OBJECTIVE BOX
VI MOBLEY  83y  Female    Patient is a 83y old  Female who presents with a chief complaint of sent from NH for worsening kidney function (2018 16:26)      INTERVAL HPI/OVERNIGHT EVENTS:  No overnight events. Patient has no complaints.   Patient hyperglycemic this am.    REVIEW OF SYSTEMS:  CONSTITUTIONAL: No fever, weight loss, fatigue, Anorexia  EYES: No eye pain, visual disturbances, or discharge  ENMT:  No difficulty hearing, tinnitus, vertigo; No sinus or throat pain  RESPIRATORY: No cough, wheezing, chills or hemoptysis; No shortness of breath  CARDIOVASCULAR: No chest pain, palpitations, dizziness, or leg swelling  GASTROINTESTINAL: No abdominal or epigastric pain. No nausea, vomiting, or hematemesis; No diarrhea or constipation. No melena or hematochezia.  GENITOURINARY: No dysuria, frequency, hematuria, or incontinence  NEUROLOGICAL: No headaches, memory loss, loss of strength, numbness, or tremors  SKIN: No itching, burning, rashes, or lesions   LYMPH NODES: No enlarged glands  ENDOCRINE: No heat or cold intolerance; No hair loss  MUSCULOSKELETAL: No joint pain or swelling; No muscle, back, or extremity pain  PSYCHIATRIC: No depression, anxiety, mood swings, or difficulty sleeping  HEME/LYMPH: No easy bruising, or bleeding gums  ALLERGY AND IMMUNOLOGIC: No hives or eczema      VITALS:  T(C): 36.7 (2018 05:25), Max: 36.7 (2018 05:25)  T(F): 98 (2018 05:25), Max: 98 (2018 05:25)  HR: 91 (2018 05:25) (75 - 91)  BP: 153/89 (2018 05:25) (114/64 - 153/89)  BP(mean): --  RR: 16 (2018 05:25) (16 - 16)  SpO2: --      CAPILLARY BLOOD GLUCOSE  332 (2018 11:11)  307 (2018 07:25)  325 (2018 21:45)  214 (2018 16:05)      PHYSICAL EXAM:  GENERAL: NAD  HEAD:  Atraumatic, Normocephalic  EYES: EOMI, PERRLA, conjunctiva and sclera clear  ENMT: Moist mucous membranes  NECK: Supple, Normal thyroid  NERVOUS SYSTEM:  Alert & Oriented X 3  CHEST/LUNG: Decreased AE at the bases bilaterally; + rales, No rhonchi, wheezing, or rubs  HEART: Regular rate and rhythm; No murmurs, rubs, or gallops  ABDOMEN: Soft, Nontender, Nondistended; Bowel sounds present  EXTREMITIES:  No clubbing, cyanosis, or edema  LYMPH: No lymphadenopathy noted  SKIN: Please see nursing assessment.  Right  chest Tesio.    Consultant(s) Notes Reviewed:  [x ] YES  [ ] NO  Care Discussed with Consultants/Other Providers [ x] YES  [ ] NO    LABS:                         10.0   5.10  )-----------( 149      ( 2018 06:38 )             32.4            -    143  |  102  |  29<H>  ----------------------------<  73  4.5   |  26  |  4.0<H>    Ca    8.0<L>      2018 06:38  Phos  4.5         TPro  5.6<L>  /  Alb  3.4<L>  /  TBili  0.4  /  DBili  x   /  AST  12  /  ALT  6   /  AlkPhos  92      Urinalysis Basic - ( 2018 17:54 )    Color: Yellow / Appearance: Clear / S.015 / pH: x  Gluc: x / Ketone: Negative  / Bili: Negative / Urobili: 0.2 mg/dL   Blood: x / Protein: 100 mg/dL / Nitrite: Negative   Leuk Esterase: Large / RBC: 5-10 /HPF / WBC >50 /HPF   Sq Epi: x / Non Sq Epi: Occasional /HPF / Bacteria: x      MICROBIOLOGY:   Culture - Urine (18 @ 17:54)    Specimen Source: .Urine Catheterized    Culture Results: No growth    Culture - Urine (18 @ 15:40)    Specimen Source: .Urine Clean Catch (Midstream)    Culture Results:   Culture grew 3 or more types of organisms which indicate  collection contamination; consider recollection only if        RADIOLOGY & ADDITIONAL TESTS:  CT Abdomen and Pelvis No Cont (18 @ 20:21)   Findings compatible with emphysematous cystitis.      X-ray Chest 1 View- PORTABLE-Urgent (18 @ 11:08)   Support devices: Stable.    Cardiac/mediastinum/hilum: Stable cardiomegaly.    Lung parenchyma/Pleura: Resolving pulmonary congestion and left lower   lobe atelectasis.     Skeleton/soft tissues: Unchanged      Imaging Personally Reviewed:  [x] YES  [ ] NO    MEDICATIONS  (STANDING):  aspirin  chewable 81 milliGRAM(s) Oral daily  cefTRIAXone   IVPB 1 Gram(s) IV Intermittent every 24 hours  chlorhexidine 2% Cloths 1 Application(s) Topical daily  dextrose 5%. 1000 milliLiter(s) (50 mL/Hr) IV Continuous <Continuous>  dextrose 50% Injectable 12.5 Gram(s) IV Push once  dextrose 50% Injectable 25 Gram(s) IV Push once  dextrose 50% Injectable 25 Gram(s) IV Push once  escitalopram 10 milliGRAM(s) Oral daily  heparin  Injectable 5000 Unit(s) SubCutaneous every 8 hours  hydrocortisone 1% Cream 1 Application(s) Topical daily  insulin glargine Injectable (LANTUS) 10 Unit(s) SubCutaneous at bedtime  insulin lispro (HumaLOG) corrective regimen sliding scale   SubCutaneous three times a day before meals  insulin lispro Injectable (HumaLOG) 3 Unit(s) SubCutaneous three times a day with meals  levothyroxine 125 MICROGram(s) Oral daily  megestrol Suspension 400 milliGRAM(s) Oral daily  metoprolol succinate ER 25 milliGRAM(s) Oral daily  mirtazapine 15 milliGRAM(s) Oral at bedtime  senna 1 Tablet(s) Oral at bedtime    MEDICATIONS  (PRN):  acetaminophen   Tablet 650 milliGRAM(s) Oral every 6 hours PRN pain  dextrose 40% Gel 15 Gram(s) Oral once PRN Blood Glucose LESS THAN 70 milliGRAM(s)/deciliter  glucagon  Injectable 1 milliGRAM(s) IntraMuscular once PRN Glucose LESS THAN 70 milligrams/deciliter  guaiFENesin    Syrup 100 milliGRAM(s) Oral every 6 hours PRN Cough  melatonin 3 milliGRAM(s) Oral at bedtime PRN Insomnia        HEALTH ISSUES - PROBLEM Dx:  Anemia due to chronic kidney disease  Acidosis, metabolic  ESRD (end stage renal disease)  Hypothyroidism  Diabetes  COPD (chronic obstructive pulmonary disease)  Elevated troponin  Renal dysfunction

## 2018-06-22 NOTE — PROGRESS NOTE ADULT - ASSESSMENT
82 yo F with h/o DM, HTN, HL, RA, COPD, CKD IV-V, hypothyroidism, CHF (EF 55% in 12/17), s/p AICD, hemorrhoids, admitted form NH due to worsening renal function with complaints of generalized weakness and Anorexia. 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 On admission blood work showed a Cr of 6.5 (Baseline creat 4.5 (6/4/18)) with hyperkalemia (K~6.) and met acidosis. Patient was thus admitted for renal replacement therapy. She denied Anuria, vomiting or diarrhea.    Assessment and Plan:    Problem/Plan - 1:  ·  Problem: ESRD   Nephrology following: Started on HD, TTS. Cleared for discharge form Nephrology standpoint.  Monitor electrolytes.  Out [facility] HD at [facility] or [facility] to be set up. Hepatitis screen negative.  s/p Tesio catheter placement 6/18/18. Vein mapping done.      Problem/Plan - 2:  ·  Problem: Elevated troponin.   Plan: Most likely due to ESRD.  Asymptomatic. ? ECHO      Problem/Plan - 3:  ·  Problem: COPD (chronic obstructive pulmonary disease).    Plan: stable, Duoneb PRN.       Problem/Plan - 4:  ·  Problem: Uncontrolled Diabetes.   Plan: monitor FS Resumed Insulin. High Blood sugars this am. Most likely due to megace.  Hemoglobin A1C, Whole Blood: 12.4 % (05.03.18 @ 05:50)       Problem/Plan - 5:  ·  Problem: Hypothyroidism.    Plan: continue synthroid.   Thyroid Stimulating Hormone, Serum: 2.83 uIU/mL (06.21.18 @ 06:38)      Problem/Plan - 6:  Anorexia  Nutrition consulted. Patient on Mirtazapine. Per Family was on Marinol but this was not helping.  Continue Megace. Continue supplements.      Problem/Plan - 7:  Fatigue ? Related to ESRD and poor nutritional intake.  Rule out any Infection: Chest X-ray negative.  Urinalysis: UTI started on Rocephin day 2. Blood and Urine cultures negative.         Problem/Plan - 8:  UTI: large LE and Pyuria on UA. Started on Rocephin  Urine cultures negative.  Complete 5 days of IV medications.        DVT prophylaxis: Heparin  Disposition: NH when stable and Dialysis spot secured.

## 2018-06-22 NOTE — PROGRESS NOTE ADULT - ASSESSMENT
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    ·	CKD 5 - new start up on HD on this admission   HD due tomorrow  3h opti 160 tesio 2k UF 1l as tolerated     ·	DM - on Insulin, frequent FS to avoid hypoglycemia    ·	Pt needs set up for outpt HD at [facility]     ·	Spoke to CM to set up pt for [facility] for HD - pt is cleared for d/c from renal standpoint if she has a HD spot    Will follow

## 2018-06-23 RX ORDER — INSULIN GLARGINE 100 [IU]/ML
5 INJECTION, SOLUTION SUBCUTANEOUS AT BEDTIME
Qty: 0 | Refills: 0 | Status: DISCONTINUED | OUTPATIENT
Start: 2018-06-23 | End: 2018-06-26

## 2018-06-23 RX ADMIN — Medication 650 MILLIGRAM(S): at 14:58

## 2018-06-23 RX ADMIN — HEPARIN SODIUM 5000 UNIT(S): 5000 INJECTION INTRAVENOUS; SUBCUTANEOUS at 21:00

## 2018-06-23 RX ADMIN — ESCITALOPRAM OXALATE 10 MILLIGRAM(S): 10 TABLET, FILM COATED ORAL at 12:19

## 2018-06-23 RX ADMIN — Medication 100 MILLIGRAM(S): at 20:51

## 2018-06-23 RX ADMIN — INSULIN GLARGINE 5 UNIT(S): 100 INJECTION, SOLUTION SUBCUTANEOUS at 21:29

## 2018-06-23 RX ADMIN — Medication 30 MILLILITER(S): at 05:50

## 2018-06-23 RX ADMIN — MEGESTROL ACETATE 400 MILLIGRAM(S): 40 SUSPENSION ORAL at 12:19

## 2018-06-23 RX ADMIN — Medication 25 MILLIGRAM(S): at 05:50

## 2018-06-23 RX ADMIN — Medication 3 UNIT(S): at 08:19

## 2018-06-23 RX ADMIN — HEPARIN SODIUM 5000 UNIT(S): 5000 INJECTION INTRAVENOUS; SUBCUTANEOUS at 13:50

## 2018-06-23 RX ADMIN — Medication 3 MILLIGRAM(S): at 22:21

## 2018-06-23 RX ADMIN — HEPARIN SODIUM 5000 UNIT(S): 5000 INJECTION INTRAVENOUS; SUBCUTANEOUS at 05:51

## 2018-06-23 RX ADMIN — Medication 81 MILLIGRAM(S): at 12:19

## 2018-06-23 RX ADMIN — Medication 3 UNIT(S): at 12:25

## 2018-06-23 RX ADMIN — Medication 1: at 12:25

## 2018-06-23 RX ADMIN — Medication 1 APPLICATION(S): at 12:19

## 2018-06-23 RX ADMIN — Medication 125 MICROGRAM(S): at 05:50

## 2018-06-23 RX ADMIN — MIRTAZAPINE 15 MILLIGRAM(S): 45 TABLET, ORALLY DISINTEGRATING ORAL at 21:00

## 2018-06-23 RX ADMIN — CEFTRIAXONE 100 GRAM(S): 500 INJECTION, POWDER, FOR SOLUTION INTRAMUSCULAR; INTRAVENOUS at 05:51

## 2018-06-23 RX ADMIN — SENNA PLUS 1 TABLET(S): 8.6 TABLET ORAL at 21:00

## 2018-06-23 NOTE — PROGRESS NOTE ADULT - ASSESSMENT
84 yo F with h/o DM, HTN, HL, RA, COPD, CKD IV-V, hypothyroidism, CHF (EF 55% in 12/17), s/p AICD, hemorrhoids, admitted form NH due to worsening renal function with complaints of generalized weakness and Anorexia. 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 On admission blood work showed a Cr of 6.5 (Baseline creat 4.5 (6/4/18)) with hyperkalemia (K~6.) and met acidosis. Patient was thus admitted for renal replacement therapy. She denied Anuria, vomiting or diarrhea.    Assessment and Plan:    Problem/Plan - 1:  ·  Problem: ESRD   Nephrology following: Started on HD, TTS. Cleared for discharge form Nephrology standpoint.  Monitor electrolytes.  [facility] HD at [facility] or [facility] to be set up. Hepatitis screen negative.  s/p Tesio catheter placement 6/18/18. Vein mapping done.      Problem/Plan - 2:  ·  Problem: Elevated troponin.   Plan: Most likely due to ESRD.  Asymptomatic. ? ECHO      Problem/Plan - 3:  ·  Problem: COPD (chronic obstructive pulmonary disease).    Plan: stable, Duoneb PRN.       Problem/Plan - 4:  ·  Problem: Uncontrolled Diabetes with episodes of hyperglycemia.  Plan: adjusted Insulin doses.   Hemoglobin A1C, Whole Blood: 12.4 % (05.03.18 @ 05:50)       Problem/Plan - 5:  ·  Problem: Hypothyroidism.    Plan: continue synthroid.   Thyroid Stimulating Hormone, Serum: 2.83 uIU/mL (06.21.18 @ 06:38)      Problem/Plan - 6:  Anorexia Improved  Nutrition consulted: Patient on Mirtazapine. Per Family was on Marinol but this was not helping.  Continue Megace. Continue supplements.      Problem/Plan - 7:  Fatigue ? Related to ESRD and poor nutritional intake.  Rule out any Infection: Chest X-ray negative.  Urinalysis: UTI started on Rocephin day 3. Blood and Urine cultures negative.         Problem/Plan - 8:  UTI: large LE and Pyuria on UA. Started on Rocephin  Urine cultures negative.  Complete 5 days of IV medications.        DVT prophylaxis: Heparin  Disposition: NH when stable and Dialysis spot secured.

## 2018-06-23 NOTE — CHART NOTE - NSCHARTNOTEFT_GEN_A_CORE
Registered Dietitian Follow-Up     Patient Profile Reviewed                           Yes [x]   No []     Nutrition History Previously Obtained        Yes [x]  No []       Pertinent Subjective Information: Pt observed during lunch, some fish consumed, pt c/o terrible appetite.  Reports drinking 1 8oz nepro daily.     Pertinent Medical Interventions: pt sent from NH for worsening kidney fxn and poor PO;   Tesio Cath placed 6/18. past medical hx: arthritis, COPD, diabetes, hypothyroidism, HTN, CHF.  Started on Megace.  Pt started on HD     Diet order:  Renal replacement (no concentrated k, phos, low sodium) with Nepro 8oz q 8 hrs/day     Anthropometrics:  - Ht. 5'3  - Wt. 124.7 lb  - %wt change  - BMI  20  - IBW     Pertinent Lab Data: 6/21/18 Na 143, K 4.5, BUN 29 high, creat 4.0 high, Total protein 5.6 low, alb 3.4 low      Pertinent Meds: mylanta, insulin, megetrol, senna     Physical Findings:  - Appearance: laying in bed, normal weight  - GI function: pt c/o constipation  - Tubes:   - Oral/Mouth cavity:  - Skin: intact     Nutrition Requirements  Weight Used:     Estimated Energy Needs    Continue x[]  Adjust []  Adjusted Energy Recommendations:   kcal/day        Estimated Protein Needs    Continue []  Adjust []  Adjusted Protein Recommendations:   gm/day        Estimated Fluid Needs        Continue []  Adjust []  Adjusted Fluid Recommendations:   mL/day     Nutrient Intake:        [] Previous Nutrition Diagnosis: inadequate oral intake             [x] Ongoing          [] Resolved    [] No active nutrition diagnosis identified at this time     Nutrition Diagnostic #1  Problem:  Etiology:  Statement:     Nutrition Diagnostic #2  Problem:  Etiology:  Statement:     Nutrition Intervention: Meals and snacks, medical food supplement, medication      Goal/Expected Outcome: pt consume at least 75% meals and snacks within 3 days     Indicator/Monitoring: monitor PO/ energy intake, weight, labs.

## 2018-06-23 NOTE — PROGRESS NOTE ADULT - SUBJECTIVE AND OBJECTIVE BOX
DLPAULAVI  83y  Female    Patient is a 83y old  Female who presents with a chief complaint of sent from NH for worsening kidney function (2018 16:26)      INTERVAL HPI/OVERNIGHT EVENTS:  Patient hypoglycemic in the 50s this PM.  Patient has no complaints.       REVIEW OF SYSTEMS:  CONSTITUTIONAL: No fever, weight loss, fatigue, Anorexia  EYES: No eye pain, visual disturbances, or discharge  ENMT:  No difficulty hearing, tinnitus, vertigo; No sinus or throat pain  RESPIRATORY: No cough, wheezing, chills or hemoptysis; No shortness of breath  CARDIOVASCULAR: No chest pain, palpitations, dizziness, or leg swelling  GASTROINTESTINAL: No abdominal or epigastric pain. No nausea, vomiting, or hematemesis; No diarrhea or constipation. No melena or hematochezia.  GENITOURINARY: No dysuria, frequency, hematuria, or incontinence  NEUROLOGICAL: No headaches, memory loss, loss of strength, numbness, or tremors  SKIN: No itching, burning, rashes, or lesions   LYMPH NODES: No enlarged glands  ENDOCRINE: No heat or cold intolerance; No hair loss  MUSCULOSKELETAL: No joint pain or swelling; No muscle, back, or extremity pain  PSYCHIATRIC: No depression, anxiety, mood swings, or difficulty sleeping  HEME/LYMPH: No easy bruising, or bleeding gums  ALLERGY AND IMMUNOLOGIC: No hives or eczema      VITALS:  T(C): 36.2 (2018 13:54), Max: 36.3 (2018 22:00)  T(F): 97.2 (2018 13:54), Max: 97.3 (2018 22:00)  HR: 80 (2018 13:54) (58 - 89)  BP: 129/778 (2018 13:54) (107/63 - 149/87)  BP(mean): --  RR: 18 (2018 13:54) (16 - 18)  SpO2: --      CAPILLARY BLOOD GLUCOSE  T(C): 36.2 (2018 13:54), Max: 36.3 (2018 22:00)  T(F): 97.2 (2018 13:54), Max: 97.3 (2018 22:00)  HR: 80 (2018 13:54) (58 - 89)  BP: 129/778 (2018 13:54) (107/63 - 149/87)  BP(mean): --  RR: 18 (2018 13:54) (16 - 18)  SpO2: --      PHYSICAL EXAM:  GENERAL: NAD  HEAD:  Atraumatic, Normocephalic  EYES: EOMI, PERRLA, conjunctiva and sclera clear  ENMT: Moist mucous membranes  NECK: Supple, Normal thyroid  NERVOUS SYSTEM:  Alert & Oriented X 3  CHEST/LUNG: Decreased AE at the bases bilaterally; + rales, No rhonchi, wheezing, or rubs  HEART: Regular rate and rhythm; No murmurs, rubs, or gallops  ABDOMEN: Soft, Nontender, Nondistended; Bowel sounds present  EXTREMITIES:  No clubbing, cyanosis, or edema  LYMPH: No lymphadenopathy noted  SKIN: Please see nursing assessment.  Right  chest Tesio.    Consultant(s) Notes Reviewed:  [x ] YES  [ ] NO  Care Discussed with Consultants/Other Providers [ x] YES  [ ] NO    LABS:                         10.0   5.10  )-----------( 149      ( 2018 06:38 )             32.4                143  |  102  |  29<H>  ----------------------------<  73  4.5   |  26  |  4.0<H>    Ca    8.0<L>      2018 06:38  Phos  4.5         TPro  5.6<L>  /  Alb  3.4<L>  /  TBili  0.4  /  DBili  x   /  AST  12  /  ALT  6   /  AlkPhos  92      Urinalysis Basic - ( 2018 17:54 )    Color: Yellow / Appearance: Clear / S.015 / pH: x  Gluc: x / Ketone: Negative  / Bili: Negative / Urobili: 0.2 mg/dL   Blood: x / Protein: 100 mg/dL / Nitrite: Negative   Leuk Esterase: Large / RBC: 5-10 /HPF / WBC >50 /HPF   Sq Epi: x / Non Sq Epi: Occasional /HPF / Bacteria: x      MICROBIOLOGY:   Culture - Urine (18 @ 17:54)    Specimen Source: .Urine Catheterized    Culture Results: No growth    Culture - Urine (18 @ 15:40)    Specimen Source: .Urine Clean Catch (Midstream)    Culture Results:   Culture grew 3 or more types of organisms which indicate  collection contamination; consider recollection only if        RADIOLOGY & ADDITIONAL TESTS:  CT Abdomen and Pelvis No Cont (18 @ 20:21)   Findings compatible with emphysematous cystitis.      X-ray Chest 1 View- PORTABLE-Urgent (18 @ 11:08)   Support devices: Stable.    Cardiac/mediastinum/hilum: Stable cardiomegaly.    Lung parenchyma/Pleura: Resolving pulmonary congestion and left lower   lobe atelectasis.     Skeleton/soft tissues: Unchanged      Imaging Personally Reviewed:  [x] YES  [ ] NO    MEDICATIONS  (STANDING):  aspirin  chewable 81 milliGRAM(s) Oral daily  cefTRIAXone   IVPB 1 Gram(s) IV Intermittent every 24 hours  chlorhexidine 2% Cloths 1 Application(s) Topical daily  dextrose 5%. 1000 milliLiter(s) (50 mL/Hr) IV Continuous <Continuous>  dextrose 50% Injectable 12.5 Gram(s) IV Push once  dextrose 50% Injectable 25 Gram(s) IV Push once  dextrose 50% Injectable 25 Gram(s) IV Push once  escitalopram 10 milliGRAM(s) Oral daily  heparin  Injectable 5000 Unit(s) SubCutaneous every 8 hours  hydrocortisone 1% Cream 1 Application(s) Topical daily  insulin glargine Injectable (LANTUS) 10 Unit(s) SubCutaneous at bedtime  insulin lispro (HumaLOG) corrective regimen sliding scale   SubCutaneous three times a day before meals  insulin lispro Injectable (HumaLOG) 3 Unit(s) SubCutaneous three times a day with meals  levothyroxine 125 MICROGram(s) Oral daily  megestrol Suspension 400 milliGRAM(s) Oral daily  metoprolol succinate ER 25 milliGRAM(s) Oral daily  mirtazapine 15 milliGRAM(s) Oral at bedtime  senna 1 Tablet(s) Oral at bedtime    MEDICATIONS  (PRN):  acetaminophen   Tablet 650 milliGRAM(s) Oral every 6 hours PRN pain  dextrose 40% Gel 15 Gram(s) Oral once PRN Blood Glucose LESS THAN 70 milliGRAM(s)/deciliter  glucagon  Injectable 1 milliGRAM(s) IntraMuscular once PRN Glucose LESS THAN 70 milligrams/deciliter  guaiFENesin    Syrup 100 milliGRAM(s) Oral every 6 hours PRN Cough  melatonin 3 milliGRAM(s) Oral at bedtime PRN Insomnia        HEALTH ISSUES - PROBLEM Dx:  Anemia due to chronic kidney disease  Acidosis, metabolic  ESRD (end stage renal disease)  Hypothyroidism  Diabetes  COPD (chronic obstructive pulmonary disease)  Elevated troponin  Renal dysfunction

## 2018-06-23 NOTE — PROGRESS NOTE ADULT - ASSESSMENT
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    ·	CKD 5 - new start up on HD on this admission   HD due  today 3h opti 160 tesio 2k UF no UF (pt looks dry)    ·	DM - on Insulin, frequent FS to avoid hypoglycemia    ·	Pt needs set up for outpt HD at [facility]     Pt is cleared for d/c from renal standpoint if she has a HD spot    Will follow

## 2018-06-23 NOTE — PROGRESS NOTE ADULT - SUBJECTIVE AND OBJECTIVE BOX
Nephrology progress note    Patient is seen and examined, events over the last 24 h noted .  C/o weakness, anorexia  Allergies:  Plavix (Other (Moderate))    Hospital Medications:   MEDICATIONS  (STANDING):  aspirin  chewable 81 milliGRAM(s) Oral daily  cefTRIAXone   IVPB      cefTRIAXone   IVPB 1 Gram(s) IV Intermittent every 24 hours  chlorhexidine 2% Cloths 1 Application(s) Topical daily  dextrose 5%. 1000 milliLiter(s) (50 mL/Hr) IV Continuous <Continuous>  dextrose 50% Injectable 12.5 Gram(s) IV Push once  dextrose 50% Injectable 25 Gram(s) IV Push once  dextrose 50% Injectable 25 Gram(s) IV Push once  escitalopram 10 milliGRAM(s) Oral daily  heparin  Injectable 5000 Unit(s) SubCutaneous every 8 hours  hydrocortisone 1% Cream 1 Application(s) Topical daily  insulin glargine Injectable (LANTUS) 10 Unit(s) SubCutaneous at bedtime  insulin lispro (HumaLOG) corrective regimen sliding scale   SubCutaneous three times a day before meals  insulin lispro Injectable (HumaLOG) 3 Unit(s) SubCutaneous three times a day with meals  levothyroxine 125 MICROGram(s) Oral daily  megestrol Suspension 400 milliGRAM(s) Oral daily  metoprolol succinate ER 25 milliGRAM(s) Oral daily  mirtazapine 15 milliGRAM(s) Oral at bedtime  senna 1 Tablet(s) Oral at bedtime        VITALS:  T(F): 97.1 (18 @ 05:36), Max: 99.2 (18 @ 14:20)  HR: 89 (18 @ 05:36)  BP: 124/77 (18 @ 05:36)  RR: 16 (18 @ 05:36)  SpO2: --  Wt(kg): --     @ 07:01  -   @ 07:00  --------------------------------------------------------  IN: 0 mL / OUT: 1000 mL / NET: -1000 mL          PHYSICAL EXAM:  Constitutional: NAD  HEENT: anicteric sclera, oropharynx clear, MMM  Neck: No JVD  Respiratory: CTAB, no wheezes, rales or rhonchi  Cardiovascular: S1, S2, RRR  Gastrointestinal: BS+, soft, NT/ND  Extremities: No cyanosis or clubbing. No peripheral edema  Neurological: A/O x 3  : No CVA tenderness. No davis.   Skin: No rashes  Vascular Access: Rt CW Tesio cath    LABS:            Urine Studies:  Urinalysis Basic - ( 2018 17:54 )    Color: Yellow / Appearance: Clear / S.015 / pH:   Gluc:  / Ketone: Negative  / Bili: Negative / Urobili: 0.2 mg/dL   Blood:  / Protein: 100 mg/dL / Nitrite: Negative   Leuk Esterase: Large / RBC: 5-10 /HPF / WBC >50 /HPF   Sq Epi:  / Non Sq Epi: Occasional /HPF / Bacteria:         RADIOLOGY & ADDITIONAL STUDIES:

## 2018-06-24 LAB
ALBUMIN SERPL ELPH-MCNC: 3.2 G/DL — LOW (ref 3.5–5.2)
ALP SERPL-CCNC: 90 U/L — SIGNIFICANT CHANGE UP (ref 30–115)
ALT FLD-CCNC: 7 U/L — SIGNIFICANT CHANGE UP (ref 0–41)
ANION GAP SERPL CALC-SCNC: 13 MMOL/L — SIGNIFICANT CHANGE UP (ref 7–14)
AST SERPL-CCNC: 16 U/L — SIGNIFICANT CHANGE UP (ref 0–41)
BILIRUB SERPL-MCNC: 0.3 MG/DL — SIGNIFICANT CHANGE UP (ref 0.2–1.2)
BUN SERPL-MCNC: 20 MG/DL — SIGNIFICANT CHANGE UP (ref 10–20)
CALCIUM SERPL-MCNC: 8 MG/DL — LOW (ref 8.5–10.1)
CHLORIDE SERPL-SCNC: 96 MMOL/L — LOW (ref 98–110)
CO2 SERPL-SCNC: 29 MMOL/L — SIGNIFICANT CHANGE UP (ref 17–32)
CREAT SERPL-MCNC: 2.8 MG/DL — HIGH (ref 0.7–1.5)
GLUCOSE SERPL-MCNC: 60 MG/DL — LOW (ref 70–99)
POTASSIUM SERPL-MCNC: 4.2 MMOL/L — SIGNIFICANT CHANGE UP (ref 3.5–5)
POTASSIUM SERPL-SCNC: 4.2 MMOL/L — SIGNIFICANT CHANGE UP (ref 3.5–5)
PROT SERPL-MCNC: 5.3 G/DL — LOW (ref 6–8)
SODIUM SERPL-SCNC: 138 MMOL/L — SIGNIFICANT CHANGE UP (ref 135–146)

## 2018-06-24 RX ORDER — ONDANSETRON 8 MG/1
4 TABLET, FILM COATED ORAL ONCE
Qty: 0 | Refills: 0 | Status: COMPLETED | OUTPATIENT
Start: 2018-06-24 | End: 2018-06-24

## 2018-06-24 RX ADMIN — CEFTRIAXONE 100 GRAM(S): 500 INJECTION, POWDER, FOR SOLUTION INTRAMUSCULAR; INTRAVENOUS at 05:32

## 2018-06-24 RX ADMIN — SENNA PLUS 1 TABLET(S): 8.6 TABLET ORAL at 22:06

## 2018-06-24 RX ADMIN — ONDANSETRON 4 MILLIGRAM(S): 8 TABLET, FILM COATED ORAL at 01:40

## 2018-06-24 RX ADMIN — MEGESTROL ACETATE 400 MILLIGRAM(S): 40 SUSPENSION ORAL at 11:49

## 2018-06-24 RX ADMIN — Medication 2: at 17:32

## 2018-06-24 RX ADMIN — HEPARIN SODIUM 5000 UNIT(S): 5000 INJECTION INTRAVENOUS; SUBCUTANEOUS at 13:54

## 2018-06-24 RX ADMIN — HEPARIN SODIUM 5000 UNIT(S): 5000 INJECTION INTRAVENOUS; SUBCUTANEOUS at 22:07

## 2018-06-24 RX ADMIN — MIRTAZAPINE 15 MILLIGRAM(S): 45 TABLET, ORALLY DISINTEGRATING ORAL at 22:06

## 2018-06-24 RX ADMIN — INSULIN GLARGINE 5 UNIT(S): 100 INJECTION, SOLUTION SUBCUTANEOUS at 22:07

## 2018-06-24 RX ADMIN — Medication 125 MICROGRAM(S): at 05:31

## 2018-06-24 RX ADMIN — Medication 81 MILLIGRAM(S): at 11:49

## 2018-06-24 RX ADMIN — ESCITALOPRAM OXALATE 10 MILLIGRAM(S): 10 TABLET, FILM COATED ORAL at 11:49

## 2018-06-24 RX ADMIN — Medication 650 MILLIGRAM(S): at 10:19

## 2018-06-24 RX ADMIN — HEPARIN SODIUM 5000 UNIT(S): 5000 INJECTION INTRAVENOUS; SUBCUTANEOUS at 05:31

## 2018-06-24 RX ADMIN — Medication 1 APPLICATION(S): at 11:49

## 2018-06-24 NOTE — PROGRESS NOTE ADULT - ASSESSMENT
84 yo F with h/o DM, HTN, HL, RA, COPD, CKD IV-V, hypothyroidism, CHF (EF 55% in 12/17), s/p AICD, hemorrhoids, admitted form NH due to worsening renal function with complaints of generalized weakness and Anorexia. 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 On admission blood work showed a Cr of 6.5 (Baseline creat 4.5 (6/4/18)) with hyperkalemia (K~6.) and met acidosis. Patient was thus admitted for renal replacement therapy. She denied Anuria, vomiting or diarrhea.    Assessment and Plan:    Problem/Plan - 1:  ·  Problem: ESRD   Nephrology following: Started on HD, ?TTS or MWF. Cleared for discharge form Nephrology standpoint.  Monitor electrolytes.  [facility] or [facility] to be set up. Hepatitis screen negative.  s/p Tesio catheter placement 6/18/18. Vein mapping done.      Problem/Plan - 2:  ·  Problem: Elevated troponin.   Plan: Most likely due to ESRD.  Asymptomatic. ? ECHO      Problem/Plan - 3:  ·  Problem: COPD (chronic obstructive pulmonary disease).    Plan: stable, Duoneb PRN.       Problem/Plan - 4:  ·  Problem: Uncontrolled Diabetes with episodes of hyperglycemia.  Plan: adjusted Insulin doses.   Hemoglobin A1C, Whole Blood: 12.4 % (05.03.18 @ 05:50)       Problem/Plan - 5:  ·  Problem: Hypothyroidism.    Plan: continue synthroid.   Thyroid Stimulating Hormone, Serum: 2.83 uIU/mL (06.21.18 @ 06:38)      Problem/Plan - 6:  Anorexia Improved  Nutrition consulted: Patient on Mirtazapine. Per Family was on Marinol but this was not helping.  Continue Megace. Continue supplements.      Problem/Plan - 7:  Fatigue ? Related to ESRD and poor nutritional intake.  Rule out any Infection: Chest X-ray negative.  Urinalysis: UTI started on Rocephin day 4. Blood and Urine cultures negative.         Problem/Plan - 8:  UTI: large LE and Pyuria on UA. Started on Rocephin  Urine cultures negative.  Complete 5 days of IV medications.        DVT prophylaxis: Heparin  Disposition: NH when stable and Dialysis spot secured at [facility].

## 2018-06-24 NOTE — PROGRESS NOTE ADULT - SUBJECTIVE AND OBJECTIVE BOX
Nephrology progress note    Patient is seen and examined, events over the last 24 h noted .  Poor appetite, no SOb, tolerating HD   Allergies:  Plavix (Other (Moderate))    Hospital Medications:   MEDICATIONS  (STANDING):  aspirin  chewable 81 milliGRAM(s) Oral daily  cefTRIAXone   IVPB      cefTRIAXone   IVPB 1 Gram(s) IV Intermittent every 24 hours  chlorhexidine 2% Cloths 1 Application(s) Topical daily  dextrose 5%. 1000 milliLiter(s) (50 mL/Hr) IV Continuous <Continuous>  dextrose 50% Injectable 12.5 Gram(s) IV Push once  dextrose 50% Injectable 25 Gram(s) IV Push once  dextrose 50% Injectable 25 Gram(s) IV Push once  escitalopram 10 milliGRAM(s) Oral daily  heparin  Injectable 5000 Unit(s) SubCutaneous every 8 hours  hydrocortisone 1% Cream 1 Application(s) Topical daily  insulin glargine Injectable (LANTUS) 5 Unit(s) SubCutaneous at bedtime  insulin lispro (HumaLOG) corrective regimen sliding scale   SubCutaneous three times a day before meals  levothyroxine 125 MICROGram(s) Oral daily  megestrol Suspension 400 milliGRAM(s) Oral daily  metoprolol succinate ER 25 milliGRAM(s) Oral daily  mirtazapine 15 milliGRAM(s) Oral at bedtime  senna 1 Tablet(s) Oral at bedtime        VITALS:  T(F): 97.1 (18 @ 05:30), Max: 97.2 (18 @ 13:54)  HR: 87 (18 @ 08:14)  BP: 96/57 (18 @ 05:30)  RR: 17 (18 @ 08:14)  SpO2: 96% (18 @ 08:14)  Wt(kg): --     @ 07:01  -   @ 07:00  --------------------------------------------------------  IN: 0 mL / OUT: 0 mL / NET: 0 mL          PHYSICAL EXAM:  Constitutional: NAD  HEENT: anicteric sclera, oropharynx clear, MMM  Neck: No JVD  Respiratory: CTAB, no wheezes, rales or rhonchi  Cardiovascular: S1, S2, RRR  Gastrointestinal: BS+, soft, NT/ND  Extremities: No cyanosis or clubbing. No peripheral edema  Neurological: A/O x 3  : No CVA tenderness. No davis.   Skin: No rashes  Vascular Access: Tesio    LABS:      138  |  96<L>  |  20  ----------------------------<  60<L>  4.2   |  29  |  2.8<H>    Ca    8.0<L>      2018 06:57    TPro  5.3<L>  /  Alb  3.2<L>  /  TBili  0.3  /  DBili      /  AST  16  /  ALT  7   /  AlkPhos  90          Urine Studies:  Urinalysis Basic - ( 2018 17:54 )    Color: Yellow / Appearance: Clear / S.015 / pH:   Gluc:  / Ketone: Negative  / Bili: Negative / Urobili: 0.2 mg/dL   Blood:  / Protein: 100 mg/dL / Nitrite: Negative   Leuk Esterase: Large / RBC: 5-10 /HPF / WBC >50 /HPF   Sq Epi:  / Non Sq Epi: Occasional /HPF / Bacteria:         RADIOLOGY & ADDITIONAL STUDIES:

## 2018-06-24 NOTE — PROGRESS NOTE ADULT - SUBJECTIVE AND OBJECTIVE BOX
DLPAULAVI  83y  Female    Patient is a 83y old  Female who presents with a chief complaint of sent from NH for worsening kidney function (2018 16:26)      INTERVAL HPI/OVERNIGHT EVENTS:  No interval events.  Patient has no complaints.       REVIEW OF SYSTEMS:  CONSTITUTIONAL: No fever, weight loss, fatigue, Anorexia  EYES: No eye pain, visual disturbances, or discharge  ENMT:  No difficulty hearing, tinnitus, vertigo; No sinus or throat pain  RESPIRATORY: No cough, wheezing, chills or hemoptysis; No shortness of breath  CARDIOVASCULAR: No chest pain, palpitations, dizziness, or leg swelling  GASTROINTESTINAL: No abdominal or epigastric pain. No nausea, vomiting, or hematemesis; No diarrhea or constipation. No melena or hematochezia.  GENITOURINARY: No dysuria, frequency, hematuria, or incontinence  NEUROLOGICAL: No headaches, memory loss, loss of strength, numbness, or tremors  SKIN: No itching, burning, rashes, or lesions   LYMPH NODES: No enlarged glands  ENDOCRINE: No heat or cold intolerance; No hair loss  MUSCULOSKELETAL: No joint pain or swelling; No muscle, back, or extremity pain  PSYCHIATRIC: No depression, anxiety, mood swings, or difficulty sleeping  HEME/LYMPH: No easy bruising, or bleeding gums  ALLERGY AND IMMUNOLOGIC: No hives or eczema      VITALS:  T(C): 36.2 (2018 05:30), Max: 36.2 (2018 13:54)  T(F): 97.1 (2018 05:30), Max: 97.2 (2018 13:54)  HR: 87 (2018 08:14) (80 - 87)  BP: 96/57 (2018 05:30) (96/57 - 131/78)  BP(mean): --  RR: 17 (2018 08:14) (16 - 18)  SpO2: 96% (2018 08:14) (96% - 96%)      CAPILLARY BLOOD GLUCOSE  125 (2018 11:32)  96 (2018 08:30)  68 (2018 07:30)  201 (2018 22:22)  237 (2018 21:25)  106 (2018 18:02)  74 (2018 17:30)  59 (2018 16:56)  159 (2018 12:30)      PHYSICAL EXAM:  GENERAL: NAD  HEAD:  Atraumatic, Normocephalic  EYES: EOMI, PERRLA, conjunctiva and sclera clear  ENMT: Moist mucous membranes  NECK: Supple, Normal thyroid  NERVOUS SYSTEM:  Alert & Oriented X 3  CHEST/LUNG: Decreased AE at the bases bilaterally; + rales, No rhonchi, wheezing, or rubs  HEART: Regular rate and rhythm; No murmurs, rubs, or gallops  ABDOMEN: Soft, Nontender, Nondistended; Bowel sounds present  EXTREMITIES:  No clubbing, cyanosis, or edema  LYMPH: No lymphadenopathy noted  SKIN: Please see nursing assessment.  Right  chest Tesio.    Consultant(s) Notes Reviewed:  [x ] YES  [ ] NO  Care Discussed with Consultants/Other Providers [ x] YES  [ ] NO    LABS:        138  |  96<L>  |  20  ----------------------------<  60<L>  4.2   |  29  |  2.8<H>    Ca    8.0<L>      2018 06:57    TPro  5.3<L>  /  Alb  3.2<L>  /  TBili  0.3  /  DBili  x   /  AST  16  /  ALT  7   /  AlkPhos  90                             10.0   5.10  )-----------( 149      ( 2018 06:38 )             32.4              Urinalysis Basic - ( 2018 17:54 )    Color: Yellow / Appearance: Clear / S.015 / pH: x  Gluc: x / Ketone: Negative  / Bili: Negative / Urobili: 0.2 mg/dL   Blood: x / Protein: 100 mg/dL / Nitrite: Negative   Leuk Esterase: Large / RBC: 5-10 /HPF / WBC >50 /HPF   Sq Epi: x / Non Sq Epi: Occasional /HPF / Bacteria: x      MICROBIOLOGY:   Culture - Urine (18 @ 17:54)    Specimen Source: .Urine Catheterized    Culture Results: No growth    Culture - Urine (18 @ 15:40)    Specimen Source: .Urine Clean Catch (Midstream)    Culture Results:   Culture grew 3 or more types of organisms which indicate  collection contamination; consider recollection only if        RADIOLOGY & ADDITIONAL TESTS:  CT Abdomen and Pelvis No Cont (18 @ 20:21)   Findings compatible with emphysematous cystitis.      X-ray Chest 1 View- PORTABLE-Urgent (18 @ 11:08)   Support devices: Stable.    Cardiac/mediastinum/hilum: Stable cardiomegaly.    Lung parenchyma/Pleura: Resolving pulmonary congestion and left lower   lobe atelectasis.     Skeleton/soft tissues: Unchanged      Imaging Personally Reviewed:  [x] YES  [ ] NO    MEDICATIONS  (STANDING):  aspirin  chewable 81 milliGRAM(s) Oral daily  cefTRIAXone   IVPB 1 Gram(s) IV Intermittent every 24 hours  chlorhexidine 2% Cloths 1 Application(s) Topical daily  dextrose 5%. 1000 milliLiter(s) (50 mL/Hr) IV Continuous <Continuous>  dextrose 50% Injectable 12.5 Gram(s) IV Push once  dextrose 50% Injectable 25 Gram(s) IV Push once  dextrose 50% Injectable 25 Gram(s) IV Push once  escitalopram 10 milliGRAM(s) Oral daily  heparin  Injectable 5000 Unit(s) SubCutaneous every 8 hours  hydrocortisone 1% Cream 1 Application(s) Topical daily  insulin glargine Injectable (LANTUS) 10 Unit(s) SubCutaneous at bedtime  insulin lispro (HumaLOG) corrective regimen sliding scale   SubCutaneous three times a day before meals  insulin lispro Injectable (HumaLOG) 3 Unit(s) SubCutaneous three times a day with meals  levothyroxine 125 MICROGram(s) Oral daily  megestrol Suspension 400 milliGRAM(s) Oral daily  metoprolol succinate ER 25 milliGRAM(s) Oral daily  mirtazapine 15 milliGRAM(s) Oral at bedtime  senna 1 Tablet(s) Oral at bedtime    MEDICATIONS  (PRN):  acetaminophen   Tablet 650 milliGRAM(s) Oral every 6 hours PRN pain  dextrose 40% Gel 15 Gram(s) Oral once PRN Blood Glucose LESS THAN 70 milliGRAM(s)/deciliter  glucagon  Injectable 1 milliGRAM(s) IntraMuscular once PRN Glucose LESS THAN 70 milligrams/deciliter  guaiFENesin    Syrup 100 milliGRAM(s) Oral every 6 hours PRN Cough  melatonin 3 milliGRAM(s) Oral at bedtime PRN Insomnia        HEALTH ISSUES - PROBLEM Dx:  Anemia due to chronic kidney disease  Acidosis, metabolic  ESRD (end stage renal disease)  Hypothyroidism  Diabetes  COPD (chronic obstructive pulmonary disease)  Elevated troponin  Renal dysfunction

## 2018-06-24 NOTE — PROGRESS NOTE ADULT - ASSESSMENT
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    ·	CKD 5 - new start up on HD on this admission   HD due  tomorrow 3h opti 160 tesio 2k UF no UF (pt looks dry)    ·	DM - on Insulin, frequent FS to avoid hypoglycemia       - low FS this am, poor appetite- adjust/reduce Insulin, may need to d/c it    ·	Pt needs set up for outpt HD at [facility]     Pt is cleared for d/c from renal standpoint if she has a HD spot    Will follow

## 2018-06-25 LAB
ANION GAP SERPL CALC-SCNC: 14 MMOL/L — SIGNIFICANT CHANGE UP (ref 7–14)
BUN SERPL-MCNC: 29 MG/DL — HIGH (ref 10–20)
CALCIUM SERPL-MCNC: 8.2 MG/DL — LOW (ref 8.5–10.1)
CHLORIDE SERPL-SCNC: 95 MMOL/L — LOW (ref 98–110)
CO2 SERPL-SCNC: 26 MMOL/L — SIGNIFICANT CHANGE UP (ref 17–32)
CREAT SERPL-MCNC: 3.5 MG/DL — HIGH (ref 0.7–1.5)
GLUCOSE SERPL-MCNC: 247 MG/DL — HIGH (ref 70–99)
HCT VFR BLD CALC: 34.8 % — LOW (ref 37–47)
HGB BLD-MCNC: 10.6 G/DL — LOW (ref 12–16)
MAGNESIUM SERPL-MCNC: 2.1 MG/DL — SIGNIFICANT CHANGE UP (ref 1.8–2.4)
MCHC RBC-ENTMCNC: 28.6 PG — SIGNIFICANT CHANGE UP (ref 27–31)
MCHC RBC-ENTMCNC: 30.5 G/DL — LOW (ref 32–37)
MCV RBC AUTO: 94.1 FL — SIGNIFICANT CHANGE UP (ref 81–99)
NRBC # BLD: 0 /100 WBCS — SIGNIFICANT CHANGE UP (ref 0–0)
PHOSPHATE SERPL-MCNC: 3.4 MG/DL — SIGNIFICANT CHANGE UP (ref 2.1–4.9)
PLATELET # BLD AUTO: 148 K/UL — SIGNIFICANT CHANGE UP (ref 130–400)
POTASSIUM SERPL-MCNC: 5.1 MMOL/L — HIGH (ref 3.5–5)
POTASSIUM SERPL-SCNC: 5.1 MMOL/L — HIGH (ref 3.5–5)
RBC # BLD: 3.7 M/UL — LOW (ref 4.2–5.4)
RBC # FLD: 18.1 % — HIGH (ref 11.5–14.5)
SODIUM SERPL-SCNC: 135 MMOL/L — SIGNIFICANT CHANGE UP (ref 135–146)
WBC # BLD: 5.54 K/UL — SIGNIFICANT CHANGE UP (ref 4.8–10.8)
WBC # FLD AUTO: 5.54 K/UL — SIGNIFICANT CHANGE UP (ref 4.8–10.8)

## 2018-06-25 RX ADMIN — MIRTAZAPINE 15 MILLIGRAM(S): 45 TABLET, ORALLY DISINTEGRATING ORAL at 21:11

## 2018-06-25 RX ADMIN — INSULIN GLARGINE 5 UNIT(S): 100 INJECTION, SOLUTION SUBCUTANEOUS at 21:11

## 2018-06-25 RX ADMIN — Medication 3: at 12:21

## 2018-06-25 RX ADMIN — ESCITALOPRAM OXALATE 10 MILLIGRAM(S): 10 TABLET, FILM COATED ORAL at 11:10

## 2018-06-25 RX ADMIN — Medication 1 APPLICATION(S): at 11:12

## 2018-06-25 RX ADMIN — HEPARIN SODIUM 5000 UNIT(S): 5000 INJECTION INTRAVENOUS; SUBCUTANEOUS at 21:13

## 2018-06-25 RX ADMIN — Medication 81 MILLIGRAM(S): at 11:10

## 2018-06-25 RX ADMIN — Medication 125 MICROGRAM(S): at 05:37

## 2018-06-25 RX ADMIN — CEFTRIAXONE 100 GRAM(S): 500 INJECTION, POWDER, FOR SOLUTION INTRAMUSCULAR; INTRAVENOUS at 05:39

## 2018-06-25 RX ADMIN — Medication 25 MILLIGRAM(S): at 05:37

## 2018-06-25 RX ADMIN — Medication 650 MILLIGRAM(S): at 05:45

## 2018-06-25 RX ADMIN — HEPARIN SODIUM 5000 UNIT(S): 5000 INJECTION INTRAVENOUS; SUBCUTANEOUS at 05:38

## 2018-06-25 RX ADMIN — MEGESTROL ACETATE 400 MILLIGRAM(S): 40 SUSPENSION ORAL at 11:34

## 2018-06-25 RX ADMIN — SENNA PLUS 1 TABLET(S): 8.6 TABLET ORAL at 21:11

## 2018-06-25 RX ADMIN — Medication 2: at 17:28

## 2018-06-25 RX ADMIN — HEPARIN SODIUM 5000 UNIT(S): 5000 INJECTION INTRAVENOUS; SUBCUTANEOUS at 14:24

## 2018-06-25 NOTE — PROGRESS NOTE ADULT - SUBJECTIVE AND OBJECTIVE BOX
Nephrology progress note    Patient is seen and examined, events over the last 24 h noted .  Feeling better, appetite has improved. No SOB.  Allergies:  Plavix (Other (Moderate))    Hospital Medications:   MEDICATIONS  (STANDING):  aspirin  chewable 81 milliGRAM(s) Oral daily  cefTRIAXone   IVPB      cefTRIAXone   IVPB 1 Gram(s) IV Intermittent every 24 hours  chlorhexidine 2% Cloths 1 Application(s) Topical daily  dextrose 5%. 1000 milliLiter(s) (50 mL/Hr) IV Continuous <Continuous>  dextrose 50% Injectable 12.5 Gram(s) IV Push once  dextrose 50% Injectable 25 Gram(s) IV Push once  dextrose 50% Injectable 25 Gram(s) IV Push once  escitalopram 10 milliGRAM(s) Oral daily  heparin  Injectable 5000 Unit(s) SubCutaneous every 8 hours  hydrocortisone 1% Cream 1 Application(s) Topical daily  insulin glargine Injectable (LANTUS) 5 Unit(s) SubCutaneous at bedtime  insulin lispro (HumaLOG) corrective regimen sliding scale   SubCutaneous three times a day before meals  levothyroxine 125 MICROGram(s) Oral daily  megestrol Suspension 400 milliGRAM(s) Oral daily  metoprolol succinate ER 25 milliGRAM(s) Oral daily  mirtazapine 15 milliGRAM(s) Oral at bedtime  senna 1 Tablet(s) Oral at bedtime        VITALS:  T(F): 97.3 (18 @ 05:30), Max: 98.8 (18 @ 14:25)  HR: 94 (18 @ 05:30)  BP: 120/73 (18 @ 05:30)  RR: 17 (18 @ 05:30)  SpO2: --  Wt(kg): --     @ 07:01  -   @ 07:00  --------------------------------------------------------  IN: 0 mL / OUT: 0 mL / NET: 0 mL        Weight (kg): 25.7 ( @ 06:06)    PHYSICAL EXAM:  Constitutional: NAD  HEENT: anicteric sclera, oropharynx clear, MMM  Neck: No JVD  Respiratory: CTAB, no wheezes, rales or rhonchi  Cardiovascular: S1, S2, RRR  Gastrointestinal: BS+, soft, NT/ND  Extremities: No cyanosis or clubbing. No peripheral edema  Neurological: A/O x 3, no focal deficits  : No CVA tenderness. No davis.   Skin: No rashes  Vascular Access: Rt CW Tesio    LABS:      138  |  96<L>  |  20  ----------------------------<  60<L>  4.2   |  29  |  2.8<H>    Ca    8.0<L>      2018 06:57    TPro  5.3<L>  /  Alb  3.2<L>  /  TBili  0.3  /  DBili      /  AST  16  /  ALT  7   /  AlkPhos  90          Urine Studies:  Urinalysis Basic - ( 2018 17:54 )    Color: Yellow / Appearance: Clear / S.015 / pH:   Gluc:  / Ketone: Negative  / Bili: Negative / Urobili: 0.2 mg/dL   Blood:  / Protein: 100 mg/dL / Nitrite: Negative   Leuk Esterase: Large / RBC: 5-10 /HPF / WBC >50 /HPF   Sq Epi:  / Non Sq Epi: Occasional /HPF / Bacteria:         RADIOLOGY & ADDITIONAL STUDIES:

## 2018-06-25 NOTE — PROGRESS NOTE ADULT - ASSESSMENT
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    ·	CKD 5 - new start up on HD on this admission   HD due  Tuesday, however pt has a slot for HD wedn and [facility] spot available  She can be released today and go for HD on Wedn 6/27  (no fluid overload, labs as of 6/24 are OK)    Pt is cleared for d/c from renal standpoint without inpatient HD    D/W Hospitalist

## 2018-06-25 NOTE — PROGRESS NOTE ADULT - ASSESSMENT
82 yo F with h/o DM, HTN, HL, RA, COPD, CKD IV-V, hypothyroidism, CHF (EF 55% in 12/17), s/p AICD, hemorrhoids, admitted form NH due to worsening renal function with complaints of generalized weakness and Anorexia. 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 On admission blood work showed a Cr of 6.5 (Baseline creat 4.5 (6/4/18)) with hyperkalemia (K~6.) and met acidosis. Patient was thus admitted for renal replacement therapy. She denied Anuria, vomiting or diarrhea.    Assessment and Plan:    Problem/Plan - 1:  ·  Problem: ESRD   Nephrology following: Started on HD scheduled MWF. Cleared for discharge from Nephrology standpoint.  Monitor electrolytes.  Out patient HD at [facility] or [facility] to be set up. Hepatitis screen negative.  s/p Tesio catheter placement 6/18/18. Vein mapping done.  Follow up with Dr. Baltazar in 1 week.      Problem/Plan - 2:  ·  Problem: Elevated troponin.   Plan: Most likely due to ESRD.  Asymptomatic. ECHO 5/18 showed mildly reduced EF 40-45%.      Problem/Plan - 3:  ·  Problem: COPD (chronic obstructive pulmonary disease).    Plan: stable, Duoneb PRN.       Problem/Plan - 4:  ·  Problem: Uncontrolled Diabetes with episodes of hyperglycemia.  Plan: adjusted Insulin doses.   Hemoglobin A1C, Whole Blood: 12.4 % (05.03.18 @ 05:50)       Problem/Plan - 5:  ·  Problem: Hypothyroidism.    Plan: continue synthroid.   Thyroid Stimulating Hormone, Serum: 2.83 uIU/mL (06.21.18 @ 06:38)      Problem/Plan - 6:  Anorexia Improved  Nutrition consulted: Patient on Mirtazapine. Per Family was on Marinol but this was not helping.  Continue Megace. Continue supplements.      Problem/Plan - 7:  Fatigue ? Related to ESRD and poor nutritional intake.  Rule out any Infection: Chest X-ray negative.  Urinalysis: UTI started on Rocephin day 4. Blood and Urine cultures negative.         Problem/Plan - 8:  UTI: large LE and Pyuria on UA. Started on Rocephin  Urine cultures negative.  Complete 5 days of IV medications.        DVT prophylaxis: Heparin  Disposition: NH when stable and Dialysis spot secured at [facility]. 84 yo F with h/o DM, HTN, HL, RA, COPD, CKD IV-V, hypothyroidism, CHF (EF 55% in 12/17), s/p AICD, hemorrhoids, admitted form NH due to worsening renal function with complaints of generalized weakness and Anorexia. 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 On admission blood work showed a Cr of 6.5 (Baseline creat 4.5 (6/4/18)) with hyperkalemia (K~6.) and met acidosis. Patient was thus admitted for renal replacement therapy. She denied Anuria, vomiting or diarrhea.    Assessment and Plan:    Problem/Plan - 1:  ·  Problem: ESRD   Nephrology following: Started on HD scheduled MWF. Cleared for discharge from Nephrology standpoint but should be dialyzed tomorrow before discharge to rehab. Monitor electrolytes.  Out patient HD at [facility] set up. Hepatitis screen negative.  s/p Tesio catheter placement 6/18/18. Vein mapping done.  Follow up with Dr. Baltazar in 1 week.      Problem/Plan - 2:  ·  Problem: Elevated troponin.   Plan: Most likely due to ESRD.  Asymptomatic. ECHO 5/18 showed mildly reduced EF 40-45%.      Problem/Plan - 3:  ·  Problem: COPD (chronic obstructive pulmonary disease).    Plan: stable, Duoneb PRN.       Problem/Plan - 4:  ·  Problem: Uncontrolled Diabetes with episodes of hyperglycemia.  Plan: adjusted Insulin doses.   Hemoglobin A1C, Whole Blood: 12.4 % (05.03.18 @ 05:50)       Problem/Plan - 5:  ·  Problem: Hypothyroidism.    Plan: continue synthroid.   Thyroid Stimulating Hormone, Serum: 2.83 uIU/mL (06.21.18 @ 06:38)      Problem/Plan - 6:  Anorexia Improved  Nutrition consulted: Patient on Mirtazapine. Per Family was on Marinol but this was not helping.  Continue Megace. Continue supplements.      Problem/Plan - 7:  Fatigue ? Related to ESRD and poor nutritional intake.  Rule out any Infection: Chest X-ray negative.  Urinalysis: UTI started on Rocephin day 5. Blood and Urine cultures negative.         Problem/Plan - 8:  UTI: large LE and Pyuria on UA. Started on Rocephin  Urine cultures negative.  Complete 5 days of IV medications.        DVT prophylaxis: Heparin  Disposition: NH when stable and Dialysis spot secured at [facility].

## 2018-06-25 NOTE — PROGRESS NOTE ADULT - SUBJECTIVE AND OBJECTIVE BOX
DIAGNOSIS:   HOSPITAL DAY #:    STATUS POST:    POST OPERATIVE DAY #:     Vital Signs Last 24 Hrs  T(C): 37.1 (25 Jun 2018 22:39), Max: 37.1 (25 Jun 2018 22:39)  T(F): 98.7 (25 Jun 2018 22:39), Max: 98.7 (25 Jun 2018 22:39)  HR: 94 (25 Jun 2018 22:39) (77 - 94)  BP: 130/80 (25 Jun 2018 22:39) (120/73 - 130/80)  BP(mean): --  RR: 16 (25 Jun 2018 22:39) (16 - 17)  SpO2: --    SUBJECTIVE: Pt seen    Pain: YES  [ ]   NO [ ]   Nausea: [ ] YES [ ] NO           Vomiting: [ ] YES [ ] NO  Flatus: [ ] YES [ ] NO             Bowel Movement: [ ] YES [ ] NO     Void: [ ]YES [ ]No      MARSHA DRAINAGE: SIGNIFICANT [ ]   NOT SIGNIFICANT [ ]   NOT APPLICABLE [ ]  YES [ ] NO    General Appearance: Appears well, NAD  Neck: Supple  Chest: Equal expansion bilaterally, equal breath sounds  CV: Pulse regular presently  Abdomen: Soft [x ] YES [ ]NO  DISTENDED [ ] YES [x ] NO TENDERNESS [ ]YES [ ]NO  INCISIONS: HEALING WELL [ ] YES  [ ] NO ERYTHEMA [ ] YES [ ] NO DRAINAGE [ ] YES  [ ] NO  Extremities: Grossly symmetric, CALF TENDERNESS [ ] YES  [ ] NO  tessio site ok    LABS:                        10.6   5.54  )-----------( 148      ( 25 Jun 2018 13:05 )             34.8     06-25    135  |  95<L>  |  29<H>  ----------------------------<  247<H>  5.1<H>   |  26  |  3.5<H>    Ca    8.2<L>      25 Jun 2018 13:05  Phos  3.4     06-25  Mg     2.1     06-25    TPro  5.3<L>  /  Alb  3.2<L>  /  TBili  0.3  /  DBili  x   /  AST  16  /  ALT  7   /  AlkPhos  90  06-24            ASSESSMENT:     GOOD POST OP COURSE [ ]  YES  [ ] NO  CONDITION IMPROVING  []  YES  [ ]  NO          PLAN:    CONTINUE PRESENT MANAGEMENT  [ ] YES  [ ] NO

## 2018-06-25 NOTE — PROGRESS NOTE ADULT - SUBJECTIVE AND OBJECTIVE BOX
DLPAULAVI  83y  Female    Patient is a 83y old  Female who presents with a chief complaint of sent from NH for worsening kidney function (2018 16:26)      INTERVAL HPI/OVERNIGHT EVENTS:  No interval events.  Patient has no complaints.   She has a spot at [facility]: M, W, F.    REVIEW OF SYSTEMS:  CONSTITUTIONAL: No fever, weight loss, fatigue, Anorexia  EYES: No eye pain, visual disturbances, or discharge  ENMT:  No difficulty hearing, tinnitus, vertigo; No sinus or throat pain  RESPIRATORY: No cough, wheezing, chills or hemoptysis; No shortness of breath  CARDIOVASCULAR: No chest pain, palpitations, dizziness, or leg swelling  GASTROINTESTINAL: No abdominal or epigastric pain. No nausea, vomiting, or hematemesis; No diarrhea or constipation. No melena or hematochezia.  GENITOURINARY: No dysuria, frequency, hematuria, or incontinence  NEUROLOGICAL: No headaches, memory loss, loss of strength, numbness, or tremors  SKIN: No itching, burning, rashes, or lesions   LYMPH NODES: No enlarged glands  ENDOCRINE: No heat or cold intolerance; No hair loss  MUSCULOSKELETAL: No joint pain or swelling; No muscle, back, or extremity pain  PSYCHIATRIC: No depression, anxiety, mood swings, or difficulty sleeping  HEME/LYMPH: No easy bruising, or bleeding gums  ALLERGY AND IMMUNOLOGIC: No hives or eczema      VITALS:  T(C): 36.3 (2018 05:30), Max: 37.1 (2018 14:25)  T(F): 97.3 (2018 05:30), Max: 98.8 (2018 14:25)  HR: 94 (2018 05:30) (66 - 94)  BP: 120/73 (2018 05:30) (110/71 - 120/73)  BP(mean): --  RR: 17 (2018 05:30) (16 - 18)  SpO2: --      CAPILLARY BLOOD GLUCOSE  121 (2018 07:20)  241 (2018 21:00)  212 (2018 16:00)  125 (2018 11:32)        PHYSICAL EXAM:  GENERAL: NAD  HEAD:  Atraumatic, Normocephalic  EYES: EOMI, PERRLA, conjunctiva and sclera clear  ENMT: Moist mucous membranes  NECK: Supple, Normal thyroid  NERVOUS SYSTEM:  Alert & Oriented X 3  CHEST/LUNG: Decreased AE at the bases bilaterally; + rales, No rhonchi, wheezing, or rubs  HEART: Regular rate and rhythm; No murmurs, rubs, or gallops  ABDOMEN: Soft, Nontender, Nondistended; Bowel sounds present  EXTREMITIES:  No clubbing, cyanosis, or edema  LYMPH: No lymphadenopathy noted  SKIN: Please see nursing assessment.    Right  chest Tesio. Stables in Place    Consultant(s) Notes Reviewed:  [x ] YES  [ ] NO  Care Discussed with Consultants/Other Providers [ x] YES  [ ] NO    LABS:        138  |  96<L>  |  20  ----------------------------<  60<L>  4.2   |  29  |  2.8<H>    Ca    8.0<L>      2018 06:57    TPro  5.3<L>  /  Alb  3.2<L>  /  TBili  0.3  /  DBili  x   /  AST  16  /  ALT  7   /  AlkPhos  90                             10.0   5.10  )-----------( 149      ( 2018 06:38 )             32.4              Urinalysis Basic - ( 2018 17:54 )    Color: Yellow / Appearance: Clear / S.015 / pH: x  Gluc: x / Ketone: Negative  / Bili: Negative / Urobili: 0.2 mg/dL   Blood: x / Protein: 100 mg/dL / Nitrite: Negative   Leuk Esterase: Large / RBC: 5-10 /HPF / WBC >50 /HPF   Sq Epi: x / Non Sq Epi: Occasional /HPF / Bacteria: x      MICROBIOLOGY:   Culture - Urine (18 @ 17:54)    Specimen Source: .Urine Catheterized    Culture Results: No growth    Culture - Urine (18 @ 15:40)    Specimen Source: .Urine Clean Catch (Midstream)    Culture Results:   Culture grew 3 or more types of organisms which indicate  collection contamination; consider recollection only if        RADIOLOGY & ADDITIONAL TESTS:  CT Abdomen and Pelvis No Cont (18 @ 20:21)   Findings compatible with emphysematous cystitis.      X-ray Chest 1 View- PORTABLE-Urgent (18 @ 11:08)   Support devices: Stable.    Cardiac/mediastinum/hilum: Stable cardiomegaly.    Lung parenchyma/Pleura: Resolving pulmonary congestion and left lower   lobe atelectasis.     Skeleton/soft tissues: Unchanged    Transthoracic Echocardiogram (18 @ 10:04)    1. Left ventricular ejection fraction, by visual estimation, is 40 to   45%.   2. Mildly decreased global left ventricular systolic function.   3. Elevated mean left atrial pressure.   4. Mildly increased LV wall thickness.   5. Spectral Doppler shows pseudonormal pattern of left ventricular   myocardial filling (Grade II diastolic dysfunction).   6. Moderately reduced RV systolic function.   7. Moderate mitral annular calcification.   8. Thickening and calcificationof the anterior and posterior mitral   valve leaflets.   9. Moderate tricuspid regurgitation.  10. Mild aortic regurgitation.  11. Estimated pulmonary artery systolic pressure is 46.6 mmHg assuming a   right atrial pressure of 10 mmHg, which is consistent with mild pulmonary   hypertension.  12. Mitral valve mean gradient is 5.2 mmHg consistent with moderate   mitral stenosis.        Imaging Personally Reviewed:  [x] YES  [ ] NO    MEDICATIONS  (STANDING):  aspirin  chewable 81 milliGRAM(s) Oral daily  cefTRIAXone   IVPB 1 Gram(s) IV Intermittent every 24 hours  chlorhexidine 2% Cloths 1 Application(s) Topical daily  dextrose 5%. 1000 milliLiter(s) (50 mL/Hr) IV Continuous <Continuous>  dextrose 50% Injectable 12.5 Gram(s) IV Push once  dextrose 50% Injectable 25 Gram(s) IV Push once  dextrose 50% Injectable 25 Gram(s) IV Push once  escitalopram 10 milliGRAM(s) Oral daily  heparin  Injectable 5000 Unit(s) SubCutaneous every 8 hours  hydrocortisone 1% Cream 1 Application(s) Topical daily  insulin glargine Injectable (LANTUS) 5 Unit(s) SubCutaneous at bedtime  insulin lispro (HumaLOG) corrective regimen sliding scale   SubCutaneous three times a day before meals  levothyroxine 125 MICROGram(s) Oral daily  megestrol Suspension 400 milliGRAM(s) Oral daily  metoprolol succinate ER 25 milliGRAM(s) Oral daily  mirtazapine 15 milliGRAM(s) Oral at bedtime  senna 1 Tablet(s) Oral at bedtime    MEDICATIONS  (PRN):  acetaminophen   Tablet 650 milliGRAM(s) Oral every 6 hours PRN pain  acetaminophen   Tablet. 975 milliGRAM(s) Oral every 6 hours PRN Severe Pain (7 - 10)  aluminum hydroxide/magnesium hydroxide/simethicone Suspension 30 milliLiter(s) Oral every 4 hours PRN Dyspepsia  dextrose 40% Gel 15 Gram(s) Oral once PRN Blood Glucose LESS THAN 70 milliGRAM(s)/deciliter  glucagon  Injectable 1 milliGRAM(s) IntraMuscular once PRN Glucose LESS THAN 70 milligrams/deciliter  guaiFENesin    Syrup 100 milliGRAM(s) Oral every 6 hours PRN Cough  melatonin 3 milliGRAM(s) Oral at bedtime PRN Insomnia        HEALTH ISSUES - PROBLEM Dx:  Anemia due to chronic kidney disease  Acidosis, metabolic  ESRD (end stage renal disease)  Hypothyroidism  Diabetes  COPD (chronic obstructive pulmonary disease)  Elevated troponin  Renal dysfunction DLPAULAVI  83y  Female    Patient is a 83y old  Female who presents with a chief complaint of sent from NH for worsening kidney function (2018 16:26)      INTERVAL HPI/OVERNIGHT EVENTS:  No interval events.  Patient has no complaints.   She has a spot at [facility]: M, W, F.    REVIEW OF SYSTEMS:  CONSTITUTIONAL: No fever, weight loss, fatigue, Anorexia  EYES: No eye pain, visual disturbances, or discharge  ENMT:  No difficulty hearing, tinnitus, vertigo; No sinus or throat pain  RESPIRATORY: No cough, wheezing, chills or hemoptysis; No shortness of breath  CARDIOVASCULAR: No chest pain, palpitations, dizziness, or leg swelling  GASTROINTESTINAL: No abdominal or epigastric pain. No nausea, vomiting, or hematemesis; No diarrhea or constipation. No melena or hematochezia.  GENITOURINARY: No dysuria, frequency, hematuria, or incontinence  NEUROLOGICAL: No headaches, memory loss, loss of strength, numbness, or tremors  SKIN: No itching, burning, rashes, or lesions   LYMPH NODES: No enlarged glands  ENDOCRINE: No heat or cold intolerance; No hair loss  MUSCULOSKELETAL: No joint pain or swelling; No muscle, back, or extremity pain  PSYCHIATRIC: No depression, anxiety, mood swings, or difficulty sleeping  HEME/LYMPH: No easy bruising, or bleeding gums  ALLERGY AND IMMUNOLOGIC: No hives or eczema      VITALS:  T(C): 36.3 (2018 05:30), Max: 37.1 (2018 14:25)  T(F): 97.3 (2018 05:30), Max: 98.8 (2018 14:25)  HR: 94 (2018 05:30) (66 - 94)  BP: 120/73 (2018 05:30) (110/71 - 120/73)  BP(mean): --  RR: 17 (2018 05:30) (16 - 18)  SpO2: --      CAPILLARY BLOOD GLUCOSE  121 (2018 07:20)  241 (2018 21:00)  212 (2018 16:00)  125 (2018 11:32)        PHYSICAL EXAM:  GENERAL: NAD  HEAD:  Atraumatic, Normocephalic  EYES: EOMI, PERRLA, conjunctiva and sclera clear  ENMT: Moist mucous membranes  NECK: Supple, Normal thyroid  NERVOUS SYSTEM:  Alert & Oriented X 3  CHEST/LUNG: Decreased AE at the bases bilaterally; + rales, No rhonchi, wheezing, or rubs  HEART: Regular rate and rhythm; No murmurs, rubs, or gallops  ABDOMEN: Soft, Nontender, Nondistended; Bowel sounds present  EXTREMITIES:  No clubbing, cyanosis, or edema  LYMPH: No lymphadenopathy noted  SKIN: Please see nursing assessment.    Right  chest Tesio. Stables in Place    Consultant(s) Notes Reviewed:  [x ] YES  [ ] NO  Care Discussed with Consultants/Other Providers [ x] YES  [ ] NO    LABS:      135  |  95<L>  |  29<H>  ----------------------------<  247<H>  5.1<H>   |  26  |  3.5<H>    Ca    8.2<L>      2018 13:05  Phos  3.4       Mg     2.1         TPro  5.3<L>  /  Alb  3.2<L>  /  TBili  0.3  /  DBili  x   /  AST  16  /  ALT  7   /  AlkPhos  90        Urinalysis Basic - ( 2018 17:54 )    Color: Yellow / Appearance: Clear / S.015 / pH: x  Gluc: x / Ketone: Negative  / Bili: Negative / Urobili: 0.2 mg/dL   Blood: x / Protein: 100 mg/dL / Nitrite: Negative   Leuk Esterase: Large / RBC: 5-10 /HPF / WBC >50 /HPF   Sq Epi: x / Non Sq Epi: Occasional /HPF / Bacteria: x      MICROBIOLOGY:   Culture - Urine (18 @ 17:54)    Specimen Source: .Urine Catheterized    Culture Results: No growth    Culture - Urine (18 @ 15:40)    Specimen Source: .Urine Clean Catch (Midstream)    Culture Results:   Culture grew 3 or more types of organisms which indicate  collection contamination; consider recollection only if        RADIOLOGY & ADDITIONAL TESTS:  CT Abdomen and Pelvis No Cont (18 @ 20:21)   Findings compatible with emphysematous cystitis.      X-ray Chest 1 View- PORTABLE-Urgent (18 @ 11:08)   Support devices: Stable.    Cardiac/mediastinum/hilum: Stable cardiomegaly.    Lung parenchyma/Pleura: Resolving pulmonary congestion and left lower   lobe atelectasis.     Skeleton/soft tissues: Unchanged    Transthoracic Echocardiogram (18 @ 10:04)    1. Left ventricular ejection fraction, by visual estimation, is 40 to   45%.   2. Mildly decreased global left ventricular systolic function.   3. Elevated mean left atrial pressure.   4. Mildly increased LV wall thickness.   5. Spectral Doppler shows pseudonormal pattern of left ventricular   myocardial filling (Grade II diastolic dysfunction).   6. Moderately reduced RV systolic function.   7. Moderate mitral annular calcification.   8. Thickening and calcificationof the anterior and posterior mitral   valve leaflets.   9. Moderate tricuspid regurgitation.  10. Mild aortic regurgitation.  11. Estimated pulmonary artery systolic pressure is 46.6 mmHg assuming a   right atrial pressure of 10 mmHg, which is consistent with mild pulmonary   hypertension.  12. Mitral valve mean gradient is 5.2 mmHg consistent with moderate   mitral stenosis.        Imaging Personally Reviewed:  [x] YES  [ ] NO    MEDICATIONS  (STANDING):  aspirin  chewable 81 milliGRAM(s) Oral daily  cefTRIAXone   IVPB 1 Gram(s) IV Intermittent every 24 hours  chlorhexidine 2% Cloths 1 Application(s) Topical daily  dextrose 5%. 1000 milliLiter(s) (50 mL/Hr) IV Continuous <Continuous>  dextrose 50% Injectable 12.5 Gram(s) IV Push once  dextrose 50% Injectable 25 Gram(s) IV Push once  dextrose 50% Injectable 25 Gram(s) IV Push once  escitalopram 10 milliGRAM(s) Oral daily  heparin  Injectable 5000 Unit(s) SubCutaneous every 8 hours  hydrocortisone 1% Cream 1 Application(s) Topical daily  insulin glargine Injectable (LANTUS) 5 Unit(s) SubCutaneous at bedtime  insulin lispro (HumaLOG) corrective regimen sliding scale   SubCutaneous three times a day before meals  levothyroxine 125 MICROGram(s) Oral daily  megestrol Suspension 400 milliGRAM(s) Oral daily  metoprolol succinate ER 25 milliGRAM(s) Oral daily  mirtazapine 15 milliGRAM(s) Oral at bedtime  senna 1 Tablet(s) Oral at bedtime    MEDICATIONS  (PRN):  acetaminophen   Tablet 650 milliGRAM(s) Oral every 6 hours PRN pain  acetaminophen   Tablet. 975 milliGRAM(s) Oral every 6 hours PRN Severe Pain (7 - 10)  aluminum hydroxide/magnesium hydroxide/simethicone Suspension 30 milliLiter(s) Oral every 4 hours PRN Dyspepsia  dextrose 40% Gel 15 Gram(s) Oral once PRN Blood Glucose LESS THAN 70 milliGRAM(s)/deciliter  glucagon  Injectable 1 milliGRAM(s) IntraMuscular once PRN Glucose LESS THAN 70 milligrams/deciliter  guaiFENesin    Syrup 100 milliGRAM(s) Oral every 6 hours PRN Cough  melatonin 3 milliGRAM(s) Oral at bedtime PRN Insomnia        HEALTH ISSUES - PROBLEM Dx:  Anemia due to chronic kidney disease  Acidosis, metabolic  ESRD (end stage renal disease)  Hypothyroidism  Diabetes  COPD (chronic obstructive pulmonary disease)  Elevated troponin  Renal dysfunction RAKELTADEO VI  83y  Female    Patient is a 83y old  Female who presents with a chief complaint of sent from NH for worsening kidney function (2018 16:26)      INTERVAL HPI/OVERNIGHT EVENTS:  No interval events.  Patient has no complaints.   She has a spot at [facility]: M, W, F. Patient will be dialyzed tomorrow prior to discharge.    REVIEW OF SYSTEMS:  CONSTITUTIONAL: No fever, weight loss, fatigue, Anorexia  EYES: No eye pain, visual disturbances, or discharge  ENMT:  No difficulty hearing, tinnitus, vertigo; No sinus or throat pain  RESPIRATORY: No cough, wheezing, chills or hemoptysis; No shortness of breath  CARDIOVASCULAR: No chest pain, palpitations, dizziness, or leg swelling  GASTROINTESTINAL: No abdominal or epigastric pain. No nausea, vomiting, or hematemesis; No diarrhea or constipation. No melena or hematochezia.  GENITOURINARY: No dysuria, frequency, hematuria, or incontinence  NEUROLOGICAL: No headaches, memory loss, loss of strength, numbness, or tremors  SKIN: No itching, burning, rashes, or lesions   LYMPH NODES: No enlarged glands  ENDOCRINE: No heat or cold intolerance; No hair loss  MUSCULOSKELETAL: No joint pain or swelling; No muscle, back, or extremity pain  PSYCHIATRIC: No depression, anxiety, mood swings, or difficulty sleeping  HEME/LYMPH: No easy bruising, or bleeding gums  ALLERGY AND IMMUNOLOGIC: No hives or eczema      VITALS:  T(C): 36.3 (2018 05:30), Max: 37.1 (2018 14:25)  T(F): 97.3 (2018 05:30), Max: 98.8 (2018 14:25)  HR: 94 (2018 05:30) (66 - 94)  BP: 120/73 (2018 05:30) (110/71 - 120/73)  BP(mean): --  RR: 17 (2018 05:30) (16 - 18)  SpO2: --      CAPILLARY BLOOD GLUCOSE  121 (2018 07:20)  241 (2018 21:00)  212 (2018 16:00)  125 (2018 11:32)        PHYSICAL EXAM:  GENERAL: NAD  HEAD:  Atraumatic, Normocephalic  EYES: EOMI, PERRLA, conjunctiva and sclera clear  ENMT: Moist mucous membranes  NECK: Supple, Normal thyroid  NERVOUS SYSTEM:  Alert & Oriented X 3  CHEST/LUNG: Decreased AE at the bases bilaterally; + rales, No rhonchi, wheezing, or rubs  HEART: Regular rate and rhythm; No murmurs, rubs, or gallops  ABDOMEN: Soft, Nontender, Nondistended; Bowel sounds present  EXTREMITIES:  No clubbing, cyanosis, or edema  LYMPH: No lymphadenopathy noted  SKIN: Please see nursing assessment.    Right  chest Tesio. Stables in Place    Consultant(s) Notes Reviewed:  [x ] YES  [ ] NO  Care Discussed with Consultants/Other Providers [ x] YES  [ ] NO    LABS:      135  |  95<L>  |  29<H>  ----------------------------<  247<H>  5.1<H>   |  26  |  3.5<H>    Ca    8.2<L>      2018 13:05  Phos  3.4       Mg     2.1         TPro  5.3<L>  /  Alb  3.2<L>  /  TBili  0.3  /  DBili  x   /  AST  16  /  ALT  7   /  AlkPhos  90        Urinalysis Basic - ( 2018 17:54 )    Color: Yellow / Appearance: Clear / S.015 / pH: x  Gluc: x / Ketone: Negative  / Bili: Negative / Urobili: 0.2 mg/dL   Blood: x / Protein: 100 mg/dL / Nitrite: Negative   Leuk Esterase: Large / RBC: 5-10 /HPF / WBC >50 /HPF   Sq Epi: x / Non Sq Epi: Occasional /HPF / Bacteria: x      MICROBIOLOGY:   Culture - Urine (18 @ 17:54)    Specimen Source: .Urine Catheterized    Culture Results: No growth    Culture - Urine (18 @ 15:40)    Specimen Source: .Urine Clean Catch (Midstream)    Culture Results:   Culture grew 3 or more types of organisms which indicate  collection contamination; consider recollection only if        RADIOLOGY & ADDITIONAL TESTS:  CT Abdomen and Pelvis No Cont (18 @ 20:21)   Findings compatible with emphysematous cystitis.      X-ray Chest 1 View- PORTABLE-Urgent (18 @ 11:08)   Support devices: Stable.    Cardiac/mediastinum/hilum: Stable cardiomegaly.    Lung parenchyma/Pleura: Resolving pulmonary congestion and left lower   lobe atelectasis.     Skeleton/soft tissues: Unchanged    Transthoracic Echocardiogram (18 @ 10:04)    1. Left ventricular ejection fraction, by visual estimation, is 40 to   45%.   2. Mildly decreased global left ventricular systolic function.   3. Elevated mean left atrial pressure.   4. Mildly increased LV wall thickness.   5. Spectral Doppler shows pseudonormal pattern of left ventricular   myocardial filling (Grade II diastolic dysfunction).   6. Moderately reduced RV systolic function.   7. Moderate mitral annular calcification.   8. Thickening and calcificationof the anterior and posterior mitral   valve leaflets.   9. Moderate tricuspid regurgitation.  10. Mild aortic regurgitation.  11. Estimated pulmonary artery systolic pressure is 46.6 mmHg assuming a   right atrial pressure of 10 mmHg, which is consistent with mild pulmonary   hypertension.  12. Mitral valve mean gradient is 5.2 mmHg consistent with moderate   mitral stenosis.        Imaging Personally Reviewed:  [x] YES  [ ] NO    MEDICATIONS  (STANDING):  aspirin  chewable 81 milliGRAM(s) Oral daily  cefTRIAXone   IVPB 1 Gram(s) IV Intermittent every 24 hours  chlorhexidine 2% Cloths 1 Application(s) Topical daily  dextrose 5%. 1000 milliLiter(s) (50 mL/Hr) IV Continuous <Continuous>  dextrose 50% Injectable 12.5 Gram(s) IV Push once  dextrose 50% Injectable 25 Gram(s) IV Push once  dextrose 50% Injectable 25 Gram(s) IV Push once  escitalopram 10 milliGRAM(s) Oral daily  heparin  Injectable 5000 Unit(s) SubCutaneous every 8 hours  hydrocortisone 1% Cream 1 Application(s) Topical daily  insulin glargine Injectable (LANTUS) 5 Unit(s) SubCutaneous at bedtime  insulin lispro (HumaLOG) corrective regimen sliding scale   SubCutaneous three times a day before meals  levothyroxine 125 MICROGram(s) Oral daily  megestrol Suspension 400 milliGRAM(s) Oral daily  metoprolol succinate ER 25 milliGRAM(s) Oral daily  mirtazapine 15 milliGRAM(s) Oral at bedtime  senna 1 Tablet(s) Oral at bedtime    MEDICATIONS  (PRN):  acetaminophen   Tablet 650 milliGRAM(s) Oral every 6 hours PRN pain  acetaminophen   Tablet. 975 milliGRAM(s) Oral every 6 hours PRN Severe Pain (7 - 10)  aluminum hydroxide/magnesium hydroxide/simethicone Suspension 30 milliLiter(s) Oral every 4 hours PRN Dyspepsia  dextrose 40% Gel 15 Gram(s) Oral once PRN Blood Glucose LESS THAN 70 milliGRAM(s)/deciliter  glucagon  Injectable 1 milliGRAM(s) IntraMuscular once PRN Glucose LESS THAN 70 milligrams/deciliter  guaiFENesin    Syrup 100 milliGRAM(s) Oral every 6 hours PRN Cough  melatonin 3 milliGRAM(s) Oral at bedtime PRN Insomnia        HEALTH ISSUES - PROBLEM Dx:  Anemia due to chronic kidney disease  Acidosis, metabolic  ESRD (end stage renal disease)  Hypothyroidism  Diabetes  COPD (chronic obstructive pulmonary disease)  Elevated troponin  Renal dysfunction

## 2018-06-26 ENCOUNTER — TRANSCRIPTION ENCOUNTER (OUTPATIENT)
Age: 83
End: 2018-06-26

## 2018-06-26 VITALS
SYSTOLIC BLOOD PRESSURE: 132 MMHG | HEART RATE: 88 BPM | TEMPERATURE: 97 F | DIASTOLIC BLOOD PRESSURE: 75 MMHG | RESPIRATION RATE: 16 BRPM

## 2018-06-26 LAB
CULTURE RESULTS: SIGNIFICANT CHANGE UP
CULTURE RESULTS: SIGNIFICANT CHANGE UP
SPECIMEN SOURCE: SIGNIFICANT CHANGE UP
SPECIMEN SOURCE: SIGNIFICANT CHANGE UP

## 2018-06-26 RX ORDER — LANOLIN ALCOHOL/MO/W.PET/CERES
1 CREAM (GRAM) TOPICAL
Qty: 0 | Refills: 0 | COMMUNITY
Start: 2018-06-26

## 2018-06-26 RX ORDER — MEGESTROL ACETATE 40 MG/ML
10 SUSPENSION ORAL
Qty: 0 | Refills: 0 | COMMUNITY
Start: 2018-06-26

## 2018-06-26 RX ORDER — ACETAMINOPHEN 500 MG
2 TABLET ORAL
Qty: 0 | Refills: 0 | COMMUNITY
Start: 2018-06-26

## 2018-06-26 RX ADMIN — Medication 2: at 11:54

## 2018-06-26 RX ADMIN — HEPARIN SODIUM 5000 UNIT(S): 5000 INJECTION INTRAVENOUS; SUBCUTANEOUS at 13:59

## 2018-06-26 RX ADMIN — Medication 25 MILLIGRAM(S): at 05:18

## 2018-06-26 RX ADMIN — Medication 81 MILLIGRAM(S): at 11:26

## 2018-06-26 RX ADMIN — Medication 1 APPLICATION(S): at 11:26

## 2018-06-26 RX ADMIN — CHLORHEXIDINE GLUCONATE 1 APPLICATION(S): 213 SOLUTION TOPICAL at 11:27

## 2018-06-26 RX ADMIN — HEPARIN SODIUM 5000 UNIT(S): 5000 INJECTION INTRAVENOUS; SUBCUTANEOUS at 05:17

## 2018-06-26 RX ADMIN — ESCITALOPRAM OXALATE 10 MILLIGRAM(S): 10 TABLET, FILM COATED ORAL at 11:26

## 2018-06-26 RX ADMIN — Medication 5: at 08:07

## 2018-06-26 RX ADMIN — Medication 125 MICROGRAM(S): at 05:18

## 2018-06-26 RX ADMIN — Medication 650 MILLIGRAM(S): at 05:17

## 2018-06-26 RX ADMIN — MEGESTROL ACETATE 400 MILLIGRAM(S): 40 SUSPENSION ORAL at 11:26

## 2018-06-26 NOTE — DISCHARGE NOTE ADULT - OTHER SIGNIFICANT FINDINGS
LABS:      135  |  95<L>  |  29<H>  ----------------------------<  247<H>  5.1<H>   |  26  |  3.5<H>    Ca    8.2<L>      2018 13:05  Phos  3.4       Mg     2.1         TPro  5.3<L>  /  Alb  3.2<L>  /  TBili  0.3  /  DBili  x   /  AST  16  /  ALT  7   /  AlkPhos  90        Urinalysis Basic - ( 2018 17:54 )    Color: Yellow / Appearance: Clear / S.015 / pH: x  Gluc: x / Ketone: Negative  / Bili: Negative / Urobili: 0.2 mg/dL   Blood: x / Protein: 100 mg/dL / Nitrite: Negative   Leuk Esterase: Large / RBC: 5-10 /HPF / WBC >50 /HPF   Sq Epi: x / Non Sq Epi: Occasional /HPF / Bacteria: x      MICROBIOLOGY:   Culture - Urine (18 @ 17:54)    Specimen Source: .Urine Catheterized    Culture Results: No growth    Culture - Urine (18 @ 15:40)    Specimen Source: .Urine Clean Catch (Midstream)    Culture Results:   Culture grew 3 or more types of organisms which indicate  collection contamination; consider recollection only if        RADIOLOGY & ADDITIONAL TESTS:  CT Abdomen and Pelvis No Cont (18 @ 20:21)   Findings compatible with emphysematous cystitis.      X-ray Chest 1 View- PORTABLE-Urgent (18 @ 11:08)   Support devices: Stable.    Cardiac/mediastinum/hilum: Stable cardiomegaly.    Lung parenchyma/Pleura: Resolving pulmonary congestion and left lower   lobe atelectasis.     Skeleton/soft tissues: Unchanged    Transthoracic Echocardiogram (18 @ 10:04)    1. Left ventricular ejection fraction, by visual estimation, is 40 to   45%.   2. Mildly decreased global left ventricular systolic function.   3. Elevated mean left atrial pressure.   4. Mildly increased LV wall thickness.   5. Spectral Doppler shows pseudonormal pattern of left ventricular   myocardial filling (Grade II diastolic dysfunction).   6. Moderately reduced RV systolic function.   7. Moderate mitral annular calcification.   8. Thickening and calcificationof the anterior and posterior mitral   valve leaflets.   9. Moderate tricuspid regurgitation.  10. Mild aortic regurgitation.  11. Estimated pulmonary artery systolic pressure is 46.6 mmHg assuming a   right atrial pressure of 10 mmHg, which is consistent with mild pulmonary   hypertension.  12. Mitral valve mean gradient is 5.2 mmHg consistent with moderate   mitral stenosis.

## 2018-06-26 NOTE — DISCHARGE NOTE ADULT - CARE PROVIDER_API CALL
Gavin Garcia), Internal Medicine  [address] [place] [address]  Phone: [phone]  Fax: [phone]    Rios Baltazar), Surgery  [address] [address]  Phone: [phone]  Fax: [phone]    Meli Ortiz), Nephrology  [address] [place] [id number]  Phone: [phone]  Fax: [phone]

## 2018-06-26 NOTE — DISCHARGE NOTE ADULT - CARE PLAN
Principal Discharge DX:	ESRD (end stage renal disease)  Goal:	Prevent associated complications.  Assessment and plan of treatment:	Continue Dialysis as scheduled.  Follow up with Nephrologist.  Secondary Diagnosis:	Diabetes  Goal:	Good Glycemic control  Assessment and plan of treatment:	Continue Insulin with dose adjustment.  Monitor Finger sticks closely to prevent hypoglycemia.  Secondary Diagnosis:	HTN (hypertension)  Goal:	Good Blood pressure control  Assessment and plan of treatment:	Continue Metoprolol.

## 2018-06-26 NOTE — DISCHARGE NOTE ADULT - PATIENT PORTAL LINK FT
You can access the [facility], offered by [facility], by registering with the following website: [email or web address]

## 2018-06-26 NOTE — PROGRESS NOTE ADULT - SUBJECTIVE AND OBJECTIVE BOX
DLPAULAVI  83y  Female    Patient is a 83y old  Female who presents with a chief complaint of sent from NH for worsening kidney function (2018 16:26)      INTERVAL HPI/OVERNIGHT EVENTS:  No interval events.  Patient has no complaints.   She has a spot at [facility]: M, W, F. Patient will be dialyzed today and discharged. She will be resume her usual schedule tomorrow.    REVIEW OF SYSTEMS:  CONSTITUTIONAL: No fever, weight loss, fatigue, Anorexia  EYES: No eye pain, visual disturbances, or discharge  ENMT:  No difficulty hearing, tinnitus, vertigo; No sinus or throat pain  RESPIRATORY: No cough, wheezing, chills or hemoptysis; No shortness of breath  CARDIOVASCULAR: No chest pain, palpitations, dizziness, or leg swelling  GASTROINTESTINAL: No abdominal or epigastric pain. No nausea, vomiting, or hematemesis; No diarrhea or constipation. No melena or hematochezia.  GENITOURINARY: No dysuria, frequency, hematuria, or incontinence  NEUROLOGICAL: No headaches, memory loss, loss of strength, numbness, or tremors  SKIN: No itching, burning, rashes, or lesions   LYMPH NODES: No enlarged glands  ENDOCRINE: No heat or cold intolerance; No hair loss  MUSCULOSKELETAL: No joint pain or swelling; No muscle, back, or extremity pain  PSYCHIATRIC: No depression, anxiety, mood swings, or difficulty sleeping  HEME/LYMPH: No easy bruising, or bleeding gums  ALLERGY AND IMMUNOLOGIC: No hives or eczema      VITALS:  T(C): 35.6 (2018 05:29), Max: 37.1 (2018 22:39)  T(F): 96.1 (2018 05:29), Max: 98.7 (2018 22:39)  HR: 84 (2018 05:29) (77 - 94)  BP: 125/74 (2018 05:29) (125/74 - 130/80)  BP(mean): --  RR: 16 (2018 05:29) (16 - 16)  SpO2: --      CAPILLARY BLOOD GLUCOSE  248 (2018 21:03)  236 (2018 17:12)  268 (2018 11:30)  121 (2018 07:20)        PHYSICAL EXAM:  GENERAL: NAD  HEAD:  Atraumatic, Normocephalic  EYES: EOMI, PERRLA, conjunctiva and sclera clear  ENMT: Moist mucous membranes  NECK: Supple, Normal thyroid  NERVOUS SYSTEM:  Alert & Oriented X 3  CHEST/LUNG: Decreased AE at the bases bilaterally; + rales, No rhonchi, wheezing, or rubs  HEART: Regular rate and rhythm; No murmurs, rubs, or gallops  ABDOMEN: Soft, Nontender, Nondistended; Bowel sounds present  EXTREMITIES:  No clubbing, cyanosis, or edema  LYMPH: No lymphadenopathy noted  SKIN: Please see nursing assessment.    Right  chest Tesio. Stables in Place    Consultant(s) Notes Reviewed:  [x ] YES  [ ] NO  Care Discussed with Consultants/Other Providers [ x] YES  [ ] NO    LABS:      135  |  95<L>  |  29<H>  ----------------------------<  247<H>  5.1<H>   |  26  |  3.5<H>    Ca    8.2<L>      2018 13:05  Phos  3.4       Mg     2.1         TPro  5.3<L>  /  Alb  3.2<L>  /  TBili  0.3  /  DBili  x   /  AST  16  /  ALT  7   /  AlkPhos  90        Urinalysis Basic - ( 2018 17:54 )    Color: Yellow / Appearance: Clear / S.015 / pH: x  Gluc: x / Ketone: Negative  / Bili: Negative / Urobili: 0.2 mg/dL   Blood: x / Protein: 100 mg/dL / Nitrite: Negative   Leuk Esterase: Large / RBC: 5-10 /HPF / WBC >50 /HPF   Sq Epi: x / Non Sq Epi: Occasional /HPF / Bacteria: x      MICROBIOLOGY:   Culture - Urine (18 @ 17:54)    Specimen Source: .Urine Catheterized    Culture Results: No growth    Culture - Urine (18 @ 15:40)    Specimen Source: .Urine Clean Catch (Midstream)    Culture Results:   Culture grew 3 or more types of organisms which indicate  collection contamination; consider recollection only if        RADIOLOGY & ADDITIONAL TESTS:  CT Abdomen and Pelvis No Cont (18 @ 20:21)   Findings compatible with emphysematous cystitis.      X-ray Chest 1 View- PORTABLE-Urgent (18 @ 11:08)   Support devices: Stable.    Cardiac/mediastinum/hilum: Stable cardiomegaly.    Lung parenchyma/Pleura: Resolving pulmonary congestion and left lower   lobe atelectasis.     Skeleton/soft tissues: Unchanged    Transthoracic Echocardiogram (18 @ 10:04)    1. Left ventricular ejection fraction, by visual estimation, is 40 to   45%.   2. Mildly decreased global left ventricular systolic function.   3. Elevated mean left atrial pressure.   4. Mildly increased LV wall thickness.   5. Spectral Doppler shows pseudonormal pattern of left ventricular   myocardial filling (Grade II diastolic dysfunction).   6. Moderately reduced RV systolic function.   7. Moderate mitral annular calcification.   8. Thickening and calcificationof the anterior and posterior mitral   valve leaflets.   9. Moderate tricuspid regurgitation.  10. Mild aortic regurgitation.  11. Estimated pulmonary artery systolic pressure is 46.6 mmHg assuming a   right atrial pressure of 10 mmHg, which is consistent with mild pulmonary   hypertension.  12. Mitral valve mean gradient is 5.2 mmHg consistent with moderate   mitral stenosis.        Imaging Personally Reviewed:  [x] YES  [ ] NO    MEDICATIONS  (STANDING):  aspirin  chewable 81 milliGRAM(s) Oral daily  cefTRIAXone   IVPB 1 Gram(s) IV Intermittent every 24 hours  chlorhexidine 2% Cloths 1 Application(s) Topical daily  dextrose 5%. 1000 milliLiter(s) (50 mL/Hr) IV Continuous <Continuous>  dextrose 50% Injectable 12.5 Gram(s) IV Push once  dextrose 50% Injectable 25 Gram(s) IV Push once  dextrose 50% Injectable 25 Gram(s) IV Push once  escitalopram 10 milliGRAM(s) Oral daily  heparin  Injectable 5000 Unit(s) SubCutaneous every 8 hours  hydrocortisone 1% Cream 1 Application(s) Topical daily  insulin glargine Injectable (LANTUS) 5 Unit(s) SubCutaneous at bedtime  insulin lispro (HumaLOG) corrective regimen sliding scale   SubCutaneous three times a day before meals  levothyroxine 125 MICROGram(s) Oral daily  megestrol Suspension 400 milliGRAM(s) Oral daily  metoprolol succinate ER 25 milliGRAM(s) Oral daily  mirtazapine 15 milliGRAM(s) Oral at bedtime  senna 1 Tablet(s) Oral at bedtime    MEDICATIONS  (PRN):  acetaminophen   Tablet 650 milliGRAM(s) Oral every 6 hours PRN pain  acetaminophen   Tablet. 975 milliGRAM(s) Oral every 6 hours PRN Severe Pain (7 - 10)  aluminum hydroxide/magnesium hydroxide/simethicone Suspension 30 milliLiter(s) Oral every 4 hours PRN Dyspepsia  dextrose 40% Gel 15 Gram(s) Oral once PRN Blood Glucose LESS THAN 70 milliGRAM(s)/deciliter  glucagon  Injectable 1 milliGRAM(s) IntraMuscular once PRN Glucose LESS THAN 70 milligrams/deciliter  guaiFENesin    Syrup 100 milliGRAM(s) Oral every 6 hours PRN Cough  melatonin 3 milliGRAM(s) Oral at bedtime PRN Insomnia        HEALTH ISSUES - PROBLEM Dx:  Anemia due to chronic kidney disease  Acidosis, metabolic  ESRD (end stage renal disease)  Hypothyroidism  Diabetes  COPD (chronic obstructive pulmonary disease)  Elevated troponin  Renal dysfunction DLPAULAVI  83y  Female    Patient is a 83y old  Female who presents with a chief complaint of sent from NH for worsening kidney function (2018 16:26)      INTERVAL HPI/OVERNIGHT EVENTS:  No interval events.  Patient has no complaints.   She has a spot at [facility]: M, W, F. Patient will be dialyzed today and discharged. She will be resume her usual schedule tomorrow.    REVIEW OF SYSTEMS:  CONSTITUTIONAL: No fever, weight loss, fatigue, Anorexia  EYES: No eye pain, visual disturbances, or discharge  ENMT:  No difficulty hearing, tinnitus, vertigo; No sinus or throat pain  RESPIRATORY: No cough, wheezing, chills or hemoptysis; No shortness of breath  CARDIOVASCULAR: No chest pain, palpitations, dizziness, or leg swelling  GASTROINTESTINAL: No abdominal or epigastric pain. No nausea, vomiting, or hematemesis; No diarrhea or constipation. No melena or hematochezia.  GENITOURINARY: No dysuria, frequency, hematuria, or incontinence  NEUROLOGICAL: No headaches, memory loss, loss of strength, numbness, or tremors  SKIN: No itching, burning, rashes, or lesions   LYMPH NODES: No enlarged glands  ENDOCRINE: No heat or cold intolerance; No hair loss  MUSCULOSKELETAL: No joint pain or swelling; No muscle, back, or extremity pain  PSYCHIATRIC: No depression, anxiety, mood swings, or difficulty sleeping  HEME/LYMPH: No easy bruising, or bleeding gums  ALLERGY AND IMMUNOLOGIC: No hives or eczema      VITALS:  T(C): 35.6 (2018 05:29), Max: 37.1 (2018 22:39)  T(F): 96.1 (2018 05:29), Max: 98.7 (2018 22:39)  HR: 84 (2018 05:29) (77 - 94)  BP: 125/74 (2018 05:29) (125/74 - 130/80)  BP(mean): --  RR: 16 (2018 05:29) (16 - 16)  SpO2: --      CAPILLARY BLOOD GLUCOSE  247 (2018 11:28)  380 (2018 07:53)  248 (2018 21:03)  236 (2018 17:12)        PHYSICAL EXAM:  GENERAL: NAD  HEAD:  Atraumatic, Normocephalic  EYES: EOMI, PERRLA, conjunctiva and sclera clear  ENMT: Moist mucous membranes  NECK: Supple, Normal thyroid  NERVOUS SYSTEM:  Alert & Oriented X 3  CHEST/LUNG: Decreased AE at the bases bilaterally; + rales, No rhonchi, wheezing, or rubs  HEART: Regular rate and rhythm; No murmurs, rubs, or gallops  ABDOMEN: Soft, Nontender, Nondistended; Bowel sounds present  EXTREMITIES:  No clubbing, cyanosis, or edema  LYMPH: No lymphadenopathy noted  SKIN: Please see nursing assessment.    Right  chest Tesio. Stables in Place    Consultant(s) Notes Reviewed:  [x ] YES  [ ] NO  Care Discussed with Consultants/Other Providers [ x] YES  [ ] NO    LABS:      135  |  95<L>  |  29<H>  ----------------------------<  247<H>  5.1<H>   |  26  |  3.5<H>    Ca    8.2<L>      2018 13:05  Phos  3.4       Mg     2.1         TPro  5.3<L>  /  Alb  3.2<L>  /  TBili  0.3  /  DBili  x   /  AST  16  /  ALT  7   /  AlkPhos  90        Urinalysis Basic - ( 2018 17:54 )    Color: Yellow / Appearance: Clear / S.015 / pH: x  Gluc: x / Ketone: Negative  / Bili: Negative / Urobili: 0.2 mg/dL   Blood: x / Protein: 100 mg/dL / Nitrite: Negative   Leuk Esterase: Large / RBC: 5-10 /HPF / WBC >50 /HPF   Sq Epi: x / Non Sq Epi: Occasional /HPF / Bacteria: x      MICROBIOLOGY:   Culture - Urine (18 @ 17:54)    Specimen Source: .Urine Catheterized    Culture Results: No growth    Culture - Urine (18 @ 15:40)    Specimen Source: .Urine Clean Catch (Midstream)    Culture Results:   Culture grew 3 or more types of organisms which indicate  collection contamination; consider recollection only if        RADIOLOGY & ADDITIONAL TESTS:  CT Abdomen and Pelvis No Cont (18 @ 20:21)   Findings compatible with emphysematous cystitis.      X-ray Chest 1 View- PORTABLE-Urgent (18 @ 11:08)   Support devices: Stable.    Cardiac/mediastinum/hilum: Stable cardiomegaly.    Lung parenchyma/Pleura: Resolving pulmonary congestion and left lower   lobe atelectasis.     Skeleton/soft tissues: Unchanged    Transthoracic Echocardiogram (18 @ 10:04)    1. Left ventricular ejection fraction, by visual estimation, is 40 to   45%.   2. Mildly decreased global left ventricular systolic function.   3. Elevated mean left atrial pressure.   4. Mildly increased LV wall thickness.   5. Spectral Doppler shows pseudonormal pattern of left ventricular   myocardial filling (Grade II diastolic dysfunction).   6. Moderately reduced RV systolic function.   7. Moderate mitral annular calcification.   8. Thickening and calcificationof the anterior and posterior mitral   valve leaflets.   9. Moderate tricuspid regurgitation.  10. Mild aortic regurgitation.  11. Estimated pulmonary artery systolic pressure is 46.6 mmHg assuming a   right atrial pressure of 10 mmHg, which is consistent with mild pulmonary   hypertension.  12. Mitral valve mean gradient is 5.2 mmHg consistent with moderate   mitral stenosis.        Imaging Personally Reviewed:  [x] YES  [ ] NO    MEDICATIONS  (STANDING):  aspirin  chewable 81 milliGRAM(s) Oral daily  cefTRIAXone   IVPB 1 Gram(s) IV Intermittent every 24 hours  chlorhexidine 2% Cloths 1 Application(s) Topical daily  dextrose 5%. 1000 milliLiter(s) (50 mL/Hr) IV Continuous <Continuous>  dextrose 50% Injectable 12.5 Gram(s) IV Push once  dextrose 50% Injectable 25 Gram(s) IV Push once  dextrose 50% Injectable 25 Gram(s) IV Push once  escitalopram 10 milliGRAM(s) Oral daily  heparin  Injectable 5000 Unit(s) SubCutaneous every 8 hours  hydrocortisone 1% Cream 1 Application(s) Topical daily  insulin glargine Injectable (LANTUS) 5 Unit(s) SubCutaneous at bedtime  insulin lispro (HumaLOG) corrective regimen sliding scale   SubCutaneous three times a day before meals  levothyroxine 125 MICROGram(s) Oral daily  megestrol Suspension 400 milliGRAM(s) Oral daily  metoprolol succinate ER 25 milliGRAM(s) Oral daily  mirtazapine 15 milliGRAM(s) Oral at bedtime  senna 1 Tablet(s) Oral at bedtime    MEDICATIONS  (PRN):  acetaminophen   Tablet 650 milliGRAM(s) Oral every 6 hours PRN pain  acetaminophen   Tablet. 975 milliGRAM(s) Oral every 6 hours PRN Severe Pain (7 - 10)  aluminum hydroxide/magnesium hydroxide/simethicone Suspension 30 milliLiter(s) Oral every 4 hours PRN Dyspepsia  dextrose 40% Gel 15 Gram(s) Oral once PRN Blood Glucose LESS THAN 70 milliGRAM(s)/deciliter  glucagon  Injectable 1 milliGRAM(s) IntraMuscular once PRN Glucose LESS THAN 70 milligrams/deciliter  guaiFENesin    Syrup 100 milliGRAM(s) Oral every 6 hours PRN Cough  melatonin 3 milliGRAM(s) Oral at bedtime PRN Insomnia        HEALTH ISSUES - PROBLEM Dx:  Anemia due to chronic kidney disease  Acidosis, metabolic  ESRD (end stage renal disease)  Hypothyroidism  Diabetes  COPD (chronic obstructive pulmonary disease)  Elevated troponin  Renal dysfunction

## 2018-06-26 NOTE — DISCHARGE NOTE ADULT - CARE PROVIDERS DIRECT ADDRESSES
[email or web address],[email or web address],DirectAddress_Unknown

## 2018-06-26 NOTE — PROGRESS NOTE ADULT - SUBJECTIVE AND OBJECTIVE BOX
Nephrology progress note    Patient is seen and examined, events over the last 24 h noted .  No major events  has skin scratches all over her body     Allergies:  Plavix (Other (Moderate))    Hospital Medications:   MEDICATIONS  (STANDING):  aspirin  chewable 81 milliGRAM(s) Oral daily  chlorhexidine 2% Cloths 1 Application(s) Topical daily  dextrose 5%. 1000 milliLiter(s) (50 mL/Hr) IV Continuous <Continuous>  escitalopram 10 milliGRAM(s) Oral daily  heparin  Injectable 5000 Unit(s) SubCutaneous every 8 hours  hydrocortisone 1% Cream 1 Application(s) Topical daily  insulin glargine Injectable (LANTUS) 5 Unit(s) SubCutaneous at bedtime  insulin lispro (HumaLOG) corrective regimen sliding scale   SubCutaneous three times a day before meals  levothyroxine 125 MICROGram(s) Oral daily  megestrol Suspension 400 milliGRAM(s) Oral daily  metoprolol succinate ER 25 milliGRAM(s) Oral daily  mirtazapine 15 milliGRAM(s) Oral at bedtime  senna 1 Tablet(s) Oral at bedtime        VITALS:  T(F): 96.1 (18 @ 05:29), Max: 98.7 (18 @ 22:39)  HR: 96 (18 @ 08:30)  BP: 137/89 (18 @ 08:30)  RR: 17 (18 @ 08:30)  SpO2: 97% (18 @ 08:30)       @ 07:01  -   @ 07:00  --------------------------------------------------------  IN: 330 mL / OUT: 0 mL / NET: 330 mL          PHYSICAL EXAM:  Constitutional: NAD  HEENT: anicteric sclera, oropharynx clear, MMM  Neck: No JVD  Respiratory: CTAB, no wheezes, rales or rhonchi  Cardiovascular: S1, S2, RRR  Gastrointestinal: BS+, soft, NT/ND  Extremities: No cyanosis or clubbing. No peripheral edema  :  No davis.   Skin: No rashes    LABS:  06-25    135  |  95<L>  |  29<H>  ----------------------------<  247<H>  5.1<H>   |  26  |  3.5<H>    Ca    8.2<L>      2018 13:05  Phos  3.4     06-25  Mg     2.1     06-25                            10.6   5.54  )-----------( 148      ( 2018 13:05 )             34.8       Urine Studies:  Urinalysis Basic - ( 2018 17:54 )    Color: Yellow / Appearance: Clear / S.015 / pH:   Gluc:  / Ketone: Negative  / Bili: Negative / Urobili: 0.2 mg/dL   Blood:  / Protein: 100 mg/dL / Nitrite: Negative   Leuk Esterase: Large / RBC: 5-10 /HPF / WBC >50 /HPF   Sq Epi:  / Non Sq Epi: Occasional /HPF / Bacteria:         RADIOLOGY & ADDITIONAL STUDIES:

## 2018-06-26 NOTE — PROGRESS NOTE ADULT - ASSESSMENT
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    ·	CKD 5 - for HD today 3h opti 160 2k UF 1l    pt has a slot for HD wedn and [facility] spot available  (no fluid overload, labs as of 6/24 are OK)      D/W Hospitalist

## 2018-06-26 NOTE — DISCHARGE NOTE ADULT - PLAN OF CARE
Prevent associated complications. Continue Dialysis as scheduled.  Follow up with Nephrologist. Good Glycemic control Continue Insulin with dose adjustment.  Monitor Finger sticks closely to prevent hypoglycemia. Good Blood pressure control Continue Metoprolol.

## 2018-06-26 NOTE — DISCHARGE NOTE ADULT - MEDICATION SUMMARY - MEDICATIONS TO TAKE
I will START or STAY ON the medications listed below when I get home from the hospital:    aspirin 81 mg oral tablet  -- 1 tab(s) by mouth once a day  -- Indication: For prophylaxis    acetaminophen 325 mg oral tablet  -- 2 tab(s) by mouth every 6 hours, As needed, pain  -- Indication: For Pain    aluminum hydroxide-magnesium hydroxide 200 mg-200 mg/5 mL oral suspension  -- 30 milliliter(s) by mouth every 4 hours, As needed, Dyspepsia  -- Indication: For Dyspepsia    mirtazapine 15 mg oral tablet  -- 1 tab(s) by mouth once a day (at bedtime)  -- Indication: For Depression/Anorexia    escitalopram 10 mg oral tablet  -- 1 tab(s) by mouth once a day  -- Indication: For Depression    insulin glargine  -- 5 unit(s) subcutaneous once a day  -- Indication: For Diabetes    insulin lispro (concentrated) 200 units/mL subcutaneous solution  -- 2 unit(s) subcutaneous 3 times a day (before meals)  -- Indication: For Diabetes    pravastatin 10 mg oral tablet  -- 1 tab(s) by mouth once a day  -- Indication: For Hyperlipidemia    megestrol 40 mg/mL oral suspension  -- 10 milliliter(s) by mouth once a day  -- Indication: For Anorexia    metoprolol succinate 25 mg oral tablet, extended release  -- 1 tab(s) by mouth once a day  -- Indication: For Hypertension    nystatin 100,000 units/g topical powder  -- 1 application on skin 2 times a day  -- Indication: For Rash    guaiFENesin 100 mg/5 mL oral liquid  -- 5 milliliter(s) by mouth every 6 hours, As needed, Cough  -- Indication: For COugh    FeroSul 325 mg (65 mg elemental iron) oral tablet  -- 1 tab(s) by mouth 3 times a day  -- Indication: For Anemia due to chronic kidney disease    senna oral tablet  -- 1 tab(s) by mouth once a day (at bedtime)  -- Indication: For COnstipation    simethicone 80 mg oral tablet, chewable  -- 1 tab(s) by mouth every 8 hours, As needed, Gas  -- Indication: For Dyspepsis    melatonin 3 mg oral tablet  -- 1 tab(s) by mouth once a day (at bedtime), As needed, Insomnia  -- Indication: For Insomnia    levothyroxine 125 mcg (0.125 mg) oral tablet  -- 1 tab(s) by mouth once a day  -- Indication: For Hypothyroidism

## 2018-06-26 NOTE — PROGRESS NOTE ADULT - PROVIDER SPECIALTY LIST ADULT
Hospitalist
Nephrology
Surgery
Hospitalist
Hospitalist
Nephrology
Hospitalist

## 2018-06-26 NOTE — DISCHARGE NOTE ADULT - HOSPITAL COURSE
84 yo F with h/o DM, HTN, HL, RA, COPD, CKD IV-V, hypothyroidism, CHF (EF 55% in 12/17), s/p AICD, hemorrhoids, admitted form NH due to worsening renal function with complaints of generalized weakness and Anorexia. 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 On admission blood work showed a Cr of 6.5 (Baseline creat 4.5 (6/4/18)) with hyperkalemia (K~6.) and met acidosis. Patient was thus admitted for renal replacement therapy. She denied Anuria, vomiting or diarrhea.    Assessment and Plan:    Problem/Plan - 1:  ·  Problem: ESRD   Nephrology following: Started on HD scheduled MWF. Cleared for discharge from Nephrology standpoint but should be dialyzed tomorrow before discharge to rehab. Monitor electrolytes.  Out patient HD at [facility] set up. Hepatitis screen negative. Follow up with Nephrologist.  s/p Tesio catheter placement 6/18/18. Vein mapping done.  Follow up with Dr. Baltazar in 1 week.      Problem/Plan - 2:  ·  Problem: Elevated troponin.   Plan: Most likely due to ESRD.  Asymptomatic. ECHO 5/18 showed mildly reduced EF 40-45%.      Problem/Plan - 3:  ·  Problem: COPD (chronic obstructive pulmonary disease).    Plan: stable, Duoneb PRN.       Problem/Plan - 4:  ·  Problem: Uncontrolled Diabetes. Admitted with DKA.  Plan: adjusted Insulin doses but will need closely monitoring to prevent fluctuations in blood Glucose.   Hemoglobin A1C, Whole Blood: 12.4 % (05.03.18 @ 05:50)       Problem/Plan - 5:  ·  Problem: Hypothyroidism.    Plan: continue synthroid.   Thyroid Stimulating Hormone, Serum: 2.83 uIU/mL (06.21.18 @ 06:38)      Problem/Plan - 6:  Anorexia Improved  Nutrition consulted: Patient on Mirtazapine. Per Family was on Marinol but this was not helping.  Continue Megace. Continue supplements.      Problem/Plan - 7:  Fatigue ? Related to ESRD and poor nutritional intake. Now improved.  Rule out any Infection: Chest X-ray negative.  Urinalysis: UTI started on Rocephin day 5. Blood and Urine cultures negative.         Problem/Plan - 8:  UTI: large LE and Pyuria on UA. Started on Rocephin  Urine cultures negative.  Completed 5 days of IV medications.

## 2018-06-26 NOTE — PROGRESS NOTE ADULT - ASSESSMENT
84 yo F with h/o DM, HTN, HL, RA, COPD, CKD IV-V, hypothyroidism, CHF (EF 55% in 12/17), s/p AICD, hemorrhoids, admitted form NH due to worsening renal function with complaints of generalized weakness and Anorexia. 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 On admission blood work showed a Cr of 6.5 (Baseline creat 4.5 (6/4/18)) with hyperkalemia (K~6.) and met acidosis. Patient was thus admitted for renal replacement therapy. She denied Anuria, vomiting or diarrhea.    Assessment and Plan:    Problem/Plan - 1:  ·  Problem: ESRD   Nephrology following: Started on HD scheduled MWF. Cleared for discharge from Nephrology standpoint but should be dialyzed tomorrow before discharge to rehab. Monitor electrolytes.  Out patient HD at [facility] set up. Hepatitis screen negative.  s/p Tesio catheter placement 6/18/18. Vein mapping done.  Follow up with Dr. Baltazar in 1 week.      Problem/Plan - 2:  ·  Problem: Elevated troponin.   Plan: Most likely due to ESRD.  Asymptomatic. ECHO 5/18 showed mildly reduced EF 40-45%.      Problem/Plan - 3:  ·  Problem: COPD (chronic obstructive pulmonary disease).    Plan: stable, Duoneb PRN.       Problem/Plan - 4:  ·  Problem: Uncontrolled Diabetes with episodes of hyperglycemia.  Plan: adjusted Insulin doses.   Hemoglobin A1C, Whole Blood: 12.4 % (05.03.18 @ 05:50)       Problem/Plan - 5:  ·  Problem: Hypothyroidism.    Plan: continue synthroid.   Thyroid Stimulating Hormone, Serum: 2.83 uIU/mL (06.21.18 @ 06:38)      Problem/Plan - 6:  Anorexia Improved  Nutrition consulted: Patient on Mirtazapine. Per Family was on Marinol but this was not helping.  Continue Megace. Continue supplements.      Problem/Plan - 7:  Fatigue ? Related to ESRD and poor nutritional intake. Now improved.  Rule out any Infection: Chest X-ray negative.  Urinalysis: UTI started on Rocephin day 5. Blood and Urine cultures negative.         Problem/Plan - 8:  UTI: large LE and Pyuria on UA. Started on Rocephin  Urine cultures negative.  Completed 5 days of IV medications.        DVT prophylaxis: Heparin  Disposition: NH when stable and Dialysis spot secured at [facility]. 84 yo F with h/o DM, HTN, HL, RA, COPD, CKD IV-V, hypothyroidism, CHF (EF 55% in 12/17), s/p AICD, hemorrhoids, admitted form NH due to worsening renal function with complaints of generalized weakness and Anorexia. 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 On admission blood work showed a Cr of 6.5 (Baseline creat 4.5 (6/4/18)) with hyperkalemia (K~6.) and met acidosis. Patient was thus admitted for renal replacement therapy. She denied Anuria, vomiting or diarrhea.    Assessment and Plan:    Problem/Plan - 1:  ·  Problem: ESRD   Nephrology following: Started on HD scheduled MWF. Cleared for discharge from Nephrology standpoint but should be dialyzed tomorrow before discharge to rehab. Monitor electrolytes.  Out patient HD at [facility] set up. Hepatitis screen negative.  s/p Tesio catheter placement 6/18/18. Vein mapping done.  Follow up with Dr. Baltazar in 1 week.      Problem/Plan - 2:  ·  Problem: Elevated troponin.   Plan: Most likely due to ESRD.  Asymptomatic. ECHO 5/18 showed mildly reduced EF 40-45%.      Problem/Plan - 3:  ·  Problem: COPD (chronic obstructive pulmonary disease).    Plan: stable, Duoneb PRN.       Problem/Plan - 4:  ·  Problem: Uncontrolled Diabetes with episodes of hyperglycemia.  Plan: adjusted Insulin doses.   Hemoglobin A1C, Whole Blood: 12.4 % (05.03.18 @ 05:50)       Problem/Plan - 5:  ·  Problem: Hypothyroidism.    Plan: continue synthroid.   Thyroid Stimulating Hormone, Serum: 2.83 uIU/mL (06.21.18 @ 06:38)      Problem/Plan - 6:  Anorexia Improved  Nutrition consulted: Patient on Mirtazapine. Per Family was on Marinol but this was not helping.  Continue Megace. Continue supplements.      Problem/Plan - 7:  Fatigue ? Related to ESRD and poor nutritional intake. Now improved.  Rule out any Infection: Chest X-ray negative.  Urinalysis: UTI started on Rocephin day 5. Blood and Urine cultures negative.         Problem/Plan - 8:  UTI: large LE and Pyuria on UA. Started on Rocephin  Urine cultures negative.  Completed 5 days of IV medications.        DVT prophylaxis: Heparin  Disposition: Discharge to [facility] today.

## 2018-06-27 ENCOUNTER — OUTPATIENT (OUTPATIENT)
Dept: OUTPATIENT SERVICES | Facility: HOSPITAL | Age: 83
LOS: 1 days | Discharge: HOME | End: 2018-06-27

## 2018-06-27 DIAGNOSIS — Z00.00 ENCOUNTER FOR GENERAL ADULT MEDICAL EXAMINATION WITHOUT ABNORMAL FINDINGS: ICD-10-CM

## 2018-06-27 DIAGNOSIS — Z95.810 PRESENCE OF AUTOMATIC (IMPLANTABLE) CARDIAC DEFIBRILLATOR: Chronic | ICD-10-CM

## 2018-06-28 PROBLEM — M06.9 RHEUMATOID ARTHRITIS, UNSPECIFIED: Chronic | Status: ACTIVE | Noted: 2018-06-13

## 2018-06-28 PROBLEM — J44.9 CHRONIC OBSTRUCTIVE PULMONARY DISEASE, UNSPECIFIED: Chronic | Status: ACTIVE | Noted: 2018-06-13

## 2018-06-28 PROBLEM — N30.90 CYSTITIS, UNSPECIFIED WITHOUT HEMATURIA: Chronic | Status: ACTIVE | Noted: 2018-06-13

## 2018-07-02 DIAGNOSIS — R79.89 OTHER SPECIFIED ABNORMAL FINDINGS OF BLOOD CHEMISTRY: ICD-10-CM

## 2018-07-02 DIAGNOSIS — J44.9 CHRONIC OBSTRUCTIVE PULMONARY DISEASE, UNSPECIFIED: ICD-10-CM

## 2018-07-02 DIAGNOSIS — Z88.8 ALLERGY STATUS TO OTHER DRUGS, MEDICAMENTS AND BIOLOGICAL SUBSTANCES STATUS: ICD-10-CM

## 2018-07-02 DIAGNOSIS — E03.9 HYPOTHYROIDISM, UNSPECIFIED: ICD-10-CM

## 2018-07-02 DIAGNOSIS — E86.0 DEHYDRATION: ICD-10-CM

## 2018-07-02 DIAGNOSIS — Z79.4 LONG TERM (CURRENT) USE OF INSULIN: ICD-10-CM

## 2018-07-02 DIAGNOSIS — N18.5 CHRONIC KIDNEY DISEASE, STAGE 5: ICD-10-CM

## 2018-07-02 DIAGNOSIS — E11.65 TYPE 2 DIABETES MELLITUS WITH HYPERGLYCEMIA: ICD-10-CM

## 2018-07-02 DIAGNOSIS — E87.5 HYPERKALEMIA: ICD-10-CM

## 2018-07-02 DIAGNOSIS — D63.1 ANEMIA IN CHRONIC KIDNEY DISEASE: ICD-10-CM

## 2018-07-02 DIAGNOSIS — E11.22 TYPE 2 DIABETES MELLITUS WITH DIABETIC CHRONIC KIDNEY DISEASE: ICD-10-CM

## 2018-07-02 DIAGNOSIS — N18.6 END STAGE RENAL DISEASE: ICD-10-CM

## 2018-07-02 DIAGNOSIS — E87.2 ACIDOSIS: ICD-10-CM

## 2018-07-02 DIAGNOSIS — E11.21 TYPE 2 DIABETES MELLITUS WITH DIABETIC NEPHROPATHY: ICD-10-CM

## 2018-07-02 DIAGNOSIS — M06.9 RHEUMATOID ARTHRITIS, UNSPECIFIED: ICD-10-CM

## 2018-07-02 DIAGNOSIS — N39.0 URINARY TRACT INFECTION, SITE NOT SPECIFIED: ICD-10-CM

## 2018-07-02 DIAGNOSIS — E78.5 HYPERLIPIDEMIA, UNSPECIFIED: ICD-10-CM

## 2018-07-02 DIAGNOSIS — Z95.810 PRESENCE OF AUTOMATIC (IMPLANTABLE) CARDIAC DEFIBRILLATOR: ICD-10-CM

## 2018-07-02 DIAGNOSIS — I50.9 HEART FAILURE, UNSPECIFIED: ICD-10-CM

## 2018-07-02 DIAGNOSIS — E11.40 TYPE 2 DIABETES MELLITUS WITH DIABETIC NEUROPATHY, UNSPECIFIED: ICD-10-CM

## 2018-07-02 DIAGNOSIS — I13.2 HYPERTENSIVE HEART AND CHRONIC KIDNEY DISEASE WITH HEART FAILURE AND WITH STAGE 5 CHRONIC KIDNEY DISEASE, OR END STAGE RENAL DISEASE: ICD-10-CM

## 2018-07-02 DIAGNOSIS — L29.9 PRURITUS, UNSPECIFIED: ICD-10-CM

## 2018-07-02 DIAGNOSIS — R63.0 ANOREXIA: ICD-10-CM

## 2018-07-07 ENCOUNTER — OUTPATIENT (OUTPATIENT)
Dept: OUTPATIENT SERVICES | Facility: HOSPITAL | Age: 83
LOS: 1 days | Discharge: HOME | End: 2018-07-07

## 2018-07-07 DIAGNOSIS — R79.9 ABNORMAL FINDING OF BLOOD CHEMISTRY, UNSPECIFIED: ICD-10-CM

## 2018-07-07 DIAGNOSIS — Z95.810 PRESENCE OF AUTOMATIC (IMPLANTABLE) CARDIAC DEFIBRILLATOR: Chronic | ICD-10-CM

## 2018-07-08 ENCOUNTER — INPATIENT (INPATIENT)
Facility: HOSPITAL | Age: 83
LOS: 1 days | End: 2018-07-10
Attending: INTERNAL MEDICINE | Admitting: INTERNAL MEDICINE

## 2018-07-08 VITALS
TEMPERATURE: 97 F | SYSTOLIC BLOOD PRESSURE: 115 MMHG | RESPIRATION RATE: 22 BRPM | DIASTOLIC BLOOD PRESSURE: 65 MMHG | HEART RATE: 76 BPM | OXYGEN SATURATION: 100 %

## 2018-07-08 DIAGNOSIS — M10.9 GOUT, UNSPECIFIED: ICD-10-CM

## 2018-07-08 DIAGNOSIS — R53.1 WEAKNESS: ICD-10-CM

## 2018-07-08 DIAGNOSIS — T80.211A BLOODSTREAM INFECTION DUE TO CENTRAL VENOUS CATHETER, INITIAL ENCOUNTER: ICD-10-CM

## 2018-07-08 DIAGNOSIS — G92 TOXIC ENCEPHALOPATHY: ICD-10-CM

## 2018-07-08 DIAGNOSIS — Z79.84 LONG TERM (CURRENT) USE OF ORAL HYPOGLYCEMIC DRUGS: ICD-10-CM

## 2018-07-08 DIAGNOSIS — J44.9 CHRONIC OBSTRUCTIVE PULMONARY DISEASE, UNSPECIFIED: ICD-10-CM

## 2018-07-08 DIAGNOSIS — Z79.82 LONG TERM (CURRENT) USE OF ASPIRIN: ICD-10-CM

## 2018-07-08 DIAGNOSIS — Z95.810 PRESENCE OF AUTOMATIC (IMPLANTABLE) CARDIAC DEFIBRILLATOR: Chronic | ICD-10-CM

## 2018-07-08 DIAGNOSIS — R65.21 SEVERE SEPSIS WITH SEPTIC SHOCK: ICD-10-CM

## 2018-07-08 DIAGNOSIS — J18.9 PNEUMONIA, UNSPECIFIED ORGANISM: ICD-10-CM

## 2018-07-08 DIAGNOSIS — I50.9 HEART FAILURE, UNSPECIFIED: ICD-10-CM

## 2018-07-08 DIAGNOSIS — N18.6 END STAGE RENAL DISEASE: ICD-10-CM

## 2018-07-08 DIAGNOSIS — A41.02 SEPSIS DUE TO METHICILLIN RESISTANT STAPHYLOCOCCUS AUREUS: ICD-10-CM

## 2018-07-08 DIAGNOSIS — Z95.810 PRESENCE OF AUTOMATIC (IMPLANTABLE) CARDIAC DEFIBRILLATOR: ICD-10-CM

## 2018-07-08 DIAGNOSIS — Z66 DO NOT RESUSCITATE: ICD-10-CM

## 2018-07-08 DIAGNOSIS — E03.9 HYPOTHYROIDISM, UNSPECIFIED: ICD-10-CM
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
## 2018-07-08 DIAGNOSIS — E11.40 TYPE 2 DIABETES MELLITUS WITH DIABETIC NEUROPATHY, UNSPECIFIED: ICD-10-CM

## 2018-07-08 DIAGNOSIS — N30.00 ACUTE CYSTITIS WITHOUT HEMATURIA: ICD-10-CM

## 2018-07-08 DIAGNOSIS — Z99.2 DEPENDENCE ON RENAL DIALYSIS: ICD-10-CM

## 2018-07-08 DIAGNOSIS — I13.0 HYPERTENSIVE HEART AND CHRONIC KIDNEY DISEASE WITH HEART FAILURE AND STAGE 1 THROUGH STAGE 4 CHRONIC KIDNEY DISEASE, OR UNSPECIFIED CHRONIC KIDNEY DISEASE: ICD-10-CM

## 2018-07-08 DIAGNOSIS — E78.5 HYPERLIPIDEMIA, UNSPECIFIED: ICD-10-CM

## 2018-07-08 LAB
ALBUMIN SERPL ELPH-MCNC: 3.2 G/DL — LOW (ref 3.5–5.2)
ALBUMIN SERPL ELPH-MCNC: 3.5 G/DL — SIGNIFICANT CHANGE UP (ref 3.5–5.2)
ALP SERPL-CCNC: 241 U/L — HIGH (ref 30–115)
ALP SERPL-CCNC: 266 U/L — HIGH (ref 30–115)
ALT FLD-CCNC: 57 U/L — HIGH (ref 0–41)
ALT FLD-CCNC: 63 U/L — HIGH (ref 0–41)
ANION GAP SERPL CALC-SCNC: 21 MMOL/L — HIGH (ref 7–14)
ANION GAP SERPL CALC-SCNC: 22 MMOL/L — HIGH (ref 7–14)
APPEARANCE UR: (no result)
AST SERPL-CCNC: 84 U/L — HIGH (ref 0–41)
AST SERPL-CCNC: 96 U/L — HIGH (ref 0–41)
BACTERIA # UR AUTO: (no result) /HPF
BASE EXCESS BLDV CALC-SCNC: 3.9 MMOL/L — HIGH (ref -2–2)
BASOPHILS # BLD AUTO: 0.02 K/UL — SIGNIFICANT CHANGE UP (ref 0–0.2)
BASOPHILS # BLD AUTO: 0.02 K/UL — SIGNIFICANT CHANGE UP (ref 0–0.2)
BASOPHILS NFR BLD AUTO: 0.2 % — SIGNIFICANT CHANGE UP (ref 0–1)
BASOPHILS NFR BLD AUTO: 0.2 % — SIGNIFICANT CHANGE UP (ref 0–1)
BILIRUB SERPL-MCNC: 0.5 MG/DL — SIGNIFICANT CHANGE UP (ref 0.2–1.2)
BILIRUB SERPL-MCNC: 0.5 MG/DL — SIGNIFICANT CHANGE UP (ref 0.2–1.2)
BILIRUB UR-MCNC: (no result)
BUN SERPL-MCNC: 45 MG/DL — HIGH (ref 10–20)
BUN SERPL-MCNC: 53 MG/DL — HIGH (ref 10–20)
CA-I SERPL-SCNC: 1.04 MMOL/L — LOW (ref 1.12–1.3)
CALCIUM SERPL-MCNC: 8.4 MG/DL — LOW (ref 8.5–10.1)
CALCIUM SERPL-MCNC: 8.5 MG/DL — SIGNIFICANT CHANGE UP (ref 8.5–10.1)
CHLORIDE SERPL-SCNC: 91 MMOL/L — LOW (ref 98–110)
CHLORIDE SERPL-SCNC: 92 MMOL/L — LOW (ref 98–110)
CK MB CFR SERPL CALC: 2.1 NG/ML — SIGNIFICANT CHANGE UP (ref 0.6–6.3)
CO2 SERPL-SCNC: 22 MMOL/L — SIGNIFICANT CHANGE UP (ref 17–32)
CO2 SERPL-SCNC: 22 MMOL/L — SIGNIFICANT CHANGE UP (ref 17–32)
COLOR SPEC: (no result)
CREAT SERPL-MCNC: 4.1 MG/DL — CRITICAL HIGH (ref 0.7–1.5)
CREAT SERPL-MCNC: 4.3 MG/DL — CRITICAL HIGH (ref 0.7–1.5)
DIFF PNL FLD: (no result)
EOSINOPHIL # BLD AUTO: 0 K/UL — SIGNIFICANT CHANGE UP (ref 0–0.7)
EOSINOPHIL # BLD AUTO: 0.01 K/UL — SIGNIFICANT CHANGE UP (ref 0–0.7)
EOSINOPHIL NFR BLD AUTO: 0 % — SIGNIFICANT CHANGE UP (ref 0–8)
EOSINOPHIL NFR BLD AUTO: 0.1 % — SIGNIFICANT CHANGE UP (ref 0–8)
EPI CELLS # UR: (no result) /HPF
GAS PNL BLDV: 135 MMOL/L — LOW (ref 136–145)
GAS PNL BLDV: SIGNIFICANT CHANGE UP
GLUCOSE SERPL-MCNC: 170 MG/DL — HIGH (ref 70–99)
GLUCOSE SERPL-MCNC: 178 MG/DL — HIGH (ref 70–99)
GLUCOSE UR QL: NEGATIVE MG/DL — SIGNIFICANT CHANGE UP
HCO3 BLDV-SCNC: 28 MMOL/L — SIGNIFICANT CHANGE UP (ref 22–29)
HCT VFR BLD CALC: 30.2 % — LOW (ref 37–47)
HCT VFR BLD CALC: 31.2 % — LOW (ref 37–47)
HCT VFR BLDA CALC: 29.8 % — LOW (ref 34–44)
HGB BLD CALC-MCNC: 9.7 G/DL — LOW (ref 14–18)
HGB BLD-MCNC: 9.7 G/DL — LOW (ref 12–16)
HGB BLD-MCNC: 9.8 G/DL — LOW (ref 12–16)
IMM GRANULOCYTES NFR BLD AUTO: 0.7 % — HIGH (ref 0.1–0.3)
IMM GRANULOCYTES NFR BLD AUTO: 0.9 % — HIGH (ref 0.1–0.3)
KETONES UR-MCNC: (no result)
LACTATE BLDV-MCNC: 2.4 MMOL/L — HIGH (ref 0.5–1.6)
LEUKOCYTE ESTERASE UR-ACNC: (no result)
LIDOCAIN IGE QN: 6 U/L — LOW (ref 7–60)
LYMPHOCYTES # BLD AUTO: 0.38 K/UL — LOW (ref 1.2–3.4)
LYMPHOCYTES # BLD AUTO: 0.39 K/UL — LOW (ref 1.2–3.4)
LYMPHOCYTES # BLD AUTO: 3.9 % — LOW (ref 20.5–51.1)
LYMPHOCYTES # BLD AUTO: 3.9 % — LOW (ref 20.5–51.1)
MCHC RBC-ENTMCNC: 27.7 PG — SIGNIFICANT CHANGE UP (ref 27–31)
MCHC RBC-ENTMCNC: 28.2 PG — SIGNIFICANT CHANGE UP (ref 27–31)
MCHC RBC-ENTMCNC: 31.4 G/DL — LOW (ref 32–37)
MCHC RBC-ENTMCNC: 32.1 G/DL — SIGNIFICANT CHANGE UP (ref 32–37)
MCV RBC AUTO: 87.8 FL — SIGNIFICANT CHANGE UP (ref 81–99)
MCV RBC AUTO: 88.1 FL — SIGNIFICANT CHANGE UP (ref 81–99)
MONOCYTES # BLD AUTO: 0.46 K/UL — SIGNIFICANT CHANGE UP (ref 0.1–0.6)
MONOCYTES # BLD AUTO: 0.51 K/UL — SIGNIFICANT CHANGE UP (ref 0.1–0.6)
MONOCYTES NFR BLD AUTO: 4.7 % — SIGNIFICANT CHANGE UP (ref 1.7–9.3)
MONOCYTES NFR BLD AUTO: 5.1 % — SIGNIFICANT CHANGE UP (ref 1.7–9.3)
NEUTROPHILS # BLD AUTO: 8.76 K/UL — HIGH (ref 1.4–6.5)
NEUTROPHILS # BLD AUTO: 9.04 K/UL — HIGH (ref 1.4–6.5)
NEUTROPHILS NFR BLD AUTO: 89.9 % — HIGH (ref 42.2–75.2)
NEUTROPHILS NFR BLD AUTO: 90.4 % — HIGH (ref 42.2–75.2)
NITRITE UR-MCNC: NEGATIVE — SIGNIFICANT CHANGE UP
NRBC # BLD: 0 /100 WBCS — SIGNIFICANT CHANGE UP (ref 0–0)
NRBC # BLD: 0 /100 WBCS — SIGNIFICANT CHANGE UP (ref 0–0)
PCO2 BLDV: 40 MMHG — LOW (ref 41–51)
PH BLDV: 7.46 — HIGH (ref 7.26–7.43)
PH UR: 6.5 — SIGNIFICANT CHANGE UP (ref 5–8)
PLATELET # BLD AUTO: 171 K/UL — SIGNIFICANT CHANGE UP (ref 130–400)
PLATELET # BLD AUTO: 189 K/UL — SIGNIFICANT CHANGE UP (ref 130–400)
PO2 BLDV: 27 MMHG — SIGNIFICANT CHANGE UP (ref 20–40)
POTASSIUM BLDV-SCNC: 4.5 MMOL/L — SIGNIFICANT CHANGE UP (ref 3.3–5.6)
POTASSIUM SERPL-MCNC: 4.5 MMOL/L — SIGNIFICANT CHANGE UP (ref 3.5–5)
POTASSIUM SERPL-MCNC: 5 MMOL/L — SIGNIFICANT CHANGE UP (ref 3.5–5)
POTASSIUM SERPL-SCNC: 4.5 MMOL/L — SIGNIFICANT CHANGE UP (ref 3.5–5)
POTASSIUM SERPL-SCNC: 5 MMOL/L — SIGNIFICANT CHANGE UP (ref 3.5–5)
PROT SERPL-MCNC: 5.8 G/DL — LOW (ref 6–8)
PROT SERPL-MCNC: 6 G/DL — SIGNIFICANT CHANGE UP (ref 6–8)
PROT UR-MCNC: >=300 MG/DL
RBC # BLD: 3.44 M/UL — LOW (ref 4.2–5.4)
RBC # BLD: 3.54 M/UL — LOW (ref 4.2–5.4)
RBC # FLD: 15.2 % — HIGH (ref 11.5–14.5)
RBC # FLD: 15.4 % — HIGH (ref 11.5–14.5)
RBC CASTS # UR COMP ASSIST: >50 /HPF
SAO2 % BLDV: 44 % — SIGNIFICANT CHANGE UP
SODIUM SERPL-SCNC: 135 MMOL/L — SIGNIFICANT CHANGE UP (ref 135–146)
SODIUM SERPL-SCNC: 135 MMOL/L — SIGNIFICANT CHANGE UP (ref 135–146)
SP GR SPEC: 1.02 — SIGNIFICANT CHANGE UP (ref 1.01–1.03)
TROPONIN T SERPL-MCNC: 0.58 NG/ML — CRITICAL HIGH
TROPONIN T SERPL-MCNC: 0.61 NG/ML — CRITICAL HIGH
UROBILINOGEN FLD QL: 1 MG/DL (ref 0.2–0.2)
WBC # BLD: 10.05 K/UL — SIGNIFICANT CHANGE UP (ref 4.8–10.8)
WBC # BLD: 9.7 K/UL — SIGNIFICANT CHANGE UP (ref 4.8–10.8)
WBC # FLD AUTO: 10.05 K/UL — SIGNIFICANT CHANGE UP (ref 4.8–10.8)
WBC # FLD AUTO: 9.7 K/UL — SIGNIFICANT CHANGE UP (ref 4.8–10.8)
WBC UR QL: >50 /HPF

## 2018-07-08 RX ORDER — METOPROLOL TARTRATE 50 MG
25 TABLET ORAL DAILY
Qty: 0 | Refills: 0 | Status: DISCONTINUED | OUTPATIENT
Start: 2018-07-08 | End: 2018-07-10

## 2018-07-08 RX ORDER — SODIUM CHLORIDE 9 MG/ML
500 INJECTION, SOLUTION INTRAVENOUS ONCE
Qty: 0 | Refills: 0 | Status: COMPLETED | OUTPATIENT
Start: 2018-07-08 | End: 2018-07-08

## 2018-07-08 RX ORDER — MIRTAZAPINE 45 MG/1
15 TABLET, ORALLY DISINTEGRATING ORAL AT BEDTIME
Qty: 0 | Refills: 0 | Status: DISCONTINUED | OUTPATIENT
Start: 2018-07-08 | End: 2018-07-10

## 2018-07-08 RX ORDER — CEFTRIAXONE 500 MG/1
1 INJECTION, POWDER, FOR SOLUTION INTRAMUSCULAR; INTRAVENOUS EVERY 24 HOURS
Qty: 0 | Refills: 0 | Status: DISCONTINUED | OUTPATIENT
Start: 2018-07-09 | End: 2018-07-09

## 2018-07-08 RX ORDER — ESCITALOPRAM OXALATE 10 MG/1
10 TABLET, FILM COATED ORAL DAILY
Qty: 0 | Refills: 0 | Status: DISCONTINUED | OUTPATIENT
Start: 2018-07-08 | End: 2018-07-10

## 2018-07-08 RX ORDER — FERROUS SULFATE 325(65) MG
325 TABLET ORAL DAILY
Qty: 0 | Refills: 0 | Status: DISCONTINUED | OUTPATIENT
Start: 2018-07-08 | End: 2018-07-10

## 2018-07-08 RX ORDER — NALOXONE HYDROCHLORIDE 4 MG/.1ML
0.4 SPRAY NASAL ONCE
Qty: 0 | Refills: 0 | Status: COMPLETED | OUTPATIENT
Start: 2018-07-08 | End: 2018-07-08

## 2018-07-08 RX ORDER — CEFTRIAXONE 500 MG/1
1 INJECTION, POWDER, FOR SOLUTION INTRAMUSCULAR; INTRAVENOUS ONCE
Qty: 0 | Refills: 0 | Status: COMPLETED | OUTPATIENT
Start: 2018-07-08 | End: 2018-07-08

## 2018-07-08 RX ORDER — SENNA PLUS 8.6 MG/1
1 TABLET ORAL AT BEDTIME
Qty: 0 | Refills: 0 | Status: DISCONTINUED | OUTPATIENT
Start: 2018-07-08 | End: 2018-07-10

## 2018-07-08 RX ORDER — ASPIRIN/CALCIUM CARB/MAGNESIUM 324 MG
81 TABLET ORAL DAILY
Qty: 0 | Refills: 0 | Status: DISCONTINUED | OUTPATIENT
Start: 2018-07-08 | End: 2018-07-10

## 2018-07-08 RX ORDER — AZITHROMYCIN 500 MG/1
500 TABLET, FILM COATED ORAL ONCE
Qty: 0 | Refills: 0 | Status: COMPLETED | OUTPATIENT
Start: 2018-07-08 | End: 2018-07-08

## 2018-07-08 RX ORDER — ASPIRIN/CALCIUM CARB/MAGNESIUM 324 MG
325 TABLET ORAL ONCE
Qty: 0 | Refills: 0 | Status: COMPLETED | OUTPATIENT
Start: 2018-07-08 | End: 2018-07-08

## 2018-07-08 RX ORDER — MORPHINE SULFATE 50 MG/1
2 CAPSULE, EXTENDED RELEASE ORAL ONCE
Qty: 0 | Refills: 0 | Status: DISCONTINUED | OUTPATIENT
Start: 2018-07-08 | End: 2018-07-08

## 2018-07-08 RX ORDER — LEVOTHYROXINE SODIUM 125 MCG
125 TABLET ORAL DAILY
Qty: 0 | Refills: 0 | Status: DISCONTINUED | OUTPATIENT
Start: 2018-07-08 | End: 2018-07-10

## 2018-07-08 RX ORDER — HEPARIN SODIUM 5000 [USP'U]/ML
5000 INJECTION INTRAVENOUS; SUBCUTANEOUS EVERY 8 HOURS
Qty: 0 | Refills: 0 | Status: DISCONTINUED | OUTPATIENT
Start: 2018-07-08 | End: 2018-07-10

## 2018-07-08 RX ORDER — LANOLIN ALCOHOL/MO/W.PET/CERES
3 CREAM (GRAM) TOPICAL AT BEDTIME
Qty: 0 | Refills: 0 | Status: DISCONTINUED | OUTPATIENT
Start: 2018-07-08 | End: 2018-07-10

## 2018-07-08 RX ORDER — MEGESTROL ACETATE 40 MG/ML
400 SUSPENSION ORAL DAILY
Qty: 0 | Refills: 0 | Status: DISCONTINUED | OUTPATIENT
Start: 2018-07-08 | End: 2018-07-10

## 2018-07-08 RX ADMIN — MORPHINE SULFATE 2 MILLIGRAM(S): 50 CAPSULE, EXTENDED RELEASE ORAL at 14:55

## 2018-07-08 RX ADMIN — MORPHINE SULFATE 2 MILLIGRAM(S): 50 CAPSULE, EXTENDED RELEASE ORAL at 17:40

## 2018-07-08 RX ADMIN — SODIUM CHLORIDE 1000 MILLILITER(S): 9 INJECTION, SOLUTION INTRAVENOUS at 09:27

## 2018-07-08 RX ADMIN — HEPARIN SODIUM 5000 UNIT(S): 5000 INJECTION INTRAVENOUS; SUBCUTANEOUS at 15:43

## 2018-07-08 RX ADMIN — MORPHINE SULFATE 2 MILLIGRAM(S): 50 CAPSULE, EXTENDED RELEASE ORAL at 14:18

## 2018-07-08 RX ADMIN — AZITHROMYCIN 255 MILLIGRAM(S): 500 TABLET, FILM COATED ORAL at 11:58

## 2018-07-08 RX ADMIN — HEPARIN SODIUM 5000 UNIT(S): 5000 INJECTION INTRAVENOUS; SUBCUTANEOUS at 21:23

## 2018-07-08 RX ADMIN — MORPHINE SULFATE 2 MILLIGRAM(S): 50 CAPSULE, EXTENDED RELEASE ORAL at 16:03

## 2018-07-08 RX ADMIN — MORPHINE SULFATE 2 MILLIGRAM(S): 50 CAPSULE, EXTENDED RELEASE ORAL at 10:57

## 2018-07-08 RX ADMIN — NALOXONE HYDROCHLORIDE 0.4 MILLIGRAM(S): 4 SPRAY NASAL at 20:03

## 2018-07-08 RX ADMIN — MORPHINE SULFATE 2 MILLIGRAM(S): 50 CAPSULE, EXTENDED RELEASE ORAL at 12:59

## 2018-07-08 RX ADMIN — CEFTRIAXONE 100 GRAM(S): 500 INJECTION, POWDER, FOR SOLUTION INTRAMUSCULAR; INTRAVENOUS at 11:59

## 2018-07-08 NOTE — H&P ADULT - NSHPOUTPATIENTPROVIDERS_GEN_ALL_CORE
PCP: Dr. Reza   Cardiologist: Dr. Mcdonald   Electrophysiologist: Dr. Kwong   Pharmacy: [facility]. [phone]

## 2018-07-08 NOTE — ED PROVIDER NOTE - OBJECTIVE STATEMENT
83 year old female with pmhx of COPD, CHF with ICD placement, DMII, HTN, RA, ESRD and hypothyroidism presents with fever and AMS beginning 2 days ago. Fever was reportedly measured at the nursing home at 101 prior to arrival. Temp here is 96.7. PT is accompanied by 2 daughters who state that patient has become increasingly confused since 2 days ago when she reportedly fell at the nursing home. Unknown by family whether she hit her head, however they report R shoulder pain since this time. They also state that she has not urinated since dialysis 2 days ago. PT confirms diffuse abdominal pain without nausea, vomiting or diarrhea. PT denies any chest pain, shortness of breath, leg swelling, or dysuria.

## 2018-07-08 NOTE — H&P ADULT - ASSESSMENT
83 year old female with PMH of COPD, CHF with AICD placement, DMII, HTN, RA, ESRD on Hemodialysis and hypothyroidism presents to the ED from [facility] for fever and AMS which started 2 days ago and was getting progressively worse Patient is accompanied by her two daughter who state that she has been getting progressively altered from 2 days back. Her fever was measured to be 101 at the facility. Patient fell at the nursing home a couple days back however imaging hear revealed no acute pathology.  PT denies any chest pain, shortness of breath, leg swelling, or dysuria.      1) AMS Likely secondary to Acute Cystitis   Continue with Ceftriaxone for now  Follow with Urine Culture and sensitivities.   Monitor I&O      2) ESRD  Follow with Nephrology   Family wants to discuss Benefit vs Risk in regards to continuation of Hemodialysis with Nephrology   Creatinine 4.1   Follow with Repeat blood work   Monitor electrolytes and replete accordingly     3) CHF   Stable at this time   Follow with ABG and repeat CXR in AM  Monitor pulmonary vascular congestion present on Xray.     4) DM   Insulin to be dosed if blood glucose >180.   Stable at this time   Patient has not been eating, reasoning for holding off on Home Insulin regimen.   Follow with Fasting Blood Glucose levels.     5) DVT Prophylaxis   Heparin Sub Q     6) GI Prophylaxis   Protonix     Patient is DNR & DNI     Disposition: Will need to be re-assessed for placement once goals of care are established and meeting with Patients family. 83 year old female with PMH of COPD, CHF with AICD placement, DMII, HTN, RA, ESRD on Hemodialysis and hypothyroidism presents to the ED from [facility] for fever and AMS which started 2 days ago and was getting progressively worse Patient is accompanied by her two daughter who state that she has been getting progressively altered from 2 days back. Her fever was measured to be 101 at the facility. Patient fell at the nursing home a couple days back however imaging hear revealed no acute pathology.  PT denies any chest pain, shortness of breath, leg swelling, or dysuria.      1) AMS Likely secondary to Acute Cystitis   Continue with Ceftriaxone for now  Follow with Urine Culture and sensitivities.   Monitor I&O    2) ESRD  Follow with Nephrology   Family wants to discuss Benefit vs Risk in regards to continuation of Hemodialysis with Nephrology   Creatinine 4.1   Follow with Repeat blood work   Monitor electrolytes and replete accordingly   Tessio In place.     3) CHF   Stable at this time   Follow with ABG and repeat CXR in AM  Monitor pulmonary vascular congestion present on Xray.     4) DM   Insulin to be dosed if blood glucose >180.   Stable at this time   Patient has not been eating, reasoning for holding off on Home Insulin regimen.   Follow with Fasting Blood Glucose levels.     5) Elevated Troponin   Follow with Repeat troponin and trend   Likely secondary to ESRD   In the event Troponin continue to rise follow with Cardiology consult.     6) DVT Prophylaxis   Heparin Sub Q     7) GI Prophylaxis   Protonix     Patient is DNR & DNI     Disposition: Will need to be re-assessed for placement once goals of care are established and meeting with Patients family.

## 2018-07-08 NOTE — ED ADULT NURSE REASSESSMENT NOTE - NS ED NURSE REASSESS COMMENT FT1
pt assessed. pt lethargic. vitals stable 104/62 hr 77. p.o. 97% 3L NC. pt responds to painful stimuli, md aware. will continue to monitor.

## 2018-07-08 NOTE — CONSULT NOTE ADULT - SUBJECTIVE AND OBJECTIVE BOX
VI MOBLEY [id number]  83y Female    HPI:  83 year old female with pmhx of COPD, CHF with ICD placement, DMII, HTN, RA, ESRD and hypothyroidism presents with fever and AMS beginning 2 days ago. Fever was reportedly measured at the nursing home at 101 prior to arrival. Temp here is 96.7. PT is accompanied by 2 daughters who state that patient has become increasingly confused since 2 days ago when she reportedly fell at the nursing home. Unknown by family whether she hit her head, however they report R shoulder pain since this time. They also state that she has not urinated since dialysis 2 days ago. PT confirms diffuse abdominal pain without nausea, vomiting or diarrhea. PT denies any chest pain, shortness of breath, leg swelling, or dysuria.   Surgery was consulted because the patient has a tesio catheter placed by dr mckee on 18 for dialysis.  PAST MEDICAL & SURGICAL HISTORY:  Arthritis, rheumatoid  Cystitis  COPD (chronic obstructive pulmonary disease)  Neuropathy  Diabetes  Hypothyroidism  HTN (hypertension)  CHF (congestive heart failure)  AICD (automatic cardioverter/defibrillator) present        MEDICATIONS  (STANDING):  azithromycin  IVPB 500 milliGRAM(s) IV Intermittent Once  cefTRIAXone   IVPB 1 Gram(s) IV Intermittent Once    MEDICATIONS  (PRN):      Allergies    Plavix (Other (Moderate))    Intolerances        REVIEW OF SYSTEMS    [ ] A ten-point review of systems was otherwise negative except as noted.  [ ] Due to altered mental status/intubation, subjective information were not able to be obtained from the patient. History was obtained, to the extent possible, from review of the chart and collateral sources of information.      Vital Signs Last 24 Hrs  T(C): 37.4 (2018 07:44), Max: 37.4 (2018 07:44)  T(F): 99.3 (2018 07:44), Max: 99.3 (2018 07:44)  HR: 76 (2018 07:25) (76 - 76)  BP: 115/65 (2018 07:25) (115/65 - 115/65)  BP(mean): --  RR: 22 (2018 07:25) (22 - 22)  SpO2: 100% (2018 07:44) (100% - 100%)    PHYSICAL EXAM:  GENERAL: NAD, well-appearing  CHEST/LUNG: Clear to auscultation bilaterally  HEART: Regular rate and rhythm  ABDOMEN: Soft, Nontender, Nondistended;   EXTREMITIES:  No clubbing, cyanosis, or edema      LABS:  Labs:  CAPILLARY BLOOD GLUCOSE                              9.8    9.70  )-----------( 189      ( 2018 08:12 )             31.2       Auto Neutrophil %: 90.4 % (18 @ 08:12)  Auto Immature Granulocyte %: 0.7 % (18 @ 08:12)        135  |  92<L>  |  45<H>  ----------------------------<  170<H>  5.0   |  22  |  4.1<HH>      Calcium, Total Serum: 8.5 mg/dL (18 @ 08:12)      LFTs:             6.0  | 0.5  | 96       ------------------[241     ( 2018 08:12 )  3.5  | x    | 57          Lipase:6      Amylase:x         Blood Gas Venous - Lactate: 2.4 mmoL/L (18 @ 09:07)      Coags:    CARDIAC MARKERS ( 2018 08:12 )  x     / 0.61 ng/mL / x     / x     / 2.1 ng/mL      Urinalysis Basic - ( 2018 08:12 )    Color: Red / Appearance: Turbid / S.020 / pH: x  Gluc: x / Ketone: Trace  / Bili: Small / Urobili: 1.0 mg/dL   Blood: x / Protein: >=300 mg/dL / Nitrite: Negative   Leuk Esterase: Large / RBC: >50 /HPF / WBC >50 /HPF   Sq Epi: x / Non Sq Epi: Few /HPF / Bacteria: Moderate /HPF            RADIOLOGY & ADDITIONAL STUDIES:  pending VI MOBLEY [id number]  83y Female    HPI:  83 year old female with pmhx of COPD, CHF with ICD placement, DMII, HTN, RA, ESRD and hypothyroidism presents with fever and AMS beginning 2 days ago. Fever was reportedly measured at the nursing home at 101 prior to arrival. Temp here is 96.7. PT is accompanied by 2 daughters who state that patient has become increasingly confused since 2 days ago when she reportedly fell at the nursing home. Unknown by family whether she hit her head, however they report R shoulder pain since this time. They also state that she has not urinated since dialysis 2 days ago. PT confirms diffuse abdominal pain without nausea, vomiting or diarrhea. PT denies any chest pain, shortness of breath, leg swelling, or dysuria.  Surgery was consulted because the patient has a tesio catheter placed by dr mckee on 18 for dialysis.      PAST MEDICAL & SURGICAL HISTORY:  Arthritis, rheumatoid  Cystitis  COPD (chronic obstructive pulmonary disease)  Neuropathy  Diabetes  Hypothyroidism  HTN (hypertension)  CHF (congestive heart failure)  AICD (automatic cardioverter/defibrillator) present        MEDICATIONS  (STANDING):  azithromycin  IVPB 500 milliGRAM(s) IV Intermittent Once  cefTRIAXone   IVPB 1 Gram(s) IV Intermittent Once      Allergies    Plavix (Other (Moderate))    Intolerances        REVIEW OF SYSTEMS    [x] A ten-point review of systems was otherwise negative except as noted.  [ ] Due to altered mental status/intubation, subjective information were not able to be obtained from the patient. History was obtained, to the extent possible, from review of the chart and collateral sources of information.      Vital Signs Last 24 Hrs  T(C): 37.4 (2018 07:44), Max: 37.4 (2018 07:44)  T(F): 99.3 (2018 07:44), Max: 99.3 (2018 07:44)  HR: 76 (2018 07:25) (76 - 76)  BP: 115/65 (2018 07:25) (115/65 - 115/65)  BP(mean): --  RR: 22 (2018 07:25) (22 - 22)  SpO2: 100% (2018 07:44) (100% - 100%)    PHYSICAL EXAM:  GENERAL: NAD, well-appearing  CHEST/LUNG: Clear to auscultation bilaterally  HEART: Regular rate and rhythm  ABDOMEN: Soft, Nontender, Nondistended;   EXTREMITIES:  No clubbing, cyanosis, or edema      LABS:  Labs:  CAPILLARY BLOOD GLUCOSE                              9.8    9.70  )-----------( 189      ( 2018 08:12 )             31.2       Auto Neutrophil %: 90.4 % (18 @ 08:12)  Auto Immature Granulocyte %: 0.7 % (18 @ 08:12)        135  |  92<L>  |  45<H>  ----------------------------<  170<H>  5.0   |  22  |  4.1<HH>      Calcium, Total Serum: 8.5 mg/dL (18 @ 08:12)      LFTs:             6.0  | 0.5  | 96       ------------------[241     ( 2018 08:12 )  3.5  | x    | 57          Lipase:6      Amylase:x         Blood Gas Venous - Lactate: 2.4 mmoL/L (18 @ 09:07)      Coags:    CARDIAC MARKERS ( 2018 08:12 )  x     / 0.61 ng/mL / x     / x     / 2.1 ng/mL      Urinalysis Basic - ( 2018 08:12 )    Color: Red / Appearance: Turbid / S.020 / pH: x  Gluc: x / Ketone: Trace  / Bili: Small / Urobili: 1.0 mg/dL   Blood: x / Protein: >=300 mg/dL / Nitrite: Negative   Leuk Esterase: Large / RBC: >50 /HPF / WBC >50 /HPF   Sq Epi: x / Non Sq Epi: Few /HPF / Bacteria: Moderate /HPF            RADIOLOGY & ADDITIONAL STUDIES:  pending

## 2018-07-08 NOTE — ED PROVIDER NOTE - PHYSICAL EXAMINATION
CONSTITUTIONAL: Cachectic; confused  SKIN: warm, dry  HEAD: Normocephalic; atraumatic.  EYES: PERRL, EOMI, no conjunctival erythema  ENT: No nasal discharge; airway clear.  NECK: Supple; non tender.  CARD: S1, S2 normal; no murmurs, gallops, or rubs. Regular rate and rhythm.   RESP: No wheezes, rales or rhonchi.  ABD: RUQ tenderness, no guarding, rebound or rigidity  EXT: Normal ROM.  No clubbing, cyanosis or edema.   LYMPH: No acute cervical adenopathy.  NEURO: Responsive to yes/no questions, confused  PSYCH: Cooperative

## 2018-07-08 NOTE — H&P ADULT - ATTENDING COMMENTS
Patient is seen and examined independently, I agree with resident note above and plan of care -edited and corrected where applicable- with addition information in progress note section (7/9/18).

## 2018-07-08 NOTE — H&P ADULT - HISTORY OF PRESENT ILLNESS
83 year old female with PMH of COPD, CHF with AICD placement, DMII, HTN, RA, ESRD on Hemodialysis and hypothyroidism presents to the ED from [facility] for fever and AMS which started 2 days ago and was getting progressively worse Patient is accompanied by her two daughter who state that she has been getting progressively altered from 2 days back. Her fever was measured to be 101 at the facility. Patient fell at the nursing home a couple days back however imaging hear revealed no acute pathology.   PT denies any chest pain, shortness of breath, leg swelling, or dysuria. 83 year old female with PMH of COPD, CHF with AICD placement, DMII, HTN, RA, ESRD on Hemodialysis and hypothyroidism presents to the ED from [facility] for fever and AMS which started 2 days ago and was getting progressively worse Patient is accompanied by her two daughter who state that she has been getting progressively altered from 2 days back. Her fever was measured to be 101 at the facility. Patient fell at the nursing home a couple days back however imaging hear revealed no acute pathology.  PT denies any chest pain, shortness of breath, leg swelling, or dysuria.      PCP: Dr. Reza   Cardiologist: Dr. Mcdonald   Electrophysiologist: Dr. Kwong   Pharmacy: [facility]. [phone]

## 2018-07-08 NOTE — H&P ADULT - NSHPLABSRESULTS_GEN_ALL_CORE
9.8    9.70  )-----------( 189      ( 08 Jul 2018 08:12 )             31.2     07-08    135  |  92<L>  |  45<H>  ----------------------------<  170<H>  5.0   |  22  |  4.1<HH>    Ca    8.5      08 Jul 2018 08:12    TPro  6.0  /  Alb  3.5  /  TBili  0.5  /  DBili  x   /  AST  96<H>  /  ALT  57<H>  /  AlkPhos  241<H>  07-08    CARDIAC MARKERS ( 08 Jul 2018 08:12 )  x     / 0.61 ng/mL / x     / x     / 2.1 ng/mL    CT HEAD:     IMPRESSION:     1.  No acute intra-cranial pathology.    2.  Chronic microvascular ischemic changes.     CXR:    Impression:      1. Increased interstitial edema and pulmonary vascular congestion.    2. Small left basilar opacity, possibly small left pleural effusion   and/or atelectasis.      XRAY Pelvis:    IMPRESSION:     No acute osseous abnormalities.

## 2018-07-08 NOTE — ED PROVIDER NOTE - CARE PLAN
Principal Discharge DX:	UTI (urinary tract infection)  Secondary Diagnosis:	Pneumonia  Secondary Diagnosis:	Altered mental status

## 2018-07-08 NOTE — ED ADULT NURSE REASSESSMENT NOTE - NS ED NURSE REASSESS COMMENT FT1
pt assessed. resting comfortably in bed. pt RUCW port dressing changed. c/d/i. no s/s acuter distress. 2L NC. will continue to monitor.

## 2018-07-08 NOTE — ED PROVIDER NOTE - NS ED ROS FT
Eyes:  No visual changes  ENMT:  No sore throat or runny nose  Cardiac:  No chest pain, SOB or edema. No chest pain with exertion.  Respiratory:  No cough or respiratory distress. No hemoptysis.  GI:  No nausea, vomiting, diarrhea, + for abdominal pain.  :  No dysuria, frequency or burning.  MS:  No myalgia, muscle weakness, joint pain or back pain.  Neuro:  No headache or weakness.  No LOC.  Skin:  No skin rash.   Endocrine: + history of thyroid disease and diabetes.

## 2018-07-08 NOTE — CONSULT NOTE ADULT - ASSESSMENT
83 year old female s/p tesio placement on 6/18/18   -follow up ID recommendations   -follow up renal consult 83 year old female s/p tesio placement on 6/18/18     -follow up ID recommendations   -follow up renal consult  -blood cultures    D/W dr. Baltazar

## 2018-07-08 NOTE — ED PROVIDER NOTE - ATTENDING CONTRIBUTION TO CARE
83F PMH copd, dm, esrd mwf R tesio 6/18 dr mckee, htn, hl, hypothyroid, p/w AMS x 2 days. fever x 2 days as well. similar sx w prior UTIs. also recent unwitnessed fall at NH per daughter. c/o HA. no neck pain, stiffness. no cp, sob, abd pain, nvdc, dysuria, freq, hematuria, numbness, weakness, tingling. no PO x 2 days well. dec UOP as well. oxygen has been low as well.  on exam, AFVSS, well dwayne nad, ncat, eomi, perrla, mmm, lctab, R chest tesio w serous drainage, no erythema/warmth or tenderness, rrr nl s1s2 no mrg, abd soft ntnd, aaox2 (name place), no nuchal rigidity, cn 2-12 intact, 5/5 motor x 4 ext, silt x 4 ext, no facial droop or slurred speech, no nystagmus, no pronator drift, no le edema or calf ttp, a/p; AMS, ha, recent falls, dec po, fevers, will do labs, ua, cth, XR, cultures, rectal temp, surgery consult, re-eval

## 2018-07-08 NOTE — H&P ADULT - NSHPPHYSICALEXAM_GEN_ALL_CORE
PHYSICAL EXAM:    GENERAL: NAD, speaks in full sentences, no signs of respiratory distress    HEAD:  Atraumatic, Normocephalic    EYES: EOMI, PERRLA, conjunctiva and sclera clear    NECK: Supple, No JVD    CHEST/LUNG:Good air entry bilaterally. Expiratory Wheeze appreciated bilaterally. Right Sided Tessio in place.     HEART: Regular rate and rhythm; No murmurs;     ABDOMEN: Soft, Nontender, Nondistended; Bowel sounds present; No guarding    EXTREMITIES:  2+ Peripheral Pulses, Bilateral Lower Extremity edema     PSYCH: Patient awake however not oriented to person place and time.     NEUROLOGY: non-focal    SKIN: No rashes or lesions

## 2018-07-09 LAB
ALBUMIN SERPL ELPH-MCNC: 2.9 G/DL — LOW (ref 3.5–5.2)
ALBUMIN SERPL ELPH-MCNC: 3.1 G/DL — LOW (ref 3.5–5.2)
ALLERGY+IMMUNOLOGY DIAG STUDY NOTE: SIGNIFICANT CHANGE UP
ALP SERPL-CCNC: 247 U/L — HIGH (ref 30–115)
ALP SERPL-CCNC: 252 U/L — HIGH (ref 30–115)
ALT FLD-CCNC: 157 U/L — HIGH (ref 0–41)
ALT FLD-CCNC: 80 U/L — HIGH (ref 0–41)
ANION GAP SERPL CALC-SCNC: 31 MMOL/L — HIGH (ref 7–14)
ANION GAP SERPL CALC-SCNC: 32 MMOL/L — HIGH (ref 7–14)
APTT BLD: 29.5 SEC — SIGNIFICANT CHANGE UP (ref 27–39.2)
AST SERPL-CCNC: 116 U/L — HIGH (ref 0–41)
AST SERPL-CCNC: 309 U/L — HIGH (ref 0–41)
BASE EXCESS BLDA CALC-SCNC: -9.6 MMOL/L — LOW (ref -2–2)
BASOPHILS # BLD AUTO: 0.02 K/UL — SIGNIFICANT CHANGE UP (ref 0–0.2)
BASOPHILS NFR BLD AUTO: 0.2 % — SIGNIFICANT CHANGE UP (ref 0–1)
BILIRUB SERPL-MCNC: 0.6 MG/DL — SIGNIFICANT CHANGE UP (ref 0.2–1.2)
BILIRUB SERPL-MCNC: 0.7 MG/DL — SIGNIFICANT CHANGE UP (ref 0.2–1.2)
BLD GP AB SCN SERPL QL: (no result)
BUN SERPL-MCNC: 75 MG/DL — CRITICAL HIGH (ref 10–20)
BUN SERPL-MCNC: 76 MG/DL — CRITICAL HIGH (ref 10–20)
CALCIUM SERPL-MCNC: 7.9 MG/DL — LOW (ref 8.5–10.1)
CALCIUM SERPL-MCNC: 8.4 MG/DL — LOW (ref 8.5–10.1)
CHLORIDE SERPL-SCNC: 91 MMOL/L — LOW (ref 98–110)
CHLORIDE SERPL-SCNC: 94 MMOL/L — LOW (ref 98–110)
CO2 SERPL-SCNC: 16 MMOL/L — LOW (ref 17–32)
CO2 SERPL-SCNC: 17 MMOL/L — SIGNIFICANT CHANGE UP (ref 17–32)
CREAT SERPL-MCNC: 5.1 MG/DL — CRITICAL HIGH (ref 0.7–1.5)
CREAT SERPL-MCNC: 5.8 MG/DL — CRITICAL HIGH (ref 0.7–1.5)
CULTURE RESULTS: NO GROWTH — SIGNIFICANT CHANGE UP
DAT IGG-SP REAG RBC-IMP: (no result)
DIR ANTIGLOB POLYSPECIFIC INTERPRETATION: (no result)
EOSINOPHIL # BLD AUTO: 0.01 K/UL — SIGNIFICANT CHANGE UP (ref 0–0.7)
EOSINOPHIL NFR BLD AUTO: 0.1 % — SIGNIFICANT CHANGE UP (ref 0–8)
GLUCOSE SERPL-MCNC: 230 MG/DL — HIGH (ref 70–99)
GLUCOSE SERPL-MCNC: 246 MG/DL — HIGH (ref 70–99)
GRAM STN FLD: SIGNIFICANT CHANGE UP
HCO3 BLDA-SCNC: 15 MMOL/L — LOW (ref 23–27)
HCT VFR BLD CALC: 21.5 % — LOW (ref 37–47)
HCT VFR BLD CALC: 29.8 % — LOW (ref 37–47)
HGB BLD-MCNC: 6.8 G/DL — CRITICAL LOW (ref 12–16)
HGB BLD-MCNC: 9.5 G/DL — LOW (ref 12–16)
IAT COMP-SP REAG SERPL QL: SIGNIFICANT CHANGE UP
IMM GRANULOCYTES NFR BLD AUTO: 1.4 % — HIGH (ref 0.1–0.3)
INR BLD: 1.36 RATIO — HIGH (ref 0.65–1.3)
LACTATE SERPL-SCNC: 4.8 MMOL/L — CRITICAL HIGH (ref 0.5–2.2)
LYMPHOCYTES # BLD AUTO: 0.58 K/UL — LOW (ref 1.2–3.4)
LYMPHOCYTES # BLD AUTO: 4.9 % — LOW (ref 20.5–51.1)
MCHC RBC-ENTMCNC: 27.6 PG — SIGNIFICANT CHANGE UP (ref 27–31)
MCHC RBC-ENTMCNC: 28.2 PG — SIGNIFICANT CHANGE UP (ref 27–31)
MCHC RBC-ENTMCNC: 31.6 G/DL — LOW (ref 32–37)
MCHC RBC-ENTMCNC: 31.9 G/DL — LOW (ref 32–37)
MCV RBC AUTO: 86.6 FL — SIGNIFICANT CHANGE UP (ref 81–99)
MCV RBC AUTO: 89.2 FL — SIGNIFICANT CHANGE UP (ref 81–99)
METHOD TYPE: SIGNIFICANT CHANGE UP
MONOCYTES # BLD AUTO: 0.62 K/UL — HIGH (ref 0.1–0.6)
MONOCYTES NFR BLD AUTO: 5.3 % — SIGNIFICANT CHANGE UP (ref 1.7–9.3)
MRSA SPEC QL CULT: SIGNIFICANT CHANGE UP
NEUTROPHILS # BLD AUTO: 10.37 K/UL — HIGH (ref 1.4–6.5)
NEUTROPHILS NFR BLD AUTO: 88.1 % — HIGH (ref 42.2–75.2)
NRBC # BLD: 0 /100 WBCS — SIGNIFICANT CHANGE UP (ref 0–0)
NRBC # BLD: 0 /100 WBCS — SIGNIFICANT CHANGE UP (ref 0–0)
PCO2 BLDA: 27 MMHG — LOW (ref 38–42)
PH BLDA: 7.35 — LOW (ref 7.38–7.42)
PLATELET # BLD AUTO: 113 K/UL — LOW (ref 130–400)
PLATELET # BLD AUTO: 165 K/UL — SIGNIFICANT CHANGE UP (ref 130–400)
PO2 BLDA: 68 MMHG — LOW (ref 78–95)
POTASSIUM SERPL-MCNC: 5.1 MMOL/L — HIGH (ref 3.5–5)
POTASSIUM SERPL-MCNC: 5.7 MMOL/L — HIGH (ref 3.5–5)
POTASSIUM SERPL-SCNC: 5.1 MMOL/L — HIGH (ref 3.5–5)
POTASSIUM SERPL-SCNC: 5.7 MMOL/L — HIGH (ref 3.5–5)
PROT SERPL-MCNC: 5.3 G/DL — LOW (ref 6–8)
PROT SERPL-MCNC: 5.5 G/DL — LOW (ref 6–8)
PROTHROM AB SERPL-ACNC: 14.6 SEC — HIGH (ref 9.95–12.87)
RBC # BLD: 2.41 M/UL — LOW (ref 4.2–5.4)
RBC # BLD: 3.44 M/UL — LOW (ref 4.2–5.4)
RBC # FLD: 15.1 % — HIGH (ref 11.5–14.5)
RBC # FLD: 15.4 % — HIGH (ref 11.5–14.5)
SAO2 % BLDA: 92 % — LOW (ref 94–98)
SODIUM SERPL-SCNC: 140 MMOL/L — SIGNIFICANT CHANGE UP (ref 135–146)
SODIUM SERPL-SCNC: 141 MMOL/L — SIGNIFICANT CHANGE UP (ref 135–146)
SPECIMEN SOURCE: SIGNIFICANT CHANGE UP
TROPONIN T SERPL-MCNC: 0.63 NG/ML — CRITICAL HIGH
TROPONIN T SERPL-MCNC: 0.71 NG/ML — CRITICAL HIGH
TROPONIN T SERPL-MCNC: 0.73 NG/ML — CRITICAL HIGH
TYPE + AB SCN PNL BLD: SIGNIFICANT CHANGE UP
WBC # BLD: 11.51 K/UL — HIGH (ref 4.8–10.8)
WBC # BLD: 11.77 K/UL — HIGH (ref 4.8–10.8)
WBC # FLD AUTO: 11.51 K/UL — HIGH (ref 4.8–10.8)
WBC # FLD AUTO: 11.77 K/UL — HIGH (ref 4.8–10.8)

## 2018-07-09 RX ORDER — ACETAMINOPHEN 500 MG
650 TABLET ORAL EVERY 6 HOURS
Qty: 0 | Refills: 0 | Status: DISCONTINUED | OUTPATIENT
Start: 2018-07-09 | End: 2018-07-10

## 2018-07-09 RX ORDER — CEFEPIME 1 G/1
1000 INJECTION, POWDER, FOR SOLUTION INTRAMUSCULAR; INTRAVENOUS EVERY 12 HOURS
Qty: 0 | Refills: 0 | Status: DISCONTINUED | OUTPATIENT
Start: 2018-07-10 | End: 2018-07-10

## 2018-07-09 RX ORDER — VANCOMYCIN HCL 1 G
VIAL (EA) INTRAVENOUS
Qty: 0 | Refills: 0 | Status: DISCONTINUED | OUTPATIENT
Start: 2018-07-09 | End: 2018-07-09

## 2018-07-09 RX ORDER — MEROPENEM 1 G/30ML
1000 INJECTION INTRAVENOUS EVERY 8 HOURS
Qty: 0 | Refills: 0 | Status: DISCONTINUED | OUTPATIENT
Start: 2018-07-09 | End: 2018-07-09

## 2018-07-09 RX ORDER — PANTOPRAZOLE SODIUM 20 MG/1
40 TABLET, DELAYED RELEASE ORAL
Qty: 0 | Refills: 0 | Status: DISCONTINUED | OUTPATIENT
Start: 2018-07-09 | End: 2018-07-10

## 2018-07-09 RX ORDER — SODIUM CHLORIDE 9 MG/ML
250 INJECTION INTRAMUSCULAR; INTRAVENOUS; SUBCUTANEOUS ONCE
Qty: 0 | Refills: 0 | Status: DISCONTINUED | OUTPATIENT
Start: 2018-07-09 | End: 2018-07-10

## 2018-07-09 RX ORDER — CEFEPIME 1 G/1
1000 INJECTION, POWDER, FOR SOLUTION INTRAMUSCULAR; INTRAVENOUS ONCE
Qty: 0 | Refills: 0 | Status: COMPLETED | OUTPATIENT
Start: 2018-07-09 | End: 2018-07-09

## 2018-07-09 RX ORDER — MEROPENEM 1 G/30ML
500 INJECTION INTRAVENOUS EVERY 24 HOURS
Qty: 0 | Refills: 0 | Status: DISCONTINUED | OUTPATIENT
Start: 2018-07-09 | End: 2018-07-10

## 2018-07-09 RX ORDER — CEFEPIME 1 G/1
1000 INJECTION, POWDER, FOR SOLUTION INTRAMUSCULAR; INTRAVENOUS ONCE
Qty: 0 | Refills: 0 | Status: DISCONTINUED | OUTPATIENT
Start: 2018-07-09 | End: 2018-07-10

## 2018-07-09 RX ORDER — VANCOMYCIN HCL 1 G
1000 VIAL (EA) INTRAVENOUS ONCE
Qty: 0 | Refills: 0 | Status: DISCONTINUED | OUTPATIENT
Start: 2018-07-09 | End: 2018-07-09

## 2018-07-09 RX ORDER — VANCOMYCIN HCL 1 G
1250 VIAL (EA) INTRAVENOUS ONCE
Qty: 0 | Refills: 0 | Status: COMPLETED | OUTPATIENT
Start: 2018-07-09 | End: 2018-07-09

## 2018-07-09 RX ORDER — CEFEPIME 1 G/1
INJECTION, POWDER, FOR SOLUTION INTRAMUSCULAR; INTRAVENOUS
Qty: 0 | Refills: 0 | Status: DISCONTINUED | OUTPATIENT
Start: 2018-07-09 | End: 2018-07-10

## 2018-07-09 RX ADMIN — HEPARIN SODIUM 5000 UNIT(S): 5000 INJECTION INTRAVENOUS; SUBCUTANEOUS at 05:24

## 2018-07-09 RX ADMIN — MEGESTROL ACETATE 400 MILLIGRAM(S): 40 SUSPENSION ORAL at 18:32

## 2018-07-09 RX ADMIN — MEROPENEM 100 MILLIGRAM(S): 1 INJECTION INTRAVENOUS at 22:14

## 2018-07-09 RX ADMIN — Medication 81 MILLIGRAM(S): at 18:31

## 2018-07-09 RX ADMIN — MIRTAZAPINE 15 MILLIGRAM(S): 45 TABLET, ORALLY DISINTEGRATING ORAL at 01:15

## 2018-07-09 RX ADMIN — Medication 166.67 MILLIGRAM(S): at 12:59

## 2018-07-09 RX ADMIN — Medication 25 MILLIGRAM(S): at 05:28

## 2018-07-09 RX ADMIN — ESCITALOPRAM OXALATE 10 MILLIGRAM(S): 10 TABLET, FILM COATED ORAL at 18:31

## 2018-07-09 RX ADMIN — Medication 650 MILLIGRAM(S): at 12:25

## 2018-07-09 RX ADMIN — HEPARIN SODIUM 5000 UNIT(S): 5000 INJECTION INTRAVENOUS; SUBCUTANEOUS at 18:30

## 2018-07-09 RX ADMIN — Medication 125 MICROGRAM(S): at 05:25

## 2018-07-09 RX ADMIN — Medication 325 MILLIGRAM(S): at 18:32

## 2018-07-09 RX ADMIN — CEFEPIME 100 MILLIGRAM(S): 1 INJECTION, POWDER, FOR SOLUTION INTRAMUSCULAR; INTRAVENOUS at 23:33

## 2018-07-09 RX ADMIN — CEFTRIAXONE 100 GRAM(S): 500 INJECTION, POWDER, FOR SOLUTION INTRAMUSCULAR; INTRAVENOUS at 05:24

## 2018-07-09 NOTE — CONSULT NOTE ADULT - ASSESSMENT
82 yo lady with PMH of Arthritis, rheumatoid ,COPD , Neuropathy ,Diabetes, Hypothyroidism ,HTN (hypertension) ,CHF, AICD , CKD 5 presenting with fever chills and change of mental status  ·	CKD 5 , no evidence of volume overload   ·	Will hold on HD for today  ·	MRSA bacteremia x2, may need to remove tesio   ·	check 2 D echo / CORNELL   ·	dc Rocephin Azithromycin   ·	Start Vanco 1250 mg x1   ·	ID eval       will follow

## 2018-07-09 NOTE — CHART NOTE - NSCHARTNOTEFT_GEN_A_CORE
83 year old female with PMH of COPD, CHF with AICD placement, DMII, HTN, RA, ESRD on Hemodialysis and hypothyroidism presented to the ED from [facility] for fever and AMS x 2 days ago .During the course of hospitalization patient was found to be septic 2/2 MRSA bacteremia possibly 2/2 tessio which was removed today . Since admission patient was altered and lethargic   At 19:30 a Rapid response was called by housestaff when patient was found to be more lethargic and BP was undectable .  On presentation VS sbp not detectable , HR 84  Blood glucose 257 , sO2 97 % RA   Pateint received 1.5 L NS and repeat BP 70/48 manually . To get a good IV access under sterile condition a Left femoral was inserted under Sono guidance. Placement confirmed with Ultrasound and Blood gas from Femoral showed venous blood gas .   VBG and consecutive abg was consistent with metabolic acidosis with elevated lactate 4.4 and low bicarb    A/P  patient received 1250 iv vancomycin this am , therefore no more vancomycin in patient with esrd . Received meropenem 500 and cefepime 1000 mg iv stat  started on levophed 0.2 - repeat bp 102/70   blood work sent out - cbc, cmp, cardiac enzymes, lactate stat  repeat blood work @ 23:30   called the daughter who said no aggressive measures and opted for dnr/dni  ICU consult stat- spoke with dr richards - patient is not a suitable candidate for ICU because of poor prognosis , recommended close follow up .  informed hospitalist on call- dr lloyd . Recommends CT head when patient more stable  As per floor protocol cannot titrate levophed therefore patient will be transferred to CEU  family a bedside and have been explained the poor prognosis. They are understandable of the situation  Consider palliative consult in am  Sign out provided to Dr Magdi diaz   will continue to follow

## 2018-07-09 NOTE — PROGRESS NOTE ADULT - SUBJECTIVE AND OBJECTIVE BOX
SUBJECTIVE:    Patient is a 83y old Female who presents with a chief complaint of Altered Mental Status (2018 12:39)    Currently admitted to medicine with the primary diagnosis of UTI (urinary tract infection)     Today is hospital day 1d. This morning she is resting comfortably in bed and reports no new issues or overnight events.     PAST MEDICAL & SURGICAL HISTORY  Arthritis, rheumatoid  Cystitis  COPD (chronic obstructive pulmonary disease)  Neuropathy  Diabetes  Hypothyroidism  HTN (hypertension)  CHF (congestive heart failure)  AICD (automatic cardioverter/defibrillator) present    SOCIAL HISTORY:  Negative for smoking/alcohol/drug use.     ALLERGIES:  Plavix (Other (Moderate))    MEDICATIONS:  STANDING MEDICATIONS  aspirin  chewable 81 milliGRAM(s) Oral daily  escitalopram 10 milliGRAM(s) Oral daily  ferrous    sulfate 325 milliGRAM(s) Oral daily  heparin  Injectable 5000 Unit(s) SubCutaneous every 8 hours  levothyroxine 125 MICROGram(s) Oral daily  megestrol Suspension 400 milliGRAM(s) Oral daily  metoprolol succinate ER 25 milliGRAM(s) Oral daily  mirtazapine 15 milliGRAM(s) Oral at bedtime  pantoprazole    Tablet 40 milliGRAM(s) Oral before breakfast  senna 1 Tablet(s) Oral at bedtime    PRN MEDICATIONS  acetaminophen  Suppository 650 milliGRAM(s) Rectal every 6 hours PRN  melatonin 3 milliGRAM(s) Oral at bedtime PRN    VITALS:   T(F): 99.4  HR: 93  BP: 90/59  RR: 32  SpO2: 98%    LABS:                        9.5    11.77 )-----------( 165      ( 2018 09:49 )             29.8     07-    140  |  91<L>  |  75<HH>  ----------------------------<  230<H>  5.1<H>   |  17  |  5.1<HH>    Ca    8.4<L>      2018 09:49    TPro  5.5<L>  /  Alb  2.9<L>  /  TBili  0.6  /  DBili  x   /  AST  116<H>  /  ALT  80<H>  /  AlkPhos  252<H>        Urinalysis Basic - ( 2018 08:12 )    Color: Red / Appearance: Turbid / S.020 / pH: x  Gluc: x / Ketone: Trace  / Bili: Small / Urobili: 1.0 mg/dL   Blood: x / Protein: >=300 mg/dL / Nitrite: Negative   Leuk Esterase: Large / RBC: >50 /HPF / WBC >50 /HPF   Sq Epi: x / Non Sq Epi: Few /HPF / Bacteria: Moderate /HPF        Troponin T, Serum: 0.73 ng/mL <HH> (18 @ 09:49)  Troponin T, Serum: 0.71 ng/mL <HH> (18 @ 00:50)      Culture - Blood (collected 2018 08:53)  Source: .Blood Blood-Venous  Gram Stain (2018 05:23):    Growth in aerobic bottle:    Gram Positive Cocci in Clusters    Growth in anaerobic bottle:    Gram Positive Cocci in Clusters  Preliminary Report (2018 05:23):    Growth in aerobic bottle:    Gram Positive Cocci in Clusters    "Due to technical problems, Proteus sp. will Not be reported as part of    the BCID panel until further notice"    ***Blood Panel PCR results on this specimen are available    approximately 3 hours after the Gram stain result.***    Gram stain, PCR, and/or culture results may not always    correspond due to difference in methodologies.    ************************************************************    This PCR assay was performed using [employer].    The following targets are tested for: Enterococcus,    vancomycin resistant enterococci, Listeria monocytogenes,    coagulase negative staphylococci, S. aureus,    methicillin resistant S. aureus, Streptococcus agalactiae    (Group B), S. pneumoniae, S. pyogenes (Group A),    Acinetobacter baumannii, Enterobacter cloacae, E. coli,    Klebsiella oxytoca, K. pneumoniae, Proteus sp.,    Serratia marcescens, Haemophilus influenzae,    Neisseria meningitidis, Pseudomonas aeruginosa, Candida    albicans, C. glabrata, C krusei, C parapsilosis,    C. tropicalis and the KPC resistance gene.    Growth in anaerobic bottle:    Gram Positive Cocci in Clusters  Organism: Blood Culture PCR (2018 04:11)  Organism: Blood Culture PCR (2018 04:11)    Culture - Urine (collected 2018 08:12)  Source: .Urine Catheterized  Final Report (2018 11:18):    No growth    Culture - Blood (collected 2018 08:12)  Source: .Blood Blood-Venous  Gram Stain (2018 07:09):    Growth in aerobic bottle:    Gram Positive Cocci in Clusters    Growth in anaerobic bottle:    Gram Positive Cocci in Clusters  Preliminary Report (2018 07:09):    Growth in aerobic bottle:    Gram Positive Cocci in Clusters    Growth in anaerobic bottle:    Gram Positive Cocci in Clusters  Organism: Blood Culture PCR (18 @ 04:11)      -  Methicillin resistant Staphylococcus aureus (MRSA): Detec      Method Type: PCR    CARDIAC MARKERS ( 2018 09:49 )  x     / 0.73 ng/mL / x     / x     / x      CARDIAC MARKERS ( 2018 00:50 )  x     / 0.71 ng/mL / x     / x     / x      CARDIAC MARKERS ( 2018 16:49 )  x     / 0.58 ng/mL / x     / x     / x      CARDIAC MARKERS ( 2018 08:12 )  x     / 0.61 ng/mL / x     / x     / 2.1 ng/mL      RADIOLOGY:    Xray Chest 1 View-PORTABLE IMMEDIATE (18 @ 14:59) >  1.Support devices: Enteric tube in the stomach. Left-sided AICD with leads   projecting over the ventricles and right atrium. Please note battery pack   limits evaluation of the underlying lung. Right sided dialysis catheter  with tips in the lower SVC and right atrium.    2. Trace right pleural effusion and basilar opacity/atelectasis, slightly   worsened.    PHYSICAL EXAM:    GEN: Not responsive, drowsy and agitated  LUNGS: bilateral crackles, vascular access on right side of chest/neck, erythema and swelling around vasvulature  HEART: S1/S2 present. RRR.   ABD: Soft, non-tender, non-distended. Bowel sounds present  EXT: No edema  NEURO: AAO X 0

## 2018-07-09 NOTE — PROCEDURE NOTE - NSPROCDETAILS_GEN_ALL_CORE
sterile technique, catheter placed/sterile dressing applied/guidewire recovered/lumen(s) aspirated and flushed/ultrasound guidance

## 2018-07-09 NOTE — CHART NOTE - NSCHARTNOTEFT_GEN_A_CORE
Patient TESIO removed. Purulent drainage coming out from the TESIO site. Culture sent. Patient with AMS at this time.

## 2018-07-09 NOTE — SWALLOW BEDSIDE ASSESSMENT ADULT - SLP PERTINENT HISTORY OF CURRENT PROBLEM
Pt admitted with AMS, fever. Sepsis 2' UTI, PNA from [facility]. s/p tesio placement. PMHx: COPD, CHF Pt admitted with AMS, fever. Sepsis 2' UTI, PNA from [facility]. s/p tesio placement. PMHx: COPD, CHF. Known to SLP dept 5/2018 recs for ground w thin liquids

## 2018-07-09 NOTE — PROGRESS NOTE ADULT - SUBJECTIVE AND OBJECTIVE BOX
VI MOBLEY  83y Female   [id number]    Hospital Day: 1  Post Operative Day:  Procedure:  Patient is a 83y old  Female who presents with sepsis r/o tesio as the source(2018 12:39)    PAST MEDICAL & SURGICAL HISTORY:  Arthritis, rheumatoid  Cystitis  COPD (chronic obstructive pulmonary disease)  Neuropathy  Diabetes  Hypothyroidism  HTN (hypertension)  CHF (congestive heart failure)  AICD (automatic cardioverter/defibrillator) present      Events of the Last 24h:none  Vital Signs Last 24 Hrs  T(C): 37.4 (2018 07:44), Max: 37.4 (2018 07:44)  T(F): 99.3 (2018 07:44), Max: 99.3 (2018 07:44)  HR: 79 (2018 00:18) (54 - 95)  BP: 99/57 (2018 00:18) (88/46 - 118/60)  BP(mean): --  RR: 22 (2018 07:25) (22 - 22)  SpO2: 98% (2018 20:02) (98% - 100%)            I&O's Summary    2018 07:  -  2018 01:24  --------------------------------------------------------  IN: 0 mL / OUT: 300 mL / NET: -300 mL     I&O's Detail    2018 07:  -  2018 01:24  --------------------------------------------------------  IN:  Total IN: 0 mL    OUT:    Voided: 300 mL  Total OUT: 300 mL    Total NET: -300 mL          MEDICATIONS  (STANDING):  aspirin  chewable 81 milliGRAM(s) Oral daily  cefTRIAXone   IVPB 1 Gram(s) IV Intermittent every 24 hours  escitalopram 10 milliGRAM(s) Oral daily  ferrous    sulfate 325 milliGRAM(s) Oral daily  heparin  Injectable 5000 Unit(s) SubCutaneous every 8 hours  levothyroxine 125 MICROGram(s) Oral daily  megestrol Suspension 400 milliGRAM(s) Oral daily  metoprolol succinate ER 25 milliGRAM(s) Oral daily  mirtazapine 15 milliGRAM(s) Oral at bedtime  senna 1 Tablet(s) Oral at bedtime    MEDICATIONS  (PRN):  melatonin 3 milliGRAM(s) Oral at bedtime PRN Insomnia      PHYSICAL EXAM:    GENERAL: NAD    HEENT: NCAT    CHEST/LUNGS: CTAB    HEART: RRR,  No murmurs, rubs, or gallops    ABDOMEN: SNTND +BS    EXTREMITIES:  FROM, No clubbing, cyanosis, or edema, palpable pulse    NEURO: No focal neurological deficits    SKIN: No rashes or lesions    INCISION/WOUNDS:                          9.7    10.05 )-----------( 171      ( 2018 16:49 )             30.2        CBC Full  -  ( 2018 16:49 )  WBC Count : 10.05 K/uL  Hemoglobin : 9.7 g/dL  Hematocrit : 30.2 %  Platelet Count - Automated : 171 K/uL  Mean Cell Volume : 87.8 fL  Mean Cell Hemoglobin : 28.2 pg  Mean Cell Hemoglobin Concentration : 32.1 g/dL  Auto Neutrophil # : 9.04 K/uL  Auto Lymphocyte # : 0.39 K/uL  Auto Monocyte # : 0.51 K/uL  Auto Eosinophil # : 0.00 K/uL  Auto Basophil # : 0.02 K/uL  Auto Neutrophil % : 89.9 %  Auto Lymphocyte % : 3.9 %  Auto Monocyte % : 5.1 %  Auto Eosinophil % : 0.0 %  Auto Basophil % : 0.2 %               135   |  91    |  53                 Ca: 8.4    BMP:   ----------------------------< 178    Mg: x     (18 @ 16:49)             4.5    |  22    | 4.3                Ph: x        LFT:     TPro: 5.8 / Alb: 3.2 / TBili: 0.5 / DBili: x / AST: 84 / ALT: 63 / AlkPhos: 266   (18 @ 16:49)    LIVER FUNCTIONS - ( 2018 16:49 )  Alb: 3.2 g/dL / Pro: 5.8 g/dL / ALK PHOS: 266 U/L / ALT: 63 U/L / AST: 84 U/L / GGT: x             CARDIAC MARKERS ( 2018 16:49 )  x     / 0.58 ng/mL / x     / x     / x      CARDIAC MARKERS ( 2018 08:12 )  x     / 0.61 ng/mL / x     / x     / 2.1 ng/mL      Urinalysis Basic - ( 2018 08:12 )    Color: Red / Appearance: Turbid / S.020 / pH: x  Gluc: x / Ketone: Trace  / Bili: Small / Urobili: 1.0 mg/dL   Blood: x / Protein: >=300 mg/dL / Nitrite: Negative   Leuk Esterase: Large / RBC: >50 /HPF / WBC >50 /HPF   Sq Epi: x / Non Sq Epi: Few /HPF / Bacteria: Moderate /HPF

## 2018-07-09 NOTE — CONSULT NOTE ADULT - SUBJECTIVE AND OBJECTIVE BOX
VI MOBLEY  83y, Female  Allergy: Plavix (Other (Moderate))      HPI:  83 year old female with PMH of COPD, CHF with AICD placement, DMII, HTN, RA, ESRD on Hemodialysis and hypothyroidism presents to the ED from [facility] for fever and AMS which started 2 days ago and was getting progressively worse Patient is accompanied by her two daughter who state that she has been getting progressively altered from 2 days back. Her fever was measured to be 101 at the facility. Patient fell at the nursing home a couple days back however imaging hear revealed no acute pathology.  PT denies any chest pain, shortness of breath, leg swelling, or dysuria.      PCP: Dr. Reza   Cardiologist: Dr. Mcdonald   Electrophysiologist: Dr. Kwong   Pharmacy: [facility]. [phone] (2018 12:39)    FAMILY HISTORY:  No pertinent family history in first degree relatives    PAST MEDICAL & SURGICAL HISTORY:  Arthritis, rheumatoid  Cystitis  COPD (chronic obstructive pulmonary disease)  Neuropathy  Diabetes  Hypothyroidism  HTN (hypertension)  CHF (congestive heart failure)  AICD (automatic cardioverter/defibrillator) present        VITALS:  T(F): 100.4, Max: 100.6 (18 @ 04:48)  HR: 99  BP: 107/55  RR: 22Vital Signs Last 24 Hrs  T(C): 38 (2018 10:11), Max: 38.1 (2018 04:48)  T(F): 100.4 (2018 10:11), Max: 100.6 (2018 04:48)  HR: 99 (2018 04:48) (54 - 99)  BP: 107/55 (2018 04:48) (88/46 - 118/60)  BP(mean): --  RR: 22 (2018 04:48) (22 - 22)  SpO2: 98% (2018 20:02) (98% - 98%)    TESTS & MEASUREMENTS:                        9.5    11.77 )-----------( 165      ( 2018 09:49 )             29.8     07-09    140  |  91<L>  |  75<HH>  ----------------------------<  230<H>  5.1<H>   |  17  |  5.1<HH>    Ca    8.4<L>      2018 09:49    TPro  5.5<L>  /  Alb  2.9<L>  /  TBili  0.6  /  DBili  x   /  AST  116<H>  /  ALT  80<H>  /  AlkPhos  252<H>  07    LIVER FUNCTIONS - ( 2018 09:49 )  Alb: 2.9 g/dL / Pro: 5.5 g/dL / ALK PHOS: 252 U/L / ALT: 80 U/L / AST: 116 U/L / GGT: x             Culture - Blood (collected 18 @ 08:53)  Source: .Blood Blood-Venous  Gram Stain (18 @ 05:23):    Growth in aerobic bottle:    Gram Positive Cocci in Clusters    Growth in anaerobic bottle:    Gram Positive Cocci in Clusters  Preliminary Report (18 @ 05:23):    Growth in aerobic bottle:    Gram Positive Cocci in Clusters    "Due to technical problems, Proteus sp. will Not be reported as part of    the BCID panel until further notice"    ***Blood Panel PCR results on this specimen are available    approximately 3 hours after the Gram stain result.***    Gram stain, PCR, and/or culture results may not always    correspond due to difference in methodologies.    ************************************************************    This PCR assay was performed using [employer].    The following targets are tested for: Enterococcus,    vancomycin resistant enterococci, Listeria monocytogenes,    coagulase negative staphylococci, S. aureus,    methicillin resistant S. aureus, Streptococcus agalactiae    (Group B), S. pneumoniae, S. pyogenes (Group A),    Acinetobacter baumannii, Enterobacter cloacae, E. coli,    Klebsiella oxytoca, K. pneumoniae, Proteus sp.,    Serratia marcescens, Haemophilus influenzae,    Neisseria meningitidis, Pseudomonas aeruginosa, Candida    albicans, C. glabrata, C krusei, C parapsilosis,    C. tropicalis and the KPC resistance gene.    Growth in anaerobic bottle:    Gram Positive Cocci in Clusters  Organism: Blood Culture PCR (18 @ 04:11)  Organism: Blood Culture PCR (18 @ 04:11)      -  Methicillin resistant Staphylococcus aureus (MRSA): Detec      Method Type: PCR    Culture - Urine (collected 18 @ 08:12)  Source: .Urine Catheterized  Final Report (18 @ 11:18):    No growth    Culture - Blood (collected 18 @ 08:12)  Source: .Blood Blood-Venous  Gram Stain (18 @ 07:09):    Growth in aerobic bottle:    Gram Positive Cocci in Clusters    Growth in anaerobic bottle:    Gram Positive Cocci in Clusters  Preliminary Report (18 @ 07:09):    Growth in aerobic bottle:    Gram Positive Cocci in Clusters    Growth in anaerobic bottle:    Gram Positive Cocci in Clusters      Urinalysis Basic - ( 2018 08:12 )    Color: Red / Appearance: Turbid / S.020 / pH: x  Gluc: x / Ketone: Trace  / Bili: Small / Urobili: 1.0 mg/dL   Blood: x / Protein: >=300 mg/dL / Nitrite: Negative   Leuk Esterase: Large / RBC: >50 /HPF / WBC >50 /HPF   Sq Epi: x / Non Sq Epi: Few /HPF / Bacteria: Moderate /HPF          RADIOLOGY & ADDITIONAL TESTS:    ANTIBIOTICS:  vancomycin  IVPB 1250 milliGRAM(s) IV Intermittent once

## 2018-07-09 NOTE — PROCEDURE NOTE - NSPOSTCAREGUIDE_GEN_A_CORE
Keep the cast/splint/dressing clean and dry/Care for catheter as per unit/ICU protocols/Verbal/written post procedure instructions were given to patient/caregiver/Instructed patient/caregiver to follow-up with primary care physician

## 2018-07-09 NOTE — CONSULT NOTE ADULT - SUBJECTIVE AND OBJECTIVE BOX
NEPHROLOGY CONSULTATION NOTE    Patient is a 83y Female whom presented to the hospital with     PAST MEDICAL & SURGICAL HISTORY:  Arthritis, rheumatoid  Cystitis  COPD (chronic obstructive pulmonary disease)  Neuropathy  Diabetes  Hypothyroidism  HTN (hypertension)  CHF (congestive heart failure)  AICD (automatic cardioverter/defibrillator) present    Allergies:  Plavix (Other (Moderate))    Home Medications Reviewed  Hospital Medications:   MEDICATIONS  (STANDING):  aspirin  chewable 81 milliGRAM(s) Oral daily  cefTRIAXone   IVPB 1 Gram(s) IV Intermittent every 24 hours  escitalopram 10 milliGRAM(s) Oral daily  ferrous    sulfate 325 milliGRAM(s) Oral daily  heparin  Injectable 5000 Unit(s) SubCutaneous every 8 hours  levothyroxine 125 MICROGram(s) Oral daily  megestrol Suspension 400 milliGRAM(s) Oral daily  metoprolol succinate ER 25 milliGRAM(s) Oral daily  mirtazapine 15 milliGRAM(s) Oral at bedtime  senna 1 Tablet(s) Oral at bedtime      SOCIAL HISTORY:  Denies ETOH,Smoking,   FAMILY HISTORY:  No pertinent family history in first degree relatives        REVIEW OF SYSTEMS:  CONSTITUTIONAL: No weakness, fevers or chills  EYES/ENT: No visual changes;  No vertigo or throat pain   NECK: No pain or stiffness  RESPIRATORY: No cough, wheezing, hemoptysis; No shortness of breath  CARDIOVASCULAR: No chest pain or palpitations.  GASTROINTESTINAL: No abdominal or epigastric pain. No nausea, vomiting, or hematemesis; No diarrhea or constipation. No melena or hematochezia.  GENITOURINARY: No dysuria, frequency, foamy urine, urinary urgency, incontinence or hematuria  NEUROLOGICAL: No numbness or weakness  SKIN: No itching, burning, rashes, or lesions   VASCULAR: No bilateral lower extremity edema.   All other review of systems is negative unless indicated above.    VITALS:  T(F): 100.6 (18 @ 04:48), Max: 100.6 (18 @ 04:48)  HR: 99 (18 @ 04:48)  BP: 107/55 (18 @ 04:48)  RR: 22 (18 @ 04:48)  SpO2: 98% (18 @ 20:02)    07 @ 07:01  -   @ 07:00  --------------------------------------------------------  IN: 0 mL / OUT: 300 mL / NET: -300 mL        Weight (kg): 48 ( @ 02:08)      I&O's Detail    2018 07:01  -  2018 07:00  --------------------------------------------------------  IN:  Total IN: 0 mL    OUT:    Voided: 300 mL  Total OUT: 300 mL    Total NET: -300 mL            PHYSICAL EXAM:  Constitutional: NAD  HEENT: anicteric sclera, oropharynx clear, MMM  Neck: No JVD  Respiratory: CTAB, no wheezes, rales or rhonchi  Cardiovascular: S1, S2, RRR  Gastrointestinal: BS+, soft, NT/ND  Extremities: No cyanosis or clubbing. No peripheral edema  Neurological: A/O x 3, no focal deficits  Psychiatric: Normal mood, normal affect  : No CVA tenderness. No davis.   Skin: No rashes  Vascular Access:    LABS:      135  |  91<L>  |  53<H>  ----------------------------<  178<H>  4.5   |  22  |  4.3<HH>    Ca    8.4<L>      2018 16:49    TPro  5.8<L>  /  Alb  3.2<L>  /  TBili  0.5  /  DBili      /  AST  84<H>  /  ALT  63<H>  /  AlkPhos  266<H>      Creatinine Trend: 4.3 <--, 4.1 <--, 3.5 <--, 2.8 <--, 4.0 <--, 3.5 <--, 5.3 <--, 4.4 <--, 4.4 <--, 5.4 <--, 6.3 <--, 6.3 <--, 6.0 <--, 6.1 <--                        9.7    10.05 )-----------( 171      ( 2018 16:49 )             30.2     Urine Studies:  Urinalysis Basic - ( 2018 08:12 )    Color: Red / Appearance: Turbid / S.020 / pH:   Gluc:  / Ketone: Trace  / Bili: Small / Urobili: 1.0 mg/dL   Blood:  / Protein: >=300 mg/dL / Nitrite: Negative   Leuk Esterase: Large / RBC: >50 /HPF / WBC >50 /HPF   Sq Epi:  / Non Sq Epi: Few /HPF / Bacteria: Moderate /HPF          06-15 @ 11:18  7.7  93  --        RADIOLOGY & ADDITIONAL STUDIES:

## 2018-07-09 NOTE — CONSULT NOTE ADULT - ASSESSMENT
IMPRESSION:  Sepsis secondary to ORSA secondary to zulema catheter infection with metabolic encephalopthy with nonresponsive state.  Cerebritis a concern.  R/O endocarditis.  Pulmonary edema.    RECOMMENDATIONS:  Vancomycin 1250 mg x once today then 1 gm post HD.  Daily blood cultures after removal of the catheter.  ECHO.    Prognosis is extremely poor.

## 2018-07-09 NOTE — PROGRESS NOTE ADULT - SUBJECTIVE AND OBJECTIVE BOX
RAKELVI PIEDRA  83y, Female  Allergy: Plavix (Other (Moderate))    Hospital Day: 1d    Patient seen and examined earlier today.     PMH/PSH:  PAST MEDICAL & SURGICAL HISTORY:  Arthritis, rheumatoid  Cystitis  COPD (chronic obstructive pulmonary disease)  Neuropathy  Diabetes  Hypothyroidism  HTN (hypertension)  CHF (congestive heart failure)  AICD (automatic cardioverter/defibrillator) present      LAST 24-Hr EVENTS:    VITALS:  T(F): 100.4 (18 @ 10:11), Max: 100.6 (18 @ 04:48)  HR: 99 (18 @ 04:48)  BP: 107/55 (18 @ 04:48) (88/46 - 118/60)  RR: 22 (18 @ 04:48)  SpO2: 98% (18 @ 20:02)    TESTS & MEASUREMENTS:  Weight (Kg): 48 (18 @ 02:08)    18 @ 07:01  -  18 @ 07:00  --------------------------------------------------------  IN: 0 mL / OUT: 300 mL / NET: -300 mL                            9.5    11.77 )-----------( 165      ( 2018 09:49 )             29.8           135  |  91<L>  |  53<H>  ----------------------------<  178<H>  4.5   |  22  |  4.3<HH>    Ca    8.4<L>      2018 16:49    TPro  5.8<L>  /  Alb  3.2<L>  /  TBili  0.5  /  DBili  x   /  AST  84<H>  /  ALT  63<H>  /  AlkPhos  266<H>      LIVER FUNCTIONS - ( 2018 16:49 )  Alb: 3.2 g/dL / Pro: 5.8 g/dL / ALK PHOS: 266 U/L / ALT: 63 U/L / AST: 84 U/L / GGT: x           CARDIAC MARKERS ( 2018 00:50 )  x     / 0.71 ng/mL / x     / x     / x      CARDIAC MARKERS ( 2018 16:49 )  x     / 0.58 ng/mL / x     / x     / x      CARDIAC MARKERS ( 2018 08:12 )  x     / 0.61 ng/mL / x     / x     / 2.1 ng/mL        Culture - Blood (collected 18 @ 08:53)  Source: .Blood Blood-Venous  Gram Stain (18 @ 05:23):    Growth in aerobic bottle:    Gram Positive Cocci in Clusters    Growth in anaerobic bottle:    Gram Positive Cocci in Clusters  Preliminary Report (18 @ 05:23):    Growth in aerobic bottle:    Gram Positive Cocci in Clusters  Organism: Blood Culture PCR (18 @ 04:11)  Organism: Blood Culture PCR (18 @ 04:11)      -  Methicillin resistant Staphylococcus aureus (MRSA): Detec      Method Type: PCR    Culture - Urine (collected 18 @ 08:12)  Source: .Urine Catheterized  Final Report (18 @ 11:18):    No growth    Culture - Blood (collected 18 @ 08:12)  Source: .Blood Blood-Venous  Gram Stain (18 @ 07:09):    Growth in aerobic bottle:    Gram Positive Cocci in Clusters    Growth in anaerobic bottle:    Gram Positive Cocci in Clusters  Preliminary Report (18 @ 07:09):    Growth in aerobic bottle:    Gram Positive Cocci in Clusters    Growth in anaerobic bottle:    Gram Positive Cocci in Clusters      Urinalysis Basic - ( 2018 08:12 )    Color: Red / Appearance: Turbid / S.020 / pH: x  Gluc: x / Ketone: Trace  / Bili: Small / Urobili: 1.0 mg/dL   Blood: x / Protein: >=300 mg/dL / Nitrite: Negative   Leuk Esterase: Large / RBC: >50 /HPF / WBC >50 /HPF   Sq Epi: x / Non Sq Epi: Few /HPF / Bacteria: Moderate /HPF        RADIOLOGY & ADDITIONAL TESTS:  < from: CT Cervical Spine No Cont (18 @ 09:23) >    1.  No acute osseous abnormalities.    2.  Trace retrolisthesis of C4 on C5, stable since .    3.  Multilevel degenerative changes of the cervical spine, most severe at   C4-C5 level.          < from: CT Head No Cont (18 @ 09:21) >  IMPRESSION:     1.  No acute intra-cranial pathology.    2.  Chronic microvascular ischemic changes.       < from: Xray Chest 1 View- PORTABLE-Urgent (18 @ 08:47) >  Impression:      1. Increased interstitial edema and pulmonary vascular congestion.    2. Small left basilar opacity, possibly small left pleural effusion   and/or atelectasis.    3. Stable cardiomegaly.     < end of copied text >    RECENT DIAGNOSTIC ORDERS:  Comprehensive Metabolic Panel: AM Sched. Collection: 2018 04:30 (18 @ 13:25)  Blood Gas Profile - Arterial: 16:00 (18 @ 13:25)  Blood Gas Profile - Arterial: AM Sched. Collection:2018 04:30 (18 @ 13:25)  Troponin T, Serum: AM Sched. Collection: 2018 04:30 (18 @ 13:25)  Xray Chest 1 View- PORTABLE-Routine: AM   Indication: sob  Transport: Portable  Exam Completed  Provider's Contact #: [phone] (18 @ 13:25)  Culture - Blood: AM Sched. Collection:2018 04:30  Specimen Source: Blood (18 @ 13:25)  Culture - Blood: Routine  Specimen Source: Blood (18 @ 13:25)  Culture - Blood: AM Sched. Collection:10-Jul-2018 04:30  Specimen Source: Blood (18 @ 10:24)  Culture - Blood: AM Sched. Collection:2018 04:30  Specimen Source: Blood (18 @ 10:24)  Complete Blood Count + Automated Diff: AM Sched. Collection: 10-Jul-2018 04:30 (18 @ 10:30)  Comprehensive Metabolic Panel: AM Sched. Collection: 10-Jul-2018 04:30 (18 @ 10:30)  Phosphorus Level, Serum: AM Sched. Collection: 10-Jul-2018 04:30 (18 @ 10:30)  Magnesium, Serum: AM Sched. Collection: 10-Jul-2018 04:30 (18 @ 10:30)      MEDICATIONS:  MEDICATIONS  (STANDING):  aspirin  chewable 81 milliGRAM(s) Oral daily  escitalopram 10 milliGRAM(s) Oral daily  ferrous    sulfate 325 milliGRAM(s) Oral daily  heparin  Injectable 5000 Unit(s) SubCutaneous every 8 hours  levothyroxine 125 MICROGram(s) Oral daily  megestrol Suspension 400 milliGRAM(s) Oral daily  metoprolol succinate ER 25 milliGRAM(s) Oral daily  mirtazapine 15 milliGRAM(s) Oral at bedtime  pantoprazole    Tablet 40 milliGRAM(s) Oral before breakfast  senna 1 Tablet(s) Oral at bedtime  vancomycin  IVPB 1250 milliGRAM(s) IV Intermittent once    MEDICATIONS  (PRN):  acetaminophen  Suppository 650 milliGRAM(s) Rectal every 6 hours PRN For Temp greater than 38 C (100.4 F)  melatonin 3 milliGRAM(s) Oral at bedtime PRN Insomnia                PHYSICAL EXAM:  GENERAL: A&O x3,  in NAD/P,   NECK: No Swelling  CHEST/LUNG: Good air entry, No wheezing  HEART: RRR, No murmurs  ABDOMEN: Soft, Bowel sounds present  EXTREMITIES:  No clubbing, LEANDRAVI  83y, Female  Allergy: Plavix (Other (Moderate))    Hospital Day: 1d    Patient seen and examined earlier today.   Sister and 2 daughters at bedside    PMH/PSH:  PAST MEDICAL & SURGICAL HISTORY:  Arthritis, rheumatoid  Cystitis  COPD (chronic obstructive pulmonary disease)  Neuropathy  Diabetes  Hypothyroidism  HTN (hypertension)  CHF (congestive heart failure)  AICD (automatic cardioverter/defibrillator) present      LAST 24-Hr EVENTS:  remains poorly responsive     VITALS:  T(F): 100.4 (18 @ 10:11), Max: 100.6 (18 @ 04:48)  HR: 99 (18 @ 04:48)  BP: 107/55 (18 @ 04:48) (88/46 - 118/60)  RR: 22 (18 @ 04:48)  SpO2: 98% (18 @ 20:02)    TESTS & MEASUREMENTS:  Weight (Kg): 48 (18 @ 02:08)    18 @ 07:01  -  18 @ 07:00  --------------------------------------------------------  IN: 0 mL / OUT: 300 mL / NET: -300 mL                            9.5    11.77 )-----------( 165      ( 2018 09:49 )             29.8           135  |  91<L>  |  53<H>  ----------------------------<  178<H>  4.5   |  22  |  4.3<HH>    Ca    8.4<L>      2018 16:49    TPro  5.8<L>  /  Alb  3.2<L>  /  TBili  0.5  /  DBili  x   /  AST  84<H>  /  ALT  63<H>  /  AlkPhos  266<H>      LIVER FUNCTIONS - ( 2018 16:49 )  Alb: 3.2 g/dL / Pro: 5.8 g/dL / ALK PHOS: 266 U/L / ALT: 63 U/L / AST: 84 U/L / GGT: x           CARDIAC MARKERS ( 2018 00:50 )  x     / 0.71 ng/mL / x     / x     / x      CARDIAC MARKERS ( 2018 16:49 )  x     / 0.58 ng/mL / x     / x     / x      CARDIAC MARKERS ( 2018 08:12 )  x     / 0.61 ng/mL / x     / x     / 2.1 ng/mL        Culture - Blood (collected 18 @ 08:53)  Source: .Blood Blood-Venous  Gram Stain (18 @ 05:23):    Growth in aerobic bottle:    Gram Positive Cocci in Clusters    Growth in anaerobic bottle:    Gram Positive Cocci in Clusters  Preliminary Report (18 @ 05:23):    Growth in aerobic bottle:    Gram Positive Cocci in Clusters  Organism: Blood Culture PCR (18 @ 04:11)  Organism: Blood Culture PCR (18 @ 04:11)      -  Methicillin resistant Staphylococcus aureus (MRSA): Detec      Method Type: PCR    Culture - Urine (collected 18 @ 08:12)  Source: .Urine Catheterized  Final Report (18 @ 11:18):    No growth    Culture - Blood (collected 18 @ 08:12)  Source: .Blood Blood-Venous  Gram Stain (18 @ 07:09):    Growth in aerobic bottle:    Gram Positive Cocci in Clusters    Growth in anaerobic bottle:    Gram Positive Cocci in Clusters  Preliminary Report (18 @ 07:09):    Growth in aerobic bottle:    Gram Positive Cocci in Clusters    Growth in anaerobic bottle:    Gram Positive Cocci in Clusters      Urinalysis Basic - ( 2018 08:12 )    Color: Red / Appearance: Turbid / S.020 / pH: x  Gluc: x / Ketone: Trace  / Bili: Small / Urobili: 1.0 mg/dL   Blood: x / Protein: >=300 mg/dL / Nitrite: Negative   Leuk Esterase: Large / RBC: >50 /HPF / WBC >50 /HPF   Sq Epi: x / Non Sq Epi: Few /HPF / Bacteria: Moderate /HPF        RADIOLOGY & ADDITIONAL TESTS:  < from: CT Cervical Spine No Cont (18 @ 09:23) >    1.  No acute osseous abnormalities  2.  Trace retrolisthesis of C4 on C5, stable since .  3.  Multilevel degenerative changes of the cervical spine, most severe at   C4-C5 level.          < from: CT Head No Cont (18 @ 09:21) >  IMPRESSION:     1.  No acute intra-cranial pathology.  2.  Chronic microvascular ischemic changes.       < from: Xray Chest 1 View- PORTABLE-Urgent (18 @ 08:47) >  Impression:      1. Increased interstitial edema and pulmonary vascular congestion.  2. Small left basilar opacity, possibly small left pleural effusion   and/or atelectasis.  3. Stable cardiomegaly.       RECENT DIAGNOSTIC ORDERS:  Comprehensive Metabolic Panel: AM Sched. Collection: 2018 04:30 (18 @ 13:25)  Blood Gas Profile - Arterial: 16:00 (18 @ 13:25)  Blood Gas Profile - Arterial: AM Sched. Collection:2018 04:30 (18 @ 13:25)  Troponin T, Serum: AM Sched. Collection: 2018 04:30 (18 @ 13:25)  Xray Chest 1 View- PORTABLE-Routine: AM   Indication: sob  Transport: Portable  Exam Completed  Provider's Contact #: [phone] (18 @ 13:25)  Culture - Blood: AM Sched. Collection:2018 04:30  Specimen Source: Blood (18 @ 13:25)  Culture - Blood: Routine  Specimen Source: Blood (18 @ 13:25)  Culture - Blood: AM Sched. Collection:10-Jul-2018 04:30  Specimen Source: Blood (18 @ 10:24)  Culture - Blood: AM Sched. Collection:2018 04:30  Specimen Source: Blood (18 @ 10:24)  Complete Blood Count + Automated Diff: AM Sched. Collection: 10-Jul-2018 04:30 (18 @ 10:30)  Comprehensive Metabolic Panel: AM Sched. Collection: 10-Jul-2018 04:30 (18 @ 10:30)  Phosphorus Level, Serum: AM Sched. Collection: 10-Jul-2018 04:30 (18 @ 10:30)  Magnesium, Serum: AM Sched. Collection: 10-Jul-2018 04:30 (18 @ 10:30)      MEDICATIONS:  MEDICATIONS  (STANDING):  aspirin  chewable 81 milliGRAM(s) Oral daily  escitalopram 10 milliGRAM(s) Oral daily  ferrous    sulfate 325 milliGRAM(s) Oral daily  heparin  Injectable 5000 Unit(s) SubCutaneous every 8 hours  levothyroxine 125 MICROGram(s) Oral daily  megestrol Suspension 400 milliGRAM(s) Oral daily  metoprolol succinate ER 25 milliGRAM(s) Oral daily  mirtazapine 15 milliGRAM(s) Oral at bedtime  pantoprazole    Tablet 40 milliGRAM(s) Oral before breakfast  senna 1 Tablet(s) Oral at bedtime  vancomycin  IVPB 1250 milliGRAM(s) IV Intermittent once    MEDICATIONS  (PRN):  acetaminophen  Suppository 650 milliGRAM(s) Rectal every 6 hours PRN For Temp greater than 38 C (100.4 F)  melatonin 3 milliGRAM(s) Oral at bedtime PRN Insomnia          PHYSICAL EXAM:  GENERAL: moaning but not in apparent pain. Poorly responsive.   NECK: No Swelling  CHEST/LUNG: Good air entry, No wheezing. (+) Tessio  HEART: RRR, No murmurs  ABDOMEN: Soft, Bowel sounds present  EXTREMITIES:  No clubbing.

## 2018-07-09 NOTE — CONSULT NOTE ADULT - SUBJECTIVE AND OBJECTIVE BOX
NEPHROLOGY CONSULTATION NOTE    83 year old female with PMH of COPD, CHF with AICD placement, DMII, HTN, RA, ESRD on Hemodialysis and hypothyroidism presents to the ED from [facility] for fever and AMS which started 2 days ago and was getting progressively worse Patient is accompanied by her two daughter who state that she has been getting progressively altered from 2 days back. Her fever was measured to be 101 at the facility. Patient fell at the nursing home a couple days back however imaging hear revealed no acute pathology.  PT denies any chest pain, shortness of breath, leg swelling, or dysuria.    PAST MEDICAL & SURGICAL HISTORY:  Arthritis, rheumatoid  Cystitis  COPD (chronic obstructive pulmonary disease)  Neuropathy  Diabetes  Hypothyroidism  HTN (hypertension)  CHF (congestive heart failure)  AICD (automatic cardioverter/defibrillator) present    Allergies:  Plavix (Other (Moderate))    Home Medications Reviewed  Hospital Medications:   MEDICATIONS  (STANDING):  aspirin  chewable 81 milliGRAM(s) Oral daily  cefTRIAXone   IVPB 1 Gram(s) IV Intermittent every 24 hours  escitalopram 10 milliGRAM(s) Oral daily  ferrous    sulfate 325 milliGRAM(s) Oral daily  heparin  Injectable 5000 Unit(s) SubCutaneous every 8 hours  levothyroxine 125 MICROGram(s) Oral daily  megestrol Suspension 400 milliGRAM(s) Oral daily  metoprolol succinate ER 25 milliGRAM(s) Oral daily  mirtazapine 15 milliGRAM(s) Oral at bedtime  pantoprazole    Tablet 40 milliGRAM(s) Oral before breakfast  senna 1 Tablet(s) Oral at bedtime      SOCIAL HISTORY:  Denies ETOH,Smoking,   FAMILY HISTORY:  No pertinent family history in first degree relatives        REVIEW OF SYSTEMS:  CONSTITUTIONAL: No weakness, fevers or chills  EYES/ENT: No visual changes;  No vertigo or throat pain   NECK: No pain or stiffness  RESPIRATORY: No cough, wheezing, hemoptysis; No shortness of breath  CARDIOVASCULAR: No chest pain or palpitations.  GASTROINTESTINAL: No abdominal or epigastric pain. No nausea, vomiting, or hematemesis; No diarrhea or constipation. No melena or hematochezia.  GENITOURINARY: No dysuria, frequency, foamy urine, urinary urgency, incontinence or hematuria  NEUROLOGICAL: No numbness or weakness  SKIN: No itching, burning, rashes, or lesions   VASCULAR: No bilateral lower extremity edema.   All other review of systems is negative unless indicated above.    VITALS:  T(F): 100.6 (18 @ 04:48), Max: 100.6 (18 @ 04:48)  HR: 99 (18 @ 04:48)  BP: 107/55 (18 @ 04:48)  RR: 22 (18 @ 04:48)  SpO2: 98% (18 @ 20:02)     @ 07:01  -   @ 07:00  --------------------------------------------------------  IN: 0 mL / OUT: 300 mL / NET: -300 mL        Weight (kg): 48 ( @ 02:08)      I&O's Detail    2018 07:  -  2018 07:00  --------------------------------------------------------  IN:  Total IN: 0 mL    OUT:    Voided: 300 mL  Total OUT: 300 mL    Total NET: -300 mL            PHYSICAL EXAM:  Constitutional: NAD  HEENT: anicteric sclera, oropharynx clear, MMM  Neck: No JVD  Respiratory: CTAB, no wheezes, rales or rhonchi  Cardiovascular: S1, S2, RRR  Gastrointestinal: BS+, soft, NT/ND  Extremities: No cyanosis or clubbing. No peripheral edema  Neurological: A/O x 3, no focal deficits  Psychiatric: Normal mood, normal affect  : No CVA tenderness. No davis.   Skin: No rashes  Vascular Access:    LABS:      135  |  91<L>  |  53<H>  ----------------------------<  178<H>  4.5   |  22  |  4.3<HH>    Ca    8.4<L>      2018 16:49    TPro  5.8<L>  /  Alb  3.2<L>  /  TBili  0.5  /  DBili      /  AST  84<H>  /  ALT  63<H>  /  AlkPhos  266<H>  -    Creatinine Trend: 4.3 <--, 4.1 <--, 3.5 <--, 2.8 <--, 4.0 <--, 3.5 <--, 5.3 <--, 4.4 <--, 4.4 <--, 5.4 <--, 6.3 <--, 6.3 <--, 6.0 <--, 6.1 <--                        9.7    10.05 )-----------( 171      ( 2018 16:49 )             30.2     Urine Studies:  Urinalysis Basic - ( 2018 08:12 )    Color: Red / Appearance: Turbid / S.020 / pH:   Gluc:  / Ketone: Trace  / Bili: Small / Urobili: 1.0 mg/dL   Blood:  / Protein: >=300 mg/dL / Nitrite: Negative   Leuk Esterase: Large / RBC: >50 /HPF / WBC >50 /HPF   Sq Epi:  / Non Sq Epi: Few /HPF / Bacteria: Moderate /HPF          06-15 @ 11:18  7.7  93  --        RADIOLOGY & ADDITIONAL STUDIES: NEPHROLOGY CONSULTATION NOTE    83 year old female with PMH of COPD, CHF with AICD placement, DMII, HTN, RA, ESRD on Hemodialysis and hypothyroidism presents to the ED from [facility] for fever and AMS which started after having HD HD on friday. PAtient finished treatment and went to NH developed fever no chills no diarrhea, no abdominal pain . Patient was weak lethargic no focal motor sensory deficit no seizure disorder no other complaints     PAST MEDICAL & SURGICAL HISTORY:  Arthritis, rheumatoid  Cystitis  COPD (chronic obstructive pulmonary disease)  Neuropathy  Diabetes  Hypothyroidism  HTN (hypertension)  CHF (congestive heart failure)  AICD (automatic cardioverter/defibrillator) present    Allergies:  Plavix (Other (Moderate))    Home Medications Reviewed  Hospital Medications:   MEDICATIONS  (STANDING):  aspirin  chewable 81 milliGRAM(s) Oral daily  cefTRIAXone   IVPB 1 Gram(s) IV Intermittent every 24 hours  escitalopram 10 milliGRAM(s) Oral daily  ferrous    sulfate 325 milliGRAM(s) Oral daily  heparin  Injectable 5000 Unit(s) SubCutaneous every 8 hours  levothyroxine 125 MICROGram(s) Oral daily  megestrol Suspension 400 milliGRAM(s) Oral daily  metoprolol succinate ER 25 milliGRAM(s) Oral daily  mirtazapine 15 milliGRAM(s) Oral at bedtime  pantoprazole    Tablet 40 milliGRAM(s) Oral before breakfast  senna 1 Tablet(s) Oral at bedtime      SOCIAL HISTORY:  Denies ETOH,Smoking,   FAMILY HISTORY:  No pertinent family history in first degree relatives        REVIEW OF SYSTEMS:  All other review of systems is negative unless indicated above.    VITALS:  T(F): 100.6 (18 @ 04:48), Max: 100.6 (18 @ 04:48)  HR: 99 (18 @ 04:48)  BP: 107/55 (18 @ 04:48)  RR: 22 (18 @ 04:48)  SpO2: 98% (18 @ 20:02)     @ 07:01  -   @ 07:00  --------------------------------------------------------  IN: 0 mL / OUT: 300 mL / NET: -300 mL        Weight (kg): 48 ( @ 02:08)      I&O's Detail    2018 07:01  -  2018 07:00  --------------------------------------------------------  IN:  Total IN: 0 mL    OUT:    Voided: 300 mL  Total OUT: 300 mL    Total NET: -300 mL            PHYSICAL EXAM:  Constitutional: NAD  HEENT: anicteric sclera, oropharynx clear, MMM  Neck: No JVD  Respiratory: Crackles at base   Cardiovascular: S1, S2, RRR  Gastrointestinal: BS+, soft, NT/ND  Extremities: No cyanosis or clubbing. No peripheral edema  Psychiatric: Normal mood, normal affect  Skin: No rashes  Vascular Access:    LABS:      135  |  91<L>  |  53<H>  ----------------------------<  178<H>  4.5   |  22  |  4.3<HH>    Ca    8.4<L>      2018 16:49    TPro  5.8<L>  /  Alb  3.2<L>  /  TBili  0.5  /  DBili      /  AST  84<H>  /  ALT  63<H>  /  AlkPhos  266<H>      Creatinine Trend: 4.3 <--, 4.1 <--, 3.5 <--, 2.8 <--, 4.0 <--, 3.5 <--, 5.3 <--, 4.4 <--, 4.4 <--, 5.4 <--, 6.3 <--, 6.3 <--, 6.0 <--, 6.1 <--                        9.7    10.05 )-----------( 171      ( 2018 16:49 )             30.2     Urine Studies:  Urinalysis Basic - ( 2018 08:12 )    Color: Red / Appearance: Turbid / S.020 / pH:   Gluc:  / Ketone: Trace  / Bili: Small / Urobili: 1.0 mg/dL   Blood:  / Protein: >=300 mg/dL / Nitrite: Negative   Leuk Esterase: Large / RBC: >50 /HPF / WBC >50 /HPF   Sq Epi:  / Non Sq Epi: Few /HPF / Bacteria: Moderate /HPF                  RADIOLOGY & ADDITIONAL STUDIES:    < from: CT Cervical Spine No Cont (18 @ 09:23) >    IMPRESSION:     1.  No acute osseous abnormalities.    2.  Trace retrolisthesis of C4 on C5, stable since .    3.  Multilevel degenerative changes of the cervical spine, most severe at   C4-C5 level.      < end of copied text >

## 2018-07-10 VITALS
DIASTOLIC BLOOD PRESSURE: 52 MMHG | HEART RATE: 84 BPM | RESPIRATION RATE: 14 BRPM | SYSTOLIC BLOOD PRESSURE: 77 MMHG | TEMPERATURE: 96 F

## 2018-07-10 LAB
-  AMPICILLIN/SULBACTAM: SIGNIFICANT CHANGE UP
-  CEFAZOLIN: SIGNIFICANT CHANGE UP
-  CLINDAMYCIN: SIGNIFICANT CHANGE UP
-  DAPTOMYCIN: SIGNIFICANT CHANGE UP
-  ERYTHROMYCIN: SIGNIFICANT CHANGE UP
-  GENTAMICIN: SIGNIFICANT CHANGE UP
-  LINEZOLID: SIGNIFICANT CHANGE UP
-  OXACILLIN: SIGNIFICANT CHANGE UP
-  PENICILLIN: SIGNIFICANT CHANGE UP
-  RIFAMPIN: SIGNIFICANT CHANGE UP
-  TETRACYCLINE: SIGNIFICANT CHANGE UP
-  TRIMETHOPRIM/SULFAMETHOXAZOLE: SIGNIFICANT CHANGE UP
-  VANCOMYCIN: SIGNIFICANT CHANGE UP
ANION GAP SERPL CALC-SCNC: 37 MMOL/L — HIGH (ref 7–14)
APTT BLD: 26.7 SEC — LOW (ref 27–39.2)
BUN SERPL-MCNC: 89 MG/DL — CRITICAL HIGH (ref 10–20)
CALCIUM SERPL-MCNC: 8.3 MG/DL — LOW (ref 8.5–10.1)
CHLORIDE SERPL-SCNC: 91 MMOL/L — LOW (ref 98–110)
CO2 SERPL-SCNC: 12 MMOL/L — LOW (ref 17–32)
CREAT SERPL-MCNC: 5.7 MG/DL — CRITICAL HIGH (ref 0.7–1.5)
CULTURE RESULTS: SIGNIFICANT CHANGE UP
CULTURE RESULTS: SIGNIFICANT CHANGE UP
GLUCOSE SERPL-MCNC: 258 MG/DL — HIGH (ref 70–99)
GRAM STN FLD: SIGNIFICANT CHANGE UP
HCT VFR BLD CALC: 32.8 % — LOW (ref 37–47)
HGB BLD-MCNC: 10.6 G/DL — LOW (ref 12–16)
LACTATE SERPL-SCNC: 5.7 MMOL/L — CRITICAL HIGH (ref 0.5–2.2)
MCHC RBC-ENTMCNC: 28.2 PG — SIGNIFICANT CHANGE UP (ref 27–31)
MCHC RBC-ENTMCNC: 32.3 G/DL — SIGNIFICANT CHANGE UP (ref 32–37)
MCV RBC AUTO: 87.2 FL — SIGNIFICANT CHANGE UP (ref 81–99)
METHOD TYPE: SIGNIFICANT CHANGE UP
NRBC # BLD: 0 /100 WBCS — SIGNIFICANT CHANGE UP (ref 0–0)
ORGANISM # SPEC MICROSCOPIC CNT: SIGNIFICANT CHANGE UP
PLATELET # BLD AUTO: 144 K/UL — SIGNIFICANT CHANGE UP (ref 130–400)
POTASSIUM SERPL-MCNC: 5.9 MMOL/L — HIGH (ref 3.5–5)
POTASSIUM SERPL-SCNC: 5.9 MMOL/L — HIGH (ref 3.5–5)
RBC # BLD: 3.76 M/UL — LOW (ref 4.2–5.4)
RBC # FLD: 15.5 % — HIGH (ref 11.5–14.5)
SODIUM SERPL-SCNC: 140 MMOL/L — SIGNIFICANT CHANGE UP (ref 135–146)
SPECIMEN SOURCE: SIGNIFICANT CHANGE UP
WBC # BLD: 22.49 K/UL — HIGH (ref 4.8–10.8)
WBC # FLD AUTO: 22.49 K/UL — HIGH (ref 4.8–10.8)

## 2018-07-10 RX ORDER — NOREPINEPHRINE BITARTRATE/D5W 8 MG/250ML
0.05 PLASTIC BAG, INJECTION (ML) INTRAVENOUS
Qty: 8 | Refills: 0 | Status: DISCONTINUED | OUTPATIENT
Start: 2018-07-10 | End: 2018-07-10

## 2018-07-10 RX ORDER — VANCOMYCIN HCL 1 G
1000 VIAL (EA) INTRAVENOUS ONCE
Qty: 0 | Refills: 0 | Status: DISCONTINUED | OUTPATIENT
Start: 2018-07-10 | End: 2018-07-10

## 2018-07-10 RX ADMIN — Medication 4.5 MICROGRAM(S)/KG/MIN: at 03:16

## 2018-07-10 RX ADMIN — HEPARIN SODIUM 5000 UNIT(S): 5000 INJECTION INTRAVENOUS; SUBCUTANEOUS at 05:49

## 2018-07-10 NOTE — PROGRESS NOTE ADULT - ASSESSMENT
1.	Bacteremia  She has MRSA infection most likely due to tesio placement  Surgery consulted, Dr Baltazar to follow up for tesio removal and possible Udaul placement for hemodialysis in the future  Gave her one time dose of Vancomycin 1250 mg, Infectious Disease following closely for further management    2.	Elevated troponins  She likely has type-2 NSTEMI due to underlying sepsis    3.	CKD-5   No evidence of overload, will get dialysis if needed in near future  Would need vascular access for dialysis    4.	Toxic metabolic encephalopathy  Likely due to underlying severe infectious process  Patient has an NG tube in place and stable     As per healthcare proxy, patient will be DNI/DNR as per previous wishes communicated with healthcare proxy.    She has poor prognosis!
82 yo lady with PMH of Arthritis, rheumatoid ,COPD , Neuropathy ,Diabetes, Hypothyroidism ,HTN (hypertension) ,CHF, AICD , CKD 5 presenting with fever chills and change of mental status  ·	CKD 5 , no evidence of volume overload   ·	Will hold on HD for today  ·	MRSA bacteremia x2, may need to remove tesio   ·	check 2 D echo / CORNELL   ·	dc Rocephin Azithromycin   ·	Start Vanco 1250 mg x1   ·	ID eval       will follow
follow  up wound cultures   continue abx
trend fever curve  abx
1.	Bacteremia, MRSA. Possibly CVC related vs skin source  2.	Elevated troponins, patient at risk for type-2 NSTEMI due to underlying sepsis  3.	severe sepsis due to #1  4.	ESRD on HD as scheduled  5.	High-risk for I.E.  6.	Toxic metabolic encephalopathy likely due to underlying severe infectious process    PLAN  ·	broad-spectrum intravenous antibiotics   ·	F/U blood culture official results  ·	Echo r/o vegetation/ I.E.  ·	RRT as scheduled  ·	As per ID and renal team: Current tessio to be removed and udall access for HD  ·	if patient allows: NGT for feeding    As per healthcare proxy, patient will be DNI/DNR as per previous wishes communicated with healthcare proxy.  Patient remains with ominously poor prognosis overall despite all care.    I discussed with patient daughters (one is the healthcare proxy) and sister at bedside  I discussed with ID and renal attendings regarding all above.

## 2018-07-10 NOTE — PROGRESS NOTE ADULT - SUBJECTIVE AND OBJECTIVE BOX
VI MOBLEY  83y Female   [id number]    Hospital Day: 3  Post Operative Day:  Procedure:  Patient is a 83y old  Female who presents with a chief complaint of Altered Mental Status, sepsis tesio removed (2018 12:39)    PAST MEDICAL & SURGICAL HISTORY:  Arthritis, rheumatoid  Cystitis  COPD (chronic obstructive pulmonary disease)  Neuropathy  Diabetes  Hypothyroidism  HTN (hypertension)  CHF (congestive heart failure)  AICD (automatic cardioverter/defibrillator) present      Events of the Last 24h:no acute events overnight  Vital Signs Last 24 Hrs  T(C): 35.8 (2018 23:11), Max: 38.1 (2018 04:48)  T(F): 96.5 (2018 23:11), Max: 100.6 (2018 04:48)  HR: 87 (2018 23:11) (82 - 99)  BP: 117/77 (2018 23:11) (85/57 - 117/77)  BP(mean): --  RR: 30 (2018 22:56) (22 - 32)  SpO2: 95% (2018 18:52) (95% - 95%)        Diet, NPO with Tube Feed:   Tube Feeding Modality: Nasogastric  Nepro with Carb Steady  Total Volume for 24 Hours (mL): 960  Continuous  Starting Tube Feed Rate mL per Hour: 10  Increase Tube Feed Rate by (mL): 10     Every hour  Until Goal Tube Feed Rate (mL per Hour): 40  Tube Feed Duration (in Hours): 24  Tube Feed Start Time: 18:00 (18 @ 16:57)      I&O's Summary    2018 07:  -  2018 07:00  --------------------------------------------------------  IN: 0 mL / OUT: 300 mL / NET: -300 mL    2018 07:  -  10 Jul 2018 00:41  --------------------------------------------------------  IN: 0 mL / OUT: 1 mL / NET: -1 mL     I&O's Detail    2018 07:  -  2018 07:00  --------------------------------------------------------  IN:  Total IN: 0 mL    OUT:    Voided: 300 mL  Total OUT: 300 mL    Total NET: -300 mL      2018 07:  -  10 Jul 2018 00:41  --------------------------------------------------------  IN:  Total IN: 0 mL    OUT:    Voided: 1 mL  Total OUT: 1 mL    Total NET: -1 mL          MEDICATIONS  (STANDING):  aspirin  chewable 81 milliGRAM(s) Oral daily  cefepime   IVPB 1000 milliGRAM(s) IV Intermittent every 12 hours  cefepime   IVPB      cefepime Injectable 1000 milliGRAM(s) IntraMuscular once  escitalopram 10 milliGRAM(s) Oral daily  ferrous    sulfate 325 milliGRAM(s) Oral daily  heparin  Injectable 5000 Unit(s) SubCutaneous every 8 hours  levothyroxine 125 MICROGram(s) Oral daily  megestrol Suspension 400 milliGRAM(s) Oral daily  meropenem  IVPB 500 milliGRAM(s) IV Intermittent every 24 hours  metoprolol succinate ER 25 milliGRAM(s) Oral daily  mirtazapine 15 milliGRAM(s) Oral at bedtime  pantoprazole    Tablet 40 milliGRAM(s) Oral before breakfast  senna 1 Tablet(s) Oral at bedtime  sodium chloride 0.9% Bolus 250 milliLiter(s) IV Bolus once    MEDICATIONS  (PRN):  acetaminophen  Suppository 650 milliGRAM(s) Rectal every 6 hours PRN For Temp greater than 38 C (100.4 F)  melatonin 3 milliGRAM(s) Oral at bedtime PRN Insomnia      PHYSICAL EXAM:    GENERAL: NAD    HEENT: NCAT    CHEST/LUNGS: CTAB    HEART: RRR,  No murmurs, rubs, or gallops    ABDOMEN: SNTND +BS    EXTREMITIES:  FROM, No clubbing, cyanosis, or edema, palpable pulse    NEURO: No focal neurological deficits    SKIN: No rashes or lesions    INCISION/WOUNDS:                          6.8    11.51 )-----------( 113      ( 2018 20:33 )             21.5        CBC Full  -  ( 2018 20:33 )  WBC Count : 11.51 K/uL  Hemoglobin : 6.8 g/dL  Hematocrit : 21.5 %  Platelet Count - Automated : 113 K/uL  Mean Cell Volume : 89.2 fL  Mean Cell Hemoglobin : 28.2 pg  Mean Cell Hemoglobin Concentration : 31.6 g/dL  Auto Neutrophil # : x  Auto Lymphocyte # : x  Auto Monocyte # : x  Auto Eosinophil # : x  Auto Basophil # : x  Auto Neutrophil % : x  Auto Lymphocyte % : x  Auto Monocyte % : x  Auto Eosinophil % : x  Auto Basophil % : x               141   |  94    |  76                 Ca: 7.9    BMP:   ----------------------------< 246    Mg: x     (18 @ 20:33)             5.7    |  16    | 5.8                Ph: x        LFT:     TPro: 5.3 / Alb: 3.1 / TBili: 0.7 / DBili: x / AST: 309 / ALT: 157 / AlkPhos: 247   (18 @ 20:33)    LIVER FUNCTIONS - ( 2018 20:33 )  Alb: 3.1 g/dL / Pro: 5.3 g/dL / ALK PHOS: 247 U/L / ALT: 157 U/L / AST: 309 U/L / GGT: x           PT/INR - ( 2018 20:35 )   PT: 14.60 sec;   INR: 1.36 ratio         PTT - ( 2018 20:35 )  PTT:29.5 sec  CARDIAC MARKERS ( 2018 20:33 )  x     / 0.63 ng/mL / x     / x     / x      CARDIAC MARKERS ( 2018 09:49 )  x     / 0.73 ng/mL / x     / x     / x      CARDIAC MARKERS ( 2018 00:50 )  x     / 0.71 ng/mL / x     / x     / x      CARDIAC MARKERS ( 2018 16:49 )  x     / 0.58 ng/mL / x     / x     / x      CARDIAC MARKERS ( 2018 08:12 )  x     / 0.61 ng/mL / x     / x     / 2.1 ng/mL      Urinalysis Basic - ( 2018 08:12 )    Color: Red / Appearance: Turbid / S.020 / pH: x  Gluc: x / Ketone: Trace  / Bili: Small / Urobili: 1.0 mg/dL   Blood: x / Protein: >=300 mg/dL / Nitrite: Negative   Leuk Esterase: Large / RBC: >50 /HPF / WBC >50 /HPF   Sq Epi: x / Non Sq Epi: Few /HPF / Bacteria: Moderate /HPF        Culture - Blood (collected 2018 08:53)  Source: .Blood Blood-Venous  Gram Stain (2018 05:23):    Growth in aerobic bottle:    Gram Positive Cocci in Clusters    Growth in anaerobic bottle:    Gram Positive Cocci in Clusters  Preliminary Report (2018 18:14):    Growth in aerobic and anaerobic bottles: Staphylococcus aureus    "Due to technical problems, Proteus sp. will Not be reported as part of    the BCID panel until further notice"    ***Blood Panel PCR results on this specimen are available    approximately 3 hours after the Gram stain result.***    Gram stain, PCR, and/or culture results may not always    correspond due to difference in methodologies.    ************************************************************    This PCR assay was performed using [employer].    The following targets are tested for: Enterococcus,    vancomycin resistant enterococci, Listeria monocytogenes,    coagulase negative staphylococci, S. aureus,    methicillin resistant S. aureus, Streptococcus agalactiae    (Group B), S. pneumoniae, S. pyogenes (Group A),    Acinetobacter baumannii, Enterobacter cloacae, E. coli,    Klebsiella oxytoca, K. pneumoniae, Proteus sp.,    Serratia marcescens, Haemophilus influenzae,    Neisseria meningitidis, Pseudomonas aeruginosa, Candida    albicans, C. glabrata, C krusei, C parapsilosis,    C. tropicalis and the KPC resistance gene.  Organism: Blood Culture PCR (2018 04:11)  Organism: Blood Culture PCR (2018 04:11)    Culture - Urine (collected 2018 08:12)  Source: .Urine Catheterized  Final Report (2018 11:18):    No growth    Culture - Blood (collected 2018 08:12)  Source: .Blood Blood-Venous  Gram Stain (2018 07:09):    Growth in aerobic bottle:    Gram Positive Cocci in Clusters    Growth in anaerobic bottle:    Gram Positive Cocci in Clusters  Preliminary Report (2018 19:58):    Growth in aerobic and anaerobic bottles:    Staphylococcus aureus    See previous culture [id number]    Growth in anaerobic bottle:    Gram Positive Cocci in Clusters

## 2018-07-10 NOTE — CHART NOTE - NSCHARTNOTEFT_GEN_A_CORE
EXPIRATION NOTE    I was called into the patient's room by the nurse to see the patient. She was unresponsive, motionless, without breathing movements and pulses. I listened for heart sounds, which were absent, pupils were dilated b/l. Patient was pronounced at 9:00 AM. Likely cause of death: Multiorgan failure secondary to septic shock. Family was notified. Paper for aspiration was completed and sent to the Medical Records.

## 2018-07-10 NOTE — PROGRESS NOTE ADULT - SUBJECTIVE AND OBJECTIVE BOX
Nephrology progress note    Patient is seen and examined, events over the last 24 h noted .    Allergies:  Plavix (Other (Moderate))    Hospital Medications:   MEDICATIONS  (STANDING):  aspirin  chewable 81 milliGRAM(s) Oral daily  escitalopram 10 milliGRAM(s) Oral daily  ferrous    sulfate 325 milliGRAM(s) Oral daily  heparin  Injectable 5000 Unit(s) SubCutaneous every 8 hours  levothyroxine 125 MICROGram(s) Oral daily  meropenem  IVPB 500 milliGRAM(s) IV Intermittent every 24 hours  mirtazapine 15 milliGRAM(s) Oral at bedtime  norepinephrine Infusion 0.05 MICROgram(s)/kG/Min (4.884 mL/Hr) IV Continuous <Continuous>  pantoprazole    Tablet 40 milliGRAM(s) Oral before breakfast  senna 1 Tablet(s) Oral at bedtime  sodium chloride 0.9% Bolus 250 milliLiter(s) IV Bolus once  vancomycin  IVPB 1000 milliGRAM(s) IV Intermittent once        VITALS:  T(F): 96 (07-10-18 @ 08:30), Max: 99.4 (18 @ 12:57)  HR: 84 (07-10-18 @ 08:30)  BP: 77/52 (07-10-18 @ 08:30)  RR: 14 (07-10-18 @ 08:30)  SpO2: 99% (07-10-18 @ 06:30)  Wt(kg): --     @ :  -   @ 07:00  --------------------------------------------------------  IN: 0 mL / OUT: 300 mL / NET: -300 mL     @ 07:  -  07-10 @ 07:00  --------------------------------------------------------  IN: 0 mL / OUT: 1 mL / NET: -1 mL    07-10 @ 07:  -  07-10 @ 10:30  --------------------------------------------------------  IN: 9.6 mL / OUT: 0 mL / NET: 9.6 mL        Weight (kg): 52.1 (07-10 @ 02:30)    PHYSICAL EXAM:  Constitutional: NAD  HEENT: anicteric sclera, oropharynx clear, MMM  Neck: No JVD  Respiratory: CTAB, no wheezes, rales or rhonchi  Cardiovascular: S1, S2, RRR  Gastrointestinal: BS+, soft, NT/ND  Extremities: No cyanosis or clubbing. No peripheral edema  :  No davis.   Skin: No rashes    LABS:      140  |  91<L>  |  89<HH>  ----------------------------<  258<H>  5.9<H>   |  12<L>  |  5.7<HH>    Ca    8.3<L>      2018 23:30    TPro  5.3<L>  /  Alb  3.1<L>  /  TBili  0.7  /  DBili      /  AST  309<H>  /  ALT  157<H>  /  AlkPhos  247<H>                            10.6   22.49 )-----------( 144      ( 10 Jul 2018 00:40 )             32.8       Urine Studies:  Urinalysis Basic - ( 2018 08:12 )    Color: Red / Appearance: Turbid / S.020 / pH:   Gluc:  / Ketone: Trace  / Bili: Small / Urobili: 1.0 mg/dL   Blood:  / Protein: >=300 mg/dL / Nitrite: Negative   Leuk Esterase: Large / RBC: >50 /HPF / WBC >50 /HPF   Sq Epi:  / Non Sq Epi: Few /HPF / Bacteria: Moderate /HPF        RADIOLOGY & ADDITIONAL STUDIES:

## 2018-07-11 LAB
-  AMPICILLIN/SULBACTAM: SIGNIFICANT CHANGE UP
-  CEFAZOLIN: SIGNIFICANT CHANGE UP
-  CLINDAMYCIN: SIGNIFICANT CHANGE UP
-  DAPTOMYCIN: SIGNIFICANT CHANGE UP
-  ERYTHROMYCIN: SIGNIFICANT CHANGE UP
-  GENTAMICIN: SIGNIFICANT CHANGE UP
-  LINEZOLID: SIGNIFICANT CHANGE UP
-  OXACILLIN: SIGNIFICANT CHANGE UP
-  PENICILLIN: SIGNIFICANT CHANGE UP
-  RIFAMPIN: SIGNIFICANT CHANGE UP
-  TETRACYCLINE: SIGNIFICANT CHANGE UP
-  TRIMETHOPRIM/SULFAMETHOXAZOLE: SIGNIFICANT CHANGE UP
-  VANCOMYCIN: SIGNIFICANT CHANGE UP
CULTURE RESULTS: SIGNIFICANT CHANGE UP
METHOD TYPE: SIGNIFICANT CHANGE UP
ORGANISM # SPEC MICROSCOPIC CNT: SIGNIFICANT CHANGE UP
ORGANISM # SPEC MICROSCOPIC CNT: SIGNIFICANT CHANGE UP
SPECIMEN SOURCE: SIGNIFICANT CHANGE UP

## 2018-10-12 NOTE — PROGRESS NOTE ADULT - SUBJECTIVE AND OBJECTIVE BOX
[facility]  Discharge Note  Short Stay    Admit Date: 10/12/2018    Discharge Date and Time: 10/12/2018 12:03 PM      Attending Physician: Ne Mas MD     Discharge Provider: Álvaro Denis    Diagnoses:  Active Hospital Problems    Diagnosis  POA    *Penile chordee [N48.89]  Yes    Phimosis [N47.1]  Yes    Penile torsion [N48.82]  Yes      Resolved Hospital Problems   No resolved problems to display.       Discharged Condition: good    Hospital Course: Patient was admitted for circumcision and chordeelysis and tolerated the procedure well with no complications. The patient was discharged home in good condition on the same day.       Final Diagnoses: Same as principal problem.    Disposition: Home or Self Care    Follow up/Patient Instructions:    Medications:  Reconciled Home Medications:   Current Discharge Medication List      START taking these medications    Details   hydrocodone-acetaminophen (HYCET) solution 7.5-325 mg/15mL Take 1.5 mLs by mouth every 4 (four) hours as needed for Pain.  Qty: 15 mL, Refills: 0           Discharge Procedure Orders   Call MD for:  persistent nausea and vomiting or diarrhea     Call MD for:  severe uncontrolled pain     Call MD for:  persistent dizziness, light-headedness, or visual disturbances     Call MD for:   Order Comments: Temperature > 101F     Follow-up Information     Ne Mas.    Specialty:  Urology  Why:  Dr. Mas' team will contact you for post op appointment date  Contact information:  [address] [facility] [address]  [phone]                   Discharge Procedure Orders (must include Diet, Follow-up, Activity):   Discharge Procedure Orders (must include Diet, Follow-up, Activity)   Call MD for:  persistent nausea and vomiting or diarrhea     Call MD for:  severe uncontrolled pain     Call MD for:  persistent dizziness, light-headedness, or visual disturbances     Call MD for:   Order Comments: Temperature > 101F  SUBJECTIVE:    Patient is a 83y old Female who presents with a chief complaint of DKA (01 May 2018 04:15)    Currently admitted to medicine with the primary diagnosis of DKA (diabetic ketoacidoses)     Today is hospital day 17d. This morning she is resting comfortably in bed and reports no new issues or overnight events.     PAST MEDICAL & SURGICAL HISTORY  Neuropathy  Diabetes  Hypothyroidism  HTN (hypertension)  CHF (congestive heart failure)  AICD (automatic cardioverter/defibrillator) present    SOCIAL HISTORY:  Negative for smoking/alcohol/drug use.     ALLERGIES:  Plavix (Other (Moderate))    MEDICATIONS:  STANDING MEDICATIONS  atorvastatin 10 milliGRAM(s) Oral at bedtime  calcium acetate 667 milliGRAM(s) Oral three times a day with meals  ciprofloxacin     Tablet 250 milliGRAM(s) Oral every 12 hours  dextrose 5%. 1000 milliLiter(s) IV Continuous <Continuous>  dextrose 5%. 1000 milliLiter(s) IV Continuous <Continuous>  dextrose 50% Injectable 12.5 Gram(s) IV Push once  dextrose 50% Injectable 25 Gram(s) IV Push once  dextrose 50% Injectable 12.5 Gram(s) IV Push once  dextrose 50% Injectable 25 Gram(s) IV Push once  dextrose 50% Injectable 25 Gram(s) IV Push once  docusate sodium 100 milliGRAM(s) Oral three times a day  dronabinol 2.5 milliGRAM(s) Oral before lunch  epoetin mohsen Injectable 5000 Unit(s) SubCutaneous every 7 days  escitalopram 10 milliGRAM(s) Oral daily  ferrous    sulfate 325 milliGRAM(s) Oral three times a day  heparin  Injectable 5000 Unit(s) SubCutaneous every 12 hours  insulin glargine Injectable (LANTUS) 5 Unit(s) SubCutaneous every morning  insulin lispro (HumaLOG) corrective regimen sliding scale   SubCutaneous three times a day before meals  insulin lispro Injectable (HumaLOG) 2 Unit(s) SubCutaneous before breakfast  insulin lispro Injectable (HumaLOG) 2 Unit(s) SubCutaneous before lunch  insulin lispro Injectable (HumaLOG) 2 Unit(s) SubCutaneous before dinner  levothyroxine 100 MICROGram(s) Oral daily  lidocaine   Patch 1 Patch Transdermal daily  lidocaine   Patch      melatonin 5 milliGRAM(s) Oral at bedtime  metoprolol succinate ER 50 milliGRAM(s) Oral daily  nystatin Powder 1 Application(s) Topical two times a day  senna 1 Tablet(s) Oral at bedtime  sodium polystyrene sulfonate Suspension 15 Gram(s) Oral two times a day    PRN MEDICATIONS  acetaminophen   Tablet. 650 milliGRAM(s) Oral every 6 hours PRN  aluminum hydroxide/magnesium hydroxide/simethicone Suspension 30 milliLiter(s) Oral once PRN  aluminum hydroxide/magnesium hydroxide/simethicone Suspension 30 milliLiter(s) Oral every 6 hours PRN  benzocaine 15 mG/menthol 3.6 mG Lozenge 1 Lozenge Oral every 3 hours PRN  dextrose 40% Gel 15 Gram(s) Oral once PRN  dextrose Gel 1 Dose(s) Oral once PRN  diphenhydrAMINE   Capsule 25 milliGRAM(s) Oral every 4 hours PRN  glucagon  Injectable 1 milliGRAM(s) IntraMuscular once PRN  glucagon  Injectable 1 milliGRAM(s) IntraMuscular once PRN  simethicone 80 milliGRAM(s) Chew every 8 hours PRN    VITALS:   T(F): 97.1  HR: 66  BP: 103/57  RR: 18  SpO2: --    LABS:                        9.2    6.63  )-----------( 388      ( 16 May 2018 09:10 )             28.6     05-16    140  |  100  |  59<H>  ----------------------------<  159<H>  5.1<H>   |  27  |  4.2<HH>    Ca    8.1<L>      16 May 2018 09:10  Phos  3.7     05-16  Mg     2.0     05-16      RADIOLOGY:  Xray Chest 1 View AP/PA (05.08.18)  Small left pleural effusion and right basilar atelectasis without definitive consolidation identified.      PHYSICAL EXAM:  GEN: No acute distress, frail  LUNGS: Clear to auscultation bilaterally   HEART: S1/S2 present. RRR.   ABD: Soft, non-tender, non-distended. Bowel sounds present  EXT: NC/NC/NE/2+PP/PLASENCIA  NEURO: AAOX3  Collins +

## 2019-01-11 ENCOUNTER — APPOINTMENT (OUTPATIENT)
Dept: CARDIOLOGY | Facility: CLINIC | Age: 84
End: 2019-01-11

## 2020-02-05 NOTE — ED PROVIDER NOTE - URETHRAL CATH EVALUATION
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   Pt failed to show for initial eval.     Antonina Stewart DPT, #[id number]
urine sent to lab for C/S

## 2020-04-03 NOTE — PATIENT PROFILE ADULT. - PRO INTERPRETER NEED 2
Implemented All Universal Safety Interventions:  [place] to call system. Call bell, personal items and telephone within reach. Instruct patient to call for assistance. Room bathroom lighting operational. Non-slip footwear when patient is off stretcher. Physically safe environment: no spills, clutter or unnecessary equipment. Stretcher in lowest position, wheels locked, appropriate side rails in place. English

## 2020-07-07 NOTE — PROGRESS NOTE ADULT - ASSESSMENT
Mrs Lloyd is an elderly Female with above PMH s/p ICU Care for DKA on the floor Day #1.     Problem List  1. DKA (resolved)  2. DREW on CKD (stable per Nephro) / Fluid Overload from RF  3. High Anion Gap Metabolic Acidosis 2/2 RF  4. Anemia (Acute on Chronic - no acute blood loss / +Chronic Anemia due to CKD)  5. Bilateral Pleural Effusions - Fluid Overload - RF - Echo with Valvulopathy  6. Emphesematous Cystitis - Need  f/u Regarding Davis - On Meropenem   7. Metabolic Encephalopathy - RF, UTI, Acidosis  8. CODE STATUS - DNR / DNI - Will speak to family to sign forms  9. HyperKalemia s/p Bicarb/Insulin  10. Sepsis POA   11. Acute on Chronic Systolic CHF s/p AICD / Current EF 55%  12. Poor Long Term Prognosis    Plan:   ID Consult  Continue Meropenem for now  Continue Davis -  Consult   Patient is AOx3 she consents to HD if needed - Daughter is HCP  Follow Kidney Function / K / f/u BMP / LFTs SENT   recs - continue abx and davis  Cultures Blood / Urine Negative  Lasix 40mg IV q12   Will follow    Time for all care 35min / Case d/w Family at bedside and Nephrology Team. Yes
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
## 2021-03-30 NOTE — PROGRESS NOTE ADULT - ASSESSMENT
Called pt and offered and appt same day as dr Alka Hernández on 4/29. She says she can't afford that appt and it's too many appt.  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    ·	CKD 5 progressive on H  ·	for HD today 3h opti 160 tesio 2k UF 1l as tolerated       ·	DM - on Insulin, frequent FS to avoid hypoglycemia    ·	Pt needs set up for outpt HD at [facility] or [facility] with HD    Will follow

## 2022-06-06 NOTE — ED ADULT NURSE NOTE - ASSIST WITH
Call to patient to inform her that her insurance will not cover the potassium powder packets. Patient wishes to have prescription sent anyway and if it is too expensive, she will call back to have a tablet prescription sent.     Will send the patient's request to have a letter to work at home to Dr. Evans, if he agrees, will write letter and fax to the patient at [phone]. Patient states that the covid infection percentages are high and that she does not wish to get covid after everything she went and is still going through related to her cancer.    standing/toileting/walking

## 2022-10-28 NOTE — PATIENT PROFILE ADULT. - HEALTHCARE QUESTIONS, PROFILE
Patient's remains in A-fib but more rate controlled. HR varies between 70-90s. Patient sleeping comfortably in bed with no complaints.     Electronically signed by Atif Lai RN on 10/28/2022 at 6:37 AM NONE

## 2023-02-23 NOTE — DIETITIAN INITIAL EVALUATION ADULT. - PHYSICAL APPEARANCE
Sore throat Monday, vomiting, diarrhea both stopped, now just sore throat with white spots. decreased appetite, taken elderberry, Headache frontal since Monday as well 7/10 ache BMI 20, lethargic, skin: tear (BS 16)/well nourished

## 2023-05-04 NOTE — PATIENT PROFILE ADULT. - SPIRITUAL CULTURAL, RELIGIOUS PRACTICES/VALUES, PROFILE
none
left knee not assessed due to surgery/bilateral upper extremity ROM was WFL (within functional limits)/bilateral lower extremity ROM was WFL (within functional limits)

## 2023-09-21 NOTE — ED ADULT TRIAGE NOTE - MODE OF ARRIVAL
Caller: Agustina Mendez    Relationship: Self    Best call back number: [phone]    Requested Prescriptions:   Requested Prescriptions     Pending Prescriptions Disp Refills    amphetamine-dextroamphetamine (Adderall) 10 MG tablet 60 tablet 0     Sig: Take 1 tablet by mouth Daily.    ALPRAZolam (XANAX) 0.5 MG tablet 90 tablet 0     Sig: Take 1 tablet by mouth At Night As Needed for Anxiety.        Pharmacy where request should be sent: [facility]ACY #[id number] - [place], [place] - [address] - [phone]  - [phone] FX     Last office visit with prescribing clinician: 8/15/2023   Last telemedicine visit with prescribing clinician: 6/27/2023   Next office visit with prescribing clinician: 9/26/2023     Additional details provided by patient:     Does the patient have less than a 3 day supply:  [x] Yes  [] No    Would you like a call back once the refill request has been completed: [] Yes [x] No    If the office needs to give you a call back, can they leave a voicemail: [] Yes [x] No    
EM[facility]

## 2023-10-27 NOTE — PROGRESS NOTE ADULT - SUBJECTIVE AND OBJECTIVE BOX
Patient is a 83y old  Female who presents with a chief complaint of DKA (01 May 2018 04:15)      Ovenight events:    PAST MEDICAL & SURGICAL HISTORY:  Neuropathy  Diabetes  Hypothyroidism  HTN (hypertension)  CHF (congestive heart failure)  AICD (automatic cardioverter/defibrillator) present        FAMILY HISTORY:  No pertinent family history in first degree relatives        Allergies    Plavix (Other (Moderate))    Intolerances        acetaminophen   Tablet. 650 milliGRAM(s) Oral every 6 hours PRN  atorvastatin 10 milliGRAM(s) Oral at bedtime  benzocaine 15 mG/menthol 3.6 mG Lozenge 1 Lozenge Oral every 3 hours PRN  dextrose 5%. 1000 milliLiter(s) IV Continuous <Continuous>  dextrose 50% Injectable 12.5 Gram(s) IV Push once  dextrose 50% Injectable 25 Gram(s) IV Push once  dextrose 50% Injectable 25 Gram(s) IV Push once  dextrose Gel 1 Dose(s) Oral once PRN  escitalopram 10 milliGRAM(s) Oral daily  glucagon  Injectable 1 milliGRAM(s) IntraMuscular once PRN  heparin  Injectable 5000 Unit(s) SubCutaneous every 12 hours  insulin glargine Injectable (LANTUS) 5 Unit(s) SubCutaneous every morning  insulin glargine Injectable (LANTUS) 5 Unit(s) SubCutaneous at bedtime  insulin lispro (HumaLOG) corrective regimen sliding scale   SubCutaneous three times a day before meals  insulin lispro (HumaLOG) corrective regimen sliding scale   SubCutaneous three times a day before meals  insulin lispro Injectable (HumaLOG) 4 Unit(s) SubCutaneous before breakfast  insulin lispro Injectable (HumaLOG) 4 Unit(s) SubCutaneous before lunch  insulin lispro Injectable (HumaLOG) 4 Unit(s) SubCutaneous before dinner  levothyroxine 100 MICROGram(s) Oral daily  melatonin 5 milliGRAM(s) Oral at bedtime  meropenem  IVPB 500 milliGRAM(s) IV Intermittent every 24 hours  metoprolol succinate ER 50 milliGRAM(s) Oral daily  sodium bicarbonate 650 milliGRAM(s) Oral every 8 hours  : Home Meds:      PHYSICAL EXAM    ICU Vital Signs Last 24 Hrs  T(C): 37 (04 May 2018 05:47), Max: 37 (04 May 2018 05:47)  T(F): 98.6 (04 May 2018 05:47), Max: 98.6 (04 May 2018 05:47)  HR: 59 (04 May 2018 05:47) (59 - 75)  BP: 142/64 (04 May 2018 05:47) (121/62 - 142/64)  BP(mean): --  ABP: --  ABP(mean): --  RR: 17 (04 May 2018 05:47) (16 - 20)  SpO2: --      Physical exam  Gen: lethargic  heent: no lymphadenopathy  Cardio: RRR, s1 s2 noted  pulm: lungs clear to auscultation b/l, no rales, crackles noted  abd: soft, non tender  Extremities: edematous b/l arms and legs non pitting edema  neuro: AA&Ox3, lethargic       18 @ 07:01  -  18 @ 07:00  --------------------------------------------------------  IN:    Packed Red Blood Cells: 276 mL  Total IN: 276 mL    OUT:    Indwelling Catheter - Urethral: 850 mL  Total OUT: 850 mL    Total NET: -574 mL          LABS:                          7.6    9.90  )-----------( 257      ( 04 May 2018 08:38 )             22.7                                               05-04    131<L>  |  95<L>  |  111<HH>  ----------------------------<  171<H>  5.2<H>   |  19  |  5.4<HH>    Ca    6.8<L>      04 May 2018 12:12                                               Urinalysis Basic - ( 03 May 2018 17:57 )    Color: Yellow / Appearance: Cloudy / S.010 / pH: x  Gluc: x / Ketone: Negative  / Bili: Negative / Urobili: 0.2 mg/dL   Blood: x / Protein: 30 mg/dL / Nitrite: Negative   Leuk Esterase: Large / RBC: 5-10 /HPF / WBC >50 /HPF   Sq Epi: x / Non Sq Epi: Occasional /HPF / Bacteria: Few /HPF        CARDIAC MARKERS ( 04 May 2018 12:12 )  x     / <0.01 ng/mL / 25 U/L / x     / 1.8 ng/mL                                                                                     Culture - Blood (collected 02 May 2018 11:09)  Source: .Blood None  Preliminary Report (03 May 2018 17:01):    No growth to date.    Culture - Blood (collected 02 May 2018 04:27)  Source: .Blood None  Preliminary Report (03 May 2018 13:02):    No growth to date.       Culture - Urine (18 @ 08:59)    -  Amikacin: S <=8    -  Amoxicillin/Clavulanic Acid: S <=8/4    -  Ampicillin: R >16    -  Ampicillin/Sulbactam: R >16/8    -  Aztreonam: S <=4    -  Cefazolin: S S<=2    -  Cefepime: S <=2    -  Cefoxitin: S 8    -  Ceftriaxone: S <=1    -  Ciprofloxacin: S <=0.5    -  Ertapenem: S <=0.5    -  Gentamicin: S <=1    -  Imipenem: S <=1    -  Levofloxacin: S <=1    -  Meropenem: S <=1    -  Nitrofurantoin: S <=32    -  Piperacillin/Tazobactam: S <=8    -  Tigecycline: S <=1    -  Tobramycin: S <=2    -  Trimethoprim/Sulfamethoxazole: S <=0.5/9.5    Specimen Source: .Urine Catheterized    Culture Results:   10,000 - 49,000 CFU/mL Escherichia coli    Organism Identification: Escherichia coli    Organism: Escherichia coli    Method Type: NOEL                                                                                    ABG - ( 03 May 2018 14:40 )  pH, Arterial: 7.34  pH, Blood: x     /  pCO2: 31    /  pO2: 68    / HCO3: 17    / Base Excess: -8.2  /  SaO2: 94                  X-Rays:                                                                                         ECHO:    MEDICATIONS  (STANDING):  atorvastatin 10 milliGRAM(s) Oral at bedtime  dextrose 5%. 1000 milliLiter(s) (50 mL/Hr) IV Continuous <Continuous>  dextrose 50% Injectable 12.5 Gram(s) IV Push once  dextrose 50% Injectable 25 Gram(s) IV Push once  dextrose 50% Injectable 25 Gram(s) IV Push once  escitalopram 10 milliGRAM(s) Oral daily  heparin  Injectable 5000 Unit(s) SubCutaneous every 12 hours  insulin glargine Injectable (LANTUS) 5 Unit(s) SubCutaneous every morning  insulin glargine Injectable (LANTUS) 5 Unit(s) SubCutaneous at bedtime  insulin lispro (HumaLOG) corrective regimen sliding scale   SubCutaneous three times a day before meals  insulin lispro (HumaLOG) corrective regimen sliding scale   SubCutaneous three times a day before meals  insulin lispro Injectable (HumaLOG) 4 Unit(s) SubCutaneous before breakfast  insulin lispro Injectable (HumaLOG) 4 Unit(s) SubCutaneous before lunch  insulin lispro Injectable (HumaLOG) 4 Unit(s) SubCutaneous before dinner  levothyroxine 100 MICROGram(s) Oral daily  melatonin 5 milliGRAM(s) Oral at bedtime  meropenem  IVPB 500 milliGRAM(s) IV Intermittent every 24 hours  metoprolol succinate ER 50 milliGRAM(s) Oral daily  sodium bicarbonate 650 milliGRAM(s) Oral every 8 hours    MEDICATIONS  (PRN):  acetaminophen   Tablet. 650 milliGRAM(s) Oral every 6 hours PRN Mild Pain (1 - 3)  benzocaine 15 mG/menthol 3.6 mG Lozenge 1 Lozenge Oral every 3 hours PRN Sore Throat  dextrose Gel 1 Dose(s) Oral once PRN Blood Glucose LESS THAN 70 milliGRAM(s)/deciliter  glucagon  Injectable 1 milliGRAM(s) IntraMuscular once PRN Glucose LESS THAN 70 milligrams/deciliter Problem: Discharge Planning  Goal: Discharge to home or other facility with appropriate resources  10/26/2023 2300 by Odell Roberts RN  Outcome: Progressing     Problem: Pain  Goal: Verbalizes/displays adequate comfort level or baseline comfort level  10/26/2023 2300 by Odell Roberts RN  Outcome: Progressing     Problem: Skin/Tissue Integrity  Goal: Absence of new skin breakdown  Description: 1. Monitor for areas of redness and/or skin breakdown  2. Assess vascular access sites hourly  3. Every 4-6 hours minimum:  Change oxygen saturation probe site  4. Every 4-6 hours:  If on nasal continuous positive airway pressure, respiratory therapy assess nares and determine need for appliance change or resting period.   10/26/2023 2300 by Odell Roberts RN  Outcome: Progressing     Problem: ABCDS Injury Assessment  Goal: Absence of physical injury  10/26/2023 2300 by Odell Roberts RN  Outcome: Progressing     Problem: Safety - Adult  Goal: Free from fall injury  10/26/2023 2300 by Odell Roberts RN  Outcome: Progressing     Problem: Chronic Conditions and Co-morbidities  Goal: Patient's chronic conditions and co-morbidity symptoms are monitored and maintained or improved  10/26/2023 2300 by Odell Roberts RN  Outcome: Progressing

## 2024-01-18 NOTE — ED ADULT NURSE NOTE - OBJECTIVE STATEMENT
Routed to PCP to see message/advise.   pt marquis from [facility] c.o. general body aches, b/l leg pain. pt s/p hemo 2 days ago. pts daughter states "she did not urinate yesterday and her legs have been restless"
